# Patient Record
Sex: FEMALE | Race: WHITE | HISPANIC OR LATINO | URBAN - METROPOLITAN AREA
[De-identification: names, ages, dates, MRNs, and addresses within clinical notes are randomized per-mention and may not be internally consistent; named-entity substitution may affect disease eponyms.]

---

## 2018-10-19 ENCOUNTER — INPATIENT (INPATIENT)
Facility: HOSPITAL | Age: 78
LOS: 1 days | Discharge: HOME | DRG: 330 | End: 2018-10-21
Attending: SURGERY | Admitting: SURGERY
Payer: MEDICARE

## 2018-10-19 ENCOUNTER — RESULT REVIEW (OUTPATIENT)
Age: 78
End: 2018-10-19

## 2018-10-19 VITALS
DIASTOLIC BLOOD PRESSURE: 77 MMHG | SYSTOLIC BLOOD PRESSURE: 112 MMHG | HEART RATE: 80 BPM | OXYGEN SATURATION: 95 % | TEMPERATURE: 97 F | RESPIRATION RATE: 20 BRPM | WEIGHT: 139.99 LBS

## 2018-10-19 DIAGNOSIS — Z98.890 OTHER SPECIFIED POSTPROCEDURAL STATES: Chronic | ICD-10-CM

## 2018-10-19 LAB
ALBUMIN SERPL ELPH-MCNC: 4.6 G/DL — SIGNIFICANT CHANGE UP (ref 3.3–5)
ALP SERPL-CCNC: 86 U/L — SIGNIFICANT CHANGE UP (ref 40–120)
ALT FLD-CCNC: 19 U/L — SIGNIFICANT CHANGE UP (ref 10–45)
ANION GAP SERPL CALC-SCNC: 16 MMOL/L — SIGNIFICANT CHANGE UP (ref 5–17)
APTT BLD: 22.5 SEC — LOW (ref 27.5–37.4)
AST SERPL-CCNC: 18 U/L — SIGNIFICANT CHANGE UP (ref 10–40)
BASOPHILS NFR BLD AUTO: 0.3 % — SIGNIFICANT CHANGE UP (ref 0–2)
BILIRUB SERPL-MCNC: 1.1 MG/DL — SIGNIFICANT CHANGE UP (ref 0.2–1.2)
BLD GP AB SCN SERPL QL: NEGATIVE — SIGNIFICANT CHANGE UP
BUN SERPL-MCNC: 67 MG/DL — HIGH (ref 7–23)
CALCIUM SERPL-MCNC: 10.3 MG/DL — SIGNIFICANT CHANGE UP (ref 8.4–10.5)
CHLORIDE SERPL-SCNC: 74 MMOL/L — LOW (ref 96–108)
CO2 SERPL-SCNC: 39 MMOL/L — HIGH (ref 22–31)
CREAT SERPL-MCNC: 1.79 MG/DL — HIGH (ref 0.5–1.3)
EOSINOPHIL NFR BLD AUTO: 0.1 % — SIGNIFICANT CHANGE UP (ref 0–6)
GLUCOSE SERPL-MCNC: 177 MG/DL — HIGH (ref 70–99)
HCT VFR BLD CALC: 44.5 % — SIGNIFICANT CHANGE UP (ref 34.5–45)
HGB BLD-MCNC: 15.4 G/DL — SIGNIFICANT CHANGE UP (ref 11.5–15.5)
INR BLD: 0.96 — SIGNIFICANT CHANGE UP (ref 0.88–1.16)
LACTATE SERPL-SCNC: 1.8 MMOL/L — SIGNIFICANT CHANGE UP (ref 0.5–2)
LIDOCAIN IGE QN: 169 U/L — HIGH (ref 7–60)
LYMPHOCYTES # BLD AUTO: 12 % — LOW (ref 13–44)
MAGNESIUM SERPL-MCNC: 3.1 MG/DL — HIGH (ref 1.6–2.6)
MCHC RBC-ENTMCNC: 30.6 PG — SIGNIFICANT CHANGE UP (ref 27–34)
MCHC RBC-ENTMCNC: 34.6 G/DL — SIGNIFICANT CHANGE UP (ref 32–36)
MCV RBC AUTO: 88.5 FL — SIGNIFICANT CHANGE UP (ref 80–100)
MONOCYTES NFR BLD AUTO: 16.4 % — HIGH (ref 2–14)
NEUTROPHILS NFR BLD AUTO: 71.2 % — SIGNIFICANT CHANGE UP (ref 43–77)
PLATELET # BLD AUTO: 270 K/UL — SIGNIFICANT CHANGE UP (ref 150–400)
POTASSIUM SERPL-MCNC: 3.6 MMOL/L — SIGNIFICANT CHANGE UP (ref 3.5–5.3)
POTASSIUM SERPL-SCNC: 3.6 MMOL/L — SIGNIFICANT CHANGE UP (ref 3.5–5.3)
PROT SERPL-MCNC: 7.6 G/DL — SIGNIFICANT CHANGE UP (ref 6–8.3)
PROTHROM AB SERPL-ACNC: 10.7 SEC — SIGNIFICANT CHANGE UP (ref 9.8–12.7)
RBC # BLD: 5.03 M/UL — SIGNIFICANT CHANGE UP (ref 3.8–5.2)
RBC # FLD: 12.9 % — SIGNIFICANT CHANGE UP (ref 10.3–16.9)
RH IG SCN BLD-IMP: POSITIVE — SIGNIFICANT CHANGE UP
RH IG SCN BLD-IMP: POSITIVE — SIGNIFICANT CHANGE UP
SODIUM SERPL-SCNC: 129 MMOL/L — LOW (ref 135–145)
WBC # BLD: 7.9 K/UL — SIGNIFICANT CHANGE UP (ref 3.8–10.5)
WBC # FLD AUTO: 7.9 K/UL — SIGNIFICANT CHANGE UP (ref 3.8–10.5)

## 2018-10-19 PROCEDURE — 74176 CT ABD & PELVIS W/O CONTRAST: CPT | Mod: 26

## 2018-10-19 PROCEDURE — 99285 EMERGENCY DEPT VISIT HI MDM: CPT | Mod: 25

## 2018-10-19 PROCEDURE — 71045 X-RAY EXAM CHEST 1 VIEW: CPT | Mod: 26,76

## 2018-10-19 PROCEDURE — 93010 ELECTROCARDIOGRAM REPORT: CPT

## 2018-10-19 RX ORDER — ONDANSETRON 8 MG/1
4 TABLET, FILM COATED ORAL ONCE
Qty: 0 | Refills: 0 | Status: COMPLETED | OUTPATIENT
Start: 2018-10-19 | End: 2018-10-19

## 2018-10-19 RX ORDER — HYDROMORPHONE HYDROCHLORIDE 2 MG/ML
0.5 INJECTION INTRAMUSCULAR; INTRAVENOUS; SUBCUTANEOUS
Qty: 0 | Refills: 0 | Status: DISCONTINUED | OUTPATIENT
Start: 2018-10-19 | End: 2018-10-21

## 2018-10-19 RX ORDER — HEPARIN SODIUM 5000 [USP'U]/ML
5000 INJECTION INTRAVENOUS; SUBCUTANEOUS EVERY 8 HOURS
Qty: 0 | Refills: 0 | Status: DISCONTINUED | OUTPATIENT
Start: 2018-10-19 | End: 2018-10-21

## 2018-10-19 RX ORDER — IOHEXOL 300 MG/ML
30 INJECTION, SOLUTION INTRAVENOUS ONCE
Qty: 0 | Refills: 0 | Status: COMPLETED | OUTPATIENT
Start: 2018-10-19 | End: 2018-10-19

## 2018-10-19 RX ORDER — ONDANSETRON 8 MG/1
4 TABLET, FILM COATED ORAL EVERY 6 HOURS
Qty: 0 | Refills: 0 | Status: DISCONTINUED | OUTPATIENT
Start: 2018-10-19 | End: 2018-10-19

## 2018-10-19 RX ORDER — POTASSIUM CHLORIDE 20 MEQ
10 PACKET (EA) ORAL
Qty: 0 | Refills: 0 | Status: COMPLETED | OUTPATIENT
Start: 2018-10-19 | End: 2018-10-19

## 2018-10-19 RX ORDER — SODIUM CHLORIDE 9 MG/ML
1000 INJECTION, SOLUTION INTRAVENOUS
Qty: 0 | Refills: 0 | Status: DISCONTINUED | OUTPATIENT
Start: 2018-10-19 | End: 2018-10-19

## 2018-10-19 RX ORDER — SODIUM CHLORIDE 9 MG/ML
1000 INJECTION INTRAMUSCULAR; INTRAVENOUS; SUBCUTANEOUS ONCE
Qty: 0 | Refills: 0 | Status: COMPLETED | OUTPATIENT
Start: 2018-10-19 | End: 2018-10-19

## 2018-10-19 RX ORDER — SODIUM CHLORIDE 9 MG/ML
1000 INJECTION, SOLUTION INTRAVENOUS
Qty: 0 | Refills: 0 | Status: DISCONTINUED | OUTPATIENT
Start: 2018-10-19 | End: 2018-10-21

## 2018-10-19 RX ADMIN — ONDANSETRON 4 MILLIGRAM(S): 8 TABLET, FILM COATED ORAL at 02:53

## 2018-10-19 RX ADMIN — Medication 100 MILLIEQUIVALENT(S): at 08:02

## 2018-10-19 RX ADMIN — HYDROMORPHONE HYDROCHLORIDE 0.5 MILLIGRAM(S): 2 INJECTION INTRAMUSCULAR; INTRAVENOUS; SUBCUTANEOUS at 13:05

## 2018-10-19 RX ADMIN — ONDANSETRON 4 MILLIGRAM(S): 8 TABLET, FILM COATED ORAL at 06:07

## 2018-10-19 RX ADMIN — HYDROMORPHONE HYDROCHLORIDE 0.5 MILLIGRAM(S): 2 INJECTION INTRAMUSCULAR; INTRAVENOUS; SUBCUTANEOUS at 16:10

## 2018-10-19 RX ADMIN — HEPARIN SODIUM 5000 UNIT(S): 5000 INJECTION INTRAVENOUS; SUBCUTANEOUS at 22:06

## 2018-10-19 RX ADMIN — SODIUM CHLORIDE 1000 MILLILITER(S): 9 INJECTION INTRAMUSCULAR; INTRAVENOUS; SUBCUTANEOUS at 06:51

## 2018-10-19 RX ADMIN — SODIUM CHLORIDE 110 MILLILITER(S): 9 INJECTION, SOLUTION INTRAVENOUS at 12:41

## 2018-10-19 RX ADMIN — HYDROMORPHONE HYDROCHLORIDE 0.5 MILLIGRAM(S): 2 INJECTION INTRAMUSCULAR; INTRAVENOUS; SUBCUTANEOUS at 16:36

## 2018-10-19 RX ADMIN — HYDROMORPHONE HYDROCHLORIDE 0.5 MILLIGRAM(S): 2 INJECTION INTRAMUSCULAR; INTRAVENOUS; SUBCUTANEOUS at 12:40

## 2018-10-19 RX ADMIN — IOHEXOL 30 MILLILITER(S): 300 INJECTION, SOLUTION INTRAVENOUS at 02:53

## 2018-10-19 RX ADMIN — SODIUM CHLORIDE 1000 MILLILITER(S): 9 INJECTION INTRAMUSCULAR; INTRAVENOUS; SUBCUTANEOUS at 02:52

## 2018-10-19 NOTE — H&P ADULT - NSHPPHYSICALEXAM_GEN_ALL_CORE
Gen: Awake, Alert, oriented  Resp: non labored breathing  Abdomen:  Soft, Distended, Not tender  Femoral hernia with incarcerated bowel, erythema in overlying skin

## 2018-10-19 NOTE — H&P ADULT - NSHPREVIEWOFSYSTEMS_GEN_ALL_CORE
Gen: No headache, no blurry vision  Chest: No cough, No SOB  CVS: No chest pain, No palpitations  GI/: as mentioned above

## 2018-10-19 NOTE — H&P ADULT - NSHPLABSRESULTS_GEN_ALL_CORE
15.4   7.9   )-----------( 270      ( 19 Oct 2018 02:43 )             44.5   10-19    129<L>  |  74<L>  |  67<H>  ----------------------------<  177<H>  3.6   |  39<H>  |  1.79<H>    Ca    10.3      19 Oct 2018 02:43  Mg     3.1     10-19    TPro  7.6  /  Alb  4.6  /  TBili  1.1  /  DBili  x   /  AST  18  /  ALT  19  /  AlkPhos  86  10-19

## 2018-10-19 NOTE — PROGRESS NOTE ADULT - ASSESSMENT
A/P: 78y Female s/p above procedure  Diet: NPO  IVF: LR  NGT  mujica  Pain/nausea control  SQH/SCDs/OOBA/IS  Dispo pending pain control, PO tolerance, clinical improvement

## 2018-10-19 NOTE — H&P ADULT - ASSESSMENT
78y, present with 7 days history of Obstipation, Nausea and vomiting, Labs showing CASSANDRA 1.79, WBC 7.9, Admitted with Incarcerated groin hernia, Trial of bedside reduction failed for hernia reduction, , possible bowel resection and anastomosis.    Plan:  - NPO  - IV fluid LR 100ml/hr  - NG tube, 2200ml of Bile on insertion  - Added on to OR for surgery with   - Follow up CXR  - Post op care  - Resume Atenolol 25mg Po Once daily after surgery  - Seen and Examined with the Chief resident

## 2018-10-19 NOTE — H&P ADULT - HISTORY OF PRESENT ILLNESS
78 Year old, History of arrythmia (S/p ablation on Atenolol), Present with constipation of 1 week duration, last BM was Thursday 10/11 and was loose, 1 episode of pain on Saturday, generalized, crampy that improved on it's on and multiple episode of non bloody vomiting that didn't improve, 78 Year old, History of arrythmia (S/p ablation on Atenolol), Present with constipation of 1 week duration, last BM was Thursday 10/11 and was loose, No Stool or gas since, 1 episode of pain on Saturday, generalized, crampy that improved on it's on and multiple episode of non bloody vomiting. patient couldn't keep food down, she thought it's just constipation and tried suppositories but it didn't work. She denies any fever, She had recent weight loss of 30lbs, no hematuria or dysuria

## 2018-10-19 NOTE — BRIEF OPERATIVE NOTE - OPERATION/FINDINGS
Right groin cutdown to inguinal ligament performed. No inguinal hernia defect appreciated. Right femoral defect with small (5cm) segment of gangrenous small intestine incarcerated and inspected. Aforementioned segment resected with primary stapled anastomosis (linear DONNIE). Edmond repair performed.

## 2018-10-19 NOTE — PROGRESS NOTE ADULT - SUBJECTIVE AND OBJECTIVE BOX
POST-OPERATIVE NOTE    Procedure: right femoral hernia repair with small bowel resection    Diagnosis/Indication: incarcerated right femoral hernia    Surgeon: Dr. Smith    S: Pt has no complaints. Denies CP, SOB, HARRISON, calf tenderness. Pain controlled with medication. Denies nausea, vomiting.    O:  T(C): 36.2 (10-19-18 @ 13:00), Max: 36.4 (10-19-18 @ 11:25)  T(F): 97.2 (10-19-18 @ 13:00), Max: 97.5 (10-19-18 @ 11:25)  HR: 68 (10-19-18 @ 13:00) (60 - 68)  BP: 154/86 (10-19-18 @ 13:00) (153/81 - 163/89)  RR: 19 (10-19-18 @ 13:00) (12 - 29)  SpO2: 100% (10-19-18 @ 13:00) (97% - 100%)  Wt(kg): --                        15.4   7.9   )-----------( 270      ( 19 Oct 2018 02:43 )             44.5     10-19    129<L>  |  74<L>  |  67<H>  ----------------------------<  177<H>  3.6   |  39<H>  |  1.79<H>    Ca    10.3      19 Oct 2018 02:43  Mg     3.1     10-19    TPro  7.6  /  Alb  4.6  /  TBili  1.1  /  DBili  x   /  AST  18  /  ALT  19  /  AlkPhos  86  10-19      Gen: NAD, resting comfortably in bed  C/V: NSR  Pulm: Nonlabored breathing, no respiratory distress  Abd: soft, NT/ND. right groin incision clean, dry, and intact. mujica in place.   Extrem: WWP, no calf edema or tenderness, SCDs in place

## 2018-10-19 NOTE — BRIEF OPERATIVE NOTE - PROCEDURE
<<-----Click on this checkbox to enter Procedure Small bowel resection with anastomosis  10/19/2018    Active  BBASSIRITE  Femoral hernia repair  10/19/2018    Active  BBASSIRITE

## 2018-10-19 NOTE — ED PROVIDER NOTE - OBJECTIVE STATEMENT
78F hx afib (s/p ablation), c/o vomiting. pt states a week ago developed loose watery nonbloody diarrhea. states had diarrhea for 3 days.  states then developed abd pain and vomiting.  states persistent vomiting.  no fevers. no abd pain currently.  pt also states has not had a bowel movement in a week.  tried peptobismol, however made abd pain worse.  no sick contacts, no recent travel.

## 2018-10-19 NOTE — ED ADULT NURSE NOTE - NSIMPLEMENTINTERV_GEN_ALL_ED
Implemented All Universal Safety Interventions:  [place] to call system. Call bell, personal items and telephone within reach. Instruct patient to call for assistance. Room bathroom lighting operational. Non-slip footwear when patient is off stretcher. Physically safe environment: no spills, clutter or unnecessary equipment. Stretcher in lowest position, wheels locked, appropriate side rails in place.

## 2018-10-19 NOTE — ED ADULT TRIAGE NOTE - ARRIVAL INFO ADDITIONAL COMMENTS
pt reports diarrhea started 8 days ago after eating chicken, lasted for 3 days, since then, has not had BM in 7 days. also c/o nausea, vomiting bile, unable to tolerate PO for several days. feeling lightheaded. denies abd pain. denies abd surgeries

## 2018-10-19 NOTE — H&P ADULT - PMH
Atrial fibrillation, unspecified type  Unspecified Atrial arrhthymia, S/p ablation on Bblocker  Dyslipidemia    Hypertension

## 2018-10-20 LAB
ANION GAP SERPL CALC-SCNC: 10 MMOL/L — SIGNIFICANT CHANGE UP (ref 5–17)
BUN SERPL-MCNC: 31 MG/DL — HIGH (ref 7–23)
CALCIUM SERPL-MCNC: 9.1 MG/DL — SIGNIFICANT CHANGE UP (ref 8.4–10.5)
CHLORIDE SERPL-SCNC: 89 MMOL/L — LOW (ref 96–108)
CO2 SERPL-SCNC: 36 MMOL/L — HIGH (ref 22–31)
CREAT SERPL-MCNC: 0.99 MG/DL — SIGNIFICANT CHANGE UP (ref 0.5–1.3)
GLUCOSE SERPL-MCNC: 95 MG/DL — SIGNIFICANT CHANGE UP (ref 70–99)
HCT VFR BLD CALC: 41.7 % — SIGNIFICANT CHANGE UP (ref 34.5–45)
HGB BLD-MCNC: 13.5 G/DL — SIGNIFICANT CHANGE UP (ref 11.5–15.5)
MAGNESIUM SERPL-MCNC: 2.4 MG/DL — SIGNIFICANT CHANGE UP (ref 1.6–2.6)
MCHC RBC-ENTMCNC: 29.9 PG — SIGNIFICANT CHANGE UP (ref 27–34)
MCHC RBC-ENTMCNC: 32.4 G/DL — SIGNIFICANT CHANGE UP (ref 32–36)
MCV RBC AUTO: 92.3 FL — SIGNIFICANT CHANGE UP (ref 80–100)
PHOSPHATE SERPL-MCNC: 2.8 MG/DL — SIGNIFICANT CHANGE UP (ref 2.5–4.5)
PLATELET # BLD AUTO: 225 K/UL — SIGNIFICANT CHANGE UP (ref 150–400)
POTASSIUM SERPL-MCNC: 4.3 MMOL/L — SIGNIFICANT CHANGE UP (ref 3.5–5.3)
POTASSIUM SERPL-SCNC: 4.3 MMOL/L — SIGNIFICANT CHANGE UP (ref 3.5–5.3)
RBC # BLD: 4.52 M/UL — SIGNIFICANT CHANGE UP (ref 3.8–5.2)
RBC # FLD: 13.1 % — SIGNIFICANT CHANGE UP (ref 10.3–16.9)
SODIUM SERPL-SCNC: 135 MMOL/L — SIGNIFICANT CHANGE UP (ref 135–145)
WBC # BLD: 11.4 K/UL — HIGH (ref 3.8–10.5)
WBC # FLD AUTO: 11.4 K/UL — HIGH (ref 3.8–10.5)

## 2018-10-20 RX ADMIN — HEPARIN SODIUM 5000 UNIT(S): 5000 INJECTION INTRAVENOUS; SUBCUTANEOUS at 14:11

## 2018-10-20 RX ADMIN — SODIUM CHLORIDE 110 MILLILITER(S): 9 INJECTION, SOLUTION INTRAVENOUS at 03:33

## 2018-10-20 RX ADMIN — HEPARIN SODIUM 5000 UNIT(S): 5000 INJECTION INTRAVENOUS; SUBCUTANEOUS at 21:37

## 2018-10-20 RX ADMIN — HEPARIN SODIUM 5000 UNIT(S): 5000 INJECTION INTRAVENOUS; SUBCUTANEOUS at 05:23

## 2018-10-20 NOTE — PROGRESS NOTE ADULT - ASSESSMENT
78y Female s/p above procedure    Diet: NPO/sips  IVF: LR  d/c NGT  d/c WENDI mujica  Pain/nausea control  SQH/SCDs/OOBA/IS  Dispo pending pain control, PO tolerance, clinical improvement

## 2018-10-20 NOTE — PROGRESS NOTE ADULT - SUBJECTIVE AND OBJECTIVE BOX
S: Patient examined bedside. Denies complaints. States pain is well controlled. Denies passing flatus or BMs. NGT w/ minimal output.     O:     Vital Signs Last 24 Hrs  T(C): 36.9 (20 Oct 2018 12:58), Max: 37.1 (20 Oct 2018 08:09)  T(F): 98.5 (20 Oct 2018 12:58), Max: 98.7 (20 Oct 2018 08:09)  HR: 75 (20 Oct 2018 12:58) (63 - 81)  BP: 136/98 (20 Oct 2018 12:58) (136/98 - 154/77)  BP(mean): 106 (19 Oct 2018 15:00) (106 - 106)  RR: 19 (20 Oct 2018 12:58) (15 - 19)  SpO2: 96% (20 Oct 2018 12:58) (96% - 100%)  I&O's Summary    19 Oct 2018 07:01  -  20 Oct 2018 07:00  --------------------------------------------------------  IN: 1430 mL / OUT: 2240 mL / NET: -810 mL    20 Oct 2018 07:01  -  20 Oct 2018 14:15  --------------------------------------------------------  IN: 880 mL / OUT: 100 mL / NET: 780 mL      Gen: NAD, resting comfortably in bed  C/V: NSR  Pulm: Nonlabored breathing, no respiratory distress  Abd: soft, NT/ND. right groin incision clean, dry, and intact. mujica in place.   Extrem: WWP, no calf edema or tenderness, SCDs in place                          13.5   11.4  )-----------( 225      ( 20 Oct 2018 07:39 )             41.7   10-20    135  |  89<L>  |  31<H>  ----------------------------<  95  4.3   |  36<H>  |  0.99    Ca    9.1      20 Oct 2018 07:39  Phos  2.8     10-20  Mg     2.4     10-20    TPro  7.6  /  Alb  4.6  /  TBili  1.1  /  DBili  x   /  AST  18  /  ALT  19  /  AlkPhos  86  10-19  LIVER FUNCTIONS - ( 19 Oct 2018 02:43 )  Alb: 4.6 g/dL / Pro: 7.6 g/dL / ALK PHOS: 86 U/L / ALT: 19 U/L / AST: 18 U/L / GGT: x

## 2018-10-20 NOTE — PROGRESS NOTE ADULT - SUBJECTIVE AND OBJECTIVE BOX
POD#1 Doing well, no compl, OOB, abdomen is soft, ND, tender over incision line. Cont NPO, IVF, OOB.

## 2018-10-21 ENCOUNTER — TRANSCRIPTION ENCOUNTER (OUTPATIENT)
Age: 78
End: 2018-10-21

## 2018-10-21 VITALS
OXYGEN SATURATION: 97 % | RESPIRATION RATE: 17 BRPM | DIASTOLIC BLOOD PRESSURE: 74 MMHG | HEART RATE: 85 BPM | SYSTOLIC BLOOD PRESSURE: 120 MMHG | TEMPERATURE: 98 F

## 2018-10-21 LAB
ANION GAP SERPL CALC-SCNC: 14 MMOL/L — SIGNIFICANT CHANGE UP (ref 5–17)
BUN SERPL-MCNC: 20 MG/DL — SIGNIFICANT CHANGE UP (ref 7–23)
CALCIUM SERPL-MCNC: 8.5 MG/DL — SIGNIFICANT CHANGE UP (ref 8.4–10.5)
CHLORIDE SERPL-SCNC: 94 MMOL/L — LOW (ref 96–108)
CO2 SERPL-SCNC: 27 MMOL/L — SIGNIFICANT CHANGE UP (ref 22–31)
CREAT SERPL-MCNC: 0.67 MG/DL — SIGNIFICANT CHANGE UP (ref 0.5–1.3)
GLUCOSE SERPL-MCNC: 62 MG/DL — LOW (ref 70–99)
HCT VFR BLD CALC: 37.8 % — SIGNIFICANT CHANGE UP (ref 34.5–45)
HGB BLD-MCNC: 12.2 G/DL — SIGNIFICANT CHANGE UP (ref 11.5–15.5)
MAGNESIUM SERPL-MCNC: 1.9 MG/DL — SIGNIFICANT CHANGE UP (ref 1.6–2.6)
MCHC RBC-ENTMCNC: 30 PG — SIGNIFICANT CHANGE UP (ref 27–34)
MCHC RBC-ENTMCNC: 32.3 G/DL — SIGNIFICANT CHANGE UP (ref 32–36)
MCV RBC AUTO: 93.1 FL — SIGNIFICANT CHANGE UP (ref 80–100)
PHOSPHATE SERPL-MCNC: 1.4 MG/DL — LOW (ref 2.5–4.5)
PLATELET # BLD AUTO: 187 K/UL — SIGNIFICANT CHANGE UP (ref 150–400)
POTASSIUM SERPL-MCNC: 3.5 MMOL/L — SIGNIFICANT CHANGE UP (ref 3.5–5.3)
POTASSIUM SERPL-SCNC: 3.5 MMOL/L — SIGNIFICANT CHANGE UP (ref 3.5–5.3)
RBC # BLD: 4.06 M/UL — SIGNIFICANT CHANGE UP (ref 3.8–5.2)
RBC # FLD: 12.9 % — SIGNIFICANT CHANGE UP (ref 10.3–16.9)
SODIUM SERPL-SCNC: 135 MMOL/L — SIGNIFICANT CHANGE UP (ref 135–145)
WBC # BLD: 9.9 K/UL — SIGNIFICANT CHANGE UP (ref 3.8–10.5)
WBC # FLD AUTO: 9.9 K/UL — SIGNIFICANT CHANGE UP (ref 3.8–10.5)

## 2018-10-21 RX ORDER — ACETAMINOPHEN 500 MG
650 TABLET ORAL EVERY 6 HOURS
Qty: 0 | Refills: 0 | Status: DISCONTINUED | OUTPATIENT
Start: 2018-10-21 | End: 2018-10-21

## 2018-10-21 RX ORDER — OXYCODONE AND ACETAMINOPHEN 5; 325 MG/1; MG/1
1 TABLET ORAL EVERY 6 HOURS
Qty: 0 | Refills: 0 | Status: DISCONTINUED | OUTPATIENT
Start: 2018-10-21 | End: 2018-10-21

## 2018-10-21 RX ADMIN — SODIUM CHLORIDE 110 MILLILITER(S): 9 INJECTION, SOLUTION INTRAVENOUS at 05:03

## 2018-10-21 RX ADMIN — HEPARIN SODIUM 5000 UNIT(S): 5000 INJECTION INTRAVENOUS; SUBCUTANEOUS at 05:03

## 2018-10-21 NOTE — PROGRESS NOTE ADULT - SUBJECTIVE AND OBJECTIVE BOX
SUBJECTIVE: No acute events overnight. +flatus/BM. Denies n/v.       Vital Signs Last 24 Hrs  T(C): 37.2 (21 Oct 2018 08:34), Max: 37.4 (20 Oct 2018 20:50)  T(F): 99 (21 Oct 2018 08:34), Max: 99.4 (20 Oct 2018 20:50)  HR: 74 (21 Oct 2018 08:34) (74 - 92)  BP: 152/92 (21 Oct 2018 08:34) (136/88 - 152/92)  BP(mean): --  RR: 17 (21 Oct 2018 08:34) (16 - 19)  SpO2: 98% (21 Oct 2018 08:34) (96% - 99%)    General: NAD, resting comfortably in bed  Pulm: Nonlabored breathing, no respiratory distress  Abd: soft, NT/ND, incision cdi      LABS:                        12.2   9.9   )-----------( 187      ( 21 Oct 2018 07:26 )             37.8     10-21    135  |  94<L>  |  20  ----------------------------<  62<L>  3.5   |  27  |  0.67    Ca    8.5      21 Oct 2018 07:26  Phos  1.4     10-21  Mg     1.9     10-21

## 2018-10-21 NOTE — DISCHARGE NOTE ADULT - PLAN OF CARE
Recovery Follow up with Dr. Smith in 1-2 weeks. Call the office to schedule your appointment. You may shower; soap and water over incision sites. Do not scrub. Pat dry when done. No tub bathing or swimming until cleared. Keep incision sites out of the sun as scars will darken. Ambulate as tolerated, but no heavy lifting (>10lbs) or strenuous exercise. You may resume regular diet. You should be urinating at least 3-4x per day. Call the office if you experience increasing pain, abdominal pain, nausea, vomiting, or temperature >101.4F.

## 2018-10-21 NOTE — PROGRESS NOTE ADULT - ASSESSMENT
77yo F with PMH of arrythmia (s/p ablation on Atenolol) s/p femoral hernia Edmond repair with SBR (10/19)    - SQH  - CLD  - IVF  - Ice pack to groin

## 2018-10-21 NOTE — DISCHARGE NOTE ADULT - HOSPITAL COURSE
78 Year old, History of arrythmia (S/p ablation on Atenolol), Present with constipation of 1 week duration, last BM was Thursday 10/11 and was loose, No Stool or gas since, 1 episode of pain on Saturday, generalized, crampy that improved on it's on and multiple episode of non bloody vomiting. Patient couldn't keep food down, she thought it's just constipation and tried suppositories but it didn't work. She denies any fever, She had recent weight loss of 30lbs, no hematuria or dysuria.    Patient underwent open femoral hernia repair with small bowel resection. Patient tolerated procedure. Patient had ROBF and tolerated PO intake. Patient's clinical condition improved during hospital stay. Patient was hemodynamically stable, afebrile, and with a plan for followup upon discharge.

## 2018-10-21 NOTE — DISCHARGE NOTE ADULT - CARE PLAN
Principal Discharge DX:	Non-recurrent unilateral femoral hernia with gangrene  Goal:	Recovery  Assessment and plan of treatment:	Follow up with Dr. Smith in 1-2 weeks. Call the office to schedule your appointment. You may shower; soap and water over incision sites. Do not scrub. Pat dry when done. No tub bathing or swimming until cleared. Keep incision sites out of the sun as scars will darken. Ambulate as tolerated, but no heavy lifting (>10lbs) or strenuous exercise. You may resume regular diet. You should be urinating at least 3-4x per day. Call the office if you experience increasing pain, abdominal pain, nausea, vomiting, or temperature >101.4F.

## 2018-10-21 NOTE — PROGRESS NOTE ADULT - REASON FOR ADMISSION
Incarcerated femoral hernia

## 2018-10-21 NOTE — DISCHARGE NOTE ADULT - MEDICATION SUMMARY - MEDICATIONS TO TAKE
I will START or STAY ON the medications listed below when I get home from the hospital:    Percocet 5/325 oral tablet  -- 1 tab(s) by mouth every 6 hours, As Needed -for severe pain MDD:4   -- Caution federal law prohibits the transfer of this drug to any person other  than the person for whom it was prescribed.  May cause drowsiness.  Alcohol may intensify this effect.  Use care when operating dangerous machinery.  This prescription cannot be refilled.  This product contains acetaminophen.  Do not use  with any other product containing acetaminophen to prevent possible liver damage.  Using more of this medication than prescribed may cause serious breathing problems.    -- Indication: For Severe pain

## 2018-10-23 LAB — SURGICAL PATHOLOGY STUDY: SIGNIFICANT CHANGE UP

## 2018-10-25 DIAGNOSIS — N17.9 ACUTE KIDNEY FAILURE, UNSPECIFIED: ICD-10-CM

## 2018-10-25 DIAGNOSIS — K41.40 UNILATERAL FEMORAL HERNIA, WITH GANGRENE, NOT SPECIFIED AS RECURRENT: ICD-10-CM

## 2018-10-25 DIAGNOSIS — I48.91 UNSPECIFIED ATRIAL FIBRILLATION: ICD-10-CM

## 2018-10-25 DIAGNOSIS — K56.609 UNSPECIFIED INTESTINAL OBSTRUCTION, UNSPECIFIED AS TO PARTIAL VERSUS COMPLETE OBSTRUCTION: ICD-10-CM

## 2018-10-25 DIAGNOSIS — E78.5 HYPERLIPIDEMIA, UNSPECIFIED: ICD-10-CM

## 2018-10-25 DIAGNOSIS — I10 ESSENTIAL (PRIMARY) HYPERTENSION: ICD-10-CM

## 2018-10-26 ENCOUNTER — EMERGENCY (EMERGENCY)
Facility: HOSPITAL | Age: 78
LOS: 1 days | Discharge: HOME | End: 2018-10-26
Attending: EMERGENCY MEDICINE | Admitting: EMERGENCY MEDICINE
Payer: MEDICARE

## 2018-10-26 VITALS
OXYGEN SATURATION: 99 % | SYSTOLIC BLOOD PRESSURE: 140 MMHG | HEART RATE: 82 BPM | TEMPERATURE: 98 F | DIASTOLIC BLOOD PRESSURE: 89 MMHG | RESPIRATION RATE: 18 BRPM

## 2018-10-26 VITALS
HEART RATE: 91 BPM | OXYGEN SATURATION: 99 % | SYSTOLIC BLOOD PRESSURE: 142 MMHG | RESPIRATION RATE: 18 BRPM | DIASTOLIC BLOOD PRESSURE: 94 MMHG | TEMPERATURE: 98 F

## 2018-10-26 DIAGNOSIS — Z79.891 LONG TERM (CURRENT) USE OF OPIATE ANALGESIC: ICD-10-CM

## 2018-10-26 DIAGNOSIS — K91.840 POSTPROCEDURAL HEMORRHAGE OF A DIGESTIVE SYSTEM ORGAN OR STRUCTURE FOLLOWING A DIGESTIVE SYSTEM PROCEDURE: ICD-10-CM

## 2018-10-26 DIAGNOSIS — K56.609 UNSPECIFIED INTESTINAL OBSTRUCTION, UNSPECIFIED AS TO PARTIAL VERSUS COMPLETE OBSTRUCTION: Chronic | ICD-10-CM

## 2018-10-26 DIAGNOSIS — Z98.890 OTHER SPECIFIED POSTPROCEDURAL STATES: Chronic | ICD-10-CM

## 2018-10-26 PROBLEM — I48.91 UNSPECIFIED ATRIAL FIBRILLATION: Chronic | Status: ACTIVE | Noted: 2018-10-19

## 2018-10-26 LAB
ALBUMIN SERPL ELPH-MCNC: 3.5 G/DL — SIGNIFICANT CHANGE UP (ref 3.3–5)
ALP SERPL-CCNC: 97 U/L — SIGNIFICANT CHANGE UP (ref 40–120)
ALT FLD-CCNC: 58 U/L — HIGH (ref 10–45)
ANION GAP SERPL CALC-SCNC: 12 MMOL/L — SIGNIFICANT CHANGE UP (ref 5–17)
APPEARANCE UR: CLEAR — SIGNIFICANT CHANGE UP
APTT BLD: 25.6 SEC — LOW (ref 27.5–37.4)
AST SERPL-CCNC: 34 U/L — SIGNIFICANT CHANGE UP (ref 10–40)
BASOPHILS NFR BLD AUTO: 0.3 % — SIGNIFICANT CHANGE UP (ref 0–2)
BILIRUB SERPL-MCNC: 0.8 MG/DL — SIGNIFICANT CHANGE UP (ref 0.2–1.2)
BILIRUB UR-MCNC: NEGATIVE — SIGNIFICANT CHANGE UP
BUN SERPL-MCNC: 9 MG/DL — SIGNIFICANT CHANGE UP (ref 7–23)
CALCIUM SERPL-MCNC: 9 MG/DL — SIGNIFICANT CHANGE UP (ref 8.4–10.5)
CHLORIDE SERPL-SCNC: 99 MMOL/L — SIGNIFICANT CHANGE UP (ref 96–108)
CO2 SERPL-SCNC: 29 MMOL/L — SIGNIFICANT CHANGE UP (ref 22–31)
COLOR SPEC: YELLOW — SIGNIFICANT CHANGE UP
CREAT SERPL-MCNC: 0.73 MG/DL — SIGNIFICANT CHANGE UP (ref 0.5–1.3)
DIFF PNL FLD: ABNORMAL
EOSINOPHIL NFR BLD AUTO: 0.6 % — SIGNIFICANT CHANGE UP (ref 0–6)
GLUCOSE SERPL-MCNC: 122 MG/DL — HIGH (ref 70–99)
GLUCOSE UR QL: NEGATIVE — SIGNIFICANT CHANGE UP
HCT VFR BLD CALC: 38.3 % — SIGNIFICANT CHANGE UP (ref 34.5–45)
HGB BLD-MCNC: 12.6 G/DL — SIGNIFICANT CHANGE UP (ref 11.5–15.5)
INR BLD: 1 — SIGNIFICANT CHANGE UP (ref 0.88–1.16)
KETONES UR-MCNC: NEGATIVE — SIGNIFICANT CHANGE UP
LEUKOCYTE ESTERASE UR-ACNC: ABNORMAL
LYMPHOCYTES # BLD AUTO: 12.2 % — LOW (ref 13–44)
MCHC RBC-ENTMCNC: 30.4 PG — SIGNIFICANT CHANGE UP (ref 27–34)
MCHC RBC-ENTMCNC: 32.9 G/DL — SIGNIFICANT CHANGE UP (ref 32–36)
MCV RBC AUTO: 92.3 FL — SIGNIFICANT CHANGE UP (ref 80–100)
MONOCYTES NFR BLD AUTO: 8 % — SIGNIFICANT CHANGE UP (ref 2–14)
NEUTROPHILS NFR BLD AUTO: 78.9 % — HIGH (ref 43–77)
NITRITE UR-MCNC: NEGATIVE — SIGNIFICANT CHANGE UP
PH UR: 6.5 — SIGNIFICANT CHANGE UP (ref 5–8)
PLATELET # BLD AUTO: 345 K/UL — SIGNIFICANT CHANGE UP (ref 150–400)
POTASSIUM SERPL-MCNC: 4.1 MMOL/L — SIGNIFICANT CHANGE UP (ref 3.5–5.3)
POTASSIUM SERPL-SCNC: 4.1 MMOL/L — SIGNIFICANT CHANGE UP (ref 3.5–5.3)
PROT SERPL-MCNC: 6.2 G/DL — SIGNIFICANT CHANGE UP (ref 6–8.3)
PROT UR-MCNC: NEGATIVE MG/DL — SIGNIFICANT CHANGE UP
PROTHROM AB SERPL-ACNC: 11.1 SEC — SIGNIFICANT CHANGE UP (ref 9.8–12.7)
RBC # BLD: 4.15 M/UL — SIGNIFICANT CHANGE UP (ref 3.8–5.2)
RBC # FLD: 13.4 % — SIGNIFICANT CHANGE UP (ref 10.3–16.9)
SODIUM SERPL-SCNC: 140 MMOL/L — SIGNIFICANT CHANGE UP (ref 135–145)
SP GR SPEC: <=1.005 — SIGNIFICANT CHANGE UP (ref 1–1.03)
UROBILINOGEN FLD QL: 0.2 E.U./DL — SIGNIFICANT CHANGE UP
WBC # BLD: 13.4 K/UL — HIGH (ref 3.8–10.5)
WBC # FLD AUTO: 13.4 K/UL — HIGH (ref 3.8–10.5)

## 2018-10-26 PROCEDURE — 85025 COMPLETE CBC W/AUTO DIFF WBC: CPT

## 2018-10-26 PROCEDURE — 74177 CT ABD & PELVIS W/CONTRAST: CPT

## 2018-10-26 PROCEDURE — 74177 CT ABD & PELVIS W/CONTRAST: CPT | Mod: 26

## 2018-10-26 PROCEDURE — 99284 EMERGENCY DEPT VISIT MOD MDM: CPT | Mod: 25

## 2018-10-26 PROCEDURE — 80053 COMPREHEN METABOLIC PANEL: CPT

## 2018-10-26 PROCEDURE — 85730 THROMBOPLASTIN TIME PARTIAL: CPT

## 2018-10-26 PROCEDURE — 36415 COLL VENOUS BLD VENIPUNCTURE: CPT

## 2018-10-26 PROCEDURE — 81001 URINALYSIS AUTO W/SCOPE: CPT

## 2018-10-26 PROCEDURE — 85610 PROTHROMBIN TIME: CPT

## 2018-10-26 PROCEDURE — 96374 THER/PROPH/DIAG INJ IV PUSH: CPT | Mod: XU

## 2018-10-26 RX ORDER — ONDANSETRON 8 MG/1
4 TABLET, FILM COATED ORAL ONCE
Qty: 0 | Refills: 0 | Status: COMPLETED | OUTPATIENT
Start: 2018-10-26 | End: 2018-10-26

## 2018-10-26 RX ORDER — IOHEXOL 300 MG/ML
30 INJECTION, SOLUTION INTRAVENOUS ONCE
Qty: 0 | Refills: 0 | Status: COMPLETED | OUTPATIENT
Start: 2018-10-26 | End: 2018-10-26

## 2018-10-26 RX ADMIN — Medication 1 TABLET(S): at 06:17

## 2018-10-26 RX ADMIN — IOHEXOL 30 MILLILITER(S): 300 INJECTION, SOLUTION INTRAVENOUS at 02:07

## 2018-10-26 RX ADMIN — ONDANSETRON 4 MILLIGRAM(S): 8 TABLET, FILM COATED ORAL at 02:06

## 2018-10-26 NOTE — ED ADULT NURSE REASSESSMENT NOTE - NS ED NURSE REASSESS COMMENT FT1
pt. provided with warm blanket for comfort, spouse remains at bedside, utd on poc with understanding verbalized

## 2018-10-26 NOTE — ED PROVIDER NOTE - MEDICAL DECISION MAKING DETAILS
?hematoma, ?seroma, ?postop infectious process, will check labs, CT (will d/w surg regarding best imaging modality), analgesia

## 2018-10-26 NOTE — ED ADULT NURSE REASSESSMENT NOTE - NS ED NURSE REASSESS COMMENT FT1
pt. ingesting contrast, ernestina. well, awaiting CT at approx. 0320, pt. aware, with understanding verbalized, spouse remains at bedside

## 2018-10-26 NOTE — ED PROVIDER NOTE - PHYSICAL EXAMINATION
CON: ao x 3, HENMT: clear oropharynx, soft neck, HEAD: atraumatic, CV: rrr, equal pulses b/l, RESP: cta b/l, GI: +BS, soft, nontender, no rebound, no guarding, SKIN: right inguinal area firmness w/o fluctuance or induration, surrounding mild ecchymosis, no vesicles, no lymphatic streaking, no bualle, MSK: no deformities, NEURO: no gross motor or sensory deficit

## 2018-10-26 NOTE — ED PROVIDER NOTE - OBJECTIVE STATEMENT
78 yof pw right groin bleeding from surg site, sp Right groin cutdown to inguinal ligament performed. No inguinal hernia defect appreciated. Right femoral defect with small (5cm) segment of gangrenous small intestine incarcerated and inspected.  states bleeding began the day after returning home.  no vomiting.

## 2018-10-26 NOTE — ED ADULT TRIAGE NOTE - ARRIVAL INFO ADDITIONAL COMMENTS
pt had surgery for a bowel obstruction last friday and on tuesday began bleeding from site whenever she moved.

## 2018-10-26 NOTE — ED ADULT NURSE NOTE - PMH
Atrial fibrillation, unspecified type  Unspecified Atrial arrhthymia, S/p ablation on Bblocker  Dyslipidemia    Hypertension    Small bowel obstruction

## 2018-10-26 NOTE — ED ADULT NURSE NOTE - OBJECTIVE STATEMENT
s/p abdominal sx due to SBO last Friday last week, Bleeding on the operative site on and off since Tuesday,

## 2018-10-26 NOTE — ED ADULT NURSE NOTE - NSIMPLEMENTINTERV_GEN_ALL_ED
Implemented All Universal Safety Interventions:  [place] to call system. Call bell, personal items and telephone within reach. Instruct patient to call for assistance. Room bathroom lighting operational. Non-slip footwear when patient is off stretcher. Physically safe environment: no spills, clutter or unnecessary equipment. Stretcher in lowest position, wheels locked, appropriate side rails in place.

## 2018-10-26 NOTE — CONSULT NOTE ADULT - ASSESSMENT
78 Year old Female, S/p Right femoral hernia repair, small bowel resection and reanastomosis on 10/19/2018, Present with Right inguinal incision seroma and surrounding cellulitis, Bedside drainage has been done with some purulent/serosang fluid coming out of the wound, wound culture taken    Plan:  - No acute surgical intervention  - Oral Augmentin for 10 days  - Follow up with  in 1 week  - Packing to be changed tomorrow, Patient and  were given wound care instruction and when to return to the ED  - Dispo per ED

## 2018-11-11 PROCEDURE — 85610 PROTHROMBIN TIME: CPT

## 2018-11-11 PROCEDURE — 86850 RBC ANTIBODY SCREEN: CPT

## 2018-11-11 PROCEDURE — 93005 ELECTROCARDIOGRAM TRACING: CPT

## 2018-11-11 PROCEDURE — 99285 EMERGENCY DEPT VISIT HI MDM: CPT | Mod: 25

## 2018-11-11 PROCEDURE — 84100 ASSAY OF PHOSPHORUS: CPT

## 2018-11-11 PROCEDURE — 80053 COMPREHEN METABOLIC PANEL: CPT

## 2018-11-11 PROCEDURE — 85730 THROMBOPLASTIN TIME PARTIAL: CPT

## 2018-11-11 PROCEDURE — 85025 COMPLETE CBC W/AUTO DIFF WBC: CPT

## 2018-11-11 PROCEDURE — 74176 CT ABD & PELVIS W/O CONTRAST: CPT

## 2018-11-11 PROCEDURE — 83605 ASSAY OF LACTIC ACID: CPT

## 2018-11-11 PROCEDURE — 96374 THER/PROPH/DIAG INJ IV PUSH: CPT

## 2018-11-11 PROCEDURE — 88302 TISSUE EXAM BY PATHOLOGIST: CPT

## 2018-11-11 PROCEDURE — 71045 X-RAY EXAM CHEST 1 VIEW: CPT

## 2018-11-11 PROCEDURE — 85027 COMPLETE CBC AUTOMATED: CPT

## 2018-11-11 PROCEDURE — 83735 ASSAY OF MAGNESIUM: CPT

## 2018-11-11 PROCEDURE — 88307 TISSUE EXAM BY PATHOLOGIST: CPT

## 2018-11-11 PROCEDURE — 86900 BLOOD TYPING SEROLOGIC ABO: CPT

## 2018-11-11 PROCEDURE — 80048 BASIC METABOLIC PNL TOTAL CA: CPT

## 2018-11-11 PROCEDURE — 36415 COLL VENOUS BLD VENIPUNCTURE: CPT

## 2018-11-11 PROCEDURE — 86901 BLOOD TYPING SEROLOGIC RH(D): CPT

## 2018-11-11 PROCEDURE — 83690 ASSAY OF LIPASE: CPT

## 2019-06-12 NOTE — CONSULT NOTE ADULT - SUBJECTIVE AND OBJECTIVE BOX
L arm IV infiltrated. 78 Year old Female, S/p Right femoral hernia repair, small bowel resection and reanastomosis on 10/19/2018, patient was discharged 2 days after and has been doing well till 3 days ago when she started having drainage from her incision site. She also had swelling, which was present after surgery. The drainage looks bloody and of significant amount. She denies any fever. has been tolerating PO diet and passing stool and flatus. she denies any pain    Objective:  Vital Signs Last 24 Hrs  T(C): 36.7 (26 Oct 2018 01:31), Max: 36.7 (26 Oct 2018 01:31)  T(F): 98.1 (26 Oct 2018 01:31), Max: 98.1 (26 Oct 2018 01:31)  HR: 91 (26 Oct 2018 01:31) (91 - 91)  BP: 142/94 (26 Oct 2018 01:31) (142/94 - 142/94)  BP(mean): --  RR: 18 (26 Oct 2018 01:31) (18 - 18)  SpO2: 99% (26 Oct 2018 01:31) (99% - 99%)    Gen: Awake, Alert and oriented  Resp: non labored breathing  Abdomen:  Soft, ND,NT  RLQ: inguinal incision with surrounding erythema extending around the wound   Fullness and tenderness 78 Year old Female, S/p Right femoral hernia repair, small bowel resection and reanastomosis on 10/19/2018, patient was discharged 2 days after and has been doing well till 3 days ago when she started having drainage from her incision site. She also had swelling, which was present after surgery. The drainage looks bloody and of significant amount. She denies any fever. has been tolerating PO diet and passing stool and flatus. she denies any pain    Objective:  Vital Signs Last 24 Hrs  T(C): 36.7 (26 Oct 2018 01:31), Max: 36.7 (26 Oct 2018 01:31)  T(F): 98.1 (26 Oct 2018 01:31), Max: 98.1 (26 Oct 2018 01:31)  HR: 91 (26 Oct 2018 01:31) (91 - 91)  BP: 142/94 (26 Oct 2018 01:31) (142/94 - 142/94)  BP(mean): --  RR: 18 (26 Oct 2018 01:31) (18 - 18)  SpO2: 99% (26 Oct 2018 01:31) (99% - 99%)    Gen: Awake, Alert and oriented  Resp: non labored breathing  Abdomen:  Soft, ND,NT  RLQ: inguinal incision with surrounding erythema extending around the wound   Fullness and tenderness  loose wound edges with serosanguinous fluid coming out

## 2021-02-04 NOTE — PATIENT PROFILE ADULT - NSPROSPIRITUALVALUESFT_GEN_A_NUR
Jessica Durbin was seen and treated in our emergency department on 2/4/2021. She may return to work on 02/05/2021. If you have any questions or concerns, please don't hesitate to call.  
 
 
Attending Provider: Shana Parks MD 

na

## 2022-01-01 ENCOUNTER — RESULT REVIEW (OUTPATIENT)
Age: 82
End: 2022-01-01

## 2022-01-01 ENCOUNTER — TRANSCRIPTION ENCOUNTER (OUTPATIENT)
Age: 82
End: 2022-01-01

## 2022-01-01 ENCOUNTER — APPOINTMENT (OUTPATIENT)
Dept: INFUSION THERAPY | Facility: HOSPITAL | Age: 82
End: 2022-01-01

## 2022-01-01 ENCOUNTER — APPOINTMENT (OUTPATIENT)
Dept: HEMATOLOGY ONCOLOGY | Facility: CLINIC | Age: 82
End: 2022-01-01

## 2022-01-01 ENCOUNTER — APPOINTMENT (OUTPATIENT)
Dept: PULMONOLOGY | Facility: CLINIC | Age: 82
End: 2022-01-01
Payer: MEDICARE

## 2022-01-01 ENCOUNTER — OUTPATIENT (OUTPATIENT)
Dept: OUTPATIENT SERVICES | Facility: HOSPITAL | Age: 82
LOS: 1 days | Discharge: HOME | End: 2022-01-01

## 2022-01-01 ENCOUNTER — APPOINTMENT (OUTPATIENT)
Dept: INTERNAL MEDICINE | Facility: CLINIC | Age: 82
End: 2022-01-01

## 2022-01-01 ENCOUNTER — APPOINTMENT (OUTPATIENT)
Dept: NUCLEAR MEDICINE | Facility: IMAGING CENTER | Age: 82
End: 2022-01-01

## 2022-01-01 ENCOUNTER — APPOINTMENT (OUTPATIENT)
Dept: NEUROSURGERY | Facility: HOSPITAL | Age: 82
End: 2022-01-01

## 2022-01-01 ENCOUNTER — NON-APPOINTMENT (OUTPATIENT)
Age: 82
End: 2022-01-01

## 2022-01-01 ENCOUNTER — APPOINTMENT (OUTPATIENT)
Dept: UROGYNECOLOGY | Facility: CLINIC | Age: 82
End: 2022-01-01

## 2022-01-01 ENCOUNTER — INPATIENT (INPATIENT)
Facility: HOSPITAL | Age: 82
LOS: 10 days | Discharge: NURSING, REHAB OR LONG-TERM CARE | DRG: 871 | End: 2022-11-03
Attending: INTERNAL MEDICINE | Admitting: HOSPITALIST
Payer: MEDICARE

## 2022-01-01 ENCOUNTER — INPATIENT (INPATIENT)
Facility: HOSPITAL | Age: 82
LOS: 31 days | Discharge: NURSING, REHAB OR LONG-TERM CARE | DRG: 823 | End: 2022-09-23
Attending: STUDENT IN AN ORGANIZED HEALTH CARE EDUCATION/TRAINING PROGRAM | Admitting: STUDENT IN AN ORGANIZED HEALTH CARE EDUCATION/TRAINING PROGRAM
Payer: MEDICARE

## 2022-01-01 ENCOUNTER — APPOINTMENT (OUTPATIENT)
Dept: UROGYNECOLOGY | Facility: CLINIC | Age: 82
End: 2022-01-01
Payer: MEDICARE

## 2022-01-01 ENCOUNTER — OUTPATIENT (OUTPATIENT)
Dept: OUTPATIENT SERVICES | Facility: HOSPITAL | Age: 82
LOS: 1 days | End: 2022-01-01
Payer: MEDICARE

## 2022-01-01 VITALS
TEMPERATURE: 98 F | OXYGEN SATURATION: 98 % | DIASTOLIC BLOOD PRESSURE: 90 MMHG | HEART RATE: 68 BPM | RESPIRATION RATE: 18 BRPM | SYSTOLIC BLOOD PRESSURE: 146 MMHG

## 2022-01-01 VITALS
HEART RATE: 109 BPM | OXYGEN SATURATION: 92 % | DIASTOLIC BLOOD PRESSURE: 83 MMHG | SYSTOLIC BLOOD PRESSURE: 114 MMHG | WEIGHT: 104.28 LBS | BODY MASS INDEX: 16.88 KG/M2 | TEMPERATURE: 96.8 F | RESPIRATION RATE: 84 BRPM

## 2022-01-01 VITALS
RESPIRATION RATE: 18 BRPM | HEART RATE: 108 BPM | DIASTOLIC BLOOD PRESSURE: 72 MMHG | HEIGHT: 65.91 IN | SYSTOLIC BLOOD PRESSURE: 97 MMHG | TEMPERATURE: 97.6 F

## 2022-01-01 VITALS
HEART RATE: 80 BPM | RESPIRATION RATE: 16 BRPM | OXYGEN SATURATION: 97 % | BODY MASS INDEX: 18.57 KG/M2 | SYSTOLIC BLOOD PRESSURE: 127 MMHG | TEMPERATURE: 97.9 F | DIASTOLIC BLOOD PRESSURE: 85 MMHG | WEIGHT: 114.2 LBS | HEIGHT: 65.91 IN

## 2022-01-01 VITALS — BODY MASS INDEX: 17.88 KG/M2 | WEIGHT: 110.45 LBS

## 2022-01-01 VITALS
BODY MASS INDEX: 17.48 KG/M2 | TEMPERATURE: 97.3 F | RESPIRATION RATE: 18 BRPM | HEART RATE: 111 BPM | OXYGEN SATURATION: 98 % | SYSTOLIC BLOOD PRESSURE: 100 MMHG | DIASTOLIC BLOOD PRESSURE: 70 MMHG | WEIGHT: 107.98 LBS

## 2022-01-01 VITALS
TEMPERATURE: 98 F | SYSTOLIC BLOOD PRESSURE: 133 MMHG | OXYGEN SATURATION: 97 % | HEIGHT: 68 IN | RESPIRATION RATE: 32 BRPM | HEART RATE: 110 BPM | DIASTOLIC BLOOD PRESSURE: 99 MMHG

## 2022-01-01 VITALS
TEMPERATURE: 98 F | SYSTOLIC BLOOD PRESSURE: 140 MMHG | HEART RATE: 69 BPM | WEIGHT: 110.01 LBS | DIASTOLIC BLOOD PRESSURE: 91 MMHG | RESPIRATION RATE: 18 BRPM | OXYGEN SATURATION: 96 % | HEIGHT: 68 IN

## 2022-01-01 VITALS — DIASTOLIC BLOOD PRESSURE: 64 MMHG | HEART RATE: 98 BPM | SYSTOLIC BLOOD PRESSURE: 97 MMHG

## 2022-01-01 VITALS — OXYGEN SATURATION: 98 %

## 2022-01-01 DIAGNOSIS — J18.9 PNEUMONIA, UNSPECIFIED ORGANISM: ICD-10-CM

## 2022-01-01 DIAGNOSIS — R11.2 NAUSEA WITH VOMITING, UNSPECIFIED: ICD-10-CM

## 2022-01-01 DIAGNOSIS — Z87.19 PERSONAL HISTORY OF OTHER DISEASES OF THE DIGESTIVE SYSTEM: ICD-10-CM

## 2022-01-01 DIAGNOSIS — N81.4 UTEROVAGINAL PROLAPSE, UNSPECIFIED: ICD-10-CM

## 2022-01-01 DIAGNOSIS — Z51.11 ENCOUNTER FOR ANTINEOPLASTIC CHEMOTHERAPY: ICD-10-CM

## 2022-01-01 DIAGNOSIS — Z85.830 PERSONAL HISTORY OF MALIGNANT NEOPLASM OF BONE: ICD-10-CM

## 2022-01-01 DIAGNOSIS — Z86.39 PERSONAL HISTORY OF OTHER ENDOCRINE, NUTRITIONAL AND METABOLIC DISEASE: ICD-10-CM

## 2022-01-01 DIAGNOSIS — J90 PLEURAL EFFUSION, NOT ELSEWHERE CLASSIFIED: ICD-10-CM

## 2022-01-01 DIAGNOSIS — K56.609 UNSPECIFIED INTESTINAL OBSTRUCTION, UNSPECIFIED AS TO PARTIAL VERSUS COMPLETE OBSTRUCTION: Chronic | ICD-10-CM

## 2022-01-01 DIAGNOSIS — Z87.42 PERSONAL HISTORY OF OTHER DISEASES OF THE FEMALE GENITAL TRACT: ICD-10-CM

## 2022-01-01 DIAGNOSIS — C82.90 FOLLICULAR LYMPHOMA, UNSPECIFIED, UNSPECIFIED SITE: ICD-10-CM

## 2022-01-01 DIAGNOSIS — R33.9 RETENTION OF URINE, UNSPECIFIED: ICD-10-CM

## 2022-01-01 DIAGNOSIS — Z98.890 OTHER SPECIFIED POSTPROCEDURAL STATES: Chronic | ICD-10-CM

## 2022-01-01 DIAGNOSIS — Z78.9 OTHER SPECIFIED HEALTH STATUS: ICD-10-CM

## 2022-01-01 DIAGNOSIS — Z80.9 FAMILY HISTORY OF MALIGNANT NEOPLASM, UNSPECIFIED: ICD-10-CM

## 2022-01-01 DIAGNOSIS — N81.11 CYSTOCELE, MIDLINE: ICD-10-CM

## 2022-01-01 DIAGNOSIS — Z90.49 ACQUIRED ABSENCE OF OTHER SPECIFIED PARTS OF DIGESTIVE TRACT: Chronic | ICD-10-CM

## 2022-01-01 DIAGNOSIS — G96.198 OTHER DISORDERS OF MENINGES, NOT ELSEWHERE CLASSIFIED: ICD-10-CM

## 2022-01-01 DIAGNOSIS — N39.0 URINARY TRACT INFECTION, SITE NOT SPECIFIED: ICD-10-CM

## 2022-01-01 DIAGNOSIS — Z29.9 ENCOUNTER FOR PROPHYLACTIC MEASURES, UNSPECIFIED: ICD-10-CM

## 2022-01-01 DIAGNOSIS — Z86.79 PERSONAL HISTORY OF OTHER DISEASES OF THE CIRCULATORY SYSTEM: ICD-10-CM

## 2022-01-01 DIAGNOSIS — I50.32 CHRONIC DIASTOLIC (CONGESTIVE) HEART FAILURE: ICD-10-CM

## 2022-01-01 DIAGNOSIS — R06.02 SHORTNESS OF BREATH: ICD-10-CM

## 2022-01-01 DIAGNOSIS — Z98.2 PRESENCE OF CEREBROSPINAL FLUID DRAINAGE DEVICE: ICD-10-CM

## 2022-01-01 DIAGNOSIS — N81.3 COMPLETE UTEROVAGINAL PROLAPSE: ICD-10-CM

## 2022-01-01 DIAGNOSIS — Z87.09 PERSONAL HISTORY OF OTHER DISEASES OF THE RESPIRATORY SYSTEM: ICD-10-CM

## 2022-01-01 DIAGNOSIS — C85.90 NON-HODGKIN LYMPHOMA, UNSPECIFIED, UNSPECIFIED SITE: ICD-10-CM

## 2022-01-01 DIAGNOSIS — Z80.6 FAMILY HISTORY OF LEUKEMIA: Chronic | ICD-10-CM

## 2022-01-01 DIAGNOSIS — I48.20 CHRONIC ATRIAL FIBRILLATION, UNSPECIFIED: ICD-10-CM

## 2022-01-01 DIAGNOSIS — Z85.79 PERSONAL HISTORY OF OTHER MALIGNANT NEOPLASMS OF LYMPHOID, HEMATOPOIETIC AND RELATED TISSUES: ICD-10-CM

## 2022-01-01 DIAGNOSIS — R53.1 WEAKNESS: ICD-10-CM

## 2022-01-01 DIAGNOSIS — C79.9 SECONDARY MALIGNANT NEOPLASM OF UNSPECIFIED SITE: ICD-10-CM

## 2022-01-01 DIAGNOSIS — N81.6 RECTOCELE: ICD-10-CM

## 2022-01-01 DIAGNOSIS — Z51.89 ENCOUNTER FOR OTHER SPECIFIED AFTERCARE: ICD-10-CM

## 2022-01-01 DIAGNOSIS — M25.532 PAIN IN LEFT WRIST: ICD-10-CM

## 2022-01-01 DIAGNOSIS — I50.33 ACUTE ON CHRONIC DIASTOLIC (CONGESTIVE) HEART FAILURE: ICD-10-CM

## 2022-01-01 LAB
-  AMIKACIN: SIGNIFICANT CHANGE UP
-  AMIKACIN: SIGNIFICANT CHANGE UP
-  AMPICILLIN/SULBACTAM: SIGNIFICANT CHANGE UP
-  AMPICILLIN: SIGNIFICANT CHANGE UP
-  AZTREONAM: SIGNIFICANT CHANGE UP
-  AZTREONAM: SIGNIFICANT CHANGE UP
-  CEFAZOLIN: SIGNIFICANT CHANGE UP
-  CEFEPIME: SIGNIFICANT CHANGE UP
-  CEFEPIME: SIGNIFICANT CHANGE UP
-  CEFOXITIN: SIGNIFICANT CHANGE UP
-  CEFTAZIDIME: SIGNIFICANT CHANGE UP
-  CEFTRIAXONE: SIGNIFICANT CHANGE UP
-  CIPROFLOXACIN: SIGNIFICANT CHANGE UP
-  CIPROFLOXACIN: SIGNIFICANT CHANGE UP
-  ERTAPENEM: SIGNIFICANT CHANGE UP
-  GENTAMICIN: SIGNIFICANT CHANGE UP
-  GENTAMICIN: SIGNIFICANT CHANGE UP
-  IMIPENEM: SIGNIFICANT CHANGE UP
-  IMIPENEM: SIGNIFICANT CHANGE UP
-  K. PNEUMONIAE GROUP: SIGNIFICANT CHANGE UP
-  LEVOFLOXACIN: SIGNIFICANT CHANGE UP
-  LEVOFLOXACIN: SIGNIFICANT CHANGE UP
-  MEROPENEM: SIGNIFICANT CHANGE UP
-  MEROPENEM: SIGNIFICANT CHANGE UP
-  PIPERACILLIN/TAZOBACTAM: SIGNIFICANT CHANGE UP
-  PIPERACILLIN/TAZOBACTAM: SIGNIFICANT CHANGE UP
-  TOBRAMYCIN: SIGNIFICANT CHANGE UP
-  TOBRAMYCIN: SIGNIFICANT CHANGE UP
-  TRIMETHOPRIM/SULFAMETHOXAZOLE: SIGNIFICANT CHANGE UP
ALBUMIN FLD-MCNC: 1.4 G/DL — SIGNIFICANT CHANGE UP
ALBUMIN FLD-MCNC: 2.5 G/DL — SIGNIFICANT CHANGE UP
ALBUMIN MFR SERPL ELPH: 64.4 %
ALBUMIN SERPL ELPH-MCNC: 2.3 G/DL — LOW (ref 3.3–5)
ALBUMIN SERPL ELPH-MCNC: 2.5 G/DL — LOW (ref 3.3–5)
ALBUMIN SERPL ELPH-MCNC: 2.6 G/DL — LOW (ref 3.3–5)
ALBUMIN SERPL ELPH-MCNC: 2.6 G/DL — LOW (ref 3.3–5)
ALBUMIN SERPL ELPH-MCNC: 2.7 G/DL — LOW (ref 3.3–5)
ALBUMIN SERPL ELPH-MCNC: 2.8 G/DL — LOW (ref 3.3–5)
ALBUMIN SERPL ELPH-MCNC: 3.1 G/DL — LOW (ref 3.3–5)
ALBUMIN SERPL ELPH-MCNC: 3.2 G/DL
ALBUMIN SERPL ELPH-MCNC: 3.3 G/DL — SIGNIFICANT CHANGE UP (ref 3.3–5)
ALBUMIN SERPL ELPH-MCNC: 3.4 G/DL — SIGNIFICANT CHANGE UP (ref 3.3–5)
ALBUMIN SERPL ELPH-MCNC: 3.5 G/DL
ALBUMIN SERPL ELPH-MCNC: 3.5 G/DL — SIGNIFICANT CHANGE UP (ref 3.3–5)
ALBUMIN SERPL ELPH-MCNC: 3.6 G/DL — SIGNIFICANT CHANGE UP (ref 3.3–5)
ALBUMIN SERPL ELPH-MCNC: 3.7 G/DL — SIGNIFICANT CHANGE UP (ref 3.3–5)
ALBUMIN SERPL ELPH-MCNC: 3.7 G/DL — SIGNIFICANT CHANGE UP (ref 3.3–5)
ALBUMIN SERPL ELPH-MCNC: 3.8 G/DL
ALBUMIN SERPL ELPH-MCNC: 4 G/DL — SIGNIFICANT CHANGE UP (ref 3.3–5)
ALBUMIN SERPL ELPH-MCNC: 4.1 G/DL — SIGNIFICANT CHANGE UP (ref 3.3–5)
ALBUMIN SERPL ELPH-MCNC: 4.1 G/DL — SIGNIFICANT CHANGE UP (ref 3.3–5)
ALBUMIN SERPL-MCNC: 3.7 G/DL
ALBUMIN/GLOB SERPL: 1.8 RATIO
ALDOLASE SERPL-CCNC: 4.6 U/L — SIGNIFICANT CHANGE UP (ref 3.3–10.3)
ALP BLD-CCNC: 143 U/L
ALP BLD-CCNC: 145 U/L
ALP BLD-CCNC: 173 U/L
ALP SERPL-CCNC: 101 U/L — SIGNIFICANT CHANGE UP (ref 40–120)
ALP SERPL-CCNC: 101 U/L — SIGNIFICANT CHANGE UP (ref 40–120)
ALP SERPL-CCNC: 103 U/L — SIGNIFICANT CHANGE UP (ref 40–120)
ALP SERPL-CCNC: 104 U/L — SIGNIFICANT CHANGE UP (ref 40–120)
ALP SERPL-CCNC: 108 U/L — SIGNIFICANT CHANGE UP (ref 40–120)
ALP SERPL-CCNC: 109 U/L — SIGNIFICANT CHANGE UP (ref 40–120)
ALP SERPL-CCNC: 111 U/L — SIGNIFICANT CHANGE UP (ref 40–120)
ALP SERPL-CCNC: 112 U/L — SIGNIFICANT CHANGE UP (ref 40–120)
ALP SERPL-CCNC: 114 U/L — SIGNIFICANT CHANGE UP (ref 40–120)
ALP SERPL-CCNC: 120 U/L — SIGNIFICANT CHANGE UP (ref 40–120)
ALP SERPL-CCNC: 128 U/L — HIGH (ref 40–120)
ALP SERPL-CCNC: 130 U/L — HIGH (ref 40–120)
ALP SERPL-CCNC: 143 U/L — HIGH (ref 40–120)
ALP SERPL-CCNC: 144 U/L — HIGH (ref 40–120)
ALP SERPL-CCNC: 147 U/L — HIGH (ref 40–120)
ALP SERPL-CCNC: 152 U/L — HIGH (ref 40–120)
ALP SERPL-CCNC: 159 U/L — HIGH (ref 40–120)
ALP SERPL-CCNC: 160 U/L — HIGH (ref 40–120)
ALP SERPL-CCNC: 161 U/L — HIGH (ref 40–120)
ALP SERPL-CCNC: 175 U/L — HIGH (ref 40–120)
ALP SERPL-CCNC: 199 U/L — HIGH (ref 40–120)
ALP SERPL-CCNC: 207 U/L — HIGH (ref 40–120)
ALP SERPL-CCNC: 94 U/L — SIGNIFICANT CHANGE UP (ref 40–120)
ALPHA1 GLOB MFR SERPL ELPH: 4.6 %
ALPHA1 GLOB SERPL ELPH-MCNC: 0.3 G/DL
ALPHA2 GLOB MFR SERPL ELPH: 10.7 %
ALPHA2 GLOB SERPL ELPH-MCNC: 0.6 G/DL
ALT FLD-CCNC: 10 U/L — SIGNIFICANT CHANGE UP (ref 10–45)
ALT FLD-CCNC: 11 U/L — SIGNIFICANT CHANGE UP (ref 10–45)
ALT FLD-CCNC: 15 U/L — SIGNIFICANT CHANGE UP (ref 10–45)
ALT FLD-CCNC: 16 U/L — SIGNIFICANT CHANGE UP (ref 10–45)
ALT FLD-CCNC: 17 U/L — SIGNIFICANT CHANGE UP (ref 10–45)
ALT FLD-CCNC: 17 U/L — SIGNIFICANT CHANGE UP (ref 10–45)
ALT FLD-CCNC: 18 U/L — SIGNIFICANT CHANGE UP (ref 10–45)
ALT FLD-CCNC: 19 U/L — SIGNIFICANT CHANGE UP (ref 10–45)
ALT FLD-CCNC: 19 U/L — SIGNIFICANT CHANGE UP (ref 10–45)
ALT FLD-CCNC: 21 U/L — SIGNIFICANT CHANGE UP (ref 10–45)
ALT FLD-CCNC: 23 U/L — SIGNIFICANT CHANGE UP (ref 10–45)
ALT FLD-CCNC: 24 U/L — SIGNIFICANT CHANGE UP (ref 10–45)
ALT FLD-CCNC: 30 U/L — SIGNIFICANT CHANGE UP (ref 10–45)
ALT FLD-CCNC: 6 U/L — LOW (ref 10–45)
ALT FLD-CCNC: 7 U/L — LOW (ref 10–45)
ALT FLD-CCNC: 7 U/L — LOW (ref 10–45)
ALT FLD-CCNC: <5 U/L — LOW (ref 10–45)
ALT SERPL-CCNC: 20 U/L
ALT SERPL-CCNC: <5 U/L
ALT SERPL-CCNC: <5 U/L
ANION GAP SERPL CALC-SCNC: 10 MMOL/L — SIGNIFICANT CHANGE UP (ref 5–17)
ANION GAP SERPL CALC-SCNC: 11 MMOL/L
ANION GAP SERPL CALC-SCNC: 11 MMOL/L — SIGNIFICANT CHANGE UP (ref 5–17)
ANION GAP SERPL CALC-SCNC: 12 MMOL/L
ANION GAP SERPL CALC-SCNC: 12 MMOL/L — SIGNIFICANT CHANGE UP (ref 5–17)
ANION GAP SERPL CALC-SCNC: 13 MMOL/L — SIGNIFICANT CHANGE UP (ref 5–17)
ANION GAP SERPL CALC-SCNC: 13 MMOL/L — SIGNIFICANT CHANGE UP (ref 5–17)
ANION GAP SERPL CALC-SCNC: 14 MMOL/L — SIGNIFICANT CHANGE UP (ref 5–17)
ANION GAP SERPL CALC-SCNC: 14 MMOL/L — SIGNIFICANT CHANGE UP (ref 5–17)
ANION GAP SERPL CALC-SCNC: 15 MMOL/L — SIGNIFICANT CHANGE UP (ref 5–17)
ANION GAP SERPL CALC-SCNC: 15 MMOL/L — SIGNIFICANT CHANGE UP (ref 5–17)
ANION GAP SERPL CALC-SCNC: 7 MMOL/L — SIGNIFICANT CHANGE UP (ref 5–17)
ANION GAP SERPL CALC-SCNC: 7 MMOL/L — SIGNIFICANT CHANGE UP (ref 5–17)
ANION GAP SERPL CALC-SCNC: 8 MMOL/L — SIGNIFICANT CHANGE UP (ref 5–17)
ANION GAP SERPL CALC-SCNC: 9 MMOL/L
ANION GAP SERPL CALC-SCNC: 9 MMOL/L — SIGNIFICANT CHANGE UP (ref 5–17)
ANISOCYTOSIS BLD QL: SLIGHT — SIGNIFICANT CHANGE UP
APPEARANCE CSF: CLEAR — SIGNIFICANT CHANGE UP
APPEARANCE SPUN FLD: COLORLESS — SIGNIFICANT CHANGE UP
APPEARANCE UR: ABNORMAL
APPEARANCE UR: CLEAR — SIGNIFICANT CHANGE UP
APTT BLD: 21.8 SEC — LOW (ref 27.5–35.5)
APTT BLD: 22.3 SEC — LOW (ref 27.5–35.5)
APTT BLD: 24.2 SEC
APTT BLD: 24.5 SEC — LOW (ref 27.5–35.5)
APTT BLD: 24.8 SEC — LOW (ref 27.5–35.5)
APTT BLD: 26.8 SEC — LOW (ref 27.5–35.5)
APTT BLD: 27.2 SEC — LOW (ref 27.5–35.5)
APTT BLD: 27.4 SEC — LOW (ref 27.5–35.5)
APTT BLD: 29.1 SEC — SIGNIFICANT CHANGE UP (ref 27.5–35.5)
APTT BLD: 39.1 SEC — HIGH (ref 27.5–35.5)
APTT BLD: 50.8 SEC — HIGH (ref 27.5–35.5)
APTT BLD: 54.8 SEC — HIGH (ref 27.5–35.5)
APTT BLD: 55.9 SEC — HIGH (ref 27.5–35.5)
APTT BLD: 56.2 SEC — HIGH (ref 27.5–35.5)
APTT BLD: 57.3 SEC — HIGH (ref 27.5–35.5)
APTT BLD: 57.8 SEC — HIGH (ref 27.5–35.5)
APTT BLD: 59.5 SEC — HIGH (ref 27.5–35.5)
APTT BLD: 59.9 SEC — HIGH (ref 27.5–35.5)
APTT BLD: 60.8 SEC — HIGH (ref 27.5–35.5)
APTT BLD: 61.3 SEC — HIGH (ref 27.5–35.5)
APTT BLD: 62.3 SEC — HIGH (ref 27.5–35.5)
APTT BLD: 64.1 SEC — HIGH (ref 27.5–35.5)
APTT BLD: 64.6 SEC — HIGH (ref 27.5–35.5)
APTT BLD: 65.4 SEC — HIGH (ref 27.5–35.5)
APTT BLD: 70.6 SEC — HIGH (ref 27.5–35.5)
APTT BLD: 84 SEC — HIGH (ref 27.5–35.5)
AST SERPL-CCNC: 11 U/L — SIGNIFICANT CHANGE UP (ref 10–40)
AST SERPL-CCNC: 11 U/L — SIGNIFICANT CHANGE UP (ref 10–40)
AST SERPL-CCNC: 12 U/L — SIGNIFICANT CHANGE UP (ref 10–40)
AST SERPL-CCNC: 13 U/L — SIGNIFICANT CHANGE UP (ref 10–40)
AST SERPL-CCNC: 14 U/L
AST SERPL-CCNC: 14 U/L
AST SERPL-CCNC: 14 U/L — SIGNIFICANT CHANGE UP (ref 10–40)
AST SERPL-CCNC: 15 U/L
AST SERPL-CCNC: 15 U/L — SIGNIFICANT CHANGE UP (ref 10–40)
AST SERPL-CCNC: 16 U/L — SIGNIFICANT CHANGE UP (ref 10–40)
AST SERPL-CCNC: 16 U/L — SIGNIFICANT CHANGE UP (ref 10–40)
AST SERPL-CCNC: 17 U/L — SIGNIFICANT CHANGE UP (ref 10–40)
AST SERPL-CCNC: 17 U/L — SIGNIFICANT CHANGE UP (ref 10–40)
AST SERPL-CCNC: 19 U/L — SIGNIFICANT CHANGE UP (ref 10–40)
AST SERPL-CCNC: 20 U/L — SIGNIFICANT CHANGE UP (ref 10–40)
AST SERPL-CCNC: 20 U/L — SIGNIFICANT CHANGE UP (ref 10–40)
AST SERPL-CCNC: 22 U/L — SIGNIFICANT CHANGE UP (ref 10–40)
AST SERPL-CCNC: 26 U/L — SIGNIFICANT CHANGE UP (ref 10–40)
AST SERPL-CCNC: 35 U/L — SIGNIFICANT CHANGE UP (ref 10–40)
AST SERPL-CCNC: 7 U/L — LOW (ref 10–40)
AST SERPL-CCNC: 9 U/L — LOW (ref 10–40)
AST SERPL-CCNC: 9 U/L — LOW (ref 10–40)
B PERT IGG+IGM PNL SER: ABNORMAL
B-GLOBULIN MFR SERPL ELPH: 10.4 %
B-GLOBULIN SERPL ELPH-MCNC: 0.6 G/DL
B2 MICROGLOB SERPL-MCNC: 1.6 MG/L
B2 MICROGLOB SERPL-MCNC: 2.9 MG/L — HIGH (ref 0.8–2.2)
B2 MICROGLOB SERPL-MCNC: 3.3 MG/L — HIGH (ref 0.8–2.2)
B2 MICROGLOB SERPL-MCNC: 3.6 MG/L
B2 MICROGLOB SERPL-MCNC: 3.6 MG/L — HIGH (ref 0.8–2.2)
B2 MICROGLOB SERPL-MCNC: 4.3 MG/L
BACTERIA # UR AUTO: NEGATIVE — SIGNIFICANT CHANGE UP
BACTERIA # UR AUTO: NEGATIVE — SIGNIFICANT CHANGE UP
BASE EXCESS BLDV CALC-SCNC: 2.9 MMOL/L — SIGNIFICANT CHANGE UP (ref -2–3)
BASOPHILS # BLD AUTO: 0 K/UL — SIGNIFICANT CHANGE UP (ref 0–0.2)
BASOPHILS # BLD AUTO: 0.01 K/UL — SIGNIFICANT CHANGE UP (ref 0–0.2)
BASOPHILS # BLD AUTO: 0.03 K/UL — SIGNIFICANT CHANGE UP (ref 0–0.2)
BASOPHILS # BLD AUTO: 0.03 K/UL — SIGNIFICANT CHANGE UP (ref 0–0.2)
BASOPHILS # BLD AUTO: 0.04 K/UL — SIGNIFICANT CHANGE UP (ref 0–0.2)
BASOPHILS # BLD AUTO: 0.04 K/UL — SIGNIFICANT CHANGE UP (ref 0–0.2)
BASOPHILS # BLD AUTO: 0.05 K/UL — SIGNIFICANT CHANGE UP (ref 0–0.2)
BASOPHILS # BLD AUTO: 0.06 K/UL — SIGNIFICANT CHANGE UP (ref 0–0.2)
BASOPHILS # BLD AUTO: 0.06 K/UL — SIGNIFICANT CHANGE UP (ref 0–0.2)
BASOPHILS # BLD AUTO: 0.08 K/UL — SIGNIFICANT CHANGE UP (ref 0–0.2)
BASOPHILS # BLD AUTO: 0.08 K/UL — SIGNIFICANT CHANGE UP (ref 0–0.2)
BASOPHILS # BLD AUTO: 0.1 K/UL — SIGNIFICANT CHANGE UP (ref 0–0.2)
BASOPHILS # BLD AUTO: 0.11 K/UL — SIGNIFICANT CHANGE UP (ref 0–0.2)
BASOPHILS # BLD AUTO: 0.17 K/UL — SIGNIFICANT CHANGE UP (ref 0–0.2)
BASOPHILS NFR BLD AUTO: 0 % — SIGNIFICANT CHANGE UP (ref 0–2)
BASOPHILS NFR BLD AUTO: 0.1 % — SIGNIFICANT CHANGE UP (ref 0–2)
BASOPHILS NFR BLD AUTO: 0.1 % — SIGNIFICANT CHANGE UP (ref 0–2)
BASOPHILS NFR BLD AUTO: 0.2 % — SIGNIFICANT CHANGE UP (ref 0–2)
BASOPHILS NFR BLD AUTO: 0.4 % — SIGNIFICANT CHANGE UP (ref 0–2)
BASOPHILS NFR BLD AUTO: 0.5 % — SIGNIFICANT CHANGE UP (ref 0–2)
BASOPHILS NFR BLD AUTO: 0.7 % — SIGNIFICANT CHANGE UP (ref 0–2)
BASOPHILS NFR BLD AUTO: 0.8 % — SIGNIFICANT CHANGE UP (ref 0–2)
BASOPHILS NFR BLD AUTO: 0.9 % — SIGNIFICANT CHANGE UP (ref 0–2)
BASOPHILS NFR BLD AUTO: 1 % — SIGNIFICANT CHANGE UP (ref 0–2)
BASOPHILS NFR BLD AUTO: 1.2 % — SIGNIFICANT CHANGE UP (ref 0–2)
BASOPHILS NFR BLD AUTO: 1.2 % — SIGNIFICANT CHANGE UP (ref 0–2)
BCR/ABL BY RT - PCR QUANTITATIVE: SIGNIFICANT CHANGE UP
BILIRUB SERPL-MCNC: 0.2 MG/DL — SIGNIFICANT CHANGE UP (ref 0.2–1.2)
BILIRUB SERPL-MCNC: 0.3 MG/DL
BILIRUB SERPL-MCNC: 0.3 MG/DL — SIGNIFICANT CHANGE UP (ref 0.2–1.2)
BILIRUB SERPL-MCNC: 0.4 MG/DL
BILIRUB SERPL-MCNC: 0.4 MG/DL
BILIRUB SERPL-MCNC: 0.4 MG/DL — SIGNIFICANT CHANGE UP (ref 0.2–1.2)
BILIRUB SERPL-MCNC: 0.5 MG/DL — SIGNIFICANT CHANGE UP (ref 0.2–1.2)
BILIRUB SERPL-MCNC: 0.6 MG/DL — SIGNIFICANT CHANGE UP (ref 0.2–1.2)
BILIRUB SERPL-MCNC: 0.7 MG/DL — SIGNIFICANT CHANGE UP (ref 0.2–1.2)
BILIRUB SERPL-MCNC: 0.8 MG/DL — SIGNIFICANT CHANGE UP (ref 0.2–1.2)
BILIRUB UR-MCNC: NEGATIVE — SIGNIFICANT CHANGE UP
BILIRUB UR-MCNC: NEGATIVE — SIGNIFICANT CHANGE UP
BLD GP AB SCN SERPL QL: NEGATIVE — SIGNIFICANT CHANGE UP
BUN SERPL-MCNC: 14 MG/DL — SIGNIFICANT CHANGE UP (ref 7–23)
BUN SERPL-MCNC: 17 MG/DL — SIGNIFICANT CHANGE UP (ref 7–23)
BUN SERPL-MCNC: 18 MG/DL — SIGNIFICANT CHANGE UP (ref 7–23)
BUN SERPL-MCNC: 19 MG/DL
BUN SERPL-MCNC: 19 MG/DL
BUN SERPL-MCNC: 19 MG/DL — SIGNIFICANT CHANGE UP (ref 7–23)
BUN SERPL-MCNC: 20 MG/DL — SIGNIFICANT CHANGE UP (ref 7–23)
BUN SERPL-MCNC: 21 MG/DL — SIGNIFICANT CHANGE UP (ref 7–23)
BUN SERPL-MCNC: 22 MG/DL — SIGNIFICANT CHANGE UP (ref 7–23)
BUN SERPL-MCNC: 22 MG/DL — SIGNIFICANT CHANGE UP (ref 7–23)
BUN SERPL-MCNC: 24 MG/DL — HIGH (ref 7–23)
BUN SERPL-MCNC: 25 MG/DL
BUN SERPL-MCNC: 25 MG/DL — HIGH (ref 7–23)
BUN SERPL-MCNC: 25 MG/DL — HIGH (ref 7–23)
BUN SERPL-MCNC: 26 MG/DL — HIGH (ref 7–23)
BUN SERPL-MCNC: 26 MG/DL — HIGH (ref 7–23)
BUN SERPL-MCNC: 28 MG/DL — HIGH (ref 7–23)
BUN SERPL-MCNC: 29 MG/DL — HIGH (ref 7–23)
BUN SERPL-MCNC: 30 MG/DL — HIGH (ref 7–23)
BUN SERPL-MCNC: 31 MG/DL — HIGH (ref 7–23)
BUN SERPL-MCNC: 32 MG/DL — HIGH (ref 7–23)
BUN SERPL-MCNC: 33 MG/DL — HIGH (ref 7–23)
BUN SERPL-MCNC: 34 MG/DL — HIGH (ref 7–23)
BUN SERPL-MCNC: 35 MG/DL — HIGH (ref 7–23)
BUN SERPL-MCNC: 35 MG/DL — HIGH (ref 7–23)
BUN SERPL-MCNC: 37 MG/DL — HIGH (ref 7–23)
BUN SERPL-MCNC: 40 MG/DL — HIGH (ref 7–23)
CA-I BLD-SCNC: 1.1 MMOL/L — LOW (ref 1.15–1.33)
CA-I SERPL-SCNC: 1.21 MMOL/L — SIGNIFICANT CHANGE UP (ref 1.15–1.33)
CALCIUM SERPL-MCNC: 8.1 MG/DL — LOW (ref 8.4–10.5)
CALCIUM SERPL-MCNC: 8.2 MG/DL — LOW (ref 8.4–10.5)
CALCIUM SERPL-MCNC: 8.3 MG/DL — LOW (ref 8.4–10.5)
CALCIUM SERPL-MCNC: 8.5 MG/DL — SIGNIFICANT CHANGE UP (ref 8.4–10.5)
CALCIUM SERPL-MCNC: 8.6 MG/DL — SIGNIFICANT CHANGE UP (ref 8.4–10.5)
CALCIUM SERPL-MCNC: 8.7 MG/DL — SIGNIFICANT CHANGE UP (ref 8.4–10.5)
CALCIUM SERPL-MCNC: 8.8 MG/DL
CALCIUM SERPL-MCNC: 8.8 MG/DL — SIGNIFICANT CHANGE UP (ref 8.4–10.5)
CALCIUM SERPL-MCNC: 8.9 MG/DL — SIGNIFICANT CHANGE UP (ref 8.4–10.5)
CALCIUM SERPL-MCNC: 9 MG/DL — SIGNIFICANT CHANGE UP (ref 8.4–10.5)
CALCIUM SERPL-MCNC: 9.1 MG/DL
CALCIUM SERPL-MCNC: 9.1 MG/DL
CALCIUM SERPL-MCNC: 9.1 MG/DL — SIGNIFICANT CHANGE UP (ref 8.4–10.5)
CALCIUM SERPL-MCNC: 9.2 MG/DL — SIGNIFICANT CHANGE UP (ref 8.4–10.5)
CALCIUM SERPL-MCNC: 9.3 MG/DL — SIGNIFICANT CHANGE UP (ref 8.4–10.5)
CALCIUM SERPL-MCNC: 9.4 MG/DL — SIGNIFICANT CHANGE UP (ref 8.4–10.5)
CALCIUM SERPL-MCNC: 9.5 MG/DL — SIGNIFICANT CHANGE UP (ref 8.4–10.5)
CALCIUM SERPL-MCNC: 9.5 MG/DL — SIGNIFICANT CHANGE UP (ref 8.4–10.5)
CALCIUM SERPL-MCNC: 9.9 MG/DL — SIGNIFICANT CHANGE UP (ref 8.4–10.5)
CHLORIDE BLDV-SCNC: 109 MMOL/L — HIGH (ref 96–108)
CHLORIDE SERPL-SCNC: 100 MMOL/L
CHLORIDE SERPL-SCNC: 100 MMOL/L — SIGNIFICANT CHANGE UP (ref 96–108)
CHLORIDE SERPL-SCNC: 101 MMOL/L — SIGNIFICANT CHANGE UP (ref 96–108)
CHLORIDE SERPL-SCNC: 102 MMOL/L
CHLORIDE SERPL-SCNC: 102 MMOL/L — SIGNIFICANT CHANGE UP (ref 96–108)
CHLORIDE SERPL-SCNC: 103 MMOL/L — SIGNIFICANT CHANGE UP (ref 96–108)
CHLORIDE SERPL-SCNC: 104 MMOL/L — SIGNIFICANT CHANGE UP (ref 96–108)
CHLORIDE SERPL-SCNC: 105 MMOL/L
CHLORIDE SERPL-SCNC: 105 MMOL/L — SIGNIFICANT CHANGE UP (ref 96–108)
CHLORIDE SERPL-SCNC: 106 MMOL/L — SIGNIFICANT CHANGE UP (ref 96–108)
CHLORIDE SERPL-SCNC: 107 MMOL/L — SIGNIFICANT CHANGE UP (ref 96–108)
CHLORIDE SERPL-SCNC: 108 MMOL/L — SIGNIFICANT CHANGE UP (ref 96–108)
CHLORIDE SERPL-SCNC: 108 MMOL/L — SIGNIFICANT CHANGE UP (ref 96–108)
CHLORIDE SERPL-SCNC: 110 MMOL/L — HIGH (ref 96–108)
CHLORIDE SERPL-SCNC: 98 MMOL/L — SIGNIFICANT CHANGE UP (ref 96–108)
CHLORIDE SERPL-SCNC: 99 MMOL/L — SIGNIFICANT CHANGE UP (ref 96–108)
CHOLEST FLD-MCNC: 63 MG/DL — SIGNIFICANT CHANGE UP
CHROM ANALY INTERPHASE BLD FISH-IMP: SIGNIFICANT CHANGE UP
CHROM ANALY OVERALL INTERP SPEC-IMP: SIGNIFICANT CHANGE UP
CK SERPL-CCNC: 69 U/L — SIGNIFICANT CHANGE UP (ref 25–170)
CO2 BLDV-SCNC: 31 MMOL/L — HIGH (ref 22–26)
CO2 SERPL-SCNC: 22 MMOL/L — SIGNIFICANT CHANGE UP (ref 22–31)
CO2 SERPL-SCNC: 23 MMOL/L — SIGNIFICANT CHANGE UP (ref 22–31)
CO2 SERPL-SCNC: 23 MMOL/L — SIGNIFICANT CHANGE UP (ref 22–31)
CO2 SERPL-SCNC: 24 MMOL/L — SIGNIFICANT CHANGE UP (ref 22–31)
CO2 SERPL-SCNC: 25 MMOL/L
CO2 SERPL-SCNC: 25 MMOL/L — SIGNIFICANT CHANGE UP (ref 22–31)
CO2 SERPL-SCNC: 26 MMOL/L
CO2 SERPL-SCNC: 26 MMOL/L — SIGNIFICANT CHANGE UP (ref 22–31)
CO2 SERPL-SCNC: 27 MMOL/L — SIGNIFICANT CHANGE UP (ref 22–31)
CO2 SERPL-SCNC: 28 MMOL/L
CO2 SERPL-SCNC: 28 MMOL/L — SIGNIFICANT CHANGE UP (ref 22–31)
CO2 SERPL-SCNC: 30 MMOL/L — SIGNIFICANT CHANGE UP (ref 22–31)
CO2 SERPL-SCNC: 31 MMOL/L — SIGNIFICANT CHANGE UP (ref 22–31)
COLOR CSF: SIGNIFICANT CHANGE UP
COLOR FLD: YELLOW — SIGNIFICANT CHANGE UP
COLOR SPEC: YELLOW — SIGNIFICANT CHANGE UP
COLOR SPEC: YELLOW — SIGNIFICANT CHANGE UP
COPPER SERPL-MCNC: 143 UG/DL — SIGNIFICANT CHANGE UP (ref 80–158)
CREAT SERPL-MCNC: 0.4 MG/DL — LOW (ref 0.5–1.3)
CREAT SERPL-MCNC: 0.44 MG/DL — LOW (ref 0.5–1.3)
CREAT SERPL-MCNC: 0.44 MG/DL — LOW (ref 0.5–1.3)
CREAT SERPL-MCNC: 0.45 MG/DL — LOW (ref 0.5–1.3)
CREAT SERPL-MCNC: 0.48 MG/DL — LOW (ref 0.5–1.3)
CREAT SERPL-MCNC: 0.5 MG/DL
CREAT SERPL-MCNC: 0.5 MG/DL — SIGNIFICANT CHANGE UP (ref 0.5–1.3)
CREAT SERPL-MCNC: 0.52 MG/DL
CREAT SERPL-MCNC: 0.52 MG/DL — SIGNIFICANT CHANGE UP (ref 0.5–1.3)
CREAT SERPL-MCNC: 0.53 MG/DL — SIGNIFICANT CHANGE UP (ref 0.5–1.3)
CREAT SERPL-MCNC: 0.54 MG/DL — SIGNIFICANT CHANGE UP (ref 0.5–1.3)
CREAT SERPL-MCNC: 0.54 MG/DL — SIGNIFICANT CHANGE UP (ref 0.5–1.3)
CREAT SERPL-MCNC: 0.55 MG/DL — SIGNIFICANT CHANGE UP (ref 0.5–1.3)
CREAT SERPL-MCNC: 0.56 MG/DL — SIGNIFICANT CHANGE UP (ref 0.5–1.3)
CREAT SERPL-MCNC: 0.57 MG/DL — SIGNIFICANT CHANGE UP (ref 0.5–1.3)
CREAT SERPL-MCNC: 0.58 MG/DL — SIGNIFICANT CHANGE UP (ref 0.5–1.3)
CREAT SERPL-MCNC: 0.58 MG/DL — SIGNIFICANT CHANGE UP (ref 0.5–1.3)
CREAT SERPL-MCNC: 0.59 MG/DL — SIGNIFICANT CHANGE UP (ref 0.5–1.3)
CREAT SERPL-MCNC: 0.6 MG/DL — SIGNIFICANT CHANGE UP (ref 0.5–1.3)
CREAT SERPL-MCNC: 0.63 MG/DL — SIGNIFICANT CHANGE UP (ref 0.5–1.3)
CREAT SERPL-MCNC: 0.64 MG/DL — SIGNIFICANT CHANGE UP (ref 0.5–1.3)
CREAT SERPL-MCNC: 0.65 MG/DL — SIGNIFICANT CHANGE UP (ref 0.5–1.3)
CREAT SERPL-MCNC: 0.65 MG/DL — SIGNIFICANT CHANGE UP (ref 0.5–1.3)
CREAT SERPL-MCNC: 0.66 MG/DL — SIGNIFICANT CHANGE UP (ref 0.5–1.3)
CREAT SERPL-MCNC: 0.66 MG/DL — SIGNIFICANT CHANGE UP (ref 0.5–1.3)
CREAT SERPL-MCNC: 0.67 MG/DL — SIGNIFICANT CHANGE UP (ref 0.5–1.3)
CREAT SERPL-MCNC: 0.67 MG/DL — SIGNIFICANT CHANGE UP (ref 0.5–1.3)
CREAT SERPL-MCNC: 0.7 MG/DL — SIGNIFICANT CHANGE UP (ref 0.5–1.3)
CREAT SERPL-MCNC: 0.76 MG/DL — SIGNIFICANT CHANGE UP (ref 0.5–1.3)
CREAT SERPL-MCNC: 0.78 MG/DL — SIGNIFICANT CHANGE UP (ref 0.5–1.3)
CREAT SERPL-MCNC: 0.79 MG/DL
CREAT SERPL-MCNC: 0.82 MG/DL — SIGNIFICANT CHANGE UP (ref 0.5–1.3)
CREAT SERPL-MCNC: 0.83 MG/DL — SIGNIFICANT CHANGE UP (ref 0.5–1.3)
CREAT SERPL-MCNC: 0.84 MG/DL — SIGNIFICANT CHANGE UP (ref 0.5–1.3)
CREAT SERPL-MCNC: 0.99 MG/DL — SIGNIFICANT CHANGE UP (ref 0.5–1.3)
CULTURE RESULTS: NO GROWTH — SIGNIFICANT CHANGE UP
CULTURE RESULTS: SIGNIFICANT CHANGE UP
DACRYOCYTES BLD QL SMEAR: SLIGHT — SIGNIFICANT CHANGE UP
DEPRECATED KAPPA LC FREE/LAMBDA SER: 1.1 RATIO
DIFF PNL FLD: ABNORMAL
DIFF PNL FLD: NEGATIVE — SIGNIFICANT CHANGE UP
EGFR: 57 ML/MIN/1.73M2 — LOW
EGFR: 70 ML/MIN/1.73M2 — SIGNIFICANT CHANGE UP
EGFR: 71 ML/MIN/1.73M2 — SIGNIFICANT CHANGE UP
EGFR: 72 ML/MIN/1.73M2 — SIGNIFICANT CHANGE UP
EGFR: 75 ML/MIN/1.73M2
EGFR: 76 ML/MIN/1.73M2 — SIGNIFICANT CHANGE UP
EGFR: 79 ML/MIN/1.73M2 — SIGNIFICANT CHANGE UP
EGFR: 87 ML/MIN/1.73M2 — SIGNIFICANT CHANGE UP
EGFR: 88 ML/MIN/1.73M2 — SIGNIFICANT CHANGE UP
EGFR: 89 ML/MIN/1.73M2 — SIGNIFICANT CHANGE UP
EGFR: 89 ML/MIN/1.73M2 — SIGNIFICANT CHANGE UP
EGFR: 90 ML/MIN/1.73M2 — SIGNIFICANT CHANGE UP
EGFR: 91 ML/MIN/1.73M2 — SIGNIFICANT CHANGE UP
EGFR: 92 ML/MIN/1.73M2 — SIGNIFICANT CHANGE UP
EGFR: 93 ML/MIN/1.73M2
EGFR: 93 ML/MIN/1.73M2 — SIGNIFICANT CHANGE UP
EGFR: 93 ML/MIN/1.73M2 — SIGNIFICANT CHANGE UP
EGFR: 94 ML/MIN/1.73M2
EGFR: 94 ML/MIN/1.73M2 — SIGNIFICANT CHANGE UP
EGFR: 95 ML/MIN/1.73M2 — SIGNIFICANT CHANGE UP
EGFR: 97 ML/MIN/1.73M2 — SIGNIFICANT CHANGE UP
EGFR: 99 ML/MIN/1.73M2 — SIGNIFICANT CHANGE UP
ELLIPTOCYTES BLD QL SMEAR: SLIGHT — SIGNIFICANT CHANGE UP
EOSINOPHIL # BLD AUTO: 0 K/UL — SIGNIFICANT CHANGE UP (ref 0–0.5)
EOSINOPHIL # BLD AUTO: 0.02 K/UL — SIGNIFICANT CHANGE UP (ref 0–0.5)
EOSINOPHIL # BLD AUTO: 0.05 K/UL — SIGNIFICANT CHANGE UP (ref 0–0.5)
EOSINOPHIL # BLD AUTO: 0.06 K/UL — SIGNIFICANT CHANGE UP (ref 0–0.5)
EOSINOPHIL # BLD AUTO: 0.16 K/UL — SIGNIFICANT CHANGE UP (ref 0–0.5)
EOSINOPHIL # BLD AUTO: 0.18 K/UL — SIGNIFICANT CHANGE UP (ref 0–0.5)
EOSINOPHIL # BLD AUTO: 0.19 K/UL — SIGNIFICANT CHANGE UP (ref 0–0.5)
EOSINOPHIL # BLD AUTO: 0.21 K/UL — SIGNIFICANT CHANGE UP (ref 0–0.5)
EOSINOPHIL # BLD AUTO: 0.23 K/UL — SIGNIFICANT CHANGE UP (ref 0–0.5)
EOSINOPHIL # BLD AUTO: 0.24 K/UL — SIGNIFICANT CHANGE UP (ref 0–0.5)
EOSINOPHIL # BLD AUTO: 0.24 K/UL — SIGNIFICANT CHANGE UP (ref 0–0.5)
EOSINOPHIL # BLD AUTO: 0.26 K/UL — SIGNIFICANT CHANGE UP (ref 0–0.5)
EOSINOPHIL # BLD AUTO: 0.26 K/UL — SIGNIFICANT CHANGE UP (ref 0–0.5)
EOSINOPHIL # BLD AUTO: 0.3 K/UL — SIGNIFICANT CHANGE UP (ref 0–0.5)
EOSINOPHIL # BLD AUTO: 0.34 K/UL — SIGNIFICANT CHANGE UP (ref 0–0.5)
EOSINOPHIL NFR BLD AUTO: 0 % — SIGNIFICANT CHANGE UP (ref 0–6)
EOSINOPHIL NFR BLD AUTO: 0.2 % — SIGNIFICANT CHANGE UP (ref 0–6)
EOSINOPHIL NFR BLD AUTO: 0.3 % — SIGNIFICANT CHANGE UP (ref 0–6)
EOSINOPHIL NFR BLD AUTO: 0.7 % — SIGNIFICANT CHANGE UP (ref 0–6)
EOSINOPHIL NFR BLD AUTO: 1.2 % — SIGNIFICANT CHANGE UP (ref 0–6)
EOSINOPHIL NFR BLD AUTO: 1.7 % — SIGNIFICANT CHANGE UP (ref 0–6)
EOSINOPHIL NFR BLD AUTO: 2.2 % — SIGNIFICANT CHANGE UP (ref 0–6)
EOSINOPHIL NFR BLD AUTO: 2.3 % — SIGNIFICANT CHANGE UP (ref 0–6)
EOSINOPHIL NFR BLD AUTO: 2.3 % — SIGNIFICANT CHANGE UP (ref 0–6)
EOSINOPHIL NFR BLD AUTO: 2.6 % — SIGNIFICANT CHANGE UP (ref 0–6)
EOSINOPHIL NFR BLD AUTO: 3.6 % — SIGNIFICANT CHANGE UP (ref 0–6)
EOSINOPHIL NFR BLD AUTO: 4 % — SIGNIFICANT CHANGE UP (ref 0–6)
EOSINOPHIL NFR BLD AUTO: 4.5 % — SIGNIFICANT CHANGE UP (ref 0–6)
EOSINOPHIL NFR BLD AUTO: 4.5 % — SIGNIFICANT CHANGE UP (ref 0–6)
EOSINOPHIL NFR BLD AUTO: 4.9 % — SIGNIFICANT CHANGE UP (ref 0–6)
EPI CELLS # UR: 0 /HPF — SIGNIFICANT CHANGE UP
EPI CELLS # UR: 2 — SIGNIFICANT CHANGE UP
FERRITIN SERPL-MCNC: 109 NG/ML — SIGNIFICANT CHANGE UP (ref 15–150)
FLUID INTAKE SUBSTANCE CLASS: SIGNIFICANT CHANGE UP
FOLATE SERPL-MCNC: 19.5 NG/ML — SIGNIFICANT CHANGE UP
FOLATE+VIT B12 SERBLD-IMP: 3 % — SIGNIFICANT CHANGE UP
FOLATE+VIT B12 SERBLD-IMP: 4 % — SIGNIFICANT CHANGE UP
GAMMA GLOB FLD ELPH-MCNC: 0.6 G/DL
GAMMA GLOB MFR SERPL ELPH: 9.9 %
GAS PNL BLDV: 141 MMOL/L — SIGNIFICANT CHANGE UP (ref 136–145)
GAS PNL BLDV: SIGNIFICANT CHANGE UP
GLUCOSE BLDV-MCNC: 122 MG/DL — HIGH (ref 70–99)
GLUCOSE CSF-MCNC: 100 MG/DL — HIGH (ref 40–70)
GLUCOSE FLD-MCNC: 124 MG/DL — SIGNIFICANT CHANGE UP
GLUCOSE FLD-MCNC: 135 MG/DL — SIGNIFICANT CHANGE UP
GLUCOSE FLD-MCNC: 155 MG/DL — SIGNIFICANT CHANGE UP
GLUCOSE SERPL-MCNC: 102 MG/DL — HIGH (ref 70–99)
GLUCOSE SERPL-MCNC: 107 MG/DL
GLUCOSE SERPL-MCNC: 109 MG/DL — HIGH (ref 70–99)
GLUCOSE SERPL-MCNC: 111 MG/DL — HIGH (ref 70–99)
GLUCOSE SERPL-MCNC: 118 MG/DL
GLUCOSE SERPL-MCNC: 118 MG/DL — HIGH (ref 70–99)
GLUCOSE SERPL-MCNC: 124 MG/DL — HIGH (ref 70–99)
GLUCOSE SERPL-MCNC: 129 MG/DL — HIGH (ref 70–99)
GLUCOSE SERPL-MCNC: 130 MG/DL — HIGH (ref 70–99)
GLUCOSE SERPL-MCNC: 131 MG/DL — HIGH (ref 70–99)
GLUCOSE SERPL-MCNC: 131 MG/DL — HIGH (ref 70–99)
GLUCOSE SERPL-MCNC: 133 MG/DL — HIGH (ref 70–99)
GLUCOSE SERPL-MCNC: 134 MG/DL — HIGH (ref 70–99)
GLUCOSE SERPL-MCNC: 137 MG/DL — HIGH (ref 70–99)
GLUCOSE SERPL-MCNC: 141 MG/DL — HIGH (ref 70–99)
GLUCOSE SERPL-MCNC: 144 MG/DL — HIGH (ref 70–99)
GLUCOSE SERPL-MCNC: 153 MG/DL — HIGH (ref 70–99)
GLUCOSE SERPL-MCNC: 158 MG/DL — HIGH (ref 70–99)
GLUCOSE SERPL-MCNC: 169 MG/DL — HIGH (ref 70–99)
GLUCOSE SERPL-MCNC: 173 MG/DL — HIGH (ref 70–99)
GLUCOSE SERPL-MCNC: 193 MG/DL
GLUCOSE SERPL-MCNC: 277 MG/DL — HIGH (ref 70–99)
GLUCOSE SERPL-MCNC: 287 MG/DL — HIGH (ref 70–99)
GLUCOSE SERPL-MCNC: 80 MG/DL — SIGNIFICANT CHANGE UP (ref 70–99)
GLUCOSE SERPL-MCNC: 80 MG/DL — SIGNIFICANT CHANGE UP (ref 70–99)
GLUCOSE SERPL-MCNC: 81 MG/DL — SIGNIFICANT CHANGE UP (ref 70–99)
GLUCOSE SERPL-MCNC: 81 MG/DL — SIGNIFICANT CHANGE UP (ref 70–99)
GLUCOSE SERPL-MCNC: 83 MG/DL — SIGNIFICANT CHANGE UP (ref 70–99)
GLUCOSE SERPL-MCNC: 83 MG/DL — SIGNIFICANT CHANGE UP (ref 70–99)
GLUCOSE SERPL-MCNC: 84 MG/DL — SIGNIFICANT CHANGE UP (ref 70–99)
GLUCOSE SERPL-MCNC: 84 MG/DL — SIGNIFICANT CHANGE UP (ref 70–99)
GLUCOSE SERPL-MCNC: 86 MG/DL — SIGNIFICANT CHANGE UP (ref 70–99)
GLUCOSE SERPL-MCNC: 87 MG/DL — SIGNIFICANT CHANGE UP (ref 70–99)
GLUCOSE SERPL-MCNC: 88 MG/DL — SIGNIFICANT CHANGE UP (ref 70–99)
GLUCOSE SERPL-MCNC: 88 MG/DL — SIGNIFICANT CHANGE UP (ref 70–99)
GLUCOSE SERPL-MCNC: 89 MG/DL — SIGNIFICANT CHANGE UP (ref 70–99)
GLUCOSE SERPL-MCNC: 90 MG/DL — SIGNIFICANT CHANGE UP (ref 70–99)
GLUCOSE SERPL-MCNC: 92 MG/DL — SIGNIFICANT CHANGE UP (ref 70–99)
GLUCOSE SERPL-MCNC: 93 MG/DL — SIGNIFICANT CHANGE UP (ref 70–99)
GLUCOSE SERPL-MCNC: 96 MG/DL — SIGNIFICANT CHANGE UP (ref 70–99)
GLUCOSE UR QL: ABNORMAL
GLUCOSE UR QL: NEGATIVE — SIGNIFICANT CHANGE UP
GRAM STN FLD: SIGNIFICANT CHANGE UP
HAV IGM SER-ACNC: SIGNIFICANT CHANGE UP
HBV CORE AB SER-ACNC: SIGNIFICANT CHANGE UP
HBV CORE IGM SER-ACNC: SIGNIFICANT CHANGE UP
HBV SURFACE AG SER-ACNC: SIGNIFICANT CHANGE UP
HCO3 BLDV-SCNC: 29 MMOL/L — SIGNIFICANT CHANGE UP (ref 22–29)
HCT VFR BLD CALC: 27.6 % — LOW (ref 34.5–45)
HCT VFR BLD CALC: 28.2 % — LOW (ref 34.5–45)
HCT VFR BLD CALC: 28.6 % — LOW (ref 34.5–45)
HCT VFR BLD CALC: 29.1 % — LOW (ref 34.5–45)
HCT VFR BLD CALC: 29.3 % — LOW (ref 34.5–45)
HCT VFR BLD CALC: 29.4 % — LOW (ref 34.5–45)
HCT VFR BLD CALC: 29.4 % — LOW (ref 34.5–45)
HCT VFR BLD CALC: 29.7 % — LOW (ref 34.5–45)
HCT VFR BLD CALC: 29.8 % — LOW (ref 34.5–45)
HCT VFR BLD CALC: 30.5 % — LOW (ref 34.5–45)
HCT VFR BLD CALC: 30.8 % — LOW (ref 34.5–45)
HCT VFR BLD CALC: 31.9 % — LOW (ref 34.5–45)
HCT VFR BLD CALC: 33 % — LOW (ref 34.5–45)
HCT VFR BLD CALC: 33.4 % — LOW (ref 34.5–45)
HCT VFR BLD CALC: 33.6 % — LOW (ref 34.5–45)
HCT VFR BLD CALC: 33.6 % — LOW (ref 34.5–45)
HCT VFR BLD CALC: 33.7 % — LOW (ref 34.5–45)
HCT VFR BLD CALC: 33.8 % — LOW (ref 34.5–45)
HCT VFR BLD CALC: 34.5 % — SIGNIFICANT CHANGE UP (ref 34.5–45)
HCT VFR BLD CALC: 34.7 % — SIGNIFICANT CHANGE UP (ref 34.5–45)
HCT VFR BLD CALC: 35.1 % — SIGNIFICANT CHANGE UP (ref 34.5–45)
HCT VFR BLD CALC: 35.2 % — SIGNIFICANT CHANGE UP (ref 34.5–45)
HCT VFR BLD CALC: 35.5 % — SIGNIFICANT CHANGE UP (ref 34.5–45)
HCT VFR BLD CALC: 35.7 % — SIGNIFICANT CHANGE UP (ref 34.5–45)
HCT VFR BLD CALC: 35.9 % — SIGNIFICANT CHANGE UP (ref 34.5–45)
HCT VFR BLD CALC: 36 % — SIGNIFICANT CHANGE UP (ref 34.5–45)
HCT VFR BLD CALC: 36.2 % — SIGNIFICANT CHANGE UP (ref 34.5–45)
HCT VFR BLD CALC: 36.4 % — SIGNIFICANT CHANGE UP (ref 34.5–45)
HCT VFR BLD CALC: 36.4 % — SIGNIFICANT CHANGE UP (ref 34.5–45)
HCT VFR BLD CALC: 36.6 % — SIGNIFICANT CHANGE UP (ref 34.5–45)
HCT VFR BLD CALC: 36.6 % — SIGNIFICANT CHANGE UP (ref 34.5–45)
HCT VFR BLD CALC: 36.7 % — SIGNIFICANT CHANGE UP (ref 34.5–45)
HCT VFR BLD CALC: 37.5 % — SIGNIFICANT CHANGE UP (ref 34.5–45)
HCT VFR BLD CALC: 37.8 % — SIGNIFICANT CHANGE UP (ref 34.5–45)
HCT VFR BLD CALC: 38.3 % — SIGNIFICANT CHANGE UP (ref 34.5–45)
HCT VFR BLD CALC: 38.7 % — SIGNIFICANT CHANGE UP (ref 34.5–45)
HCT VFR BLD CALC: 38.8 % — SIGNIFICANT CHANGE UP (ref 34.5–45)
HCT VFR BLD CALC: 38.8 % — SIGNIFICANT CHANGE UP (ref 34.5–45)
HCT VFR BLD CALC: 39.6 % — SIGNIFICANT CHANGE UP (ref 34.5–45)
HCT VFR BLDA CALC: 39 % — SIGNIFICANT CHANGE UP (ref 34.5–46.5)
HCV AB S/CO SERPL IA: 0.09 S/CO — SIGNIFICANT CHANGE UP (ref 0–0.99)
HCV AB SERPL-IMP: SIGNIFICANT CHANGE UP
HEMATOPATHOLOGY REPORT: SIGNIFICANT CHANGE UP
HGB BLD CALC-MCNC: 12.9 G/DL — SIGNIFICANT CHANGE UP (ref 11.7–16.1)
HGB BLD-MCNC: 10.4 G/DL — LOW (ref 11.5–15.5)
HGB BLD-MCNC: 10.4 G/DL — LOW (ref 11.5–15.5)
HGB BLD-MCNC: 10.5 G/DL — LOW (ref 11.5–15.5)
HGB BLD-MCNC: 10.9 G/DL — LOW (ref 11.5–15.5)
HGB BLD-MCNC: 10.9 G/DL — LOW (ref 11.5–15.5)
HGB BLD-MCNC: 11.2 G/DL — LOW (ref 11.5–15.5)
HGB BLD-MCNC: 11.3 G/DL — LOW (ref 11.5–15.5)
HGB BLD-MCNC: 11.5 G/DL — SIGNIFICANT CHANGE UP (ref 11.5–15.5)
HGB BLD-MCNC: 11.5 G/DL — SIGNIFICANT CHANGE UP (ref 11.5–15.5)
HGB BLD-MCNC: 11.6 G/DL — SIGNIFICANT CHANGE UP (ref 11.5–15.5)
HGB BLD-MCNC: 11.7 G/DL — SIGNIFICANT CHANGE UP (ref 11.5–15.5)
HGB BLD-MCNC: 11.8 G/DL — SIGNIFICANT CHANGE UP (ref 11.5–15.5)
HGB BLD-MCNC: 11.8 G/DL — SIGNIFICANT CHANGE UP (ref 11.5–15.5)
HGB BLD-MCNC: 11.9 G/DL — SIGNIFICANT CHANGE UP (ref 11.5–15.5)
HGB BLD-MCNC: 12 G/DL — SIGNIFICANT CHANGE UP (ref 11.5–15.5)
HGB BLD-MCNC: 12.2 G/DL — SIGNIFICANT CHANGE UP (ref 11.5–15.5)
HGB BLD-MCNC: 12.3 G/DL — SIGNIFICANT CHANGE UP (ref 11.5–15.5)
HGB BLD-MCNC: 12.4 G/DL — SIGNIFICANT CHANGE UP (ref 11.5–15.5)
HGB BLD-MCNC: 12.4 G/DL — SIGNIFICANT CHANGE UP (ref 11.5–15.5)
HGB BLD-MCNC: 12.5 G/DL — SIGNIFICANT CHANGE UP (ref 11.5–15.5)
HGB BLD-MCNC: 12.5 G/DL — SIGNIFICANT CHANGE UP (ref 11.5–15.5)
HGB BLD-MCNC: 12.6 G/DL — SIGNIFICANT CHANGE UP (ref 11.5–15.5)
HGB BLD-MCNC: 13 G/DL — SIGNIFICANT CHANGE UP (ref 11.5–15.5)
HGB BLD-MCNC: 13 G/DL — SIGNIFICANT CHANGE UP (ref 11.5–15.5)
HGB BLD-MCNC: 8.8 G/DL — LOW (ref 11.5–15.5)
HGB BLD-MCNC: 9.2 G/DL — LOW (ref 11.5–15.5)
HGB BLD-MCNC: 9.4 G/DL — LOW (ref 11.5–15.5)
HGB BLD-MCNC: 9.5 G/DL — LOW (ref 11.5–15.5)
HGB BLD-MCNC: 9.6 G/DL — LOW (ref 11.5–15.5)
HGB BLD-MCNC: 9.8 G/DL — LOW (ref 11.5–15.5)
HGB BLD-MCNC: 9.8 G/DL — LOW (ref 11.5–15.5)
HIV 1+2 AB+HIV1 P24 AG SERPL QL IA: SIGNIFICANT CHANGE UP
HLX FLT3 FINAL REPORT: SIGNIFICANT CHANGE UP
HYALINE CASTS # UR AUTO: 0 /LPF — SIGNIFICANT CHANGE UP (ref 0–7)
HYPOCHROMIA BLD QL: SLIGHT — SIGNIFICANT CHANGE UP
IGA SER QL IEP: 72 MG/DL
IGG SER QL IEP: 552 MG/DL
IGM SER QL IEP: 191 MG/DL
IMM GRANULOCYTES NFR BLD AUTO: 0.2 % — SIGNIFICANT CHANGE UP (ref 0–1.5)
IMM GRANULOCYTES NFR BLD AUTO: 0.3 % — SIGNIFICANT CHANGE UP (ref 0–1.5)
IMM GRANULOCYTES NFR BLD AUTO: 0.4 % — SIGNIFICANT CHANGE UP (ref 0–0.9)
IMM GRANULOCYTES NFR BLD AUTO: 0.4 % — SIGNIFICANT CHANGE UP (ref 0–0.9)
IMM GRANULOCYTES NFR BLD AUTO: 0.4 % — SIGNIFICANT CHANGE UP (ref 0–1.5)
IMM GRANULOCYTES NFR BLD AUTO: 0.5 % — SIGNIFICANT CHANGE UP (ref 0–0.9)
IMM GRANULOCYTES NFR BLD AUTO: 0.7 % — SIGNIFICANT CHANGE UP (ref 0–0.9)
IMM GRANULOCYTES NFR BLD AUTO: 0.9 % — SIGNIFICANT CHANGE UP (ref 0–1.5)
IMM GRANULOCYTES NFR BLD AUTO: 2.1 % — HIGH (ref 0–0.9)
IMM GRANULOCYTES NFR BLD AUTO: 2.6 % — HIGH (ref 0–0.9)
IMM GRANULOCYTES NFR BLD AUTO: 4.2 % — HIGH (ref 0–0.9)
IMM GRANULOCYTES NFR BLD AUTO: 4.4 % — HIGH (ref 0–0.9)
IMM GRANULOCYTES NFR BLD AUTO: 5.2 % — HIGH (ref 0–0.9)
INR BLD: 0.9 RATIO — SIGNIFICANT CHANGE UP (ref 0.88–1.16)
INR BLD: 0.92 RATIO — SIGNIFICANT CHANGE UP (ref 0.88–1.16)
INR BLD: 0.94 RATIO — SIGNIFICANT CHANGE UP (ref 0.88–1.16)
INR BLD: 0.94 RATIO — SIGNIFICANT CHANGE UP (ref 0.88–1.16)
INR BLD: 0.95 RATIO — SIGNIFICANT CHANGE UP (ref 0.88–1.16)
INR BLD: 1 RATIO — SIGNIFICANT CHANGE UP (ref 0.88–1.16)
INR BLD: 1.03 RATIO — SIGNIFICANT CHANGE UP (ref 0.88–1.16)
INR BLD: 1.07 RATIO — SIGNIFICANT CHANGE UP (ref 0.88–1.16)
INR BLD: 1.12 RATIO — SIGNIFICANT CHANGE UP (ref 0.88–1.16)
INR BLD: 1.12 RATIO — SIGNIFICANT CHANGE UP (ref 0.88–1.16)
INR PPP: 0.89 RATIO
INTERPRETATION SERPL IEP-IMP: NORMAL
IRON SATN MFR SERPL: 14 % — SIGNIFICANT CHANGE UP (ref 14–50)
IRON SATN MFR SERPL: 34 UG/DL — SIGNIFICANT CHANGE UP (ref 30–160)
KAPPA LC CSF-MCNC: 0.9 MG/DL
KAPPA LC SERPL-MCNC: 0.99 MG/DL
KETONES UR-MCNC: NEGATIVE — SIGNIFICANT CHANGE UP
KETONES UR-MCNC: NEGATIVE — SIGNIFICANT CHANGE UP
LACTATE BLDV-MCNC: 1.2 MMOL/L — SIGNIFICANT CHANGE UP (ref 0.5–2)
LACTATE CSF-MCNC: 2.1 MMOL/L — SIGNIFICANT CHANGE UP (ref 1.1–2.4)
LDH CSF L TO P-CCNC: 15 U/L — SIGNIFICANT CHANGE UP
LDH FLD-CCNC: 15 U/L — SIGNIFICANT CHANGE UP
LDH SERPL L TO P-CCNC: 109 U/L — SIGNIFICANT CHANGE UP (ref 50–242)
LDH SERPL L TO P-CCNC: 111 U/L — SIGNIFICANT CHANGE UP (ref 50–242)
LDH SERPL L TO P-CCNC: 113 U/L — SIGNIFICANT CHANGE UP (ref 50–242)
LDH SERPL L TO P-CCNC: 116 U/L — SIGNIFICANT CHANGE UP (ref 50–242)
LDH SERPL L TO P-CCNC: 119 U/L — SIGNIFICANT CHANGE UP (ref 50–242)
LDH SERPL L TO P-CCNC: 122 U/L — SIGNIFICANT CHANGE UP (ref 50–242)
LDH SERPL L TO P-CCNC: 132 U/L — SIGNIFICANT CHANGE UP (ref 50–242)
LDH SERPL L TO P-CCNC: 152 U/L — SIGNIFICANT CHANGE UP (ref 50–242)
LDH SERPL L TO P-CCNC: 158 U/L — SIGNIFICANT CHANGE UP (ref 50–242)
LDH SERPL L TO P-CCNC: 161 U/L — SIGNIFICANT CHANGE UP (ref 50–242)
LDH SERPL L TO P-CCNC: 168 U/L — SIGNIFICANT CHANGE UP (ref 50–242)
LDH SERPL L TO P-CCNC: 195 U/L — SIGNIFICANT CHANGE UP (ref 50–242)
LDH SERPL L TO P-CCNC: 208 U/L — SIGNIFICANT CHANGE UP (ref 50–242)
LDH SERPL L TO P-CCNC: 210 U/L — SIGNIFICANT CHANGE UP
LDH SERPL L TO P-CCNC: 258 U/L — HIGH (ref 50–242)
LDH SERPL L TO P-CCNC: 286 U/L — HIGH (ref 50–242)
LDH SERPL L TO P-CCNC: 376 U/L — HIGH (ref 50–242)
LDH SERPL L TO P-CCNC: 59 U/L — SIGNIFICANT CHANGE UP
LDH SERPL L TO P-CCNC: 76 U/L — SIGNIFICANT CHANGE UP
LDH SERPL L TO P-CCNC: 93 U/L — SIGNIFICANT CHANGE UP (ref 50–242)
LDH SERPL-CCNC: 119 U/L
LDH SERPL-CCNC: 131 U/L
LDH SERPL-CCNC: 137 U/L
LEUKOCYTE ESTERASE UR-ACNC: ABNORMAL
LEUKOCYTE ESTERASE UR-ACNC: ABNORMAL
LIDOCAIN IGE QN: 30 U/L — SIGNIFICANT CHANGE UP (ref 7–60)
LYMPHOCYTES # BLD AUTO: 0 % — LOW (ref 13–44)
LYMPHOCYTES # BLD AUTO: 0 K/UL — LOW (ref 1–3.3)
LYMPHOCYTES # BLD AUTO: 0.18 K/UL — LOW (ref 1–3.3)
LYMPHOCYTES # BLD AUTO: 0.24 K/UL — LOW (ref 1–3.3)
LYMPHOCYTES # BLD AUTO: 0.26 K/UL — LOW (ref 1–3.3)
LYMPHOCYTES # BLD AUTO: 0.38 K/UL — LOW (ref 1–3.3)
LYMPHOCYTES # BLD AUTO: 0.39 K/UL — LOW (ref 1–3.3)
LYMPHOCYTES # BLD AUTO: 0.44 K/UL — LOW (ref 1–3.3)
LYMPHOCYTES # BLD AUTO: 0.59 K/UL — LOW (ref 1–3.3)
LYMPHOCYTES # BLD AUTO: 0.64 K/UL — LOW (ref 1–3.3)
LYMPHOCYTES # BLD AUTO: 0.67 K/UL — LOW (ref 1–3.3)
LYMPHOCYTES # BLD AUTO: 0.72 K/UL — LOW (ref 1–3.3)
LYMPHOCYTES # BLD AUTO: 0.76 K/UL — LOW (ref 1–3.3)
LYMPHOCYTES # BLD AUTO: 0.79 K/UL — LOW (ref 1–3.3)
LYMPHOCYTES # BLD AUTO: 0.8 % — LOW (ref 13–44)
LYMPHOCYTES # BLD AUTO: 0.82 K/UL — LOW (ref 1–3.3)
LYMPHOCYTES # BLD AUTO: 0.84 K/UL — LOW (ref 1–3.3)
LYMPHOCYTES # BLD AUTO: 0.95 K/UL — LOW (ref 1–3.3)
LYMPHOCYTES # BLD AUTO: 1 K/UL — SIGNIFICANT CHANGE UP (ref 1–3.3)
LYMPHOCYTES # BLD AUTO: 1.03 K/UL — SIGNIFICANT CHANGE UP (ref 1–3.3)
LYMPHOCYTES # BLD AUTO: 1.1 % — LOW (ref 13–44)
LYMPHOCYTES # BLD AUTO: 1.1 K/UL — SIGNIFICANT CHANGE UP (ref 1–3.3)
LYMPHOCYTES # BLD AUTO: 1.16 K/UL — SIGNIFICANT CHANGE UP (ref 1–3.3)
LYMPHOCYTES # BLD AUTO: 1.7 % — LOW (ref 13–44)
LYMPHOCYTES # BLD AUTO: 11.1 % — LOW (ref 13–44)
LYMPHOCYTES # BLD AUTO: 11.7 % — LOW (ref 13–44)
LYMPHOCYTES # BLD AUTO: 13.9 % — SIGNIFICANT CHANGE UP (ref 13–44)
LYMPHOCYTES # BLD AUTO: 14.2 % — SIGNIFICANT CHANGE UP (ref 13–44)
LYMPHOCYTES # BLD AUTO: 14.6 % — SIGNIFICANT CHANGE UP (ref 13–44)
LYMPHOCYTES # BLD AUTO: 15.9 % — SIGNIFICANT CHANGE UP (ref 13–44)
LYMPHOCYTES # BLD AUTO: 17.5 % — SIGNIFICANT CHANGE UP (ref 13–44)
LYMPHOCYTES # BLD AUTO: 18.7 % — SIGNIFICANT CHANGE UP (ref 13–44)
LYMPHOCYTES # BLD AUTO: 2 % — LOW (ref 13–44)
LYMPHOCYTES # BLD AUTO: 2.9 % — LOW (ref 13–44)
LYMPHOCYTES # BLD AUTO: 4.4 % — LOW (ref 13–44)
LYMPHOCYTES # BLD AUTO: 4.8 % — LOW (ref 13–44)
LYMPHOCYTES # BLD AUTO: 5.2 % — LOW (ref 13–44)
LYMPHOCYTES # BLD AUTO: 5.4 % — LOW (ref 13–44)
LYMPHOCYTES # BLD AUTO: 6 % — LOW (ref 13–44)
LYMPHOCYTES # BLD AUTO: 7.1 % — LOW (ref 13–44)
LYMPHOCYTES # CSF: 30 % — LOW (ref 40–80)
LYMPHOCYTES # FLD: 34 % — SIGNIFICANT CHANGE UP
LYMPHOCYTES # FLD: 56 % — SIGNIFICANT CHANGE UP
LYMPHOCYTES # FLD: 92 % — SIGNIFICANT CHANGE UP
M PROTEIN SPEC IFE-MCNC: NORMAL
MACROCYTES BLD QL: SLIGHT — SIGNIFICANT CHANGE UP
MAGNESIUM SERPL-MCNC: 1.6 MG/DL — SIGNIFICANT CHANGE UP (ref 1.6–2.6)
MAGNESIUM SERPL-MCNC: 1.7 MG/DL — SIGNIFICANT CHANGE UP (ref 1.6–2.6)
MAGNESIUM SERPL-MCNC: 1.8 MG/DL — SIGNIFICANT CHANGE UP (ref 1.6–2.6)
MAGNESIUM SERPL-MCNC: 1.8 MG/DL — SIGNIFICANT CHANGE UP (ref 1.6–2.6)
MAGNESIUM SERPL-MCNC: 1.9 MG/DL
MAGNESIUM SERPL-MCNC: 1.9 MG/DL — SIGNIFICANT CHANGE UP (ref 1.6–2.6)
MAGNESIUM SERPL-MCNC: 2 MG/DL — SIGNIFICANT CHANGE UP (ref 1.6–2.6)
MAGNESIUM SERPL-MCNC: 2.1 MG/DL
MAGNESIUM SERPL-MCNC: 2.1 MG/DL — SIGNIFICANT CHANGE UP (ref 1.6–2.6)
MAGNESIUM SERPL-MCNC: 2.1 MG/DL — SIGNIFICANT CHANGE UP (ref 1.6–2.6)
MAGNESIUM SERPL-MCNC: 2.2 MG/DL — SIGNIFICANT CHANGE UP (ref 1.6–2.6)
MAGNESIUM SERPL-MCNC: 2.3 MG/DL — SIGNIFICANT CHANGE UP (ref 1.6–2.6)
MANUAL SMEAR VERIFICATION: SIGNIFICANT CHANGE UP
MANUAL SMEAR VERIFICATION: SIGNIFICANT CHANGE UP
MCHC RBC-ENTMCNC: 30.2 PG — SIGNIFICANT CHANGE UP (ref 27–34)
MCHC RBC-ENTMCNC: 30.4 PG — SIGNIFICANT CHANGE UP (ref 27–34)
MCHC RBC-ENTMCNC: 30.9 G/DL — LOW (ref 32–36)
MCHC RBC-ENTMCNC: 31 GM/DL — LOW (ref 32–36)
MCHC RBC-ENTMCNC: 31.2 PG — SIGNIFICANT CHANGE UP (ref 27–34)
MCHC RBC-ENTMCNC: 31.2 PG — SIGNIFICANT CHANGE UP (ref 27–34)
MCHC RBC-ENTMCNC: 31.3 PG — SIGNIFICANT CHANGE UP (ref 27–34)
MCHC RBC-ENTMCNC: 31.4 PG — SIGNIFICANT CHANGE UP (ref 27–34)
MCHC RBC-ENTMCNC: 31.5 PG — SIGNIFICANT CHANGE UP (ref 27–34)
MCHC RBC-ENTMCNC: 31.6 PG — SIGNIFICANT CHANGE UP (ref 27–34)
MCHC RBC-ENTMCNC: 31.7 PG — SIGNIFICANT CHANGE UP (ref 27–34)
MCHC RBC-ENTMCNC: 31.8 G/DL — LOW (ref 32–36)
MCHC RBC-ENTMCNC: 31.8 GM/DL — LOW (ref 32–36)
MCHC RBC-ENTMCNC: 31.8 PG — SIGNIFICANT CHANGE UP (ref 27–34)
MCHC RBC-ENTMCNC: 31.9 GM/DL — LOW (ref 32–36)
MCHC RBC-ENTMCNC: 31.9 PG — SIGNIFICANT CHANGE UP (ref 27–34)
MCHC RBC-ENTMCNC: 31.9 PG — SIGNIFICANT CHANGE UP (ref 27–34)
MCHC RBC-ENTMCNC: 32 G/DL — SIGNIFICANT CHANGE UP (ref 32–36)
MCHC RBC-ENTMCNC: 32 PG — SIGNIFICANT CHANGE UP (ref 27–34)
MCHC RBC-ENTMCNC: 32.1 GM/DL — SIGNIFICANT CHANGE UP (ref 32–36)
MCHC RBC-ENTMCNC: 32.1 PG — SIGNIFICANT CHANGE UP (ref 27–34)
MCHC RBC-ENTMCNC: 32.2 GM/DL — SIGNIFICANT CHANGE UP (ref 32–36)
MCHC RBC-ENTMCNC: 32.2 PG — SIGNIFICANT CHANGE UP (ref 27–34)
MCHC RBC-ENTMCNC: 32.3 GM/DL — SIGNIFICANT CHANGE UP (ref 32–36)
MCHC RBC-ENTMCNC: 32.3 GM/DL — SIGNIFICANT CHANGE UP (ref 32–36)
MCHC RBC-ENTMCNC: 32.3 PG — SIGNIFICANT CHANGE UP (ref 27–34)
MCHC RBC-ENTMCNC: 32.4 GM/DL — SIGNIFICANT CHANGE UP (ref 32–36)
MCHC RBC-ENTMCNC: 32.4 PG — SIGNIFICANT CHANGE UP (ref 27–34)
MCHC RBC-ENTMCNC: 32.5 G/DL — SIGNIFICANT CHANGE UP (ref 32–36)
MCHC RBC-ENTMCNC: 32.5 GM/DL — SIGNIFICANT CHANGE UP (ref 32–36)
MCHC RBC-ENTMCNC: 32.5 GM/DL — SIGNIFICANT CHANGE UP (ref 32–36)
MCHC RBC-ENTMCNC: 32.5 PG — SIGNIFICANT CHANGE UP (ref 27–34)
MCHC RBC-ENTMCNC: 32.5 PG — SIGNIFICANT CHANGE UP (ref 27–34)
MCHC RBC-ENTMCNC: 32.6 G/DL — SIGNIFICANT CHANGE UP (ref 32–36)
MCHC RBC-ENTMCNC: 32.6 GM/DL — SIGNIFICANT CHANGE UP (ref 32–36)
MCHC RBC-ENTMCNC: 32.6 GM/DL — SIGNIFICANT CHANGE UP (ref 32–36)
MCHC RBC-ENTMCNC: 32.7 GM/DL — SIGNIFICANT CHANGE UP (ref 32–36)
MCHC RBC-ENTMCNC: 32.8 GM/DL — SIGNIFICANT CHANGE UP (ref 32–36)
MCHC RBC-ENTMCNC: 32.9 GM/DL — SIGNIFICANT CHANGE UP (ref 32–36)
MCHC RBC-ENTMCNC: 33 GM/DL — SIGNIFICANT CHANGE UP (ref 32–36)
MCHC RBC-ENTMCNC: 33 GM/DL — SIGNIFICANT CHANGE UP (ref 32–36)
MCHC RBC-ENTMCNC: 33.1 GM/DL — SIGNIFICANT CHANGE UP (ref 32–36)
MCHC RBC-ENTMCNC: 33.3 GM/DL — SIGNIFICANT CHANGE UP (ref 32–36)
MCHC RBC-ENTMCNC: 33.4 GM/DL — SIGNIFICANT CHANGE UP (ref 32–36)
MCHC RBC-ENTMCNC: 33.5 GM/DL — SIGNIFICANT CHANGE UP (ref 32–36)
MCHC RBC-ENTMCNC: 33.6 G/DL — SIGNIFICANT CHANGE UP (ref 32–36)
MCHC RBC-ENTMCNC: 33.6 GM/DL — SIGNIFICANT CHANGE UP (ref 32–36)
MCHC RBC-ENTMCNC: 33.6 GM/DL — SIGNIFICANT CHANGE UP (ref 32–36)
MCHC RBC-ENTMCNC: 33.9 GM/DL — SIGNIFICANT CHANGE UP (ref 32–36)
MCHC RBC-ENTMCNC: 33.9 GM/DL — SIGNIFICANT CHANGE UP (ref 32–36)
MCV RBC AUTO: 101 FL — HIGH (ref 80–100)
MCV RBC AUTO: 101.4 FL — HIGH (ref 80–100)
MCV RBC AUTO: 103.4 FL — HIGH (ref 80–100)
MCV RBC AUTO: 92.7 FL — SIGNIFICANT CHANGE UP (ref 80–100)
MCV RBC AUTO: 94.3 FL — SIGNIFICANT CHANGE UP (ref 80–100)
MCV RBC AUTO: 94.9 FL — SIGNIFICANT CHANGE UP (ref 80–100)
MCV RBC AUTO: 95 FL — SIGNIFICANT CHANGE UP (ref 80–100)
MCV RBC AUTO: 95.3 FL — SIGNIFICANT CHANGE UP (ref 80–100)
MCV RBC AUTO: 95.5 FL — SIGNIFICANT CHANGE UP (ref 80–100)
MCV RBC AUTO: 95.7 FL — SIGNIFICANT CHANGE UP (ref 80–100)
MCV RBC AUTO: 95.7 FL — SIGNIFICANT CHANGE UP (ref 80–100)
MCV RBC AUTO: 95.8 FL — SIGNIFICANT CHANGE UP (ref 80–100)
MCV RBC AUTO: 95.8 FL — SIGNIFICANT CHANGE UP (ref 80–100)
MCV RBC AUTO: 95.9 FL — SIGNIFICANT CHANGE UP (ref 80–100)
MCV RBC AUTO: 96.2 FL — SIGNIFICANT CHANGE UP (ref 80–100)
MCV RBC AUTO: 96.3 FL — SIGNIFICANT CHANGE UP (ref 80–100)
MCV RBC AUTO: 96.3 FL — SIGNIFICANT CHANGE UP (ref 80–100)
MCV RBC AUTO: 96.5 FL — SIGNIFICANT CHANGE UP (ref 80–100)
MCV RBC AUTO: 96.6 FL — SIGNIFICANT CHANGE UP (ref 80–100)
MCV RBC AUTO: 96.6 FL — SIGNIFICANT CHANGE UP (ref 80–100)
MCV RBC AUTO: 96.7 FL — SIGNIFICANT CHANGE UP (ref 80–100)
MCV RBC AUTO: 96.8 FL — SIGNIFICANT CHANGE UP (ref 80–100)
MCV RBC AUTO: 97 FL — SIGNIFICANT CHANGE UP (ref 80–100)
MCV RBC AUTO: 97.2 FL — SIGNIFICANT CHANGE UP (ref 80–100)
MCV RBC AUTO: 97.3 FL — SIGNIFICANT CHANGE UP (ref 80–100)
MCV RBC AUTO: 97.6 FL — SIGNIFICANT CHANGE UP (ref 80–100)
MCV RBC AUTO: 97.7 FL — SIGNIFICANT CHANGE UP (ref 80–100)
MCV RBC AUTO: 97.8 FL — SIGNIFICANT CHANGE UP (ref 80–100)
MCV RBC AUTO: 98 FL — SIGNIFICANT CHANGE UP (ref 80–100)
MCV RBC AUTO: 98.2 FL — SIGNIFICANT CHANGE UP (ref 80–100)
MCV RBC AUTO: 98.3 FL — SIGNIFICANT CHANGE UP (ref 80–100)
MCV RBC AUTO: 98.6 FL — SIGNIFICANT CHANGE UP (ref 80–100)
MCV RBC AUTO: 98.7 FL — SIGNIFICANT CHANGE UP (ref 80–100)
MCV RBC AUTO: 98.7 FL — SIGNIFICANT CHANGE UP (ref 80–100)
MCV RBC AUTO: 98.8 FL — SIGNIFICANT CHANGE UP (ref 80–100)
MCV RBC AUTO: 99 FL — SIGNIFICANT CHANGE UP (ref 80–100)
MCV RBC AUTO: 99 FL — SIGNIFICANT CHANGE UP (ref 80–100)
MCV RBC AUTO: 99.3 FL — SIGNIFICANT CHANGE UP (ref 80–100)
MCV RBC AUTO: 99.7 FL — SIGNIFICANT CHANGE UP (ref 80–100)
MESOTHL CELL # FLD: 1 % — SIGNIFICANT CHANGE UP
METAMYELOCYTES # FLD: 0.8 % — HIGH (ref 0–0)
METHOD TYPE: SIGNIFICANT CHANGE UP
MONOCYTES # BLD AUTO: 0.17 K/UL — SIGNIFICANT CHANGE UP (ref 0–0.9)
MONOCYTES # BLD AUTO: 0.35 K/UL — SIGNIFICANT CHANGE UP (ref 0–0.9)
MONOCYTES # BLD AUTO: 0.61 K/UL — SIGNIFICANT CHANGE UP (ref 0–0.9)
MONOCYTES # BLD AUTO: 0.64 K/UL — SIGNIFICANT CHANGE UP (ref 0–0.9)
MONOCYTES # BLD AUTO: 0.76 K/UL — SIGNIFICANT CHANGE UP (ref 0–0.9)
MONOCYTES # BLD AUTO: 0.77 K/UL — SIGNIFICANT CHANGE UP (ref 0–0.9)
MONOCYTES # BLD AUTO: 0.78 K/UL — SIGNIFICANT CHANGE UP (ref 0–0.9)
MONOCYTES # BLD AUTO: 0.81 K/UL — SIGNIFICANT CHANGE UP (ref 0–0.9)
MONOCYTES # BLD AUTO: 0.82 K/UL — SIGNIFICANT CHANGE UP (ref 0–0.9)
MONOCYTES # BLD AUTO: 0.92 K/UL — HIGH (ref 0–0.9)
MONOCYTES # BLD AUTO: 0.92 K/UL — HIGH (ref 0–0.9)
MONOCYTES # BLD AUTO: 0.96 K/UL — HIGH (ref 0–0.9)
MONOCYTES # BLD AUTO: 0.99 K/UL — HIGH (ref 0–0.9)
MONOCYTES # BLD AUTO: 0.99 K/UL — HIGH (ref 0–0.9)
MONOCYTES # BLD AUTO: 1.02 K/UL — HIGH (ref 0–0.9)
MONOCYTES # BLD AUTO: 1.07 K/UL — HIGH (ref 0–0.9)
MONOCYTES # BLD AUTO: 1.09 K/UL — HIGH (ref 0–0.9)
MONOCYTES # BLD AUTO: 1.17 K/UL — HIGH (ref 0–0.9)
MONOCYTES # BLD AUTO: 1.29 K/UL — HIGH (ref 0–0.9)
MONOCYTES # BLD AUTO: 1.33 K/UL — HIGH (ref 0–0.9)
MONOCYTES NFR BLD AUTO: 0.9 % — LOW (ref 2–14)
MONOCYTES NFR BLD AUTO: 11.3 % — SIGNIFICANT CHANGE UP (ref 2–14)
MONOCYTES NFR BLD AUTO: 13.1 % — SIGNIFICANT CHANGE UP (ref 2–14)
MONOCYTES NFR BLD AUTO: 13.3 % — SIGNIFICANT CHANGE UP (ref 2–14)
MONOCYTES NFR BLD AUTO: 13.5 % — SIGNIFICANT CHANGE UP (ref 2–14)
MONOCYTES NFR BLD AUTO: 14.7 % — HIGH (ref 2–14)
MONOCYTES NFR BLD AUTO: 14.8 % — HIGH (ref 2–14)
MONOCYTES NFR BLD AUTO: 15.1 % — HIGH (ref 2–14)
MONOCYTES NFR BLD AUTO: 15.5 % — HIGH (ref 2–14)
MONOCYTES NFR BLD AUTO: 2.3 % — SIGNIFICANT CHANGE UP (ref 2–14)
MONOCYTES NFR BLD AUTO: 2.6 % — SIGNIFICANT CHANGE UP (ref 2–14)
MONOCYTES NFR BLD AUTO: 2.7 % — SIGNIFICANT CHANGE UP (ref 2–14)
MONOCYTES NFR BLD AUTO: 21 % — HIGH (ref 2–14)
MONOCYTES NFR BLD AUTO: 3 % — SIGNIFICANT CHANGE UP (ref 2–14)
MONOCYTES NFR BLD AUTO: 6.3 % — SIGNIFICANT CHANGE UP (ref 2–14)
MONOCYTES NFR BLD AUTO: 7 % — SIGNIFICANT CHANGE UP (ref 2–14)
MONOCYTES NFR BLD AUTO: 7.4 % — SIGNIFICANT CHANGE UP (ref 2–14)
MONOCYTES NFR BLD AUTO: 7.4 % — SIGNIFICANT CHANGE UP (ref 2–14)
MONOCYTES NFR BLD AUTO: 8.4 % — SIGNIFICANT CHANGE UP (ref 2–14)
MONOCYTES NFR BLD AUTO: 8.5 % — SIGNIFICANT CHANGE UP (ref 2–14)
MONOS+MACROS # FLD: 18 % — SIGNIFICANT CHANGE UP
MONOS+MACROS # FLD: 3 % — SIGNIFICANT CHANGE UP
MONOS+MACROS # FLD: 5 % — SIGNIFICANT CHANGE UP
MRSA PCR RESULT.: SIGNIFICANT CHANGE UP
MYELOCYTES NFR BLD: 0.8 % — HIGH (ref 0–0)
MYOGLOBIN SERPL-MCNC: 43 NG/ML — SIGNIFICANT CHANGE UP (ref 25–58)
NEUTROPHILS # BLD AUTO: 11.05 K/UL — HIGH (ref 1.8–7.4)
NEUTROPHILS # BLD AUTO: 11.65 K/UL — HIGH (ref 1.8–7.4)
NEUTROPHILS # BLD AUTO: 11.82 K/UL — HIGH (ref 1.8–7.4)
NEUTROPHILS # BLD AUTO: 13.1 K/UL — HIGH (ref 1.8–7.4)
NEUTROPHILS # BLD AUTO: 28.53 K/UL — HIGH (ref 1.8–7.4)
NEUTROPHILS # BLD AUTO: 3.08 K/UL — SIGNIFICANT CHANGE UP (ref 1.8–7.4)
NEUTROPHILS # BLD AUTO: 3.65 K/UL — SIGNIFICANT CHANGE UP (ref 1.8–7.4)
NEUTROPHILS # BLD AUTO: 30.5 K/UL — HIGH (ref 1.8–7.4)
NEUTROPHILS # BLD AUTO: 31.9 K/UL — HIGH (ref 1.8–7.4)
NEUTROPHILS # BLD AUTO: 38.58 K/UL — HIGH (ref 1.8–7.4)
NEUTROPHILS # BLD AUTO: 4.11 K/UL — SIGNIFICANT CHANGE UP (ref 1.8–7.4)
NEUTROPHILS # BLD AUTO: 4.3 K/UL — SIGNIFICANT CHANGE UP (ref 1.8–7.4)
NEUTROPHILS # BLD AUTO: 4.61 K/UL — SIGNIFICANT CHANGE UP (ref 1.8–7.4)
NEUTROPHILS # BLD AUTO: 4.62 K/UL — SIGNIFICANT CHANGE UP (ref 1.8–7.4)
NEUTROPHILS # BLD AUTO: 4.85 K/UL — SIGNIFICANT CHANGE UP (ref 1.8–7.4)
NEUTROPHILS # BLD AUTO: 5.33 K/UL — SIGNIFICANT CHANGE UP (ref 1.8–7.4)
NEUTROPHILS # BLD AUTO: 5.78 K/UL — SIGNIFICANT CHANGE UP (ref 1.8–7.4)
NEUTROPHILS # BLD AUTO: 5.78 K/UL — SIGNIFICANT CHANGE UP (ref 1.8–7.4)
NEUTROPHILS # BLD AUTO: 6.72 K/UL — SIGNIFICANT CHANGE UP (ref 1.8–7.4)
NEUTROPHILS # BLD AUTO: 8.63 K/UL — HIGH (ref 1.8–7.4)
NEUTROPHILS # CSF: 70 % — HIGH (ref 0–6)
NEUTROPHILS NFR BLD AUTO: 60.5 % — SIGNIFICANT CHANGE UP (ref 43–77)
NEUTROPHILS NFR BLD AUTO: 62.1 % — SIGNIFICANT CHANGE UP (ref 43–77)
NEUTROPHILS NFR BLD AUTO: 63.7 % — SIGNIFICANT CHANGE UP (ref 43–77)
NEUTROPHILS NFR BLD AUTO: 67.8 % — SIGNIFICANT CHANGE UP (ref 43–77)
NEUTROPHILS NFR BLD AUTO: 68.4 % — SIGNIFICANT CHANGE UP (ref 43–77)
NEUTROPHILS NFR BLD AUTO: 68.8 % — SIGNIFICANT CHANGE UP (ref 43–77)
NEUTROPHILS NFR BLD AUTO: 70.8 % — SIGNIFICANT CHANGE UP (ref 43–77)
NEUTROPHILS NFR BLD AUTO: 72.5 % — SIGNIFICANT CHANGE UP (ref 43–77)
NEUTROPHILS NFR BLD AUTO: 73 % — SIGNIFICANT CHANGE UP (ref 43–77)
NEUTROPHILS NFR BLD AUTO: 79.6 % — HIGH (ref 43–77)
NEUTROPHILS NFR BLD AUTO: 80.7 % — HIGH (ref 43–77)
NEUTROPHILS NFR BLD AUTO: 81.8 % — HIGH (ref 43–77)
NEUTROPHILS NFR BLD AUTO: 83.1 % — HIGH (ref 43–77)
NEUTROPHILS NFR BLD AUTO: 85.6 % — HIGH (ref 43–77)
NEUTROPHILS NFR BLD AUTO: 87.4 % — HIGH (ref 43–77)
NEUTROPHILS NFR BLD AUTO: 88.9 % — HIGH (ref 43–77)
NEUTROPHILS NFR BLD AUTO: 91.5 % — HIGH (ref 43–77)
NEUTROPHILS NFR BLD AUTO: 91.8 % — HIGH (ref 43–77)
NEUTROPHILS NFR BLD AUTO: 95 % — HIGH (ref 43–77)
NEUTROPHILS NFR BLD AUTO: 97.4 % — HIGH (ref 43–77)
NEUTROPHILS-BODY FLUID: 3 % — SIGNIFICANT CHANGE UP
NEUTROPHILS-BODY FLUID: 36 % — SIGNIFICANT CHANGE UP
NEUTROPHILS-BODY FLUID: 45 % — SIGNIFICANT CHANGE UP
NEUTS BAND # BLD: 1.7 % — SIGNIFICANT CHANGE UP (ref 0–8)
NEUTS BAND # BLD: 3.4 % — SIGNIFICANT CHANGE UP (ref 0–8)
NIGHT BLUE STAIN TISS: SIGNIFICANT CHANGE UP
NIGHT BLUE STAIN TISS: SIGNIFICANT CHANGE UP
NITRITE UR-MCNC: NEGATIVE — SIGNIFICANT CHANGE UP
NITRITE UR-MCNC: POSITIVE
NON-GYNECOLOGICAL CYTOLOGY STUDY: SIGNIFICANT CHANGE UP
NRBC # BLD: 0 /100 WBCS — SIGNIFICANT CHANGE UP (ref 0–0)
NRBC # BLD: 0 /100 — SIGNIFICANT CHANGE UP (ref 0–0)
NRBC # BLD: SIGNIFICANT CHANGE UP /100 WBCS (ref 0–0)
NRBC NFR CSF: 8 /UL — HIGH (ref 0–5)
NT-PROBNP SERPL-SCNC: 716 PG/ML — HIGH (ref 0–300)
ORGANISM # SPEC MICROSCOPIC CNT: SIGNIFICANT CHANGE UP
OVALOCYTES BLD QL SMEAR: SLIGHT — SIGNIFICANT CHANGE UP
PARANEOPLASTIC AB PNL SER: SIGNIFICANT CHANGE UP
PCO2 BLDV: 52 MMHG — HIGH (ref 39–42)
PH BLDV: 7.36 — SIGNIFICANT CHANGE UP (ref 7.32–7.43)
PH FLD: 7.62 — SIGNIFICANT CHANGE UP
PH FLD: 7.65 — SIGNIFICANT CHANGE UP
PH FLD: 7.67 — SIGNIFICANT CHANGE UP
PH UR: 6 — SIGNIFICANT CHANGE UP (ref 5–8)
PH UR: 6 — SIGNIFICANT CHANGE UP (ref 5–8)
PHOSPHATE SERPL-MCNC: 2.1 MG/DL — LOW (ref 2.5–4.5)
PHOSPHATE SERPL-MCNC: 2.4 MG/DL — LOW (ref 2.5–4.5)
PHOSPHATE SERPL-MCNC: 2.5 MG/DL — SIGNIFICANT CHANGE UP (ref 2.5–4.5)
PHOSPHATE SERPL-MCNC: 2.6 MG/DL
PHOSPHATE SERPL-MCNC: 2.7 MG/DL — SIGNIFICANT CHANGE UP (ref 2.5–4.5)
PHOSPHATE SERPL-MCNC: 2.8 MG/DL — SIGNIFICANT CHANGE UP (ref 2.5–4.5)
PHOSPHATE SERPL-MCNC: 2.9 MG/DL — SIGNIFICANT CHANGE UP (ref 2.5–4.5)
PHOSPHATE SERPL-MCNC: 2.9 MG/DL — SIGNIFICANT CHANGE UP (ref 2.5–4.5)
PHOSPHATE SERPL-MCNC: 3 MG/DL — SIGNIFICANT CHANGE UP (ref 2.5–4.5)
PHOSPHATE SERPL-MCNC: 3 MG/DL — SIGNIFICANT CHANGE UP (ref 2.5–4.5)
PHOSPHATE SERPL-MCNC: 3.1 MG/DL — SIGNIFICANT CHANGE UP (ref 2.5–4.5)
PHOSPHATE SERPL-MCNC: 3.1 MG/DL — SIGNIFICANT CHANGE UP (ref 2.5–4.5)
PHOSPHATE SERPL-MCNC: 3.2 MG/DL — SIGNIFICANT CHANGE UP (ref 2.5–4.5)
PHOSPHATE SERPL-MCNC: 3.3 MG/DL — SIGNIFICANT CHANGE UP (ref 2.5–4.5)
PHOSPHATE SERPL-MCNC: 3.3 MG/DL — SIGNIFICANT CHANGE UP (ref 2.5–4.5)
PHOSPHATE SERPL-MCNC: 3.4 MG/DL — SIGNIFICANT CHANGE UP (ref 2.5–4.5)
PHOSPHATE SERPL-MCNC: 3.4 MG/DL — SIGNIFICANT CHANGE UP (ref 2.5–4.5)
PHOSPHATE SERPL-MCNC: 3.5 MG/DL — SIGNIFICANT CHANGE UP (ref 2.5–4.5)
PHOSPHATE SERPL-MCNC: 3.6 MG/DL — SIGNIFICANT CHANGE UP (ref 2.5–4.5)
PHOSPHATE SERPL-MCNC: 3.8 MG/DL
PHOSPHATE SERPL-MCNC: 3.9 MG/DL — SIGNIFICANT CHANGE UP (ref 2.5–4.5)
PHOSPHATE SERPL-MCNC: 4.1 MG/DL — SIGNIFICANT CHANGE UP (ref 2.5–4.5)
PLAT MORPH BLD: NORMAL — SIGNIFICANT CHANGE UP
PLATELET # BLD AUTO: 105 K/UL — LOW (ref 150–400)
PLATELET # BLD AUTO: 132 K/UL — LOW (ref 150–400)
PLATELET # BLD AUTO: 134 K/UL — LOW (ref 150–400)
PLATELET # BLD AUTO: 134 K/UL — LOW (ref 150–400)
PLATELET # BLD AUTO: 135 K/UL — LOW (ref 150–400)
PLATELET # BLD AUTO: 135 K/UL — LOW (ref 150–400)
PLATELET # BLD AUTO: 145 K/UL — LOW (ref 150–400)
PLATELET # BLD AUTO: 148 K/UL — LOW (ref 150–400)
PLATELET # BLD AUTO: 163 K/UL — SIGNIFICANT CHANGE UP (ref 150–400)
PLATELET # BLD AUTO: 166 K/UL — SIGNIFICANT CHANGE UP (ref 150–400)
PLATELET # BLD AUTO: 184 K/UL — SIGNIFICANT CHANGE UP (ref 150–400)
PLATELET # BLD AUTO: 188 K/UL — SIGNIFICANT CHANGE UP (ref 150–400)
PLATELET # BLD AUTO: 197 K/UL — SIGNIFICANT CHANGE UP (ref 150–400)
PLATELET # BLD AUTO: 203 K/UL — SIGNIFICANT CHANGE UP (ref 150–400)
PLATELET # BLD AUTO: 207 K/UL — SIGNIFICANT CHANGE UP (ref 150–400)
PLATELET # BLD AUTO: 212 K/UL — SIGNIFICANT CHANGE UP (ref 150–400)
PLATELET # BLD AUTO: 212 K/UL — SIGNIFICANT CHANGE UP (ref 150–400)
PLATELET # BLD AUTO: 215 K/UL — SIGNIFICANT CHANGE UP (ref 150–400)
PLATELET # BLD AUTO: 223 K/UL — SIGNIFICANT CHANGE UP (ref 150–400)
PLATELET # BLD AUTO: 229 K/UL — SIGNIFICANT CHANGE UP (ref 150–400)
PLATELET # BLD AUTO: 229 K/UL — SIGNIFICANT CHANGE UP (ref 150–400)
PLATELET # BLD AUTO: 233 K/UL — SIGNIFICANT CHANGE UP (ref 150–400)
PLATELET # BLD AUTO: 236 K/UL — SIGNIFICANT CHANGE UP (ref 150–400)
PLATELET # BLD AUTO: 241 K/UL — SIGNIFICANT CHANGE UP (ref 150–400)
PLATELET # BLD AUTO: 244 K/UL — SIGNIFICANT CHANGE UP (ref 150–400)
PLATELET # BLD AUTO: 249 K/UL — SIGNIFICANT CHANGE UP (ref 150–400)
PLATELET # BLD AUTO: 260 K/UL — SIGNIFICANT CHANGE UP (ref 150–400)
PLATELET # BLD AUTO: 265 K/UL — SIGNIFICANT CHANGE UP (ref 150–400)
PLATELET # BLD AUTO: 273 K/UL — SIGNIFICANT CHANGE UP (ref 150–400)
PLATELET # BLD AUTO: 283 K/UL — SIGNIFICANT CHANGE UP (ref 150–400)
PLATELET # BLD AUTO: 283 K/UL — SIGNIFICANT CHANGE UP (ref 150–400)
PLATELET # BLD AUTO: 285 K/UL — SIGNIFICANT CHANGE UP (ref 150–400)
PLATELET # BLD AUTO: 287 K/UL — SIGNIFICANT CHANGE UP (ref 150–400)
PLATELET # BLD AUTO: 299 K/UL — SIGNIFICANT CHANGE UP (ref 150–400)
PLATELET # BLD AUTO: 301 K/UL — SIGNIFICANT CHANGE UP (ref 150–400)
PLATELET # BLD AUTO: 316 K/UL — SIGNIFICANT CHANGE UP (ref 150–400)
PLATELET # BLD AUTO: 328 K/UL — SIGNIFICANT CHANGE UP (ref 150–400)
PLATELET # BLD AUTO: 332 K/UL — SIGNIFICANT CHANGE UP (ref 150–400)
PLATELET # BLD AUTO: 333 K/UL — SIGNIFICANT CHANGE UP (ref 150–400)
PLATELET # BLD AUTO: 338 K/UL — SIGNIFICANT CHANGE UP (ref 150–400)
PLATELET # BLD AUTO: 346 K/UL — SIGNIFICANT CHANGE UP (ref 150–400)
PLATELET # BLD AUTO: 367 K/UL — SIGNIFICANT CHANGE UP (ref 150–400)
PLATELET # BLD AUTO: 369 K/UL — SIGNIFICANT CHANGE UP (ref 150–400)
PO2 BLDV: 25 MMHG — SIGNIFICANT CHANGE UP (ref 25–45)
POIKILOCYTOSIS BLD QL AUTO: SLIGHT — SIGNIFICANT CHANGE UP
POIKILOCYTOSIS BLD QL AUTO: SLIGHT — SIGNIFICANT CHANGE UP
POLYCHROMASIA BLD QL SMEAR: SLIGHT — SIGNIFICANT CHANGE UP
POTASSIUM BLDV-SCNC: 3.7 MMOL/L — SIGNIFICANT CHANGE UP (ref 3.5–5.1)
POTASSIUM SERPL-MCNC: 3.4 MMOL/L — LOW (ref 3.5–5.3)
POTASSIUM SERPL-MCNC: 3.5 MMOL/L — SIGNIFICANT CHANGE UP (ref 3.5–5.3)
POTASSIUM SERPL-MCNC: 3.6 MMOL/L — SIGNIFICANT CHANGE UP (ref 3.5–5.3)
POTASSIUM SERPL-MCNC: 3.6 MMOL/L — SIGNIFICANT CHANGE UP (ref 3.5–5.3)
POTASSIUM SERPL-MCNC: 3.7 MMOL/L — SIGNIFICANT CHANGE UP (ref 3.5–5.3)
POTASSIUM SERPL-MCNC: 3.7 MMOL/L — SIGNIFICANT CHANGE UP (ref 3.5–5.3)
POTASSIUM SERPL-MCNC: 3.8 MMOL/L — SIGNIFICANT CHANGE UP (ref 3.5–5.3)
POTASSIUM SERPL-MCNC: 3.8 MMOL/L — SIGNIFICANT CHANGE UP (ref 3.5–5.3)
POTASSIUM SERPL-MCNC: 3.9 MMOL/L — SIGNIFICANT CHANGE UP (ref 3.5–5.3)
POTASSIUM SERPL-MCNC: 3.9 MMOL/L — SIGNIFICANT CHANGE UP (ref 3.5–5.3)
POTASSIUM SERPL-MCNC: 4 MMOL/L — SIGNIFICANT CHANGE UP (ref 3.5–5.3)
POTASSIUM SERPL-MCNC: 4.1 MMOL/L — SIGNIFICANT CHANGE UP (ref 3.5–5.3)
POTASSIUM SERPL-MCNC: 4.2 MMOL/L — SIGNIFICANT CHANGE UP (ref 3.5–5.3)
POTASSIUM SERPL-MCNC: 4.3 MMOL/L — SIGNIFICANT CHANGE UP (ref 3.5–5.3)
POTASSIUM SERPL-MCNC: 4.4 MMOL/L — SIGNIFICANT CHANGE UP (ref 3.5–5.3)
POTASSIUM SERPL-MCNC: 4.5 MMOL/L — SIGNIFICANT CHANGE UP (ref 3.5–5.3)
POTASSIUM SERPL-MCNC: 4.5 MMOL/L — SIGNIFICANT CHANGE UP (ref 3.5–5.3)
POTASSIUM SERPL-MCNC: 4.6 MMOL/L — SIGNIFICANT CHANGE UP (ref 3.5–5.3)
POTASSIUM SERPL-MCNC: 4.6 MMOL/L — SIGNIFICANT CHANGE UP (ref 3.5–5.3)
POTASSIUM SERPL-MCNC: 4.8 MMOL/L — SIGNIFICANT CHANGE UP (ref 3.5–5.3)
POTASSIUM SERPL-MCNC: 5.2 MMOL/L — SIGNIFICANT CHANGE UP (ref 3.5–5.3)
POTASSIUM SERPL-SCNC: 3.4 MMOL/L — LOW (ref 3.5–5.3)
POTASSIUM SERPL-SCNC: 3.5 MMOL/L — SIGNIFICANT CHANGE UP (ref 3.5–5.3)
POTASSIUM SERPL-SCNC: 3.6 MMOL/L — SIGNIFICANT CHANGE UP (ref 3.5–5.3)
POTASSIUM SERPL-SCNC: 3.6 MMOL/L — SIGNIFICANT CHANGE UP (ref 3.5–5.3)
POTASSIUM SERPL-SCNC: 3.7 MMOL/L — SIGNIFICANT CHANGE UP (ref 3.5–5.3)
POTASSIUM SERPL-SCNC: 3.7 MMOL/L — SIGNIFICANT CHANGE UP (ref 3.5–5.3)
POTASSIUM SERPL-SCNC: 3.8 MMOL/L — SIGNIFICANT CHANGE UP (ref 3.5–5.3)
POTASSIUM SERPL-SCNC: 3.8 MMOL/L — SIGNIFICANT CHANGE UP (ref 3.5–5.3)
POTASSIUM SERPL-SCNC: 3.9 MMOL/L — SIGNIFICANT CHANGE UP (ref 3.5–5.3)
POTASSIUM SERPL-SCNC: 3.9 MMOL/L — SIGNIFICANT CHANGE UP (ref 3.5–5.3)
POTASSIUM SERPL-SCNC: 4 MMOL/L — SIGNIFICANT CHANGE UP (ref 3.5–5.3)
POTASSIUM SERPL-SCNC: 4.1 MMOL/L — SIGNIFICANT CHANGE UP (ref 3.5–5.3)
POTASSIUM SERPL-SCNC: 4.2 MMOL/L — SIGNIFICANT CHANGE UP (ref 3.5–5.3)
POTASSIUM SERPL-SCNC: 4.3 MMOL/L — SIGNIFICANT CHANGE UP (ref 3.5–5.3)
POTASSIUM SERPL-SCNC: 4.4 MMOL/L — SIGNIFICANT CHANGE UP (ref 3.5–5.3)
POTASSIUM SERPL-SCNC: 4.5 MMOL/L — SIGNIFICANT CHANGE UP (ref 3.5–5.3)
POTASSIUM SERPL-SCNC: 4.5 MMOL/L — SIGNIFICANT CHANGE UP (ref 3.5–5.3)
POTASSIUM SERPL-SCNC: 4.6 MMOL/L
POTASSIUM SERPL-SCNC: 4.6 MMOL/L — SIGNIFICANT CHANGE UP (ref 3.5–5.3)
POTASSIUM SERPL-SCNC: 4.6 MMOL/L — SIGNIFICANT CHANGE UP (ref 3.5–5.3)
POTASSIUM SERPL-SCNC: 4.7 MMOL/L
POTASSIUM SERPL-SCNC: 4.8 MMOL/L
POTASSIUM SERPL-SCNC: 4.8 MMOL/L — SIGNIFICANT CHANGE UP (ref 3.5–5.3)
POTASSIUM SERPL-SCNC: 5.2 MMOL/L — SIGNIFICANT CHANGE UP (ref 3.5–5.3)
PROT CSF-MCNC: 10 MG/DL — LOW (ref 15–45)
PROT FLD-MCNC: 2.2 G/DL — SIGNIFICANT CHANGE UP
PROT FLD-MCNC: 2.4 G/DL — SIGNIFICANT CHANGE UP
PROT FLD-MCNC: 3.3 G/DL — SIGNIFICANT CHANGE UP
PROT SERPL-MCNC: 4.2 G/DL — LOW (ref 6–8.3)
PROT SERPL-MCNC: 4.7 G/DL — LOW (ref 6–8.3)
PROT SERPL-MCNC: 4.8 G/DL
PROT SERPL-MCNC: 4.8 G/DL — LOW (ref 6–8.3)
PROT SERPL-MCNC: 4.9 G/DL — LOW (ref 6–8.3)
PROT SERPL-MCNC: 5.2 G/DL
PROT SERPL-MCNC: 5.3 G/DL — LOW (ref 6–8.3)
PROT SERPL-MCNC: 5.3 G/DL — LOW (ref 6–8.3)
PROT SERPL-MCNC: 5.4 G/DL — LOW (ref 6–8.3)
PROT SERPL-MCNC: 5.6 G/DL — LOW (ref 6–8.3)
PROT SERPL-MCNC: 5.7 G/DL — LOW (ref 6–8.3)
PROT SERPL-MCNC: 5.8 G/DL
PROT SERPL-MCNC: 5.8 G/DL — LOW (ref 6–8.3)
PROT SERPL-MCNC: 5.9 G/DL — LOW (ref 6–8.3)
PROT SERPL-MCNC: 6 G/DL — SIGNIFICANT CHANGE UP (ref 6–8.3)
PROT SERPL-MCNC: 6.3 G/DL — SIGNIFICANT CHANGE UP (ref 6–8.3)
PROT UR-MCNC: ABNORMAL
PROT UR-MCNC: SIGNIFICANT CHANGE UP
PROTHROM AB SERPL-ACNC: 10.4 SEC — LOW (ref 10.5–13.4)
PROTHROM AB SERPL-ACNC: 10.6 SEC — SIGNIFICANT CHANGE UP (ref 10.5–13.4)
PROTHROM AB SERPL-ACNC: 10.9 SEC — SIGNIFICANT CHANGE UP (ref 10.5–13.4)
PROTHROM AB SERPL-ACNC: 11.6 SEC — SIGNIFICANT CHANGE UP (ref 10.5–13.4)
PROTHROM AB SERPL-ACNC: 11.8 SEC — SIGNIFICANT CHANGE UP (ref 10.5–13.4)
PROTHROM AB SERPL-ACNC: 12.3 SEC — SIGNIFICANT CHANGE UP (ref 10.5–13.4)
PROTHROM AB SERPL-ACNC: 13 SEC — SIGNIFICANT CHANGE UP (ref 10.5–13.4)
PROTHROM AB SERPL-ACNC: 13 SEC — SIGNIFICANT CHANGE UP (ref 10.5–13.4)
PT BLD: 10.5 SEC
RAPID RVP RESULT: SIGNIFICANT CHANGE UP
RAPID RVP RESULT: SIGNIFICANT CHANGE UP
RBC # BLD: 2.78 M/UL — LOW (ref 3.8–5.2)
RBC # BLD: 2.86 M/UL — LOW (ref 3.8–5.2)
RBC # BLD: 2.91 M/UL — LOW (ref 3.8–5.2)
RBC # BLD: 2.93 M/UL — LOW (ref 3.8–5.2)
RBC # BLD: 2.97 M/UL — LOW (ref 3.8–5.2)
RBC # BLD: 2.99 M/UL — LOW (ref 3.8–5.2)
RBC # BLD: 3.02 M/UL — LOW (ref 3.8–5.2)
RBC # BLD: 3.05 M/UL — LOW (ref 3.8–5.2)
RBC # BLD: 3.06 M/UL — LOW (ref 3.8–5.2)
RBC # BLD: 3.26 M/UL — LOW (ref 3.8–5.2)
RBC # BLD: 3.36 M/UL — LOW (ref 3.8–5.2)
RBC # BLD: 3.4 M/UL — LOW (ref 3.8–5.2)
RBC # BLD: 3.44 M/UL — LOW (ref 3.8–5.2)
RBC # BLD: 3.45 M/UL — LOW (ref 3.8–5.2)
RBC # BLD: 3.48 M/UL — LOW (ref 3.8–5.2)
RBC # BLD: 3.49 M/UL — LOW (ref 3.8–5.2)
RBC # BLD: 3.57 M/UL — LOW (ref 3.8–5.2)
RBC # BLD: 3.62 M/UL — LOW (ref 3.8–5.2)
RBC # BLD: 3.63 M/UL — LOW (ref 3.8–5.2)
RBC # BLD: 3.64 M/UL — LOW (ref 3.8–5.2)
RBC # BLD: 3.69 M/UL — LOW (ref 3.8–5.2)
RBC # BLD: 3.7 M/UL — LOW (ref 3.8–5.2)
RBC # BLD: 3.71 M/UL — LOW (ref 3.8–5.2)
RBC # BLD: 3.73 M/UL — LOW (ref 3.8–5.2)
RBC # BLD: 3.73 M/UL — LOW (ref 3.8–5.2)
RBC # BLD: 3.74 M/UL — LOW (ref 3.8–5.2)
RBC # BLD: 3.74 M/UL — LOW (ref 3.8–5.2)
RBC # BLD: 3.76 M/UL — LOW (ref 3.8–5.2)
RBC # BLD: 3.78 M/UL — LOW (ref 3.8–5.2)
RBC # BLD: 3.79 M/UL — LOW (ref 3.8–5.2)
RBC # BLD: 3.8 M/UL — SIGNIFICANT CHANGE UP (ref 3.8–5.2)
RBC # BLD: 3.81 M/UL — SIGNIFICANT CHANGE UP (ref 3.8–5.2)
RBC # BLD: 3.81 M/UL — SIGNIFICANT CHANGE UP (ref 3.8–5.2)
RBC # BLD: 3.82 M/UL — SIGNIFICANT CHANGE UP (ref 3.8–5.2)
RBC # BLD: 3.88 M/UL — SIGNIFICANT CHANGE UP (ref 3.8–5.2)
RBC # BLD: 3.94 M/UL — SIGNIFICANT CHANGE UP (ref 3.8–5.2)
RBC # BLD: 3.96 M/UL — SIGNIFICANT CHANGE UP (ref 3.8–5.2)
RBC # BLD: 3.97 M/UL — SIGNIFICANT CHANGE UP (ref 3.8–5.2)
RBC # BLD: 4.01 M/UL — SIGNIFICANT CHANGE UP (ref 3.8–5.2)
RBC # BLD: 4.01 M/UL — SIGNIFICANT CHANGE UP (ref 3.8–5.2)
RBC # BLD: 4.09 M/UL — SIGNIFICANT CHANGE UP (ref 3.8–5.2)
RBC # CSF: 217 /UL — HIGH (ref 0–0)
RBC # FLD: 13.7 % — SIGNIFICANT CHANGE UP (ref 10.3–14.5)
RBC # FLD: 13.8 % — SIGNIFICANT CHANGE UP (ref 10.3–14.5)
RBC # FLD: 13.9 % — SIGNIFICANT CHANGE UP (ref 10.3–14.5)
RBC # FLD: 14 % — SIGNIFICANT CHANGE UP (ref 10.3–14.5)
RBC # FLD: 14 % — SIGNIFICANT CHANGE UP (ref 10.3–14.5)
RBC # FLD: 14.1 % — SIGNIFICANT CHANGE UP (ref 10.3–14.5)
RBC # FLD: 14.2 % — SIGNIFICANT CHANGE UP (ref 10.3–14.5)
RBC # FLD: 14.7 % — HIGH (ref 10.3–14.5)
RBC # FLD: 15 % — HIGH (ref 10.3–14.5)
RBC # FLD: 15.1 % — HIGH (ref 10.3–14.5)
RBC # FLD: 15.3 % — HIGH (ref 10.3–14.5)
RBC # FLD: 15.3 % — HIGH (ref 10.3–14.5)
RBC # FLD: 15.4 % — HIGH (ref 10.3–14.5)
RBC # FLD: 15.5 % — HIGH (ref 10.3–14.5)
RBC # FLD: 15.7 % — HIGH (ref 10.3–14.5)
RBC # FLD: 16 % — HIGH (ref 10.3–14.5)
RBC # FLD: 16.2 % — HIGH (ref 10.3–14.5)
RBC # FLD: 16.4 % — HIGH (ref 10.3–14.5)
RBC # FLD: 17 % — HIGH (ref 10.3–14.5)
RBC # FLD: 17 % — HIGH (ref 10.3–14.5)
RBC BLD AUTO: ABNORMAL
RBC BLD AUTO: ABNORMAL
RBC BLD AUTO: SIGNIFICANT CHANGE UP
RBC BLD AUTO: SIGNIFICANT CHANGE UP
RBC CASTS # UR COMP ASSIST: 1 /HPF — SIGNIFICANT CHANGE UP (ref 0–4)
RBC CASTS # UR COMP ASSIST: 7 /HPF — HIGH (ref 0–4)
RCV VOL RI: 1000 /UL — HIGH (ref 0–0)
RCV VOL RI: 1000 /UL — HIGH (ref 0–0)
RCV VOL RI: < 2000 /UL — SIGNIFICANT CHANGE UP (ref 0–0)
RH IG SCN BLD-IMP: POSITIVE — SIGNIFICANT CHANGE UP
S AUREUS DNA NOSE QL NAA+PROBE: SIGNIFICANT CHANGE UP
SAO2 % BLDV: 29.4 % — LOW (ref 67–88)
SARS-COV-2 RNA SPEC QL NAA+PROBE: SIGNIFICANT CHANGE UP
SODIUM SERPL-SCNC: 136 MMOL/L — SIGNIFICANT CHANGE UP (ref 135–145)
SODIUM SERPL-SCNC: 136 MMOL/L — SIGNIFICANT CHANGE UP (ref 135–145)
SODIUM SERPL-SCNC: 137 MMOL/L — SIGNIFICANT CHANGE UP (ref 135–145)
SODIUM SERPL-SCNC: 138 MMOL/L
SODIUM SERPL-SCNC: 138 MMOL/L — SIGNIFICANT CHANGE UP (ref 135–145)
SODIUM SERPL-SCNC: 139 MMOL/L
SODIUM SERPL-SCNC: 139 MMOL/L — SIGNIFICANT CHANGE UP (ref 135–145)
SODIUM SERPL-SCNC: 140 MMOL/L — SIGNIFICANT CHANGE UP (ref 135–145)
SODIUM SERPL-SCNC: 141 MMOL/L
SODIUM SERPL-SCNC: 141 MMOL/L — SIGNIFICANT CHANGE UP (ref 135–145)
SODIUM SERPL-SCNC: 142 MMOL/L — SIGNIFICANT CHANGE UP (ref 135–145)
SODIUM SERPL-SCNC: 143 MMOL/L — SIGNIFICANT CHANGE UP (ref 135–145)
SODIUM SERPL-SCNC: 145 MMOL/L — SIGNIFICANT CHANGE UP (ref 135–145)
SP GR SPEC: 1.02 — SIGNIFICANT CHANGE UP (ref 1.01–1.02)
SP GR SPEC: 1.03 — HIGH (ref 1.01–1.02)
SPECIMEN SOURCE FLD: SIGNIFICANT CHANGE UP
SPECIMEN SOURCE: SIGNIFICANT CHANGE UP
T3 SERPL-MCNC: 77 NG/DL — LOW (ref 80–200)
T4 AB SER-ACNC: 6.4 UG/DL — SIGNIFICANT CHANGE UP (ref 4.6–12)
TIBC SERPL-MCNC: 238 UG/DL — SIGNIFICANT CHANGE UP (ref 220–430)
TM INTERPRETATION: SIGNIFICANT CHANGE UP
TOTAL NUCLEATED CELL COUNT, BODY FLUID: 118 /UL — SIGNIFICANT CHANGE UP
TOTAL NUCLEATED CELL COUNT, BODY FLUID: 1312 /UL — SIGNIFICANT CHANGE UP
TOTAL NUCLEATED CELL COUNT, BODY FLUID: 523 /UL — SIGNIFICANT CHANGE UP
TRIGL FLD-MCNC: 128 MG/DL — SIGNIFICANT CHANGE UP
TRIGL FLD-MCNC: 441 MG/DL — SIGNIFICANT CHANGE UP
TROPONIN T, HIGH SENSITIVITY RESULT: 15 NG/L — SIGNIFICANT CHANGE UP (ref 0–51)
TROPONIN T, HIGH SENSITIVITY RESULT: 31 NG/L — SIGNIFICANT CHANGE UP (ref 0–51)
TROPONIN T, HIGH SENSITIVITY RESULT: 41 NG/L — SIGNIFICANT CHANGE UP (ref 0–51)
TSH SERPL-MCNC: 5.04 UIU/ML — HIGH (ref 0.27–4.2)
TUBE TYPE: SIGNIFICANT CHANGE UP
UIBC SERPL-MCNC: 204 UG/DL — SIGNIFICANT CHANGE UP (ref 110–370)
URATE SERPL-MCNC: 1.8 MG/DL — LOW (ref 2.5–7)
URATE SERPL-MCNC: 1.9 MG/DL — LOW (ref 2.5–7)
URATE SERPL-MCNC: 1.9 MG/DL — LOW (ref 2.5–7)
URATE SERPL-MCNC: 2 MG/DL
URATE SERPL-MCNC: 2.1 MG/DL
URATE SERPL-MCNC: 2.2 MG/DL — LOW (ref 2.5–7)
URATE SERPL-MCNC: 2.3 MG/DL — LOW (ref 2.5–7)
URATE SERPL-MCNC: 2.3 MG/DL — LOW (ref 2.5–7)
URATE SERPL-MCNC: 2.4 MG/DL
URATE SERPL-MCNC: 2.4 MG/DL — LOW (ref 2.5–7)
URATE SERPL-MCNC: 2.5 MG/DL — SIGNIFICANT CHANGE UP (ref 2.5–7)
URATE SERPL-MCNC: 2.6 MG/DL — SIGNIFICANT CHANGE UP (ref 2.5–7)
URATE SERPL-MCNC: 2.8 MG/DL — SIGNIFICANT CHANGE UP (ref 2.5–7)
URATE SERPL-MCNC: 3 MG/DL — SIGNIFICANT CHANGE UP (ref 2.5–7)
URATE SERPL-MCNC: 3 MG/DL — SIGNIFICANT CHANGE UP (ref 2.5–7)
URATE SERPL-MCNC: 3.7 MG/DL — SIGNIFICANT CHANGE UP (ref 2.5–7)
URATE SERPL-MCNC: 4.1 MG/DL — SIGNIFICANT CHANGE UP (ref 2.5–7)
URATE SERPL-MCNC: 4.8 MG/DL — SIGNIFICANT CHANGE UP (ref 2.5–7)
URATE SERPL-MCNC: 5 MG/DL — SIGNIFICANT CHANGE UP (ref 2.5–7)
UROBILINOGEN FLD QL: NEGATIVE — SIGNIFICANT CHANGE UP
UROBILINOGEN FLD QL: NEGATIVE — SIGNIFICANT CHANGE UP
VIT B1 SERPL-MCNC: 137.4 NMOL/L — SIGNIFICANT CHANGE UP (ref 66.5–200)
VIT B12 SERPL-MCNC: 600 PG/ML — SIGNIFICANT CHANGE UP (ref 232–1245)
WBC # BLD: 10.09 K/UL — SIGNIFICANT CHANGE UP (ref 3.8–10.5)
WBC # BLD: 10.31 K/UL — SIGNIFICANT CHANGE UP (ref 3.8–10.5)
WBC # BLD: 13.33 K/UL — HIGH (ref 3.8–10.5)
WBC # BLD: 13.88 K/UL — HIGH (ref 3.8–10.5)
WBC # BLD: 14.64 K/UL — HIGH (ref 3.8–10.5)
WBC # BLD: 15.14 K/UL — HIGH (ref 3.8–10.5)
WBC # BLD: 16.73 K/UL — HIGH (ref 3.8–10.5)
WBC # BLD: 16.78 K/UL — HIGH (ref 3.8–10.5)
WBC # BLD: 18.43 K/UL — HIGH (ref 3.8–10.5)
WBC # BLD: 18.59 K/UL — HIGH (ref 3.8–10.5)
WBC # BLD: 21.63 K/UL — HIGH (ref 3.8–10.5)
WBC # BLD: 22.87 K/UL — HIGH (ref 3.8–10.5)
WBC # BLD: 23.57 K/UL — HIGH (ref 3.8–10.5)
WBC # BLD: 29.81 K/UL — HIGH (ref 3.8–10.5)
WBC # BLD: 32.12 K/UL — HIGH (ref 3.8–10.5)
WBC # BLD: 32.37 K/UL — HIGH (ref 3.8–10.5)
WBC # BLD: 34.88 K/UL — HIGH (ref 3.8–10.5)
WBC # BLD: 38.93 K/UL — HIGH (ref 3.8–10.5)
WBC # BLD: 4.67 K/UL — SIGNIFICANT CHANGE UP (ref 3.8–10.5)
WBC # BLD: 4.94 K/UL — SIGNIFICANT CHANGE UP (ref 3.8–10.5)
WBC # BLD: 5.04 K/UL — SIGNIFICANT CHANGE UP (ref 3.8–10.5)
WBC # BLD: 5.09 K/UL — SIGNIFICANT CHANGE UP (ref 3.8–10.5)
WBC # BLD: 5.39 K/UL — SIGNIFICANT CHANGE UP (ref 3.8–10.5)
WBC # BLD: 5.49 K/UL — SIGNIFICANT CHANGE UP (ref 3.8–10.5)
WBC # BLD: 5.73 K/UL — SIGNIFICANT CHANGE UP (ref 3.8–10.5)
WBC # BLD: 5.73 K/UL — SIGNIFICANT CHANGE UP (ref 3.8–10.5)
WBC # BLD: 53.25 K/UL — CRITICAL HIGH (ref 3.8–10.5)
WBC # BLD: 6.06 K/UL — SIGNIFICANT CHANGE UP (ref 3.8–10.5)
WBC # BLD: 6.29 K/UL — SIGNIFICANT CHANGE UP (ref 3.8–10.5)
WBC # BLD: 6.31 K/UL — SIGNIFICANT CHANGE UP (ref 3.8–10.5)
WBC # BLD: 6.75 K/UL — SIGNIFICANT CHANGE UP (ref 3.8–10.5)
WBC # BLD: 6.92 K/UL — SIGNIFICANT CHANGE UP (ref 3.8–10.5)
WBC # BLD: 62.01 K/UL — CRITICAL HIGH (ref 3.8–10.5)
WBC # BLD: 62.5 K/UL — CRITICAL HIGH (ref 3.8–10.5)
WBC # BLD: 68.21 K/UL — CRITICAL HIGH (ref 3.8–10.5)
WBC # BLD: 7.04 K/UL — SIGNIFICANT CHANGE UP (ref 3.8–10.5)
WBC # BLD: 7.36 K/UL — SIGNIFICANT CHANGE UP (ref 3.8–10.5)
WBC # BLD: 8.16 K/UL — SIGNIFICANT CHANGE UP (ref 3.8–10.5)
WBC # BLD: 8.21 K/UL — SIGNIFICANT CHANGE UP (ref 3.8–10.5)
WBC # BLD: 8.38 K/UL — SIGNIFICANT CHANGE UP (ref 3.8–10.5)
WBC # BLD: 8.43 K/UL — SIGNIFICANT CHANGE UP (ref 3.8–10.5)
WBC # BLD: 9.15 K/UL — SIGNIFICANT CHANGE UP (ref 3.8–10.5)
WBC # BLD: 9.94 K/UL — SIGNIFICANT CHANGE UP (ref 3.8–10.5)
WBC # FLD AUTO: 10.09 K/UL — SIGNIFICANT CHANGE UP (ref 3.8–10.5)
WBC # FLD AUTO: 10.31 K/UL — SIGNIFICANT CHANGE UP (ref 3.8–10.5)
WBC # FLD AUTO: 13.33 K/UL — HIGH (ref 3.8–10.5)
WBC # FLD AUTO: 13.88 K/UL — HIGH (ref 3.8–10.5)
WBC # FLD AUTO: 14.64 K/UL — HIGH (ref 3.8–10.5)
WBC # FLD AUTO: 15.14 K/UL — HIGH (ref 3.8–10.5)
WBC # FLD AUTO: 16.73 K/UL — HIGH (ref 3.8–10.5)
WBC # FLD AUTO: 16.78 K/UL — HIGH (ref 3.8–10.5)
WBC # FLD AUTO: 18.43 K/UL — HIGH (ref 3.8–10.5)
WBC # FLD AUTO: 18.59 K/UL — HIGH (ref 3.8–10.5)
WBC # FLD AUTO: 21.63 K/UL — HIGH (ref 3.8–10.5)
WBC # FLD AUTO: 22.87 K/UL — HIGH (ref 3.8–10.5)
WBC # FLD AUTO: 23.57 K/UL — HIGH (ref 3.8–10.5)
WBC # FLD AUTO: 29.81 K/UL — HIGH (ref 3.8–10.5)
WBC # FLD AUTO: 32.12 K/UL — HIGH (ref 3.8–10.5)
WBC # FLD AUTO: 32.37 K/UL — HIGH (ref 3.8–10.5)
WBC # FLD AUTO: 34.88 K/UL — HIGH (ref 3.8–10.5)
WBC # FLD AUTO: 38.93 K/UL — HIGH (ref 3.8–10.5)
WBC # FLD AUTO: 4.67 K/UL — SIGNIFICANT CHANGE UP (ref 3.8–10.5)
WBC # FLD AUTO: 4.94 K/UL — SIGNIFICANT CHANGE UP (ref 3.8–10.5)
WBC # FLD AUTO: 5.04 K/UL — SIGNIFICANT CHANGE UP (ref 3.8–10.5)
WBC # FLD AUTO: 5.09 K/UL — SIGNIFICANT CHANGE UP (ref 3.8–10.5)
WBC # FLD AUTO: 5.39 K/UL — SIGNIFICANT CHANGE UP (ref 3.8–10.5)
WBC # FLD AUTO: 5.49 K/UL — SIGNIFICANT CHANGE UP (ref 3.8–10.5)
WBC # FLD AUTO: 5.73 K/UL — SIGNIFICANT CHANGE UP (ref 3.8–10.5)
WBC # FLD AUTO: 5.73 K/UL — SIGNIFICANT CHANGE UP (ref 3.8–10.5)
WBC # FLD AUTO: 53.25 K/UL — CRITICAL HIGH (ref 3.8–10.5)
WBC # FLD AUTO: 6.06 K/UL — SIGNIFICANT CHANGE UP (ref 3.8–10.5)
WBC # FLD AUTO: 6.29 K/UL — SIGNIFICANT CHANGE UP (ref 3.8–10.5)
WBC # FLD AUTO: 6.31 K/UL — SIGNIFICANT CHANGE UP (ref 3.8–10.5)
WBC # FLD AUTO: 6.75 K/UL — SIGNIFICANT CHANGE UP (ref 3.8–10.5)
WBC # FLD AUTO: 6.92 K/UL — SIGNIFICANT CHANGE UP (ref 3.8–10.5)
WBC # FLD AUTO: 62.01 K/UL — CRITICAL HIGH (ref 3.8–10.5)
WBC # FLD AUTO: 62.5 K/UL — CRITICAL HIGH (ref 3.8–10.5)
WBC # FLD AUTO: 68.21 K/UL — CRITICAL HIGH (ref 3.8–10.5)
WBC # FLD AUTO: 7.04 K/UL — SIGNIFICANT CHANGE UP (ref 3.8–10.5)
WBC # FLD AUTO: 7.36 K/UL — SIGNIFICANT CHANGE UP (ref 3.8–10.5)
WBC # FLD AUTO: 8.16 K/UL — SIGNIFICANT CHANGE UP (ref 3.8–10.5)
WBC # FLD AUTO: 8.21 K/UL — SIGNIFICANT CHANGE UP (ref 3.8–10.5)
WBC # FLD AUTO: 8.38 K/UL — SIGNIFICANT CHANGE UP (ref 3.8–10.5)
WBC # FLD AUTO: 8.43 K/UL — SIGNIFICANT CHANGE UP (ref 3.8–10.5)
WBC # FLD AUTO: 9.15 K/UL — SIGNIFICANT CHANGE UP (ref 3.8–10.5)
WBC # FLD AUTO: 9.94 K/UL — SIGNIFICANT CHANGE UP (ref 3.8–10.5)
WBC UR QL: 11 /HPF — HIGH (ref 0–5)
WBC UR QL: 211 /HPF — HIGH (ref 0–5)

## 2022-01-01 PROCEDURE — 70553 MRI BRAIN STEM W/O & W/DYE: CPT | Mod: 26

## 2022-01-01 PROCEDURE — 84550 ASSAY OF BLOOD/URIC ACID: CPT

## 2022-01-01 PROCEDURE — 99233 SBSQ HOSP IP/OBS HIGH 50: CPT

## 2022-01-01 PROCEDURE — 87102 FUNGUS ISOLATION CULTURE: CPT

## 2022-01-01 PROCEDURE — 85097 BONE MARROW INTERPRETATION: CPT

## 2022-01-01 PROCEDURE — 88275 CYTOGENETICS 100-300: CPT

## 2022-01-01 PROCEDURE — 88342 IMHCHEM/IMCYTCHM 1ST ANTB: CPT | Mod: 26,59

## 2022-01-01 PROCEDURE — 99215 OFFICE O/P EST HI 40 MIN: CPT

## 2022-01-01 PROCEDURE — 99232 SBSQ HOSP IP/OBS MODERATE 35: CPT | Mod: GC

## 2022-01-01 PROCEDURE — 84484 ASSAY OF TROPONIN QUANT: CPT

## 2022-01-01 PROCEDURE — 99285 EMERGENCY DEPT VISIT HI MDM: CPT | Mod: 25

## 2022-01-01 PROCEDURE — 99214 OFFICE O/P EST MOD 30 MIN: CPT

## 2022-01-01 PROCEDURE — 99233 SBSQ HOSP IP/OBS HIGH 50: CPT | Mod: GC

## 2022-01-01 PROCEDURE — 93306 TTE W/DOPPLER COMPLETE: CPT | Mod: 26

## 2022-01-01 PROCEDURE — 99222 1ST HOSP IP/OBS MODERATE 55: CPT

## 2022-01-01 PROCEDURE — 88112 CYTOPATH CELL ENHANCE TECH: CPT | Mod: 26

## 2022-01-01 PROCEDURE — 85730 THROMBOPLASTIN TIME PARTIAL: CPT

## 2022-01-01 PROCEDURE — 86704 HEP B CORE ANTIBODY TOTAL: CPT

## 2022-01-01 PROCEDURE — 78815 PET IMAGE W/CT SKULL-THIGH: CPT | Mod: 26,PS,MH

## 2022-01-01 PROCEDURE — U0005: CPT

## 2022-01-01 PROCEDURE — 82085 ASSAY OF ALDOLASE: CPT

## 2022-01-01 PROCEDURE — C1751: CPT

## 2022-01-01 PROCEDURE — 99232 SBSQ HOSP IP/OBS MODERATE 35: CPT

## 2022-01-01 PROCEDURE — 87205 SMEAR GRAM STAIN: CPT

## 2022-01-01 PROCEDURE — 82947 ASSAY GLUCOSE BLOOD QUANT: CPT

## 2022-01-01 PROCEDURE — 70450 CT HEAD/BRAIN W/O DYE: CPT | Mod: 26

## 2022-01-01 PROCEDURE — 84443 ASSAY THYROID STIM HORMONE: CPT

## 2022-01-01 PROCEDURE — 99233 SBSQ HOSP IP/OBS HIGH 50: CPT | Mod: GC,25

## 2022-01-01 PROCEDURE — 82803 BLOOD GASES ANY COMBINATION: CPT

## 2022-01-01 PROCEDURE — 87070 CULTURE OTHR SPECIMN AEROBIC: CPT

## 2022-01-01 PROCEDURE — 87075 CULTR BACTERIA EXCEPT BLOOD: CPT

## 2022-01-01 PROCEDURE — 84100 ASSAY OF PHOSPHORUS: CPT

## 2022-01-01 PROCEDURE — 96375 TX/PRO/DX INJ NEW DRUG ADDON: CPT

## 2022-01-01 PROCEDURE — 70450 CT HEAD/BRAIN W/O DYE: CPT

## 2022-01-01 PROCEDURE — 83550 IRON BINDING TEST: CPT

## 2022-01-01 PROCEDURE — 36415 COLL VENOUS BLD VENIPUNCTURE: CPT

## 2022-01-01 PROCEDURE — 88360 TUMOR IMMUNOHISTOCHEM/MANUAL: CPT | Mod: 26

## 2022-01-01 PROCEDURE — 71045 X-RAY EXAM CHEST 1 VIEW: CPT | Mod: 26

## 2022-01-01 PROCEDURE — 97116 GAIT TRAINING THERAPY: CPT

## 2022-01-01 PROCEDURE — 72157 MRI CHEST SPINE W/O & W/DYE: CPT

## 2022-01-01 PROCEDURE — 74177 CT ABD & PELVIS W/CONTRAST: CPT | Mod: 26,MA

## 2022-01-01 PROCEDURE — 96374 THER/PROPH/DIAG INJ IV PUSH: CPT

## 2022-01-01 PROCEDURE — 76604 US EXAM CHEST: CPT | Mod: 26

## 2022-01-01 PROCEDURE — 88341 IMHCHEM/IMCYTCHM EA ADD ANTB: CPT | Mod: 26,59

## 2022-01-01 PROCEDURE — 86901 BLOOD TYPING SEROLOGIC RH(D): CPT

## 2022-01-01 PROCEDURE — 99285 EMERGENCY DEPT VISIT HI MDM: CPT | Mod: GC,CS

## 2022-01-01 PROCEDURE — 97535 SELF CARE MNGMENT TRAINING: CPT

## 2022-01-01 PROCEDURE — 84157 ASSAY OF PROTEIN OTHER: CPT

## 2022-01-01 PROCEDURE — 88313 SPECIAL STAINS GROUP 2: CPT

## 2022-01-01 PROCEDURE — 87186 SC STD MICRODIL/AGAR DIL: CPT

## 2022-01-01 PROCEDURE — 88189 FLOWCYTOMETRY/READ 16 & >: CPT

## 2022-01-01 PROCEDURE — 92526 ORAL FUNCTION THERAPY: CPT

## 2022-01-01 PROCEDURE — 85025 COMPLETE CBC W/AUTO DIFF WBC: CPT

## 2022-01-01 PROCEDURE — 88185 FLOWCYTOMETRY/TC ADD-ON: CPT

## 2022-01-01 PROCEDURE — 84436 ASSAY OF TOTAL THYROXINE: CPT

## 2022-01-01 PROCEDURE — 85027 COMPLETE CBC AUTOMATED: CPT

## 2022-01-01 PROCEDURE — 88305 TISSUE EXAM BY PATHOLOGIST: CPT | Mod: 26

## 2022-01-01 PROCEDURE — 92610 EVALUATE SWALLOWING FUNCTION: CPT

## 2022-01-01 PROCEDURE — 87040 BLOOD CULTURE FOR BACTERIA: CPT

## 2022-01-01 PROCEDURE — 85018 HEMOGLOBIN: CPT

## 2022-01-01 PROCEDURE — 82435 ASSAY OF BLOOD CHLORIDE: CPT

## 2022-01-01 PROCEDURE — 84478 ASSAY OF TRIGLYCERIDES: CPT

## 2022-01-01 PROCEDURE — 71045 X-RAY EXAM CHEST 1 VIEW: CPT

## 2022-01-01 PROCEDURE — G0452: CPT | Mod: 26

## 2022-01-01 PROCEDURE — 97161 PT EVAL LOW COMPLEX 20 MIN: CPT

## 2022-01-01 PROCEDURE — 81206 BCR/ABL1 GENE MAJOR BP: CPT

## 2022-01-01 PROCEDURE — 88108 CYTOPATH CONCENTRATE TECH: CPT

## 2022-01-01 PROCEDURE — 36569 INSJ PICC 5 YR+ W/O IMAGING: CPT

## 2022-01-01 PROCEDURE — 99222 1ST HOSP IP/OBS MODERATE 55: CPT | Mod: GC

## 2022-01-01 PROCEDURE — 99239 HOSP IP/OBS DSCHRG MGMT >30: CPT

## 2022-01-01 PROCEDURE — 82945 GLUCOSE OTHER FLUID: CPT

## 2022-01-01 PROCEDURE — 61781 SCAN PROC CRANIAL INTRA: CPT

## 2022-01-01 PROCEDURE — 38505 NEEDLE BIOPSY LYMPH NODES: CPT

## 2022-01-01 PROCEDURE — 74177 CT ABD & PELVIS W/CONTRAST: CPT | Mod: MA

## 2022-01-01 PROCEDURE — 70450 CT HEAD/BRAIN W/O DYE: CPT | Mod: 26,MA

## 2022-01-01 PROCEDURE — 86850 RBC ANTIBODY SCREEN: CPT

## 2022-01-01 PROCEDURE — C1889: CPT

## 2022-01-01 PROCEDURE — U0003: CPT

## 2022-01-01 PROCEDURE — 88108 CYTOPATH CONCENTRATE TECH: CPT | Mod: 26,59

## 2022-01-01 PROCEDURE — 87116 MYCOBACTERIA CULTURE: CPT

## 2022-01-01 PROCEDURE — 99231 SBSQ HOSP IP/OBS SF/LOW 25: CPT

## 2022-01-01 PROCEDURE — 82042 OTHER SOURCE ALBUMIN QUAN EA: CPT

## 2022-01-01 PROCEDURE — 87086 URINE CULTURE/COLONY COUNT: CPT

## 2022-01-01 PROCEDURE — 83615 LACTATE (LD) (LDH) ENZYME: CPT

## 2022-01-01 PROCEDURE — 32555 ASPIRATE PLEURA W/ IMAGING: CPT | Mod: GC

## 2022-01-01 PROCEDURE — 97530 THERAPEUTIC ACTIVITIES: CPT

## 2022-01-01 PROCEDURE — 99285 EMERGENCY DEPT VISIT HI MDM: CPT

## 2022-01-01 PROCEDURE — 72141 MRI NECK SPINE W/O DYE: CPT

## 2022-01-01 PROCEDURE — 99204 OFFICE O/P NEW MOD 45 MIN: CPT | Mod: 25

## 2022-01-01 PROCEDURE — 99223 1ST HOSP IP/OBS HIGH 75: CPT | Mod: GC,25

## 2022-01-01 PROCEDURE — 85014 HEMATOCRIT: CPT

## 2022-01-01 PROCEDURE — 71275 CT ANGIOGRAPHY CHEST: CPT | Mod: MA

## 2022-01-01 PROCEDURE — 85610 PROTHROMBIN TIME: CPT

## 2022-01-01 PROCEDURE — 81245 FLT3 GENE: CPT

## 2022-01-01 PROCEDURE — 80053 COMPREHEN METABOLIC PANEL: CPT

## 2022-01-01 PROCEDURE — 83880 ASSAY OF NATRIURETIC PEPTIDE: CPT

## 2022-01-01 PROCEDURE — 38221 DX BONE MARROW BIOPSIES: CPT | Mod: GC

## 2022-01-01 PROCEDURE — 87150 DNA/RNA AMPLIFIED PROBE: CPT

## 2022-01-01 PROCEDURE — 71275 CT ANGIOGRAPHY CHEST: CPT | Mod: 26,MA

## 2022-01-01 PROCEDURE — 99255 IP/OBS CONSLTJ NEW/EST HI 80: CPT | Mod: GC

## 2022-01-01 PROCEDURE — 88342 IMHCHEM/IMCYTCHM 1ST ANTB: CPT

## 2022-01-01 PROCEDURE — 72156 MRI NECK SPINE W/O & W/DYE: CPT | Mod: 26

## 2022-01-01 PROCEDURE — 97166 OT EVAL MOD COMPLEX 45 MIN: CPT

## 2022-01-01 PROCEDURE — A9552: CPT

## 2022-01-01 PROCEDURE — 71260 CT THORAX DX C+: CPT

## 2022-01-01 PROCEDURE — 76942 ECHO GUIDE FOR BIOPSY: CPT

## 2022-01-01 PROCEDURE — 99232 SBSQ HOSP IP/OBS MODERATE 35: CPT | Mod: GC,25

## 2022-01-01 PROCEDURE — 70551 MRI BRAIN STEM W/O DYE: CPT

## 2022-01-01 PROCEDURE — 82550 ASSAY OF CK (CPK): CPT

## 2022-01-01 PROCEDURE — 87077 CULTURE AEROBIC IDENTIFY: CPT

## 2022-01-01 PROCEDURE — 86900 BLOOD TYPING SEROLOGIC ABO: CPT

## 2022-01-01 PROCEDURE — 0225U NFCT DS DNA&RNA 21 SARSCOV2: CPT

## 2022-01-01 PROCEDURE — 87206 SMEAR FLUORESCENT/ACID STAI: CPT

## 2022-01-01 PROCEDURE — 93970 EXTREMITY STUDY: CPT

## 2022-01-01 PROCEDURE — 99231 SBSQ HOSP IP/OBS SF/LOW 25: CPT | Mod: GC

## 2022-01-01 PROCEDURE — 84480 ASSAY TRIIODOTHYRONINE (T3): CPT

## 2022-01-01 PROCEDURE — 99203 OFFICE O/P NEW LOW 30 MIN: CPT | Mod: 25

## 2022-01-01 PROCEDURE — 78815 PET IMAGE W/CT SKULL-THIGH: CPT

## 2022-01-01 PROCEDURE — 93010 ELECTROCARDIOGRAM REPORT: CPT

## 2022-01-01 PROCEDURE — 87015 SPECIMEN INFECT AGNT CONCNTJ: CPT

## 2022-01-01 PROCEDURE — 82525 ASSAY OF COPPER: CPT

## 2022-01-01 PROCEDURE — 85045 AUTOMATED RETICULOCYTE COUNT: CPT

## 2022-01-01 PROCEDURE — 84132 ASSAY OF SERUM POTASSIUM: CPT

## 2022-01-01 PROCEDURE — 93306 TTE W/DOPPLER COMPLETE: CPT

## 2022-01-01 PROCEDURE — 70553 MRI BRAIN STEM W/O & W/DYE: CPT

## 2022-01-01 PROCEDURE — 88313 SPECIAL STAINS GROUP 2: CPT | Mod: 26

## 2022-01-01 PROCEDURE — 88264 CHROMOSOME ANALYSIS 20-25: CPT

## 2022-01-01 PROCEDURE — 83540 ASSAY OF IRON: CPT

## 2022-01-01 PROCEDURE — 84311 SPECTROPHOTOMETRY: CPT

## 2022-01-01 PROCEDURE — 51701 INSERT BLADDER CATHETER: CPT

## 2022-01-01 PROCEDURE — 93005 ELECTROCARDIOGRAM TRACING: CPT

## 2022-01-01 PROCEDURE — 99223 1ST HOSP IP/OBS HIGH 75: CPT | Mod: GC

## 2022-01-01 PROCEDURE — 97110 THERAPEUTIC EXERCISES: CPT

## 2022-01-01 PROCEDURE — 88112 CYTOPATH CELL ENHANCE TECH: CPT

## 2022-01-01 PROCEDURE — 88307 TISSUE EXAM BY PATHOLOGIST: CPT

## 2022-01-01 PROCEDURE — 70450 CT HEAD/BRAIN W/O DYE: CPT | Mod: MA

## 2022-01-01 PROCEDURE — 83735 ASSAY OF MAGNESIUM: CPT

## 2022-01-01 PROCEDURE — 84295 ASSAY OF SERUM SODIUM: CPT

## 2022-01-01 PROCEDURE — 88360 TUMOR IMMUNOHISTOCHEM/MANUAL: CPT

## 2022-01-01 PROCEDURE — 87389 HIV-1 AG W/HIV-1&-2 AB AG IA: CPT

## 2022-01-01 PROCEDURE — 88280 CHROMOSOME KARYOTYPE STUDY: CPT

## 2022-01-01 PROCEDURE — A9585: CPT

## 2022-01-01 PROCEDURE — C9399: CPT

## 2022-01-01 PROCEDURE — 61215 INS SUBQ RSVR PMP/NFS SYS: CPT | Mod: 59

## 2022-01-01 PROCEDURE — 89051 BODY FLUID CELL COUNT: CPT

## 2022-01-01 PROCEDURE — 83874 ASSAY OF MYOGLOBIN: CPT

## 2022-01-01 PROCEDURE — 71260 CT THORAX DX C+: CPT | Mod: 26

## 2022-01-01 PROCEDURE — 72158 MRI LUMBAR SPINE W/O & W/DYE: CPT

## 2022-01-01 PROCEDURE — 87641 MR-STAPH DNA AMP PROBE: CPT

## 2022-01-01 PROCEDURE — 88307 TISSUE EXAM BY PATHOLOGIST: CPT | Mod: 26

## 2022-01-01 PROCEDURE — 88305 TISSUE EXAM BY PATHOLOGIST: CPT

## 2022-01-01 PROCEDURE — 72156 MRI NECK SPINE W/O & W/DYE: CPT

## 2022-01-01 PROCEDURE — 88341 IMHCHEM/IMCYTCHM EA ADD ANTB: CPT

## 2022-01-01 PROCEDURE — 88237 TISSUE CULTURE BONE MARROW: CPT

## 2022-01-01 PROCEDURE — 93970 EXTREMITY STUDY: CPT | Mod: 26

## 2022-01-01 PROCEDURE — 88271 CYTOGENETICS DNA PROBE: CPT

## 2022-01-01 PROCEDURE — 88108 CYTOPATH CONCENTRATE TECH: CPT | Mod: 26

## 2022-01-01 PROCEDURE — A4562: CPT

## 2022-01-01 PROCEDURE — 72158 MRI LUMBAR SPINE W/O & W/DYE: CPT | Mod: 26

## 2022-01-01 PROCEDURE — 73110 X-RAY EXAM OF WRIST: CPT

## 2022-01-01 PROCEDURE — 70551 MRI BRAIN STEM W/O DYE: CPT | Mod: 26

## 2022-01-01 PROCEDURE — 32554 ASPIRATE PLEURA W/O IMAGING: CPT

## 2022-01-01 PROCEDURE — 83690 ASSAY OF LIPASE: CPT

## 2022-01-01 PROCEDURE — 99223 1ST HOSP IP/OBS HIGH 75: CPT

## 2022-01-01 PROCEDURE — 83986 ASSAY PH BODY FLUID NOS: CPT

## 2022-01-01 PROCEDURE — 76604 US EXAM CHEST: CPT | Mod: 26,GC

## 2022-01-01 PROCEDURE — 61210 BURR HOLE IMPLT VENTR CATH: CPT

## 2022-01-01 PROCEDURE — 80048 BASIC METABOLIC PNL TOTAL CA: CPT

## 2022-01-01 PROCEDURE — 84145 PROCALCITONIN (PCT): CPT

## 2022-01-01 PROCEDURE — 84425 ASSAY OF VITAMIN B-1: CPT

## 2022-01-01 PROCEDURE — 88173 CYTOPATH EVAL FNA REPORT: CPT

## 2022-01-01 PROCEDURE — 82330 ASSAY OF CALCIUM: CPT

## 2022-01-01 PROCEDURE — 83605 ASSAY OF LACTIC ACID: CPT

## 2022-01-01 PROCEDURE — 82728 ASSAY OF FERRITIN: CPT

## 2022-01-01 PROCEDURE — 87640 STAPH A DNA AMP PROBE: CPT

## 2022-01-01 PROCEDURE — 99221 1ST HOSP IP/OBS SF/LOW 40: CPT

## 2022-01-01 PROCEDURE — 88173 CYTOPATH EVAL FNA REPORT: CPT | Mod: 26

## 2022-01-01 PROCEDURE — 73110 X-RAY EXAM OF WRIST: CPT | Mod: 26,LT

## 2022-01-01 PROCEDURE — 81001 URINALYSIS AUTO W/SCOPE: CPT

## 2022-01-01 PROCEDURE — 82746 ASSAY OF FOLIC ACID SERUM: CPT

## 2022-01-01 PROCEDURE — 82607 VITAMIN B-12: CPT

## 2022-01-01 PROCEDURE — 76942 ECHO GUIDE FOR BIOPSY: CPT | Mod: 26

## 2022-01-01 PROCEDURE — 86255 FLUORESCENT ANTIBODY SCREEN: CPT

## 2022-01-01 PROCEDURE — 80074 ACUTE HEPATITIS PANEL: CPT

## 2022-01-01 PROCEDURE — 72141 MRI NECK SPINE W/O DYE: CPT | Mod: 26

## 2022-01-01 PROCEDURE — 32557 INSERT CATH PLEURA W/ IMAGE: CPT | Mod: GC

## 2022-01-01 PROCEDURE — 81207 BCR/ABL1 GENE MINOR BP: CPT

## 2022-01-01 PROCEDURE — 72157 MRI CHEST SPINE W/O & W/DYE: CPT | Mod: 26

## 2022-01-01 PROCEDURE — 49180 BIOPSY ABDOMINAL MASS: CPT

## 2022-01-01 DEVICE — SURGIFOAM PAD 8CM X 12.5CM X 10MM (100): Type: IMPLANTABLE DEVICE | Site: RIGHT | Status: FUNCTIONAL

## 2022-01-01 DEVICE — IMPLANTABLE DEVICE: Type: IMPLANTABLE DEVICE | Site: RIGHT | Status: FUNCTIONAL

## 2022-01-01 RX ORDER — CEFEPIME 1 G/1
2000 INJECTION, POWDER, FOR SOLUTION INTRAMUSCULAR; INTRAVENOUS ONCE
Refills: 0 | Status: COMPLETED | OUTPATIENT
Start: 2022-01-01 | End: 2022-01-01

## 2022-01-01 RX ORDER — LATANOPROST/PF 0.005 %
0.01 DROPS OPHTHALMIC (EYE)
Refills: 0 | Status: ACTIVE | COMMUNITY

## 2022-01-01 RX ORDER — ATROPINE SULFATE 0.1 MG/ML
1 SYRINGE (ML) INJECTION ONCE
Refills: 0 | Status: DISCONTINUED | OUTPATIENT
Start: 2022-01-01 | End: 2022-01-01

## 2022-01-01 RX ORDER — APIXABAN 2.5 MG/1
2.5 TABLET, FILM COATED ORAL EVERY 12 HOURS
Refills: 0 | Status: DISCONTINUED | OUTPATIENT
Start: 2022-01-01 | End: 2022-01-01

## 2022-01-01 RX ORDER — METOPROLOL TARTRATE 50 MG
12.5 TABLET ORAL EVERY 12 HOURS
Refills: 0 | Status: DISCONTINUED | OUTPATIENT
Start: 2022-01-01 | End: 2022-01-01

## 2022-01-01 RX ORDER — ENOXAPARIN SODIUM 100 MG/ML
40 INJECTION SUBCUTANEOUS EVERY 24 HOURS
Refills: 0 | Status: DISCONTINUED | OUTPATIENT
Start: 2022-01-01 | End: 2022-01-01

## 2022-01-01 RX ORDER — ACETAMINOPHEN 500 MG
650 TABLET ORAL ONCE
Refills: 0 | Status: COMPLETED | OUTPATIENT
Start: 2022-01-01 | End: 2022-01-01

## 2022-01-01 RX ORDER — CEFEPIME 1 G/1
INJECTION, POWDER, FOR SOLUTION INTRAMUSCULAR; INTRAVENOUS
Refills: 0 | Status: DISCONTINUED | OUTPATIENT
Start: 2022-01-01 | End: 2022-01-01

## 2022-01-01 RX ORDER — APIXABAN 2.5 MG/1
1 TABLET, FILM COATED ORAL
Qty: 30 | Refills: 0
Start: 2022-01-01

## 2022-01-01 RX ORDER — FILGRASTIM 480MCG/1.6
300 VIAL (ML) INJECTION DAILY
Refills: 0 | Status: DISCONTINUED | OUTPATIENT
Start: 2022-01-01 | End: 2022-01-01

## 2022-01-01 RX ORDER — MEPERIDINE HYDROCHLORIDE 50 MG/ML
12.5 INJECTION INTRAMUSCULAR; INTRAVENOUS; SUBCUTANEOUS ONCE
Refills: 0 | Status: DISCONTINUED | OUTPATIENT
Start: 2022-01-01 | End: 2022-01-01

## 2022-01-01 RX ORDER — MAGNESIUM SULFATE 500 MG/ML
2 VIAL (ML) INJECTION ONCE
Refills: 0 | Status: COMPLETED | OUTPATIENT
Start: 2022-01-01 | End: 2022-01-01

## 2022-01-01 RX ORDER — CEFEPIME 1 G/1
2000 INJECTION, POWDER, FOR SOLUTION INTRAMUSCULAR; INTRAVENOUS EVERY 12 HOURS
Refills: 0 | Status: DISCONTINUED | OUTPATIENT
Start: 2022-01-01 | End: 2022-01-01

## 2022-01-01 RX ORDER — SODIUM CHLORIDE 9 MG/ML
1000 INJECTION INTRAMUSCULAR; INTRAVENOUS; SUBCUTANEOUS
Refills: 0 | Status: DISCONTINUED | OUTPATIENT
Start: 2022-01-01 | End: 2022-01-01

## 2022-01-01 RX ORDER — CARBIDOPA AND LEVODOPA 25; 100 MG/1; MG/1
1 TABLET ORAL THREE TIMES A DAY
Refills: 0 | Status: DISCONTINUED | OUTPATIENT
Start: 2022-01-01 | End: 2022-01-01

## 2022-01-01 RX ORDER — PANTOPRAZOLE SODIUM 20 MG/1
40 TABLET, DELAYED RELEASE ORAL
Refills: 0 | Status: DISCONTINUED | OUTPATIENT
Start: 2022-01-01 | End: 2022-01-01

## 2022-01-01 RX ORDER — LATANOPROST 0.05 MG/ML
1 SOLUTION/ DROPS OPHTHALMIC; TOPICAL AT BEDTIME
Refills: 0 | Status: DISCONTINUED | OUTPATIENT
Start: 2022-01-01 | End: 2022-01-01

## 2022-01-01 RX ORDER — METOPROLOL TARTRATE 50 MG
12.5 TABLET ORAL ONCE
Refills: 0 | Status: COMPLETED | OUTPATIENT
Start: 2022-01-01 | End: 2022-01-01

## 2022-01-01 RX ORDER — CARBIDOPA AND LEVODOPA 25; 100 MG/1; MG/1
25-100 TABLET ORAL 3 TIMES DAILY
Refills: 0 | Status: ACTIVE | COMMUNITY

## 2022-01-01 RX ORDER — HYDROMORPHONE HYDROCHLORIDE 2 MG/ML
0.25 INJECTION INTRAMUSCULAR; INTRAVENOUS; SUBCUTANEOUS ONCE
Refills: 0 | Status: DISCONTINUED | OUTPATIENT
Start: 2022-01-01 | End: 2022-01-01

## 2022-01-01 RX ORDER — ACETAMINOPHEN 500 MG
1000 TABLET ORAL ONCE
Refills: 0 | Status: COMPLETED | OUTPATIENT
Start: 2022-01-01 | End: 2022-01-01

## 2022-01-01 RX ORDER — ZINC SULFATE TAB 220 MG (50 MG ZINC EQUIVALENT) 220 (50 ZN) MG
220 TAB ORAL DAILY
Refills: 0 | Status: DISCONTINUED | OUTPATIENT
Start: 2022-01-01 | End: 2022-01-01

## 2022-01-01 RX ORDER — LANOLIN ALCOHOL/MO/W.PET/CERES
3 CREAM (GRAM) TOPICAL AT BEDTIME
Refills: 0 | Status: DISCONTINUED | OUTPATIENT
Start: 2022-01-01 | End: 2022-01-01

## 2022-01-01 RX ORDER — CEFEPIME 1 G/1
2000 INJECTION, POWDER, FOR SOLUTION INTRAMUSCULAR; INTRAVENOUS EVERY 8 HOURS
Refills: 0 | Status: DISCONTINUED | OUTPATIENT
Start: 2022-01-01 | End: 2022-01-01

## 2022-01-01 RX ORDER — SODIUM CHLORIDE 9 MG/ML
500 INJECTION, SOLUTION INTRAVENOUS ONCE
Refills: 0 | Status: COMPLETED | OUTPATIENT
Start: 2022-01-01 | End: 2022-01-01

## 2022-01-01 RX ORDER — LIDOCAINE 4 G/100G
2 CREAM TOPICAL ONCE
Refills: 0 | Status: COMPLETED | OUTPATIENT
Start: 2022-01-01 | End: 2022-01-01

## 2022-01-01 RX ORDER — CHLORHEXIDINE GLUCONATE 213 G/1000ML
1 SOLUTION TOPICAL
Refills: 0 | Status: DISCONTINUED | OUTPATIENT
Start: 2022-01-01 | End: 2022-01-01

## 2022-01-01 RX ORDER — DIPHENHYDRAMINE HCL 50 MG
1 CAPSULE ORAL
Qty: 0 | Refills: 0 | DISCHARGE
Start: 2022-01-01

## 2022-01-01 RX ORDER — DEXAMETHASONE 0.5 MG/5ML
4 ELIXIR ORAL EVERY 6 HOURS
Refills: 0 | Status: DISCONTINUED | OUTPATIENT
Start: 2022-01-01 | End: 2022-01-01

## 2022-01-01 RX ORDER — SODIUM CHLORIDE 9 MG/ML
500 INJECTION INTRAMUSCULAR; INTRAVENOUS; SUBCUTANEOUS ONCE
Refills: 0 | Status: DISCONTINUED | OUTPATIENT
Start: 2022-01-01 | End: 2022-01-01

## 2022-01-01 RX ORDER — ASCORBIC ACID 60 MG
1 TABLET,CHEWABLE ORAL
Qty: 0 | Refills: 0 | DISCHARGE
Start: 2022-01-01

## 2022-01-01 RX ORDER — HYDROMORPHONE HYDROCHLORIDE 2 MG/ML
0.5 INJECTION INTRAMUSCULAR; INTRAVENOUS; SUBCUTANEOUS
Refills: 0 | Status: DISCONTINUED | OUTPATIENT
Start: 2022-01-01 | End: 2022-01-01

## 2022-01-01 RX ORDER — FOSAPREPITANT DIMEGLUMINE 150 MG/5ML
150 INJECTION, POWDER, LYOPHILIZED, FOR SOLUTION INTRAVENOUS ONCE
Refills: 0 | Status: COMPLETED | OUTPATIENT
Start: 2022-01-01 | End: 2022-01-01

## 2022-01-01 RX ORDER — VINCRISTINE SULFATE 1 MG/ML
1 VIAL (ML) INTRAVENOUS ONCE
Refills: 0 | Status: COMPLETED | OUTPATIENT
Start: 2022-01-01 | End: 2022-01-01

## 2022-01-01 RX ORDER — CARBIDOPA AND LEVODOPA 25; 100 MG/1; MG/1
0.5 TABLET ORAL
Refills: 0 | Status: DISCONTINUED | OUTPATIENT
Start: 2022-01-01 | End: 2022-01-01

## 2022-01-01 RX ORDER — DIPHENHYDRAMINE HCL 50 MG
50 CAPSULE ORAL ONCE
Refills: 0 | Status: DISCONTINUED | OUTPATIENT
Start: 2022-01-01 | End: 2022-01-01

## 2022-01-01 RX ORDER — METOPROLOL TARTRATE 50 MG
12.5 TABLET ORAL DAILY
Refills: 0 | Status: DISCONTINUED | OUTPATIENT
Start: 2022-01-01 | End: 2022-01-01

## 2022-01-01 RX ORDER — CARBIDOPA AND LEVODOPA 25; 100 MG/1; MG/1
1 TABLET ORAL
Refills: 0 | Status: DISCONTINUED | OUTPATIENT
Start: 2022-01-01 | End: 2022-01-01

## 2022-01-01 RX ORDER — VANCOMYCIN HCL 1 G
1000 VIAL (EA) INTRAVENOUS ONCE
Refills: 0 | Status: COMPLETED | OUTPATIENT
Start: 2022-01-01 | End: 2022-01-01

## 2022-01-01 RX ORDER — SENNA PLUS 8.6 MG/1
2 TABLET ORAL AT BEDTIME
Refills: 0 | Status: DISCONTINUED | OUTPATIENT
Start: 2022-01-01 | End: 2022-01-01

## 2022-01-01 RX ORDER — VANCOMYCIN HCL 1 G
750 VIAL (EA) INTRAVENOUS ONCE
Refills: 0 | Status: COMPLETED | OUTPATIENT
Start: 2022-01-01 | End: 2022-01-01

## 2022-01-01 RX ORDER — POTASSIUM CHLORIDE 20 MEQ
20 PACKET (EA) ORAL ONCE
Refills: 0 | Status: COMPLETED | OUTPATIENT
Start: 2022-01-01 | End: 2022-01-01

## 2022-01-01 RX ORDER — DOXORUBICIN HYDROCHLORIDE 2 MG/ML
41 INJECTION, SOLUTION INTRAVENOUS ONCE
Refills: 0 | Status: COMPLETED | OUTPATIENT
Start: 2022-01-01 | End: 2022-01-01

## 2022-01-01 RX ORDER — PANTOPRAZOLE SODIUM 20 MG/1
1 TABLET, DELAYED RELEASE ORAL
Qty: 0 | Refills: 0 | DISCHARGE
Start: 2022-01-01

## 2022-01-01 RX ORDER — PIPERACILLIN AND TAZOBACTAM 4; .5 G/20ML; G/20ML
3.38 INJECTION, POWDER, LYOPHILIZED, FOR SOLUTION INTRAVENOUS EVERY 8 HOURS
Refills: 0 | Status: DISCONTINUED | OUTPATIENT
Start: 2022-01-01 | End: 2022-01-01

## 2022-01-01 RX ORDER — LANOLIN ALCOHOL/MO/W.PET/CERES
1 CREAM (GRAM) TOPICAL
Qty: 0 | Refills: 0 | DISCHARGE
Start: 2022-01-01

## 2022-01-01 RX ORDER — CEFTRIAXONE 500 MG/1
1000 INJECTION, POWDER, FOR SOLUTION INTRAMUSCULAR; INTRAVENOUS ONCE
Refills: 0 | Status: COMPLETED | OUTPATIENT
Start: 2022-01-01 | End: 2022-01-01

## 2022-01-01 RX ORDER — CEFTRIAXONE 500 MG/1
1000 INJECTION, POWDER, FOR SOLUTION INTRAMUSCULAR; INTRAVENOUS EVERY 24 HOURS
Refills: 0 | Status: DISCONTINUED | OUTPATIENT
Start: 2022-01-01 | End: 2022-01-01

## 2022-01-01 RX ORDER — SENNA PLUS 8.6 MG/1
2 TABLET ORAL
Qty: 0 | Refills: 0 | DISCHARGE
Start: 2022-01-01

## 2022-01-01 RX ORDER — ONDANSETRON 8 MG/1
8 TABLET, FILM COATED ORAL ONCE
Refills: 0 | Status: DISCONTINUED | OUTPATIENT
Start: 2022-01-01 | End: 2022-01-01

## 2022-01-01 RX ORDER — ALLOPURINOL 300 MG
300 TABLET ORAL AT BEDTIME
Refills: 0 | Status: DISCONTINUED | OUTPATIENT
Start: 2022-01-01 | End: 2022-01-01

## 2022-01-01 RX ORDER — CHLORHEXIDINE GLUCONATE 4 %
5 LIQUID (ML) TOPICAL
Refills: 0 | Status: ACTIVE | COMMUNITY

## 2022-01-01 RX ORDER — HEPARIN SODIUM 5000 [USP'U]/ML
1000 INJECTION INTRAVENOUS; SUBCUTANEOUS
Qty: 25000 | Refills: 0 | Status: DISCONTINUED | OUTPATIENT
Start: 2022-01-01 | End: 2022-01-01

## 2022-01-01 RX ORDER — PANTOPRAZOLE 40 MG/1
40 TABLET, DELAYED RELEASE ORAL
Refills: 0 | Status: ACTIVE | COMMUNITY

## 2022-01-01 RX ORDER — APIXABAN 2.5 MG/1
1 TABLET, FILM COATED ORAL
Qty: 0 | Refills: 0 | DISCHARGE
Start: 2022-01-01

## 2022-01-01 RX ORDER — RITUXIMAB 10 MG/ML
611 INJECTION, SOLUTION INTRAVENOUS ONCE
Refills: 0 | Status: COMPLETED | OUTPATIENT
Start: 2022-01-01 | End: 2022-01-01

## 2022-01-01 RX ORDER — ACETAMINOPHEN 500 MG
650 TABLET ORAL EVERY 6 HOURS
Refills: 0 | Status: DISCONTINUED | OUTPATIENT
Start: 2022-01-01 | End: 2022-01-01

## 2022-01-01 RX ORDER — KETOROLAC TROMETHAMINE 30 MG/ML
15 SYRINGE (ML) INJECTION ONCE
Refills: 0 | Status: DISCONTINUED | OUTPATIENT
Start: 2022-01-01 | End: 2022-01-01

## 2022-01-01 RX ORDER — IBUPROFEN 200 MG
400 TABLET ORAL ONCE
Refills: 0 | Status: DISCONTINUED | OUTPATIENT
Start: 2022-01-01 | End: 2022-01-01

## 2022-01-01 RX ORDER — DIPHENHYDRAMINE HCL 50 MG
50 CAPSULE ORAL ONCE
Refills: 0 | Status: COMPLETED | OUTPATIENT
Start: 2022-01-01 | End: 2022-01-01

## 2022-01-01 RX ORDER — HYDROMORPHONE HYDROCHLORIDE 2 MG/ML
0.25 INJECTION INTRAMUSCULAR; INTRAVENOUS; SUBCUTANEOUS EVERY 4 HOURS
Refills: 0 | Status: DISCONTINUED | OUTPATIENT
Start: 2022-01-01 | End: 2022-01-01

## 2022-01-01 RX ORDER — IBUPROFEN 200 MG
400 TABLET ORAL EVERY 8 HOURS
Refills: 0 | Status: DISCONTINUED | OUTPATIENT
Start: 2022-01-01 | End: 2022-01-01

## 2022-01-01 RX ORDER — HEPARIN SODIUM 5000 [USP'U]/ML
950 INJECTION INTRAVENOUS; SUBCUTANEOUS
Qty: 25000 | Refills: 0 | Status: DISCONTINUED | OUTPATIENT
Start: 2022-01-01 | End: 2022-01-01

## 2022-01-01 RX ORDER — METOPROLOL TARTRATE 50 MG
75 TABLET ORAL DAILY
Refills: 0 | Status: DISCONTINUED | OUTPATIENT
Start: 2022-01-01 | End: 2022-01-01

## 2022-01-01 RX ORDER — DEXAMETHASONE 0.5 MG/5ML
4 ELIXIR ORAL EVERY 8 HOURS
Refills: 0 | Status: DISCONTINUED | OUTPATIENT
Start: 2022-01-01 | End: 2022-01-01

## 2022-01-01 RX ORDER — ALLOPURINOL 300 MG/1
300 TABLET ORAL DAILY
Refills: 0 | Status: ACTIVE | COMMUNITY

## 2022-01-01 RX ORDER — PIPERACILLIN AND TAZOBACTAM 4; .5 G/20ML; G/20ML
3.38 INJECTION, POWDER, LYOPHILIZED, FOR SOLUTION INTRAVENOUS ONCE
Refills: 0 | Status: COMPLETED | OUTPATIENT
Start: 2022-01-01 | End: 2022-01-01

## 2022-01-01 RX ORDER — POTASSIUM PHOSPHATE, MONOBASIC POTASSIUM PHOSPHATE, DIBASIC 236; 224 MG/ML; MG/ML
15 INJECTION, SOLUTION INTRAVENOUS ONCE
Refills: 0 | Status: COMPLETED | OUTPATIENT
Start: 2022-01-01 | End: 2022-01-01

## 2022-01-01 RX ORDER — FUROSEMIDE 40 MG
20 TABLET ORAL DAILY
Refills: 0 | Status: DISCONTINUED | OUTPATIENT
Start: 2022-01-01 | End: 2022-01-01

## 2022-01-01 RX ORDER — HYDROCORTISONE 20 MG
100 TABLET ORAL ONCE
Refills: 0 | Status: COMPLETED | OUTPATIENT
Start: 2022-01-01 | End: 2022-01-01

## 2022-01-01 RX ORDER — HEPARIN SODIUM 5000 [USP'U]/ML
900 INJECTION INTRAVENOUS; SUBCUTANEOUS
Qty: 25000 | Refills: 0 | Status: DISCONTINUED | OUTPATIENT
Start: 2022-01-01 | End: 2022-01-01

## 2022-01-01 RX ORDER — ALLOPURINOL 300 MG
1 TABLET ORAL
Qty: 0 | Refills: 0 | DISCHARGE
Start: 2022-01-01

## 2022-01-01 RX ORDER — FUROSEMIDE 40 MG
1 TABLET ORAL
Qty: 0 | Refills: 0 | DISCHARGE
Start: 2022-01-01

## 2022-01-01 RX ORDER — INFLUENZA VIRUS VACCINE 15; 15; 15; 15 UG/.5ML; UG/.5ML; UG/.5ML; UG/.5ML
0.7 SUSPENSION INTRAMUSCULAR ONCE
Refills: 0 | Status: DISCONTINUED | OUTPATIENT
Start: 2022-01-01 | End: 2022-01-01

## 2022-01-01 RX ORDER — SENNOSIDES 8.6 MG TABLETS 8.6 MG/1
8.6 TABLET ORAL
Refills: 0 | Status: ACTIVE | COMMUNITY

## 2022-01-01 RX ORDER — VANCOMYCIN HCL 1 G
VIAL (EA) INTRAVENOUS
Refills: 0 | Status: DISCONTINUED | OUTPATIENT
Start: 2022-01-01 | End: 2022-01-01

## 2022-01-01 RX ORDER — METOPROLOL TARTRATE 50 MG
12.5 TABLET ORAL EVERY 8 HOURS
Refills: 0 | Status: COMPLETED | OUTPATIENT
Start: 2022-01-01 | End: 2022-01-01

## 2022-01-01 RX ORDER — VANCOMYCIN HCL 1 G
750 VIAL (EA) INTRAVENOUS EVERY 12 HOURS
Refills: 0 | Status: DISCONTINUED | OUTPATIENT
Start: 2022-01-01 | End: 2022-01-01

## 2022-01-01 RX ORDER — FUROSEMIDE 40 MG
40 TABLET ORAL ONCE
Refills: 0 | Status: COMPLETED | OUTPATIENT
Start: 2022-01-01 | End: 2022-01-01

## 2022-01-01 RX ORDER — HYDROCORTISONE 20 MG
100 TABLET ORAL ONCE
Refills: 0 | Status: DISCONTINUED | OUTPATIENT
Start: 2022-01-01 | End: 2022-01-01

## 2022-01-01 RX ORDER — CHLORHEXIDINE GLUCONATE 213 G/1000ML
1 SOLUTION TOPICAL DAILY
Refills: 0 | Status: DISCONTINUED | OUTPATIENT
Start: 2022-01-01 | End: 2022-01-01

## 2022-01-01 RX ORDER — DEXAMETHASONE 0.5 MG/5ML
10 ELIXIR ORAL ONCE
Refills: 0 | Status: DISCONTINUED | OUTPATIENT
Start: 2022-01-01 | End: 2022-01-01

## 2022-01-01 RX ORDER — CARBIDOPA AND LEVODOPA 25; 100 MG/1; MG/1
1 TABLET ORAL
Qty: 0 | Refills: 0 | DISCHARGE
Start: 2022-01-01

## 2022-01-01 RX ORDER — HEPARIN SODIUM 5000 [USP'U]/ML
800 INJECTION INTRAVENOUS; SUBCUTANEOUS
Qty: 25000 | Refills: 0 | Status: DISCONTINUED | OUTPATIENT
Start: 2022-01-01 | End: 2022-01-01

## 2022-01-01 RX ORDER — HEPARIN SODIUM 5000 [USP'U]/ML
9 INJECTION INTRAVENOUS; SUBCUTANEOUS
Qty: 25000 | Refills: 0 | Status: DISCONTINUED | OUTPATIENT
Start: 2022-01-01 | End: 2022-01-01

## 2022-01-01 RX ORDER — ASCORBIC ACID 60 MG
500 TABLET,CHEWABLE ORAL DAILY
Refills: 0 | Status: DISCONTINUED | OUTPATIENT
Start: 2022-01-01 | End: 2022-01-01

## 2022-01-01 RX ORDER — DEXAMETHASONE 0.5 MG/5ML
1 ELIXIR ORAL
Qty: 0 | Refills: 0 | DISCHARGE
Start: 2022-01-01

## 2022-01-01 RX ORDER — CEFAZOLIN SODIUM 1 G
1000 VIAL (EA) INJECTION EVERY 8 HOURS
Refills: 0 | Status: DISCONTINUED | OUTPATIENT
Start: 2022-01-01 | End: 2022-01-01

## 2022-01-01 RX ORDER — CYCLOPHOSPHAMIDE 100 MG
608 VIAL (EA) INTRAVENOUS ONCE
Refills: 0 | Status: COMPLETED | OUTPATIENT
Start: 2022-01-01 | End: 2022-01-01

## 2022-01-01 RX ORDER — METOPROLOL TARTRATE 50 MG
3 TABLET ORAL
Qty: 0 | Refills: 0 | DISCHARGE
Start: 2022-01-01

## 2022-01-01 RX ORDER — PREDNISONE 10 MG/1
10 TABLET ORAL
Qty: 24 | Refills: 2 | Status: ACTIVE | COMMUNITY
Start: 2022-01-01 | End: 1900-01-01

## 2022-01-01 RX ORDER — LATANOPROST 0.05 MG/ML
1 SOLUTION/ DROPS OPHTHALMIC; TOPICAL
Qty: 0 | Refills: 0 | DISCHARGE
Start: 2022-01-01

## 2022-01-01 RX ORDER — DEXAMETHASONE 0.5 MG/5ML
10 ELIXIR ORAL ONCE
Refills: 0 | Status: COMPLETED | OUTPATIENT
Start: 2022-01-01 | End: 2022-01-01

## 2022-01-01 RX ADMIN — Medication 650 MILLIGRAM(S): at 23:58

## 2022-01-01 RX ADMIN — Medication 300 MILLIGRAM(S): at 22:13

## 2022-01-01 RX ADMIN — CARBIDOPA AND LEVODOPA 1 TABLET(S): 25; 100 TABLET ORAL at 06:34

## 2022-01-01 RX ADMIN — Medication 100 MILLIGRAM(S): at 05:08

## 2022-01-01 RX ADMIN — CARBIDOPA AND LEVODOPA 1 TABLET(S): 25; 100 TABLET ORAL at 09:15

## 2022-01-01 RX ADMIN — Medication 100 MILLIGRAM(S): at 14:08

## 2022-01-01 RX ADMIN — Medication 650 MILLIGRAM(S): at 13:40

## 2022-01-01 RX ADMIN — CARBIDOPA AND LEVODOPA 1 TABLET(S): 25; 100 TABLET ORAL at 21:32

## 2022-01-01 RX ADMIN — Medication 650 MILLIGRAM(S): at 14:24

## 2022-01-01 RX ADMIN — Medication 4 MILLIGRAM(S): at 05:12

## 2022-01-01 RX ADMIN — Medication 650 MILLIGRAM(S): at 21:19

## 2022-01-01 RX ADMIN — LATANOPROST 1 DROP(S): 0.05 SOLUTION/ DROPS OPHTHALMIC; TOPICAL at 22:02

## 2022-01-01 RX ADMIN — CARBIDOPA AND LEVODOPA 1 TABLET(S): 25; 100 TABLET ORAL at 15:09

## 2022-01-01 RX ADMIN — CARBIDOPA AND LEVODOPA 1 TABLET(S): 25; 100 TABLET ORAL at 21:11

## 2022-01-01 RX ADMIN — Medication 100 MILLIGRAM(S): at 05:30

## 2022-01-01 RX ADMIN — CARBIDOPA AND LEVODOPA 1 TABLET(S): 25; 100 TABLET ORAL at 21:28

## 2022-01-01 RX ADMIN — CARBIDOPA AND LEVODOPA 1 TABLET(S): 25; 100 TABLET ORAL at 23:32

## 2022-01-01 RX ADMIN — ENOXAPARIN SODIUM 40 MILLIGRAM(S): 100 INJECTION SUBCUTANEOUS at 21:37

## 2022-01-01 RX ADMIN — CHLORHEXIDINE GLUCONATE 1 APPLICATION(S): 213 SOLUTION TOPICAL at 10:00

## 2022-01-01 RX ADMIN — HYDROMORPHONE HYDROCHLORIDE 0.25 MILLIGRAM(S): 2 INJECTION INTRAMUSCULAR; INTRAVENOUS; SUBCUTANEOUS at 22:52

## 2022-01-01 RX ADMIN — Medication 4 MILLIGRAM(S): at 15:14

## 2022-01-01 RX ADMIN — Medication 100 MILLIGRAM(S): at 22:53

## 2022-01-01 RX ADMIN — Medication 400 MILLIGRAM(S): at 23:57

## 2022-01-01 RX ADMIN — CARBIDOPA AND LEVODOPA 1 TABLET(S): 25; 100 TABLET ORAL at 05:56

## 2022-01-01 RX ADMIN — LIDOCAINE 2 PATCH: 4 CREAM TOPICAL at 18:30

## 2022-01-01 RX ADMIN — Medication 300 MICROGRAM(S): at 15:35

## 2022-01-01 RX ADMIN — Medication 1 TABLET(S): at 12:15

## 2022-01-01 RX ADMIN — Medication 100 MILLIGRAM(S): at 13:11

## 2022-01-01 RX ADMIN — CARBIDOPA AND LEVODOPA 1 TABLET(S): 25; 100 TABLET ORAL at 14:46

## 2022-01-01 RX ADMIN — Medication 250 MILLIGRAM(S): at 03:46

## 2022-01-01 RX ADMIN — CARBIDOPA AND LEVODOPA 0.5 TABLET(S): 25; 100 TABLET ORAL at 10:46

## 2022-01-01 RX ADMIN — Medication 300 MILLIGRAM(S): at 22:23

## 2022-01-01 RX ADMIN — LATANOPROST 1 DROP(S): 0.05 SOLUTION/ DROPS OPHTHALMIC; TOPICAL at 22:29

## 2022-01-01 RX ADMIN — LATANOPROST 1 DROP(S): 0.05 SOLUTION/ DROPS OPHTHALMIC; TOPICAL at 21:42

## 2022-01-01 RX ADMIN — CARBIDOPA AND LEVODOPA 1 TABLET(S): 25; 100 TABLET ORAL at 05:59

## 2022-01-01 RX ADMIN — HEPARIN SODIUM 10 UNIT(S)/HR: 5000 INJECTION INTRAVENOUS; SUBCUTANEOUS at 18:08

## 2022-01-01 RX ADMIN — Medication 4 MILLIGRAM(S): at 22:18

## 2022-01-01 RX ADMIN — CARBIDOPA AND LEVODOPA 1 TABLET(S): 25; 100 TABLET ORAL at 20:50

## 2022-01-01 RX ADMIN — Medication 3 MILLIGRAM(S): at 21:50

## 2022-01-01 RX ADMIN — CARBIDOPA AND LEVODOPA 0.5 TABLET(S): 25; 100 TABLET ORAL at 15:35

## 2022-01-01 RX ADMIN — LATANOPROST 1 DROP(S): 0.05 SOLUTION/ DROPS OPHTHALMIC; TOPICAL at 22:06

## 2022-01-01 RX ADMIN — CHLORHEXIDINE GLUCONATE 1 APPLICATION(S): 213 SOLUTION TOPICAL at 05:58

## 2022-01-01 RX ADMIN — CHLORHEXIDINE GLUCONATE 1 APPLICATION(S): 213 SOLUTION TOPICAL at 11:30

## 2022-01-01 RX ADMIN — CARBIDOPA AND LEVODOPA 1 TABLET(S): 25; 100 TABLET ORAL at 21:10

## 2022-01-01 RX ADMIN — Medication 1 TABLET(S): at 12:41

## 2022-01-01 RX ADMIN — Medication 650 MILLIGRAM(S): at 12:34

## 2022-01-01 RX ADMIN — LATANOPROST 1 DROP(S): 0.05 SOLUTION/ DROPS OPHTHALMIC; TOPICAL at 22:24

## 2022-01-01 RX ADMIN — HEPARIN SODIUM 11 UNIT(S)/HR: 5000 INJECTION INTRAVENOUS; SUBCUTANEOUS at 18:50

## 2022-01-01 RX ADMIN — Medication 4 MILLIGRAM(S): at 03:09

## 2022-01-01 RX ADMIN — CARBIDOPA AND LEVODOPA 1 TABLET(S): 25; 100 TABLET ORAL at 15:31

## 2022-01-01 RX ADMIN — CARBIDOPA AND LEVODOPA 1 TABLET(S): 25; 100 TABLET ORAL at 20:02

## 2022-01-01 RX ADMIN — HYDROMORPHONE HYDROCHLORIDE 0.25 MILLIGRAM(S): 2 INJECTION INTRAMUSCULAR; INTRAVENOUS; SUBCUTANEOUS at 00:32

## 2022-01-01 RX ADMIN — CARBIDOPA AND LEVODOPA 1 TABLET(S): 25; 100 TABLET ORAL at 15:14

## 2022-01-01 RX ADMIN — Medication 4 MILLIGRAM(S): at 06:08

## 2022-01-01 RX ADMIN — CARBIDOPA AND LEVODOPA 1 TABLET(S): 25; 100 TABLET ORAL at 10:49

## 2022-01-01 RX ADMIN — Medication 250 MILLIGRAM(S): at 05:04

## 2022-01-01 RX ADMIN — CEFEPIME 100 MILLIGRAM(S): 1 INJECTION, POWDER, FOR SOLUTION INTRAMUSCULAR; INTRAVENOUS at 09:44

## 2022-01-01 RX ADMIN — Medication 3 MILLIGRAM(S): at 21:17

## 2022-01-01 RX ADMIN — CARBIDOPA AND LEVODOPA 1 TABLET(S): 25; 100 TABLET ORAL at 10:47

## 2022-01-01 RX ADMIN — CHLORHEXIDINE GLUCONATE 1 APPLICATION(S): 213 SOLUTION TOPICAL at 05:39

## 2022-01-01 RX ADMIN — Medication 4 MILLIGRAM(S): at 19:57

## 2022-01-01 RX ADMIN — CHLORHEXIDINE GLUCONATE 1 APPLICATION(S): 213 SOLUTION TOPICAL at 11:32

## 2022-01-01 RX ADMIN — HEPARIN SODIUM 10 UNIT(S)/HR: 5000 INJECTION INTRAVENOUS; SUBCUTANEOUS at 13:54

## 2022-01-01 RX ADMIN — CHLORHEXIDINE GLUCONATE 1 APPLICATION(S): 213 SOLUTION TOPICAL at 05:33

## 2022-01-01 RX ADMIN — PANTOPRAZOLE SODIUM 40 MILLIGRAM(S): 20 TABLET, DELAYED RELEASE ORAL at 05:13

## 2022-01-01 RX ADMIN — Medication 650 MILLIGRAM(S): at 00:45

## 2022-01-01 RX ADMIN — Medication 300 MICROGRAM(S): at 16:53

## 2022-01-01 RX ADMIN — Medication 650 MILLIGRAM(S): at 03:20

## 2022-01-01 RX ADMIN — Medication 650 MILLIGRAM(S): at 03:41

## 2022-01-01 RX ADMIN — LIDOCAINE 2 PATCH: 4 CREAM TOPICAL at 08:00

## 2022-01-01 RX ADMIN — Medication 650 MILLIGRAM(S): at 22:30

## 2022-01-01 RX ADMIN — CARBIDOPA AND LEVODOPA 1 TABLET(S): 25; 100 TABLET ORAL at 21:27

## 2022-01-01 RX ADMIN — CEFEPIME 100 MILLIGRAM(S): 1 INJECTION, POWDER, FOR SOLUTION INTRAMUSCULAR; INTRAVENOUS at 05:36

## 2022-01-01 RX ADMIN — Medication 3 MILLIGRAM(S): at 00:57

## 2022-01-01 RX ADMIN — CARBIDOPA AND LEVODOPA 0.5 TABLET(S): 25; 100 TABLET ORAL at 10:20

## 2022-01-01 RX ADMIN — Medication 100 MILLIGRAM(S): at 23:36

## 2022-01-01 RX ADMIN — CARBIDOPA AND LEVODOPA 1 TABLET(S): 25; 100 TABLET ORAL at 21:18

## 2022-01-01 RX ADMIN — Medication 300 MILLIGRAM(S): at 21:17

## 2022-01-01 RX ADMIN — Medication 4 MILLIGRAM(S): at 14:46

## 2022-01-01 RX ADMIN — Medication 4 MILLIGRAM(S): at 06:20

## 2022-01-01 RX ADMIN — LATANOPROST 1 DROP(S): 0.05 SOLUTION/ DROPS OPHTHALMIC; TOPICAL at 21:41

## 2022-01-01 RX ADMIN — Medication 4 MILLIGRAM(S): at 02:27

## 2022-01-01 RX ADMIN — SENNA PLUS 2 TABLET(S): 8.6 TABLET ORAL at 21:38

## 2022-01-01 RX ADMIN — CEFTRIAXONE 100 MILLIGRAM(S): 500 INJECTION, POWDER, FOR SOLUTION INTRAMUSCULAR; INTRAVENOUS at 02:48

## 2022-01-01 RX ADMIN — CARBIDOPA AND LEVODOPA 1 TABLET(S): 25; 100 TABLET ORAL at 21:58

## 2022-01-01 RX ADMIN — Medication 4 MILLIGRAM(S): at 06:07

## 2022-01-01 RX ADMIN — Medication 12.5 MILLIGRAM(S): at 05:59

## 2022-01-01 RX ADMIN — Medication 400 MILLIGRAM(S): at 06:26

## 2022-01-01 RX ADMIN — PIPERACILLIN AND TAZOBACTAM 200 GRAM(S): 4; .5 INJECTION, POWDER, LYOPHILIZED, FOR SOLUTION INTRAVENOUS at 03:16

## 2022-01-01 RX ADMIN — CARBIDOPA AND LEVODOPA 1 TABLET(S): 25; 100 TABLET ORAL at 22:30

## 2022-01-01 RX ADMIN — Medication 3 MILLIGRAM(S): at 21:40

## 2022-01-01 RX ADMIN — Medication 4 MILLIGRAM(S): at 17:07

## 2022-01-01 RX ADMIN — Medication 650 MILLIGRAM(S): at 13:35

## 2022-01-01 RX ADMIN — CARBIDOPA AND LEVODOPA 1 TABLET(S): 25; 100 TABLET ORAL at 16:53

## 2022-01-01 RX ADMIN — CARBIDOPA AND LEVODOPA 1 TABLET(S): 25; 100 TABLET ORAL at 16:58

## 2022-01-01 RX ADMIN — CARBIDOPA AND LEVODOPA 1 TABLET(S): 25; 100 TABLET ORAL at 09:12

## 2022-01-01 RX ADMIN — CARBIDOPA AND LEVODOPA 1 TABLET(S): 25; 100 TABLET ORAL at 15:33

## 2022-01-01 RX ADMIN — Medication 500 MILLIGRAM(S): at 11:18

## 2022-01-01 RX ADMIN — Medication 3 MILLIGRAM(S): at 21:15

## 2022-01-01 RX ADMIN — PANTOPRAZOLE SODIUM 40 MILLIGRAM(S): 20 TABLET, DELAYED RELEASE ORAL at 05:53

## 2022-01-01 RX ADMIN — Medication 12.5 MILLIGRAM(S): at 21:37

## 2022-01-01 RX ADMIN — CARBIDOPA AND LEVODOPA 1 TABLET(S): 25; 100 TABLET ORAL at 16:18

## 2022-01-01 RX ADMIN — Medication 100 MILLIGRAM(S): at 12:34

## 2022-01-01 RX ADMIN — ENOXAPARIN SODIUM 40 MILLIGRAM(S): 100 INJECTION SUBCUTANEOUS at 15:54

## 2022-01-01 RX ADMIN — CEFEPIME 100 MILLIGRAM(S): 1 INJECTION, POWDER, FOR SOLUTION INTRAMUSCULAR; INTRAVENOUS at 05:56

## 2022-01-01 RX ADMIN — SENNA PLUS 2 TABLET(S): 8.6 TABLET ORAL at 23:31

## 2022-01-01 RX ADMIN — Medication 100 MILLIGRAM(S): at 22:13

## 2022-01-01 RX ADMIN — Medication 3 MILLIGRAM(S): at 21:38

## 2022-01-01 RX ADMIN — PANTOPRAZOLE SODIUM 40 MILLIGRAM(S): 20 TABLET, DELAYED RELEASE ORAL at 05:36

## 2022-01-01 RX ADMIN — CARBIDOPA AND LEVODOPA 1 TABLET(S): 25; 100 TABLET ORAL at 10:13

## 2022-01-01 RX ADMIN — Medication 4 MILLIGRAM(S): at 23:37

## 2022-01-01 RX ADMIN — Medication 4 MILLIGRAM(S): at 05:38

## 2022-01-01 RX ADMIN — Medication 100 MILLIGRAM(S): at 21:32

## 2022-01-01 RX ADMIN — Medication 650 MILLIGRAM(S): at 23:55

## 2022-01-01 RX ADMIN — Medication 300 MILLIGRAM(S): at 21:15

## 2022-01-01 RX ADMIN — Medication 650 MILLIGRAM(S): at 03:08

## 2022-01-01 RX ADMIN — Medication 102 MILLIGRAM(S): at 16:07

## 2022-01-01 RX ADMIN — Medication 4 MILLIGRAM(S): at 21:10

## 2022-01-01 RX ADMIN — CARBIDOPA AND LEVODOPA 1 TABLET(S): 25; 100 TABLET ORAL at 19:58

## 2022-01-01 RX ADMIN — Medication 20 MILLIEQUIVALENT(S): at 11:25

## 2022-01-01 RX ADMIN — HYDROMORPHONE HYDROCHLORIDE 0.25 MILLIGRAM(S): 2 INJECTION INTRAMUSCULAR; INTRAVENOUS; SUBCUTANEOUS at 21:37

## 2022-01-01 RX ADMIN — Medication 4 MILLIGRAM(S): at 21:42

## 2022-01-01 RX ADMIN — CARBIDOPA AND LEVODOPA 1 TABLET(S): 25; 100 TABLET ORAL at 14:29

## 2022-01-01 RX ADMIN — Medication 650 MILLIGRAM(S): at 22:00

## 2022-01-01 RX ADMIN — Medication 300 MILLIGRAM(S): at 22:34

## 2022-01-01 RX ADMIN — ENOXAPARIN SODIUM 40 MILLIGRAM(S): 100 INJECTION SUBCUTANEOUS at 21:58

## 2022-01-01 RX ADMIN — CARBIDOPA AND LEVODOPA 1 TABLET(S): 25; 100 TABLET ORAL at 21:37

## 2022-01-01 RX ADMIN — CEFEPIME 100 MILLIGRAM(S): 1 INJECTION, POWDER, FOR SOLUTION INTRAMUSCULAR; INTRAVENOUS at 17:26

## 2022-01-01 RX ADMIN — Medication 4 MILLIGRAM(S): at 17:27

## 2022-01-01 RX ADMIN — Medication 500 MILLIGRAM(S): at 12:40

## 2022-01-01 RX ADMIN — PANTOPRAZOLE SODIUM 40 MILLIGRAM(S): 20 TABLET, DELAYED RELEASE ORAL at 10:24

## 2022-01-01 RX ADMIN — CARBIDOPA AND LEVODOPA 1 TABLET(S): 25; 100 TABLET ORAL at 10:33

## 2022-01-01 RX ADMIN — Medication 650 MILLIGRAM(S): at 21:51

## 2022-01-01 RX ADMIN — HYDROMORPHONE HYDROCHLORIDE 0.25 MILLIGRAM(S): 2 INJECTION INTRAMUSCULAR; INTRAVENOUS; SUBCUTANEOUS at 14:37

## 2022-01-01 RX ADMIN — CARBIDOPA AND LEVODOPA 1 TABLET(S): 25; 100 TABLET ORAL at 13:31

## 2022-01-01 RX ADMIN — SODIUM CHLORIDE 1000 MILLILITER(S): 9 INJECTION, SOLUTION INTRAVENOUS at 00:20

## 2022-01-01 RX ADMIN — HYDROMORPHONE HYDROCHLORIDE 0.25 MILLIGRAM(S): 2 INJECTION INTRAMUSCULAR; INTRAVENOUS; SUBCUTANEOUS at 14:00

## 2022-01-01 RX ADMIN — Medication 12.5 MILLIGRAM(S): at 17:27

## 2022-01-01 RX ADMIN — Medication 3 MILLIGRAM(S): at 23:31

## 2022-01-01 RX ADMIN — CARBIDOPA AND LEVODOPA 1 TABLET(S): 25; 100 TABLET ORAL at 11:02

## 2022-01-01 RX ADMIN — HEPARIN SODIUM 10 UNIT(S)/HR: 5000 INJECTION INTRAVENOUS; SUBCUTANEOUS at 09:39

## 2022-01-01 RX ADMIN — Medication 4 MILLIGRAM(S): at 10:56

## 2022-01-01 RX ADMIN — Medication 3 MILLIGRAM(S): at 21:27

## 2022-01-01 RX ADMIN — CEFTRIAXONE 100 MILLIGRAM(S): 500 INJECTION, POWDER, FOR SOLUTION INTRAMUSCULAR; INTRAVENOUS at 03:15

## 2022-01-01 RX ADMIN — Medication 3 MILLIGRAM(S): at 00:15

## 2022-01-01 RX ADMIN — CARBIDOPA AND LEVODOPA 1 TABLET(S): 25; 100 TABLET ORAL at 09:40

## 2022-01-01 RX ADMIN — Medication 650 MILLIGRAM(S): at 22:12

## 2022-01-01 RX ADMIN — LATANOPROST 1 DROP(S): 0.05 SOLUTION/ DROPS OPHTHALMIC; TOPICAL at 21:28

## 2022-01-01 RX ADMIN — Medication 50 MILLIGRAM(S): at 12:34

## 2022-01-01 RX ADMIN — Medication 25 GRAM(S): at 09:00

## 2022-01-01 RX ADMIN — Medication 4 MILLIGRAM(S): at 14:15

## 2022-01-01 RX ADMIN — LATANOPROST 1 DROP(S): 0.05 SOLUTION/ DROPS OPHTHALMIC; TOPICAL at 21:16

## 2022-01-01 RX ADMIN — Medication 12.5 MILLIGRAM(S): at 06:33

## 2022-01-01 RX ADMIN — Medication 4 MILLIGRAM(S): at 00:33

## 2022-01-01 RX ADMIN — PANTOPRAZOLE SODIUM 40 MILLIGRAM(S): 20 TABLET, DELAYED RELEASE ORAL at 06:07

## 2022-01-01 RX ADMIN — CARBIDOPA AND LEVODOPA 1 TABLET(S): 25; 100 TABLET ORAL at 22:22

## 2022-01-01 RX ADMIN — CEFEPIME 100 MILLIGRAM(S): 1 INJECTION, POWDER, FOR SOLUTION INTRAMUSCULAR; INTRAVENOUS at 05:32

## 2022-01-01 RX ADMIN — CARBIDOPA AND LEVODOPA 1 TABLET(S): 25; 100 TABLET ORAL at 14:09

## 2022-01-01 RX ADMIN — Medication 3 MILLIGRAM(S): at 23:40

## 2022-01-01 RX ADMIN — Medication 400 MILLIGRAM(S): at 05:53

## 2022-01-01 RX ADMIN — CARBIDOPA AND LEVODOPA 1 TABLET(S): 25; 100 TABLET ORAL at 20:55

## 2022-01-01 RX ADMIN — ZINC SULFATE TAB 220 MG (50 MG ZINC EQUIVALENT) 220 MILLIGRAM(S): 220 (50 ZN) TAB at 12:38

## 2022-01-01 RX ADMIN — CARBIDOPA AND LEVODOPA 1 TABLET(S): 25; 100 TABLET ORAL at 09:50

## 2022-01-01 RX ADMIN — Medication 20 MILLIGRAM(S): at 05:24

## 2022-01-01 RX ADMIN — PANTOPRAZOLE SODIUM 40 MILLIGRAM(S): 20 TABLET, DELAYED RELEASE ORAL at 05:41

## 2022-01-01 RX ADMIN — Medication 12.5 MILLIGRAM(S): at 22:23

## 2022-01-01 RX ADMIN — Medication 100 MILLIGRAM(S): at 05:34

## 2022-01-01 RX ADMIN — LATANOPROST 1 DROP(S): 0.05 SOLUTION/ DROPS OPHTHALMIC; TOPICAL at 21:32

## 2022-01-01 RX ADMIN — Medication 300 MILLIGRAM(S): at 22:30

## 2022-01-01 RX ADMIN — PANTOPRAZOLE SODIUM 40 MILLIGRAM(S): 20 TABLET, DELAYED RELEASE ORAL at 05:21

## 2022-01-01 RX ADMIN — Medication 300 MILLIGRAM(S): at 21:32

## 2022-01-01 RX ADMIN — Medication 500 MILLIGRAM(S): at 11:24

## 2022-01-01 RX ADMIN — CARBIDOPA AND LEVODOPA 1 TABLET(S): 25; 100 TABLET ORAL at 14:15

## 2022-01-01 RX ADMIN — Medication 4 MILLIGRAM(S): at 22:00

## 2022-01-01 RX ADMIN — CARBIDOPA AND LEVODOPA 1 TABLET(S): 25; 100 TABLET ORAL at 15:03

## 2022-01-01 RX ADMIN — Medication 4 MILLIGRAM(S): at 10:34

## 2022-01-01 RX ADMIN — HEPARIN SODIUM 9.7 UNIT(S)/HR: 5000 INJECTION INTRAVENOUS; SUBCUTANEOUS at 10:16

## 2022-01-01 RX ADMIN — Medication 300 MILLIGRAM(S): at 21:31

## 2022-01-01 RX ADMIN — Medication 10 MILLIGRAM(S): at 19:05

## 2022-01-01 RX ADMIN — HEPARIN SODIUM 9 UNIT(S)/HR: 5000 INJECTION INTRAVENOUS; SUBCUTANEOUS at 08:28

## 2022-01-01 RX ADMIN — CARBIDOPA AND LEVODOPA 1 TABLET(S): 25; 100 TABLET ORAL at 20:05

## 2022-01-01 RX ADMIN — Medication 12.5 MILLIGRAM(S): at 10:24

## 2022-01-01 RX ADMIN — PANTOPRAZOLE SODIUM 40 MILLIGRAM(S): 20 TABLET, DELAYED RELEASE ORAL at 06:20

## 2022-01-01 RX ADMIN — Medication 3 MILLIGRAM(S): at 23:26

## 2022-01-01 RX ADMIN — CARBIDOPA AND LEVODOPA 1 TABLET(S): 25; 100 TABLET ORAL at 15:00

## 2022-01-01 RX ADMIN — Medication 400 MILLIGRAM(S): at 22:22

## 2022-01-01 RX ADMIN — PANTOPRAZOLE SODIUM 40 MILLIGRAM(S): 20 TABLET, DELAYED RELEASE ORAL at 06:24

## 2022-01-01 RX ADMIN — DOXORUBICIN HYDROCHLORIDE 123 MILLIGRAM(S): 2 INJECTION, SOLUTION INTRAVENOUS at 16:22

## 2022-01-01 RX ADMIN — LATANOPROST 1 DROP(S): 0.05 SOLUTION/ DROPS OPHTHALMIC; TOPICAL at 21:52

## 2022-01-01 RX ADMIN — PANTOPRAZOLE SODIUM 40 MILLIGRAM(S): 20 TABLET, DELAYED RELEASE ORAL at 06:10

## 2022-01-01 RX ADMIN — SENNA PLUS 2 TABLET(S): 8.6 TABLET ORAL at 22:49

## 2022-01-01 RX ADMIN — HEPARIN SODIUM 9 UNIT(S)/HR: 5000 INJECTION INTRAVENOUS; SUBCUTANEOUS at 00:00

## 2022-01-01 RX ADMIN — CHLORHEXIDINE GLUCONATE 1 APPLICATION(S): 213 SOLUTION TOPICAL at 09:00

## 2022-01-01 RX ADMIN — ENOXAPARIN SODIUM 40 MILLIGRAM(S): 100 INJECTION SUBCUTANEOUS at 15:09

## 2022-01-01 RX ADMIN — Medication 3 MILLIGRAM(S): at 22:29

## 2022-01-01 RX ADMIN — CARBIDOPA AND LEVODOPA 1 TABLET(S): 25; 100 TABLET ORAL at 14:03

## 2022-01-01 RX ADMIN — CEFEPIME 100 MILLIGRAM(S): 1 INJECTION, POWDER, FOR SOLUTION INTRAMUSCULAR; INTRAVENOUS at 13:39

## 2022-01-01 RX ADMIN — CHLORHEXIDINE GLUCONATE 1 APPLICATION(S): 213 SOLUTION TOPICAL at 12:39

## 2022-01-01 RX ADMIN — HYDROMORPHONE HYDROCHLORIDE 0.25 MILLIGRAM(S): 2 INJECTION INTRAMUSCULAR; INTRAVENOUS; SUBCUTANEOUS at 06:42

## 2022-01-01 RX ADMIN — Medication 500 MILLIGRAM(S): at 12:16

## 2022-01-01 RX ADMIN — CARBIDOPA AND LEVODOPA 1 TABLET(S): 25; 100 TABLET ORAL at 11:34

## 2022-01-01 RX ADMIN — CARBIDOPA AND LEVODOPA 1 TABLET(S): 25; 100 TABLET ORAL at 21:15

## 2022-01-01 RX ADMIN — Medication 75 MILLIGRAM(S): at 06:10

## 2022-01-01 RX ADMIN — Medication 650 MILLIGRAM(S): at 21:26

## 2022-01-01 RX ADMIN — Medication 608 MILLIGRAM(S): at 14:43

## 2022-01-01 RX ADMIN — Medication 650 MILLIGRAM(S): at 12:27

## 2022-01-01 RX ADMIN — CHLORHEXIDINE GLUCONATE 1 APPLICATION(S): 213 SOLUTION TOPICAL at 05:53

## 2022-01-01 RX ADMIN — PANTOPRAZOLE SODIUM 40 MILLIGRAM(S): 20 TABLET, DELAYED RELEASE ORAL at 06:08

## 2022-01-01 RX ADMIN — Medication 3 MILLIGRAM(S): at 21:32

## 2022-01-01 RX ADMIN — Medication 100 MILLIGRAM(S): at 22:06

## 2022-01-01 RX ADMIN — Medication 12.5 MILLIGRAM(S): at 21:49

## 2022-01-01 RX ADMIN — Medication 20 MILLIGRAM(S): at 06:06

## 2022-01-01 RX ADMIN — Medication 12.5 MILLIGRAM(S): at 22:12

## 2022-01-01 RX ADMIN — Medication 650 MILLIGRAM(S): at 22:45

## 2022-01-01 RX ADMIN — PANTOPRAZOLE SODIUM 40 MILLIGRAM(S): 20 TABLET, DELAYED RELEASE ORAL at 13:40

## 2022-01-01 RX ADMIN — LATANOPROST 1 DROP(S): 0.05 SOLUTION/ DROPS OPHTHALMIC; TOPICAL at 22:35

## 2022-01-01 RX ADMIN — SODIUM CHLORIDE 40 MILLILITER(S): 9 INJECTION INTRAMUSCULAR; INTRAVENOUS; SUBCUTANEOUS at 10:34

## 2022-01-01 RX ADMIN — Medication 4 MILLIGRAM(S): at 01:58

## 2022-01-01 RX ADMIN — Medication 4 MILLIGRAM(S): at 13:09

## 2022-01-01 RX ADMIN — CHLORHEXIDINE GLUCONATE 1 APPLICATION(S): 213 SOLUTION TOPICAL at 11:20

## 2022-01-01 RX ADMIN — CARBIDOPA AND LEVODOPA 1 TABLET(S): 25; 100 TABLET ORAL at 05:51

## 2022-01-01 RX ADMIN — CARBIDOPA AND LEVODOPA 1 TABLET(S): 25; 100 TABLET ORAL at 21:14

## 2022-01-01 RX ADMIN — Medication 4 MILLIGRAM(S): at 14:05

## 2022-01-01 RX ADMIN — Medication 400 MILLIGRAM(S): at 22:31

## 2022-01-01 RX ADMIN — ZINC SULFATE TAB 220 MG (50 MG ZINC EQUIVALENT) 220 MILLIGRAM(S): 220 (50 ZN) TAB at 12:15

## 2022-01-01 RX ADMIN — SENNA PLUS 2 TABLET(S): 8.6 TABLET ORAL at 22:23

## 2022-01-01 RX ADMIN — LATANOPROST 1 DROP(S): 0.05 SOLUTION/ DROPS OPHTHALMIC; TOPICAL at 21:18

## 2022-01-01 RX ADMIN — CARBIDOPA AND LEVODOPA 1 TABLET(S): 25; 100 TABLET ORAL at 21:17

## 2022-01-01 RX ADMIN — CARBIDOPA AND LEVODOPA 1 TABLET(S): 25; 100 TABLET ORAL at 09:34

## 2022-01-01 RX ADMIN — CARBIDOPA AND LEVODOPA 1 TABLET(S): 25; 100 TABLET ORAL at 16:22

## 2022-01-01 RX ADMIN — LATANOPROST 1 DROP(S): 0.05 SOLUTION/ DROPS OPHTHALMIC; TOPICAL at 22:12

## 2022-01-01 RX ADMIN — APIXABAN 2.5 MILLIGRAM(S): 2.5 TABLET, FILM COATED ORAL at 05:23

## 2022-01-01 RX ADMIN — Medication 650 MILLIGRAM(S): at 20:00

## 2022-01-01 RX ADMIN — Medication 20 MILLIGRAM(S): at 05:59

## 2022-01-01 RX ADMIN — Medication 12.5 MILLIGRAM(S): at 11:46

## 2022-01-01 RX ADMIN — SENNA PLUS 2 TABLET(S): 8.6 TABLET ORAL at 21:32

## 2022-01-01 RX ADMIN — CHLORHEXIDINE GLUCONATE 1 APPLICATION(S): 213 SOLUTION TOPICAL at 11:24

## 2022-01-01 RX ADMIN — CARBIDOPA AND LEVODOPA 0.5 TABLET(S): 25; 100 TABLET ORAL at 21:27

## 2022-01-01 RX ADMIN — Medication 4 MILLIGRAM(S): at 00:14

## 2022-01-01 RX ADMIN — Medication 4 MILLIGRAM(S): at 12:07

## 2022-01-01 RX ADMIN — CARBIDOPA AND LEVODOPA 1 TABLET(S): 25; 100 TABLET ORAL at 21:31

## 2022-01-01 RX ADMIN — Medication 250 MILLIGRAM(S): at 18:13

## 2022-01-01 RX ADMIN — CARBIDOPA AND LEVODOPA 0.5 TABLET(S): 25; 100 TABLET ORAL at 21:48

## 2022-01-01 RX ADMIN — CEFEPIME 100 MILLIGRAM(S): 1 INJECTION, POWDER, FOR SOLUTION INTRAMUSCULAR; INTRAVENOUS at 05:04

## 2022-01-01 RX ADMIN — Medication 12.5 MILLIGRAM(S): at 17:26

## 2022-01-01 RX ADMIN — PANTOPRAZOLE SODIUM 40 MILLIGRAM(S): 20 TABLET, DELAYED RELEASE ORAL at 05:33

## 2022-01-01 RX ADMIN — PIPERACILLIN AND TAZOBACTAM 25 GRAM(S): 4; .5 INJECTION, POWDER, LYOPHILIZED, FOR SOLUTION INTRAVENOUS at 22:06

## 2022-01-01 RX ADMIN — Medication 100 MILLIGRAM(S): at 05:38

## 2022-01-01 RX ADMIN — Medication 4 MILLIGRAM(S): at 16:58

## 2022-01-01 RX ADMIN — Medication 400 MILLIGRAM(S): at 23:58

## 2022-01-01 RX ADMIN — PANTOPRAZOLE SODIUM 40 MILLIGRAM(S): 20 TABLET, DELAYED RELEASE ORAL at 05:50

## 2022-01-01 RX ADMIN — Medication 3 MILLIGRAM(S): at 22:30

## 2022-01-01 RX ADMIN — Medication 20 MILLIGRAM(S): at 05:33

## 2022-01-01 RX ADMIN — PANTOPRAZOLE SODIUM 40 MILLIGRAM(S): 20 TABLET, DELAYED RELEASE ORAL at 05:04

## 2022-01-01 RX ADMIN — CHLORHEXIDINE GLUCONATE 1 APPLICATION(S): 213 SOLUTION TOPICAL at 12:32

## 2022-01-01 RX ADMIN — PANTOPRAZOLE SODIUM 40 MILLIGRAM(S): 20 TABLET, DELAYED RELEASE ORAL at 05:49

## 2022-01-01 RX ADMIN — HEPARIN SODIUM 8 UNIT(S)/HR: 5000 INJECTION INTRAVENOUS; SUBCUTANEOUS at 19:37

## 2022-01-01 RX ADMIN — PANTOPRAZOLE SODIUM 40 MILLIGRAM(S): 20 TABLET, DELAYED RELEASE ORAL at 06:26

## 2022-01-01 RX ADMIN — SODIUM CHLORIDE 40 MILLILITER(S): 9 INJECTION INTRAMUSCULAR; INTRAVENOUS; SUBCUTANEOUS at 04:03

## 2022-01-01 RX ADMIN — Medication 100 MILLIGRAM(S): at 13:53

## 2022-01-01 RX ADMIN — Medication 650 MILLIGRAM(S): at 23:59

## 2022-01-01 RX ADMIN — SENNA PLUS 2 TABLET(S): 8.6 TABLET ORAL at 22:34

## 2022-01-01 RX ADMIN — CARBIDOPA AND LEVODOPA 0.5 TABLET(S): 25; 100 TABLET ORAL at 15:04

## 2022-01-01 RX ADMIN — Medication 400 MILLIGRAM(S): at 20:02

## 2022-01-01 RX ADMIN — ENOXAPARIN SODIUM 40 MILLIGRAM(S): 100 INJECTION SUBCUTANEOUS at 16:22

## 2022-01-01 RX ADMIN — Medication 100 MILLIGRAM(S): at 14:10

## 2022-01-01 RX ADMIN — Medication 4 MILLIGRAM(S): at 08:29

## 2022-01-01 RX ADMIN — Medication 3 MILLIGRAM(S): at 22:24

## 2022-01-01 RX ADMIN — Medication 650 MILLIGRAM(S): at 22:57

## 2022-01-01 RX ADMIN — Medication 650 MILLIGRAM(S): at 05:51

## 2022-01-01 RX ADMIN — CEFEPIME 100 MILLIGRAM(S): 1 INJECTION, POWDER, FOR SOLUTION INTRAMUSCULAR; INTRAVENOUS at 18:30

## 2022-01-01 RX ADMIN — Medication 63.75 MILLIMOLE(S): at 10:57

## 2022-01-01 RX ADMIN — Medication 1000 MILLIGRAM(S): at 22:00

## 2022-01-01 RX ADMIN — CARBIDOPA AND LEVODOPA 1 TABLET(S): 25; 100 TABLET ORAL at 15:54

## 2022-01-01 RX ADMIN — Medication 12.5 MILLIGRAM(S): at 05:33

## 2022-01-01 RX ADMIN — CARBIDOPA AND LEVODOPA 1 TABLET(S): 25; 100 TABLET ORAL at 22:36

## 2022-01-01 RX ADMIN — CARBIDOPA AND LEVODOPA 1 TABLET(S): 25; 100 TABLET ORAL at 14:05

## 2022-01-01 RX ADMIN — CARBIDOPA AND LEVODOPA 1 TABLET(S): 25; 100 TABLET ORAL at 14:33

## 2022-01-01 RX ADMIN — CEFEPIME 100 MILLIGRAM(S): 1 INJECTION, POWDER, FOR SOLUTION INTRAMUSCULAR; INTRAVENOUS at 18:13

## 2022-01-01 RX ADMIN — CHLORHEXIDINE GLUCONATE 1 APPLICATION(S): 213 SOLUTION TOPICAL at 05:41

## 2022-01-01 RX ADMIN — Medication 25 GRAM(S): at 09:41

## 2022-01-01 RX ADMIN — CARBIDOPA AND LEVODOPA 1 TABLET(S): 25; 100 TABLET ORAL at 09:08

## 2022-01-01 RX ADMIN — CARBIDOPA AND LEVODOPA 1 TABLET(S): 25; 100 TABLET ORAL at 16:00

## 2022-01-01 RX ADMIN — ZINC SULFATE TAB 220 MG (50 MG ZINC EQUIVALENT) 220 MILLIGRAM(S): 220 (50 ZN) TAB at 11:18

## 2022-01-01 RX ADMIN — CARBIDOPA AND LEVODOPA 0.5 TABLET(S): 25; 100 TABLET ORAL at 20:35

## 2022-01-01 RX ADMIN — CARBIDOPA AND LEVODOPA 1 TABLET(S): 25; 100 TABLET ORAL at 06:10

## 2022-01-01 RX ADMIN — SENNA PLUS 2 TABLET(S): 8.6 TABLET ORAL at 22:29

## 2022-01-01 RX ADMIN — HYDROMORPHONE HYDROCHLORIDE 0.25 MILLIGRAM(S): 2 INJECTION INTRAMUSCULAR; INTRAVENOUS; SUBCUTANEOUS at 14:30

## 2022-01-01 RX ADMIN — CARBIDOPA AND LEVODOPA 1 TABLET(S): 25; 100 TABLET ORAL at 14:42

## 2022-01-01 RX ADMIN — HYDROMORPHONE HYDROCHLORIDE 0.25 MILLIGRAM(S): 2 INJECTION INTRAMUSCULAR; INTRAVENOUS; SUBCUTANEOUS at 18:55

## 2022-01-01 RX ADMIN — Medication 4 MILLIGRAM(S): at 05:30

## 2022-01-01 RX ADMIN — CARBIDOPA AND LEVODOPA 1 TABLET(S): 25; 100 TABLET ORAL at 16:07

## 2022-01-01 RX ADMIN — CEFEPIME 100 MILLIGRAM(S): 1 INJECTION, POWDER, FOR SOLUTION INTRAMUSCULAR; INTRAVENOUS at 17:17

## 2022-01-01 RX ADMIN — Medication 4 MILLIGRAM(S): at 05:33

## 2022-01-01 RX ADMIN — Medication 4 MILLIGRAM(S): at 19:59

## 2022-01-01 RX ADMIN — Medication 12.5 MILLIGRAM(S): at 13:13

## 2022-01-01 RX ADMIN — PANTOPRAZOLE SODIUM 40 MILLIGRAM(S): 20 TABLET, DELAYED RELEASE ORAL at 06:34

## 2022-01-01 RX ADMIN — HEPARIN SODIUM 10 UNIT(S)/HR: 5000 INJECTION INTRAVENOUS; SUBCUTANEOUS at 03:09

## 2022-01-01 RX ADMIN — Medication 15 MILLIGRAM(S): at 22:24

## 2022-01-01 RX ADMIN — CARBIDOPA AND LEVODOPA 1 TABLET(S): 25; 100 TABLET ORAL at 13:40

## 2022-01-01 RX ADMIN — LATANOPROST 1 DROP(S): 0.05 SOLUTION/ DROPS OPHTHALMIC; TOPICAL at 23:30

## 2022-01-01 RX ADMIN — Medication 300 MILLIGRAM(S): at 22:06

## 2022-01-01 RX ADMIN — Medication 100 MILLIGRAM(S): at 21:16

## 2022-01-01 RX ADMIN — Medication 12.5 MILLIGRAM(S): at 15:14

## 2022-01-01 RX ADMIN — Medication 1 DROP(S): at 21:19

## 2022-01-01 RX ADMIN — HYDROMORPHONE HYDROCHLORIDE 0.25 MILLIGRAM(S): 2 INJECTION INTRAMUSCULAR; INTRAVENOUS; SUBCUTANEOUS at 23:38

## 2022-01-01 RX ADMIN — HEPARIN SODIUM 9 UNIT(S)/HR: 5000 INJECTION INTRAVENOUS; SUBCUTANEOUS at 10:55

## 2022-01-01 RX ADMIN — Medication 3 MILLIGRAM(S): at 22:35

## 2022-01-01 RX ADMIN — Medication 650 MILLIGRAM(S): at 13:10

## 2022-01-01 RX ADMIN — Medication 3 MILLIGRAM(S): at 23:01

## 2022-01-01 RX ADMIN — CARBIDOPA AND LEVODOPA 1 TABLET(S): 25; 100 TABLET ORAL at 09:44

## 2022-01-01 RX ADMIN — Medication 4 MILLIGRAM(S): at 21:41

## 2022-01-01 RX ADMIN — Medication 400 MILLIGRAM(S): at 13:02

## 2022-01-01 RX ADMIN — POTASSIUM PHOSPHATE, MONOBASIC POTASSIUM PHOSPHATE, DIBASIC 62.5 MILLIMOLE(S): 236; 224 INJECTION, SOLUTION INTRAVENOUS at 16:53

## 2022-01-01 RX ADMIN — LATANOPROST 1 DROP(S): 0.05 SOLUTION/ DROPS OPHTHALMIC; TOPICAL at 22:30

## 2022-01-01 RX ADMIN — Medication 650 MILLIGRAM(S): at 05:56

## 2022-01-01 RX ADMIN — Medication 650 MILLIGRAM(S): at 07:53

## 2022-01-01 RX ADMIN — Medication 4 MILLIGRAM(S): at 11:02

## 2022-01-01 RX ADMIN — Medication 650 MILLIGRAM(S): at 15:01

## 2022-01-01 RX ADMIN — RITUXIMAB 611 MILLIGRAM(S): 10 INJECTION, SOLUTION INTRAVENOUS at 13:58

## 2022-01-01 RX ADMIN — HEPARIN SODIUM 9.5 UNIT(S)/HR: 5000 INJECTION INTRAVENOUS; SUBCUTANEOUS at 07:12

## 2022-01-01 RX ADMIN — Medication 4 MILLIGRAM(S): at 17:31

## 2022-01-01 RX ADMIN — ZINC SULFATE TAB 220 MG (50 MG ZINC EQUIVALENT) 220 MILLIGRAM(S): 220 (50 ZN) TAB at 12:00

## 2022-01-01 RX ADMIN — CARBIDOPA AND LEVODOPA 1 TABLET(S): 25; 100 TABLET ORAL at 16:39

## 2022-01-01 RX ADMIN — LATANOPROST 1 DROP(S): 0.05 SOLUTION/ DROPS OPHTHALMIC; TOPICAL at 22:16

## 2022-01-01 RX ADMIN — Medication 3 MILLIGRAM(S): at 23:09

## 2022-01-01 RX ADMIN — CHLORHEXIDINE GLUCONATE 1 APPLICATION(S): 213 SOLUTION TOPICAL at 12:18

## 2022-01-01 RX ADMIN — CARBIDOPA AND LEVODOPA 1 TABLET(S): 25; 100 TABLET ORAL at 10:25

## 2022-01-01 RX ADMIN — CARBIDOPA AND LEVODOPA 1 TABLET(S): 25; 100 TABLET ORAL at 09:22

## 2022-01-01 RX ADMIN — CARBIDOPA AND LEVODOPA 1 TABLET(S): 25; 100 TABLET ORAL at 13:14

## 2022-01-01 RX ADMIN — Medication 100 MILLIGRAM(S): at 14:11

## 2022-01-01 RX ADMIN — APIXABAN 2.5 MILLIGRAM(S): 2.5 TABLET, FILM COATED ORAL at 17:49

## 2022-01-01 RX ADMIN — Medication 650 MILLIGRAM(S): at 21:45

## 2022-01-01 RX ADMIN — Medication 250 MILLIGRAM(S): at 18:54

## 2022-01-01 RX ADMIN — Medication 3 MILLIGRAM(S): at 23:39

## 2022-01-01 RX ADMIN — Medication 4 MILLIGRAM(S): at 13:52

## 2022-01-01 RX ADMIN — ZINC SULFATE TAB 220 MG (50 MG ZINC EQUIVALENT) 220 MILLIGRAM(S): 220 (50 ZN) TAB at 12:16

## 2022-01-01 RX ADMIN — Medication 4 MILLIGRAM(S): at 20:05

## 2022-01-01 RX ADMIN — Medication 300 MILLIGRAM(S): at 21:16

## 2022-01-01 RX ADMIN — Medication 4 MILLIGRAM(S): at 23:10

## 2022-01-01 RX ADMIN — Medication 400 MILLIGRAM(S): at 21:27

## 2022-01-01 RX ADMIN — PANTOPRAZOLE SODIUM 40 MILLIGRAM(S): 20 TABLET, DELAYED RELEASE ORAL at 06:37

## 2022-01-01 RX ADMIN — CEFEPIME 100 MILLIGRAM(S): 1 INJECTION, POWDER, FOR SOLUTION INTRAMUSCULAR; INTRAVENOUS at 22:28

## 2022-01-01 RX ADMIN — CARBIDOPA AND LEVODOPA 1 TABLET(S): 25; 100 TABLET ORAL at 05:35

## 2022-01-01 RX ADMIN — CARBIDOPA AND LEVODOPA 1 TABLET(S): 25; 100 TABLET ORAL at 21:41

## 2022-01-01 RX ADMIN — CARBIDOPA AND LEVODOPA 1 TABLET(S): 25; 100 TABLET ORAL at 21:50

## 2022-01-01 RX ADMIN — LATANOPROST 1 DROP(S): 0.05 SOLUTION/ DROPS OPHTHALMIC; TOPICAL at 21:10

## 2022-01-01 RX ADMIN — PANTOPRAZOLE SODIUM 40 MILLIGRAM(S): 20 TABLET, DELAYED RELEASE ORAL at 05:57

## 2022-01-01 RX ADMIN — Medication 100 MILLIGRAM(S): at 06:20

## 2022-01-01 RX ADMIN — Medication 400 MILLIGRAM(S): at 13:32

## 2022-01-01 RX ADMIN — Medication 300 MILLIGRAM(S): at 21:41

## 2022-01-01 RX ADMIN — CARBIDOPA AND LEVODOPA 1 TABLET(S): 25; 100 TABLET ORAL at 22:12

## 2022-01-01 RX ADMIN — LATANOPROST 1 DROP(S): 0.05 SOLUTION/ DROPS OPHTHALMIC; TOPICAL at 21:38

## 2022-01-01 RX ADMIN — Medication 12.5 MILLIGRAM(S): at 05:11

## 2022-01-01 RX ADMIN — Medication 3 MILLIGRAM(S): at 21:18

## 2022-01-01 RX ADMIN — Medication 650 MILLIGRAM(S): at 06:05

## 2022-01-01 RX ADMIN — LATANOPROST 1 DROP(S): 0.05 SOLUTION/ DROPS OPHTHALMIC; TOPICAL at 22:00

## 2022-01-01 RX ADMIN — Medication 300 MILLIGRAM(S): at 23:31

## 2022-01-01 RX ADMIN — CARBIDOPA AND LEVODOPA 1 TABLET(S): 25; 100 TABLET ORAL at 05:23

## 2022-01-01 RX ADMIN — Medication 20 MILLIGRAM(S): at 06:09

## 2022-01-01 RX ADMIN — Medication 15 MILLIGRAM(S): at 21:38

## 2022-01-01 RX ADMIN — LATANOPROST 1 DROP(S): 0.05 SOLUTION/ DROPS OPHTHALMIC; TOPICAL at 21:50

## 2022-01-01 RX ADMIN — ENOXAPARIN SODIUM 40 MILLIGRAM(S): 100 INJECTION SUBCUTANEOUS at 15:33

## 2022-01-01 RX ADMIN — Medication 4 MILLIGRAM(S): at 06:40

## 2022-01-01 RX ADMIN — LATANOPROST 1 DROP(S): 0.05 SOLUTION/ DROPS OPHTHALMIC; TOPICAL at 21:31

## 2022-01-01 RX ADMIN — Medication 650 MILLIGRAM(S): at 12:19

## 2022-01-01 RX ADMIN — Medication 4 MILLIGRAM(S): at 20:57

## 2022-01-01 RX ADMIN — Medication 100 MILLIGRAM(S): at 21:42

## 2022-01-01 RX ADMIN — Medication 75 MILLIGRAM(S): at 06:05

## 2022-01-01 RX ADMIN — PANTOPRAZOLE SODIUM 40 MILLIGRAM(S): 20 TABLET, DELAYED RELEASE ORAL at 05:29

## 2022-01-01 RX ADMIN — Medication 300 MILLIGRAM(S): at 21:19

## 2022-01-01 RX ADMIN — CARBIDOPA AND LEVODOPA 1 TABLET(S): 25; 100 TABLET ORAL at 14:57

## 2022-01-01 RX ADMIN — CARBIDOPA AND LEVODOPA 0.5 TABLET(S): 25; 100 TABLET ORAL at 11:04

## 2022-01-01 RX ADMIN — Medication 12.5 MILLIGRAM(S): at 14:58

## 2022-01-01 RX ADMIN — Medication 300 MILLIGRAM(S): at 21:33

## 2022-01-01 RX ADMIN — CARBIDOPA AND LEVODOPA 1 TABLET(S): 25; 100 TABLET ORAL at 06:06

## 2022-01-01 RX ADMIN — CARBIDOPA AND LEVODOPA 1 TABLET(S): 25; 100 TABLET ORAL at 22:01

## 2022-01-01 RX ADMIN — CARBIDOPA AND LEVODOPA 1 TABLET(S): 25; 100 TABLET ORAL at 21:19

## 2022-01-01 RX ADMIN — LATANOPROST 1 DROP(S): 0.05 SOLUTION/ DROPS OPHTHALMIC; TOPICAL at 22:55

## 2022-01-01 RX ADMIN — LATANOPROST 1 DROP(S): 0.05 SOLUTION/ DROPS OPHTHALMIC; TOPICAL at 22:31

## 2022-01-01 RX ADMIN — Medication 400 MILLIGRAM(S): at 22:12

## 2022-01-01 RX ADMIN — Medication 650 MILLIGRAM(S): at 23:25

## 2022-01-01 RX ADMIN — Medication 3 MILLIGRAM(S): at 21:37

## 2022-01-01 RX ADMIN — PANTOPRAZOLE SODIUM 40 MILLIGRAM(S): 20 TABLET, DELAYED RELEASE ORAL at 05:30

## 2022-01-01 RX ADMIN — ENOXAPARIN SODIUM 40 MILLIGRAM(S): 100 INJECTION SUBCUTANEOUS at 15:00

## 2022-01-01 RX ADMIN — CARBIDOPA AND LEVODOPA 1 TABLET(S): 25; 100 TABLET ORAL at 13:52

## 2022-01-01 RX ADMIN — CARBIDOPA AND LEVODOPA 1 TABLET(S): 25; 100 TABLET ORAL at 21:38

## 2022-01-01 RX ADMIN — Medication 4 MILLIGRAM(S): at 05:57

## 2022-01-01 RX ADMIN — Medication 4 MILLIGRAM(S): at 23:36

## 2022-01-01 RX ADMIN — Medication 20 MILLIGRAM(S): at 10:25

## 2022-01-01 RX ADMIN — SENNA PLUS 2 TABLET(S): 8.6 TABLET ORAL at 21:52

## 2022-01-01 RX ADMIN — Medication 4 MILLIGRAM(S): at 05:08

## 2022-01-01 RX ADMIN — CARBIDOPA AND LEVODOPA 1 TABLET(S): 25; 100 TABLET ORAL at 15:34

## 2022-01-01 RX ADMIN — CARBIDOPA AND LEVODOPA 1 TABLET(S): 25; 100 TABLET ORAL at 20:14

## 2022-01-01 RX ADMIN — PIPERACILLIN AND TAZOBACTAM 200 GRAM(S): 4; .5 INJECTION, POWDER, LYOPHILIZED, FOR SOLUTION INTRAVENOUS at 18:41

## 2022-01-01 RX ADMIN — CARBIDOPA AND LEVODOPA 1 TABLET(S): 25; 100 TABLET ORAL at 10:26

## 2022-01-01 RX ADMIN — Medication 20 MILLIGRAM(S): at 06:34

## 2022-01-01 RX ADMIN — CARBIDOPA AND LEVODOPA 1 TABLET(S): 25; 100 TABLET ORAL at 13:43

## 2022-01-01 RX ADMIN — SENNA PLUS 2 TABLET(S): 8.6 TABLET ORAL at 21:14

## 2022-01-01 RX ADMIN — Medication 4 MILLIGRAM(S): at 02:51

## 2022-01-01 RX ADMIN — Medication 1000 MILLIGRAM(S): at 22:54

## 2022-01-01 RX ADMIN — CARBIDOPA AND LEVODOPA 1 TABLET(S): 25; 100 TABLET ORAL at 05:04

## 2022-01-01 RX ADMIN — FOSAPREPITANT DIMEGLUMINE 300 MILLIGRAM(S): 150 INJECTION, POWDER, LYOPHILIZED, FOR SOLUTION INTRAVENOUS at 14:02

## 2022-01-01 RX ADMIN — Medication 4 MILLIGRAM(S): at 00:19

## 2022-01-01 RX ADMIN — HEPARIN SODIUM 9.5 UNIT(S)/HR: 5000 INJECTION INTRAVENOUS; SUBCUTANEOUS at 18:22

## 2022-01-01 RX ADMIN — PANTOPRAZOLE SODIUM 40 MILLIGRAM(S): 20 TABLET, DELAYED RELEASE ORAL at 05:08

## 2022-01-01 RX ADMIN — CARBIDOPA AND LEVODOPA 0.5 TABLET(S): 25; 100 TABLET ORAL at 15:07

## 2022-01-01 RX ADMIN — Medication 1 TABLET(S): at 11:59

## 2022-01-01 RX ADMIN — Medication 100 MILLIGRAM(S): at 05:49

## 2022-01-01 RX ADMIN — PANTOPRAZOLE SODIUM 40 MILLIGRAM(S): 20 TABLET, DELAYED RELEASE ORAL at 06:06

## 2022-01-01 RX ADMIN — CARBIDOPA AND LEVODOPA 1 TABLET(S): 25; 100 TABLET ORAL at 21:42

## 2022-01-01 RX ADMIN — Medication 4 MILLIGRAM(S): at 09:13

## 2022-01-01 RX ADMIN — ZINC SULFATE TAB 220 MG (50 MG ZINC EQUIVALENT) 220 MILLIGRAM(S): 220 (50 ZN) TAB at 11:25

## 2022-01-01 RX ADMIN — CEFEPIME 100 MILLIGRAM(S): 1 INJECTION, POWDER, FOR SOLUTION INTRAMUSCULAR; INTRAVENOUS at 06:33

## 2022-01-01 RX ADMIN — ZINC SULFATE TAB 220 MG (50 MG ZINC EQUIVALENT) 220 MILLIGRAM(S): 220 (50 ZN) TAB at 12:42

## 2022-01-01 RX ADMIN — Medication 1 TABLET(S): at 11:18

## 2022-01-01 RX ADMIN — Medication 63.75 MILLIMOLE(S): at 15:34

## 2022-01-01 RX ADMIN — Medication 1 TABLET(S): at 11:25

## 2022-01-01 RX ADMIN — Medication 300 MILLIGRAM(S): at 21:51

## 2022-01-01 RX ADMIN — HYDROMORPHONE HYDROCHLORIDE 0.25 MILLIGRAM(S): 2 INJECTION INTRAMUSCULAR; INTRAVENOUS; SUBCUTANEOUS at 18:40

## 2022-01-01 RX ADMIN — CARBIDOPA AND LEVODOPA 1 TABLET(S): 25; 100 TABLET ORAL at 11:24

## 2022-01-01 RX ADMIN — Medication 300 MILLIGRAM(S): at 21:50

## 2022-01-01 RX ADMIN — CARBIDOPA AND LEVODOPA 1 TABLET(S): 25; 100 TABLET ORAL at 22:16

## 2022-01-01 RX ADMIN — Medication 4 MILLIGRAM(S): at 11:22

## 2022-01-01 RX ADMIN — Medication 100 MILLIGRAM(S): at 21:51

## 2022-01-01 RX ADMIN — Medication 250 MILLIGRAM(S): at 05:56

## 2022-01-01 RX ADMIN — Medication 12.5 MILLIGRAM(S): at 05:35

## 2022-01-01 RX ADMIN — Medication 4 MILLIGRAM(S): at 11:54

## 2022-01-01 RX ADMIN — PANTOPRAZOLE SODIUM 40 MILLIGRAM(S): 20 TABLET, DELAYED RELEASE ORAL at 05:56

## 2022-01-01 RX ADMIN — SENNA PLUS 2 TABLET(S): 8.6 TABLET ORAL at 21:17

## 2022-01-01 RX ADMIN — Medication 20 MILLIGRAM(S): at 05:11

## 2022-01-01 RX ADMIN — Medication 1 TABLET(S): at 12:16

## 2022-01-01 RX ADMIN — CARBIDOPA AND LEVODOPA 1 TABLET(S): 25; 100 TABLET ORAL at 09:52

## 2022-01-01 RX ADMIN — Medication 650 MILLIGRAM(S): at 00:41

## 2022-01-01 RX ADMIN — Medication 1 TABLET(S): at 12:38

## 2022-01-01 RX ADMIN — HEPARIN SODIUM 9.7 UNIT(S)/HR: 5000 INJECTION INTRAVENOUS; SUBCUTANEOUS at 01:40

## 2022-01-01 RX ADMIN — Medication 3 MILLIGRAM(S): at 22:12

## 2022-01-01 RX ADMIN — CARBIDOPA AND LEVODOPA 1 TABLET(S): 25; 100 TABLET ORAL at 15:46

## 2022-01-01 RX ADMIN — Medication 300 MILLIGRAM(S): at 21:37

## 2022-01-01 RX ADMIN — Medication 650 MILLIGRAM(S): at 22:11

## 2022-01-01 RX ADMIN — Medication 4 MILLIGRAM(S): at 21:28

## 2022-01-01 RX ADMIN — HYDROMORPHONE HYDROCHLORIDE 0.25 MILLIGRAM(S): 2 INJECTION INTRAMUSCULAR; INTRAVENOUS; SUBCUTANEOUS at 14:27

## 2022-01-01 RX ADMIN — CEFEPIME 100 MILLIGRAM(S): 1 INJECTION, POWDER, FOR SOLUTION INTRAMUSCULAR; INTRAVENOUS at 05:59

## 2022-01-01 RX ADMIN — Medication 650 MILLIGRAM(S): at 07:00

## 2022-01-01 RX ADMIN — Medication 1 MILLIGRAM(S): at 16:08

## 2022-01-01 RX ADMIN — Medication 650 MILLIGRAM(S): at 01:08

## 2022-01-01 RX ADMIN — PIPERACILLIN AND TAZOBACTAM 25 GRAM(S): 4; .5 INJECTION, POWDER, LYOPHILIZED, FOR SOLUTION INTRAVENOUS at 08:56

## 2022-01-01 RX ADMIN — CARBIDOPA AND LEVODOPA 1 TABLET(S): 25; 100 TABLET ORAL at 11:50

## 2022-01-01 RX ADMIN — CARBIDOPA AND LEVODOPA 1 TABLET(S): 25; 100 TABLET ORAL at 11:54

## 2022-01-01 RX ADMIN — HEPARIN SODIUM 8 UNIT(S)/HR: 5000 INJECTION INTRAVENOUS; SUBCUTANEOUS at 15:58

## 2022-01-01 RX ADMIN — Medication 40 MILLIGRAM(S): at 13:40

## 2022-01-01 RX ADMIN — Medication 3 MILLIGRAM(S): at 22:19

## 2022-01-01 RX ADMIN — CEFEPIME 100 MILLIGRAM(S): 1 INJECTION, POWDER, FOR SOLUTION INTRAMUSCULAR; INTRAVENOUS at 17:28

## 2022-01-01 RX ADMIN — CARBIDOPA AND LEVODOPA 1 TABLET(S): 25; 100 TABLET ORAL at 22:23

## 2022-01-01 RX ADMIN — CARBIDOPA AND LEVODOPA 1 TABLET(S): 25; 100 TABLET ORAL at 10:57

## 2022-01-01 RX ADMIN — HYDROMORPHONE HYDROCHLORIDE 0.25 MILLIGRAM(S): 2 INJECTION INTRAMUSCULAR; INTRAVENOUS; SUBCUTANEOUS at 05:14

## 2022-01-01 RX ADMIN — Medication 3 MILLIGRAM(S): at 21:49

## 2022-01-01 RX ADMIN — CARBIDOPA AND LEVODOPA 1 TABLET(S): 25; 100 TABLET ORAL at 13:25

## 2022-01-01 RX ADMIN — CARBIDOPA AND LEVODOPA 1 TABLET(S): 25; 100 TABLET ORAL at 17:07

## 2022-01-01 RX ADMIN — CARBIDOPA AND LEVODOPA 1 TABLET(S): 25; 100 TABLET ORAL at 05:11

## 2022-01-01 RX ADMIN — Medication 4 MILLIGRAM(S): at 12:10

## 2022-01-01 RX ADMIN — CHLORHEXIDINE GLUCONATE 1 APPLICATION(S): 213 SOLUTION TOPICAL at 06:20

## 2022-01-01 RX ADMIN — CARBIDOPA AND LEVODOPA 1 TABLET(S): 25; 100 TABLET ORAL at 10:27

## 2022-01-01 RX ADMIN — Medication 300 MILLIGRAM(S): at 22:49

## 2022-01-01 RX ADMIN — Medication 12.5 MILLIGRAM(S): at 18:40

## 2022-01-01 RX ADMIN — Medication 500 MILLIGRAM(S): at 11:59

## 2022-01-01 RX ADMIN — Medication 4 MILLIGRAM(S): at 15:34

## 2022-01-01 RX ADMIN — Medication 3 MILLIGRAM(S): at 21:19

## 2022-01-01 RX ADMIN — Medication 1 DROP(S): at 22:13

## 2022-01-01 RX ADMIN — CHLORHEXIDINE GLUCONATE 1 APPLICATION(S): 213 SOLUTION TOPICAL at 06:27

## 2022-01-01 RX ADMIN — Medication 100 MILLIGRAM(S): at 05:41

## 2022-01-01 RX ADMIN — PANTOPRAZOLE SODIUM 40 MILLIGRAM(S): 20 TABLET, DELAYED RELEASE ORAL at 05:59

## 2022-01-01 RX ADMIN — LIDOCAINE 2 PATCH: 4 CREAM TOPICAL at 05:25

## 2022-01-01 RX ADMIN — Medication 650 MILLIGRAM(S): at 08:00

## 2022-01-01 RX ADMIN — HYDROMORPHONE HYDROCHLORIDE 0.25 MILLIGRAM(S): 2 INJECTION INTRAMUSCULAR; INTRAVENOUS; SUBCUTANEOUS at 23:58

## 2022-01-01 RX ADMIN — CARBIDOPA AND LEVODOPA 1 TABLET(S): 25; 100 TABLET ORAL at 20:58

## 2022-01-01 RX ADMIN — HYDROMORPHONE HYDROCHLORIDE 0.25 MILLIGRAM(S): 2 INJECTION INTRAMUSCULAR; INTRAVENOUS; SUBCUTANEOUS at 20:52

## 2022-01-01 RX ADMIN — CARBIDOPA AND LEVODOPA 1 TABLET(S): 25; 100 TABLET ORAL at 10:14

## 2022-01-01 RX ADMIN — Medication 650 MILLIGRAM(S): at 21:28

## 2022-01-01 RX ADMIN — Medication 4 MILLIGRAM(S): at 05:34

## 2022-01-01 RX ADMIN — Medication 20 MILLIGRAM(S): at 05:51

## 2022-01-01 RX ADMIN — Medication 650 MILLIGRAM(S): at 06:35

## 2022-01-01 RX ADMIN — Medication 4 MILLIGRAM(S): at 23:22

## 2022-01-01 RX ADMIN — CARBIDOPA AND LEVODOPA 1 TABLET(S): 25; 100 TABLET ORAL at 10:03

## 2022-01-01 RX ADMIN — APIXABAN 2.5 MILLIGRAM(S): 2.5 TABLET, FILM COATED ORAL at 16:57

## 2022-01-01 RX ADMIN — Medication 500 MILLIGRAM(S): at 12:37

## 2022-01-01 RX ADMIN — Medication 75 MILLIGRAM(S): at 05:23

## 2022-01-01 RX ADMIN — Medication 650 MILLIGRAM(S): at 14:40

## 2022-01-01 RX ADMIN — Medication 650 MILLIGRAM(S): at 11:29

## 2022-01-01 RX ADMIN — Medication 4 MILLIGRAM(S): at 10:13

## 2022-01-01 RX ADMIN — CARBIDOPA AND LEVODOPA 1 TABLET(S): 25; 100 TABLET ORAL at 05:33

## 2022-01-01 RX ADMIN — Medication 4 MILLIGRAM(S): at 14:08

## 2022-01-01 RX ADMIN — CHLORHEXIDINE GLUCONATE 1 APPLICATION(S): 213 SOLUTION TOPICAL at 05:49

## 2022-04-18 NOTE — ED ADULT NURSE NOTE - INTEGUMENTARY WDL
Color consistent with ethnicity/race, warm, dry intact, resilient. alert and oriented x 3/responds to pain/responds to verbal commands

## 2022-08-22 PROBLEM — K56.609 UNSPECIFIED INTESTINAL OBSTRUCTION, UNSPECIFIED AS TO PARTIAL VERSUS COMPLETE OBSTRUCTION: Chronic | Status: ACTIVE | Noted: 2018-10-26

## 2022-08-22 NOTE — ED PROVIDER NOTE - ATTENDING CONTRIBUTION TO CARE
------------ATTENDING NOTE------------   pt w/ daughter c/o weeks of progressive decreasing functionality, inability to perform ADL/ambulation, decreasing PO w/ unintentional weight loss, poor sleep and increased urinary frequency, no recent fevers/illness, not taking medications or following w/ healthcare, ED Sign Out pending labs/imaging and planned admission for continued [place], tx, optimize medical mgmt, PT/OT, outpt needs assessments.  - Kenan Simpson MD   ---------------------------------------------

## 2022-08-22 NOTE — ED PROVIDER NOTE - CARE PLAN
1 Principal Discharge DX:	Decreased activities of daily living (ADL)  Secondary Diagnosis:	Unintentional weight loss  Secondary Diagnosis:	Mild dehydration   Principal Discharge DX:	Decreased activities of daily living (ADL)  Secondary Diagnosis:	Unintentional weight loss  Secondary Diagnosis:	Mild dehydration  Secondary Diagnosis:	Urinary tract infection

## 2022-08-22 NOTE — ED PROVIDER NOTE - PROGRESS NOTE DETAILS
Vince PGY2  UA positive. Ordered ceftriaxone.   Admitted for further management of failure to thrive.

## 2022-08-22 NOTE — ED PROVIDER NOTE - CLINICAL SUMMARY MEDICAL DECISION MAKING FREE TEXT BOX
Patient is a 82 year-old-female with history of arrhythmia s/p ablation and incarcerated R femoral hernia s/p small bowel resection presents with worsening abdominal discomfort and 2-week history of generalized weakness. Labs, UA/UC, RVP, CTH and CTAP, ECG, CXR. Fluids. TBA.

## 2022-08-22 NOTE — ED PROVIDER NOTE - NSICDXPASTSURGICALHX_GEN_ALL_CORE_FT
PAST SURGICAL HISTORY:  H/O cardiac radiofrequency ablation     Small bowel obstruction abdominal sx

## 2022-08-22 NOTE — ED PROVIDER NOTE - ATTENDING WITH...
EDWARD to reschedule July 30th appointment since Dr Dali Olguin will not be in the office 
Rescheduled appointment doctor requested   
Resident

## 2022-08-22 NOTE — ED PROVIDER NOTE - OBJECTIVE STATEMENT
Patient is a 82 year-old-female with history of arrhythmia s/p ablation and incarcerated R femoral hernia s/p small bowel resection presents with worsening abdominal discomfort. Describing it as a worsening pulling sensation at the site of mesh. + loss of appetite. Also noted that in the last 2 weeks, has been having difficulty ambulating and doing ADLs which are now. +generalized weakness. Denies fever, vomiting, dysuria, hematuria, urinary frequency. Patient is a 82 year-old-female with history of arrhythmia s/p ablation and incarcerated R femoral hernia s/p small bowel resection presents with worsening abdominal discomfort. Describing it as a worsening pulling sensation at the site of mesh. + loss of appetite. Also noted that in the last 2 weeks, has been having difficulty ambulating and doing ADLs which are now. +generalized weakness. Denies fever, vomiting, dysuria, hematuria, urinary frequency.  No PMD. Has appt with Dr. Kimball.

## 2022-08-22 NOTE — ED ADULT NURSE NOTE - OBJECTIVE STATEMENT
83 y/o female presenting to ED ambulatory, A&ox3, complaining of generalized weakness. pt reports weakness progressing. 83 y/o female presenting to ED ambulatory, A&ox3, complaining of generalized weakness. pt reports weakness progressing and worsening abdominal discomfort. pt describes pain as worsening pulling sensation at the site of mesh from prolapsed uterus. reports decreased appeitite and weakness where she feels like she cant walk long distances with difficulty completing adls. pt Denies fever, vomiting, dysuria, hematuria, urinary frequency. pt well appear, breathing spontaneous and unlabored. abd soft flat nondistended. skin warm and dry. extremities equal in strength and sensation. Safety and comfort measures provided, bed locked and in lowest position, side rails up for safety. Call bell within reach. Awaiting md parry.

## 2022-08-22 NOTE — ED PROVIDER NOTE - PHYSICAL EXAMINATION
General: cachetic   HEENT: atraumatic, normocephalic; pupils are equal, round and react to light, extraocular movements intact bilaterally without deficits, no conjunctival pallor, mucous membranes moist  Neck: no jugular venous distension, full range of motion  Chest/Lung: clear to auscultation bilaterally, no wheezes/rhonchi/rales  Heart: regular rate and rhythm, no murmur/gallops/rubs  Abdomen: normal bowel sounds, soft, non-distended, mildly tender in the lower abdomen  Extremities: no lower extremity edema, +2 radial pulses bilaterally, +2 dorsalis pedis pulses bilaterally  Musculoskeletal: full range of motion of all 4 extremities  Nervous System: alert and oriented, no motor deficits or sensory deficits; CNII-XII grossly intact; no focal neurologic deficits  Skin: no rashes/lacerations noted

## 2022-08-23 NOTE — H&P ADULT - NSHPREVIEWOFSYSTEMS_GEN_ALL_CORE
Review of Systems:   CONSTITUTIONAL: No fever, +weight loss  EYES: No eye pain, visual disturbances, or discharge  ENMT:  No difficulty hearing, tinnitus, vertigo; No sinus or throat pain  RESPIRATORY: No SOB. No cough, wheezing, chills or hemoptysis  CARDIOVASCULAR: No chest pain, palpitations, dizziness, or leg swelling  GASTROINTESTINAL: + abdominal. No epigastric pain. No nausea, vomiting, or hematemesis; No diarrhea or constipation. No melena or hematochezia.  GENITOURINARY: No dysuria, frequency, hematuria, or incontinence  NEUROLOGICAL: No headaches. + loss of strength. No numbness, or tremors  SKIN: No itching, burning, rashes, or lesions   MUSCULOSKELETAL: No joint pain or swelling; No muscle, back pain  PSYCHIATRIC: No depression, anxiety, mood swings, or difficulty sleeping  HEME/LYMPH: No easy bruising, or bleeding gums

## 2022-08-23 NOTE — H&P ADULT - PROBLEM SELECTOR PLAN 2
Several months of generalized weakness and fatigue. Reports difficulties with ADLs and unsteadiness with ambulation.   -F/u TSH   -PT/OT eval pending

## 2022-08-23 NOTE — H&P ADULT - NSHPLABSRESULTS_GEN_ALL_CORE
LABS:                         12.5   7.04  )-----------( 229      ( 22 Aug 2022 23:34 )             38.3     08-    145  |  110<H>  |  29<H>  ----------------------------<  130<H>  3.9   |  25  |  0.70    Ca    9.3      22 Aug 2022 23:34  Phos  2.9       Mg     2.3         TPro  6.0  /  Alb  4.1  /  TBili  0.4  /  DBili  x   /  AST  17  /  ALT  19  /  AlkPhos  120        Urinalysis Basic - ( 23 Aug 2022 00:44 )    Color: Yellow / Appearance: Slightly Turbid / S.028 / pH: x  Gluc: x / Ketone: Negative  / Bili: Negative / Urobili: Negative   Blood: x / Protein: 30 mg/dL / Nitrite: Positive   Leuk Esterase: Large / RBC: 1 /hpf /  /HPF   Sq Epi: x / Non Sq Epi: 0 /hpf / Bacteria: Negative          Records reviewed from prior hospitalization.  Labs reviewed. BMP and CBC grossly unremarkable.   CXR personally reviewed. Moderate left pleural effusion.   CTH: No acute intracranial processes.   CTAP: Moderate left pleural effusion. Bladder, uterine, and rectal prolapse through the pelvic floor. No bowel obstruction. Amorphous soft tissue in the retroperitoneal space adjacent to the vena cava and aorta which is similar to but more pronounced than prior exam likely representing retroperitoneal lymphadenopathy.

## 2022-08-23 NOTE — H&P ADULT - PROBLEM SELECTOR PLAN 1
Vague lower abdominal pain. Denied dysuria, difficulties with urination. UA: , +leuk esterase, +nitrate. Started on ceftriaxone for UTI by ED team.   -C/w ceftriaxone

## 2022-08-23 NOTE — H&P ADULT - PROBLEM SELECTOR PLAN 3
Known hx of uterine prolapse. CTAP significant for bladder, uterine, and rectal prolapse through the pelvic floor. Patient reports she has outpatient f/u with gyn next month for evaluation.

## 2022-08-23 NOTE — CONSULT NOTE ADULT - ASSESSMENT
83 y/o F w/ hx of arrhythmia s/p ablation and incarcerated R femoral hernia s/p small bowel resection presents with several months of generalized weakness and 2 weeks of lower abdominal discomfort being evaluated for a UTI, retroperitoneal lymphadenopathy and small asymptomatic pleural effusion found on US imaging    #small-moderate asymptomatic pleural effusion  -Patient satting well on room air in no acute distress;   -etiology: idiopathic vs occult malignancy vs cardiogenic   -Diagnostic thoracentesis 8/24; patient hesitant for doing it in the ED right away; no hx suggestive of exudative effusion (no fevers/chills/n/v/d/ha/SOB,WBC elevations); simple in nature (no loculations, sediments, striations) on US imaging  -f/u echo, BNP   81 y/o F w/ hx of arrhythmia s/p ablation and incarcerated R femoral hernia s/p small bowel resection presents with several months of generalized weakness and 2 weeks of lower abdominal discomfort being evaluated for a UTI, retroperitoneal lymphadenopathy and small asymptomatic pleural effusion found on US imaging    #small-moderate asymptomatic pleural effusion  -Patient satting well on room air in no acute distress;   -etiology: idiopathic vs occult malignancy vs cardiogenic   -Diagnostic thoracentesis likely 8/24 if patient amenable; patient hesitant for doing it in the ED right away; no hx suggestive of exudative effusion (no fevers/chills/n/v/d/ha/SOB,WBC elevations); simple in nature (no loculations, sediments, striations) on US imaging.   -Hold AM AC if/when patient amenable for the procedure after talking to family (daughter/). Does not necessarily have to happen inpatient.   -f/u echo, BNP

## 2022-08-23 NOTE — H&P ADULT - PROBLEM SELECTOR PLAN 4
CT showing moderate left pleural effusion. Unclear etiology.  Reported some dyspnea while doing house chores and weight loss. She is on RA with satts in the 90s. Discussed with pulmonary team regarding ultrasound of the effusion for further evaluation.  -BNP elevated to 716  -F/u TTE  -Appreciate pulmonary input. On US, effusion appears to be small and simple w/ loculations, striations. Can consider diagnostic thoracentesis. Will discuss with patient and family, if they would want to pursue this.

## 2022-08-23 NOTE — H&P ADULT - NSHPPHYSICALEXAM_GEN_ALL_CORE
Vital Signs Last 24 Hrs  T(C): 36.6 (23 Aug 2022 07:48), Max: 37.2 (22 Aug 2022 17:25)  T(F): 97.8 (23 Aug 2022 07:48), Max: 98.9 (22 Aug 2022 17:25)  HR: 66 (23 Aug 2022 07:48) (62 - 69)  BP: 116/73 (23 Aug 2022 07:48) (103/67 - 140/91)  BP(mean): --  RR: 16 (23 Aug 2022 07:48) (16 - 18)  SpO2: 97% (23 Aug 2022 07:48) (96% - 98%)    Parameters below as of 23 Aug 2022 07:48  Patient On (Oxygen Delivery Method): room air    CONSTITUTIONAL: Well-groomed, in no apparent distress  EYES: No conjunctival or scleral injection, non-icteric; PERRLA and symmetric  ENMT: No external nasal lesions; no pharyngeal injection or exudates, oral mucosa with moist membranes  NECK: Supple; Trachea midline  RESPIRATORY: Breathing comfortably; lungs CTA without wheeze/rhonchi/rales  CARDIOVASCULAR: +S1S2, RRR; pedal pulses full and symmetric; no lower extremity edema  GASTROINTESTINAL: ND/NT; +BS throughout, no rebound/guarding  MUSCULOSKELETAL: No digital clubbing or cyanosis; no paraspinal tenderness; examination of the  (head/neck, spine/ribs/pelvis, RUE, LUE, RLE, LLE) without misalignment, normal strength and tone of extremities  SKIN: No rashes or ulcers noted; no subcutaneous nodules or induration palpable  NEUROLOGIC: CN II-XII intact; sensation intact in LEs b/l to light touch  PSYCHIATRIC: A+O x 3; mood and affect appropriate; appropriate insight and judgment

## 2022-08-23 NOTE — CONSULT NOTE ADULT - SUBJECTIVE AND OBJECTIVE BOX
CHIEF COMPLAINT:Patient is a 82y old  Female who presents with a chief complaint of CC: generalized weakness, abdominal discomfort (23 Aug 2022 12:00)      HPI:  83 y/o F w/ hx of arrhythmia s/p ablation and incarcerated R femoral hernia s/p small bowel resection presents with several months of generalized weakness and 2 weeks of lower abdominal discomfort. She says for the past several months she has been experiencing generalized weakness which has been worsening over the last 2 weeks. Prior to this she has been able to manage chores on her own like doing laundry but now she feels she is too weak and needs help with ADLs. She ambulates with help of cane but during this timeframe, she has been feeling weak and unsteady on her feet. She also reports lower abdominal discomfort. She denies fevers, chills, CP, SOB, n/v, diarrhea, dysuria. Denies any difficulties with urination or bowel movements.   In the ED, afebrile, HR 69, 140/91, on RA with satts in the 90s. BMP and CBC grossly unremarkable. CTAP: Moderate left pleural effusion. Bladder, uterine, and rectal prolapse through the pelvic floor. No bowel obstruction. Amorphous soft tissue in the retroperitoneal space likely representing retroperitoneal lymphadenopathy. Denies CA history in the past with a family hx of leukemia in parents, denies smoking and any occupational exposure to toxic pulmonary agents.         PAST MEDICAL & SURGICAL HISTORY:  Dyslipidemia      Hypertension      Atrial fibrillation, unspecified type  Unspecified Atrial arrhthymia, S/p ablation on Bblocker      Small bowel obstruction      H/O cardiac radiofrequency ablation      Small bowel obstruction  abdominal sx          FAMILY HISTORY:  No pertinent family history in first degree relatives        SOCIAL HISTORY:  Smoking: [ ] Never Smoked [ ] Former Smoker (__ packs x ___ years) [ ] Current Smoker  (__ packs x ___ years)  Substance Use: [ ] Never Used [ ] Used ____  EtOH Use:  Marital Status: [ ] Single [ ]  [ ]  [ ]   Sexual History:   Occupation:  Recent Travel:  Country of Birth:  Advance Directives:    Allergies    No Known Allergies    Intolerances        HOME MEDICATIONS:  Home Medications:      REVIEW OF SYSTEMS:  Constitutional: [ ] negative [ ] fevers [ ] chills [ ] weight loss [ ] weight gain  HEENT: [ ] negative [ ] dry eyes [ ] eye irritation [ ] postnasal drip [ ] nasal congestion  CV: [ ] negative  [ ] chest pain [ ] orthopnea [ ] palpitations [ ] murmur  Resp: [ ] negative [ ] cough [ ] shortness of breath [ ] dyspnea [ ] wheezing [ ] sputum [ ] hemoptysis  GI: [ ] negative [ ] nausea [ ] vomiting [ ] diarrhea [ ] constipation [ ] abd pain [ ] dysphagia   : [ ] negative [ ] dysuria [ ] nocturia [ ] hematuria [ ] increased urinary frequency  Musculoskeletal: [ ] negative [ ] back pain [ ] myalgias [ ] arthralgias [ ] fracture  Skin: [ ] negative [ ] rash [ ] itch  Neurological: [ ] negative [ ] headache [ ] dizziness [ ] syncope [ ] weakness [ ] numbness  Psychiatric: [ ] negative [ ] anxiety [ ] depression  Endocrine: [ ] negative [ ] diabetes [ ] thyroid problem  Hematologic/Lymphatic: [ ] negative [ ] anemia [ ] bleeding problem  Allergic/Immunologic: [ ] negative [ ] itchy eyes [ ] nasal discharge [ ] hives [ ] angioedema  [ ] All other systems negative  [ ] Unable to assess ROS because ________    OBJECTIVE:  ICU Vital Signs Last 24 Hrs  T(C): 36.6 (23 Aug 2022 07:48), Max: 37.2 (22 Aug 2022 17:25)  T(F): 97.8 (23 Aug 2022 07:48), Max: 98.9 (22 Aug 2022 17:25)  HR: 66 (23 Aug 2022 07:48) (62 - 69)  BP: 116/73 (23 Aug 2022 07:48) (103/67 - 140/91)  BP(mean): --  ABP: --  ABP(mean): --  RR: 16 (23 Aug 2022 07:48) (16 - 18)  SpO2: 97% (23 Aug 2022 07:48) (96% - 98%)    O2 Parameters below as of 23 Aug 2022 07:48  Patient On (Oxygen Delivery Method): room air              CAPILLARY BLOOD GLUCOSE          PHYSICAL EXAM:  GENERAL: NAD, well-groomed, well-developed  HEAD:  Atraumatic, Normocephalic  EYES: EOMI, PERRLA, conjunctiva and sclera clear  ENMT: No tonsillar erythema, exudates, or enlargement; Moist mucous membranes, Good dentition, No lesions  NECK: Supple, No JVD, Normal thyroid  CHEST/LUNG: Dec sounds in L lung; no crackles, wheezing b/l  HEART: Regular rate and rhythm; No murmurs, rubs, or gallops  ABDOMEN: Soft, Nontender, Nondistended; Bowel sounds present  VASCULAR:  2+ Peripheral Pulses, No clubbing, cyanosis, or edema  LYMPH: No lymphadenopathy noted  SKIN: No rashes or lesions  NERVOUS SYSTEM:  Alert & Oriented X3, Good concentration; Motor Strength 5/5 B/L upper and lower extremities; DTRs 2+ intact and symmetric    HOSPITAL MEDICATIONS:  Standing Meds:      PRN Meds:  melatonin 3 milliGRAM(s) Oral at bedtime PRN      LABS:    The Labs were reviewed by me   The Radiology was reviewed by me    EKG tracing reviewed by me        143  |  107  |  28<H>  ----------------------------<  173<H>  4.6   |  25  |  0.99      145  |  110<H>  |  29<H>  ----------------------------<  130<H>  3.9   |  25  |  0.70    Ca    9.3      23 Aug 2022 14:40  Ca    9.3      22 Aug 2022 23:34  Phos  2.9       Mg     2.3         TPro  5.9<L>  /  Alb  4.0  /  TBili  0.4  /  DBili  x   /  AST  19  /  ALT  16  /  AlkPhos  112    TPro  6.0  /  Alb  4.1  /  TBili  0.4  /  DBili  x   /  AST  17  /  ALT  19  /  AlkPhos  120      Magnesium, Serum: 2.3 mg/dL (22 @ 23:34)    Phosphorus Level, Serum: 2.9 mg/dL (22 @ 23:34)                    Urinalysis Basic - ( 23 Aug 2022 00:44 )    Color: Yellow / Appearance: Slightly Turbid / S.028 / pH: x  Gluc: x / Ketone: Negative  / Bili: Negative / Urobili: Negative   Blood: x / Protein: 30 mg/dL / Nitrite: Positive   Leuk Esterase: Large / RBC: 1 /hpf /  /HPF   Sq Epi: x / Non Sq Epi: 0 /hpf / Bacteria: Negative                              12.5   7.04  )-----------( 229      ( 22 Aug 2022 23:34 )             38.3     CAPILLARY BLOOD GLUCOSE            MICROBIOLOGY:       RADIOLOGY:  [ ] Reviewed and interpreted by me    PULMONARY FUNCTION TESTS:    EKG:

## 2022-08-23 NOTE — ED ADULT NURSE REASSESSMENT NOTE - NS ED NURSE REASSESS COMMENT FT1
Patient straight cathed using sterile technique. Second RN tyasia at bedside to confirm sterility. Patient tolerated procedure well. Output of approximately 600cc's of yellow urine. Sterile specimens collected and sent to lab. Pt tolerated well, successful after one attempt.
pt assisted to bathroom in wheelchair
Report received from KAITLYNN DUARTE  Pt AAOx4, NAD, resp nonlabored, skin warm/dry, resting comfortably in bed Pt c/o . Pt denies headache, dizziness, chest pain, palpitations, SOB, abd pain, n/v/d, urinary symptoms, fevers, chills, weakness at this time. Pt awaiting for bed  . Safety maintained

## 2022-08-23 NOTE — H&P ADULT - ASSESSMENT
81 y/o F w/ hx of arrhythmia s/p ablation and incarcerated R femoral hernia s/p small bowel resection presents with several months of generalized weakness and 2 weeks of lower abdominal discomfort. Being treated for UTI, PT/OT eval.

## 2022-08-23 NOTE — CONSULT NOTE ADULT - ATTENDING COMMENTS
81 y/o F w/no significant history presenting with weeks of weakness found to have L sided pleural effusion. Pleural effusion is small-moderate sized and simple appearing on bedside US. No fevers, chills, weight loss, or loss of appetite. Unclear etiology of effusion. Diagnostic thoracentesis offered as there is no clear etiology. Patient to discuss with her family.    - If patient remains hospitalized and is amenable will perform diagnostic thoracentesis, however patient does not need to remain inpatient for the procedure if she is otherwise ready for discharge. 83 y/o F w/no significant history presenting with weeks of weakness found to have L sided pleural effusion. Pleural effusion is small-moderate sized and simple appearing on bedside US. No fevers, chills, weight loss, or loss of appetite. Unclear etiology of effusion. Diagnostic thoracentesis offered as there is no clear etiology. Patient to discuss with her family.    - If patient remains hospitalized and is amenable will perform diagnostic thoracentesis, however patient does not need to remain inpatient for the procedure if she is otherwise ready for discharge.  - Please check TTE, BNP

## 2022-08-23 NOTE — H&P ADULT - HISTORY OF PRESENT ILLNESS
83 y/o F w/ hx of arrhythmia s/p ablation and incarcerated R femoral hernia s/p small bowel resection presents with several months of generalized weakness and 2 weeks of lower abdominal discomfort.   She says for the past several months she has been experiencing generalized weakness which has been worsening over the last 2 weeks. Prior to this she has been able to manage chores on her own like doing laundry but now she feels she is too weak and needs help with ADLs. She ambulates with help of cane but during this timeframe, she has been feeling weak and unsteady on her feet. She also reports lower abdominal discomfort. She denies fevers, chills, CP, SOB, n/v, diarrhea, dysuria. Denies any difficulties with urination or bowel movements.   In the ED, afebrile, HR 69, 140/91, on RA with satts in the 90s. BMP and CBC grossly unremarkable. UA: , +leuk esterase, +nitrate. CXR: moderate left pleural effusion. CTH: No acute intracranial processes. CTAP: Moderate left pleural effusion. Bladder, uterine, and rectal prolapse through the pelvic floor. No bowel obstruction. Amorphous soft tissue in the retroperitoneal space adjacent to the vena cava and aorta which is similar to but more pronounced than prior exam   likely representing retroperitoneal lymphadenopathy.

## 2022-08-23 NOTE — H&P ADULT - NSICDXPASTMEDICALHX_GEN_ALL_CORE_FT
PAST MEDICAL HISTORY:  Atrial fibrillation, unspecified type Unspecified Atrial arrhthymia, S/p ablation on Bblocker    Dyslipidemia     Hypertension     Small bowel obstruction      Patient declined information

## 2022-08-24 NOTE — PROGRESS NOTE ADULT - PROBLEM SELECTOR PLAN 1
Vague lower abdominal pain. Denied dysuria, difficulties with urination. UA: , +leuk esterase, +nitrate. Started on ceftriaxone for UTI by ED team.   -UCx +pseudomonas -> switch to zosyn for now. anticipate dc on levaquin for few days pending sensitivities

## 2022-08-24 NOTE — OCCUPATIONAL THERAPY INITIAL EVALUATION ADULT - LIVES WITH, PROFILE
pt lives with  in apt with 5 flts of stairs to get to room +HR. pt was independent with all ADLs and functional tasks with use of standard cane up until a few weeks ago. pt occasionally uses rolling walker. -drives/spouse

## 2022-08-24 NOTE — OCCUPATIONAL THERAPY INITIAL EVALUATION ADULT - DIAGNOSIS, OT EVAL
pt presents with decreased strength, endurance, postural control, and balance limiting their ability to engage in ADLs and functional tasks.

## 2022-08-24 NOTE — OCCUPATIONAL THERAPY INITIAL EVALUATION ADULT - PERTINENT HX OF CURRENT PROBLEM, REHAB EVAL
83 y/o F w/ hx of arrhythmia s/p ablation and incarcerated R femoral hernia presents with several months of generalized weakness and 2 weeks of lower abdominal discomfort. She says for the past several months she has been experiencing generalized weakness which has been worsening over the last 2 weeks. Prior to this she has been able to manage chores on her own like doing laundry but now she feels she is too weak and needs help with ADLs. She ambulates with help of cane but during this timeframe, she has been feeling weak and unsteady on her feet. She also reports lower abdominal discomfort. She denies fevers, chills, CP, SOB, n/v, diarrhea, dysuria. Denies any difficulties with urination or bowel movements. CXR: moderate left pleural effusion. CTH: No acute intracranial processes. CTAP: Moderate left pleural effusion. Bladder, uterine, and rectal prolapse through the pelvic floor. No bowel obstruction. Amorphous soft tissue in the retroperitoneal space adjacent to the vena cava and aorta which is similar to but more pronounced than prior exam likely representing retroperitoneal lymphadenopathy.

## 2022-08-24 NOTE — PROGRESS NOTE ADULT - NSPROGADDITIONALINFOA_GEN_ALL_CORE
d/w acp    Guillermo Gan MD  [facility] Division of Hospital Medicine  Available via MS Teams  Pager: [phone]

## 2022-08-24 NOTE — OCCUPATIONAL THERAPY INITIAL EVALUATION ADULT - ADL RETRAINING, OT EVAL
GOAL: pt will be independent with all UB/LB dressing tasks within 4 weeks. GOAL: Pt will be independent in all toileting tasks within 4 weeks GOAL: Pt will be independent with all bathing tasks within 4 weeks.

## 2022-08-24 NOTE — PROGRESS NOTE ADULT - ATTENDING COMMENTS
81 y/o F w/no significant history presenting with weeks of weakness found to have L sided pleural effusion. Pleural effusion is small-moderate sized and simple appearing on bedside US. No fevers, chills, weight loss, or loss of appetite. Unclear etiology of effusion. Diagnostic thoracentesis offered as there is no clear etiology. Patient to discuss with her family.    - If patient remains hospitalized and is amenable will perform diagnostic thoracentesis, however patient does not need to remain inpatient for the procedure if she is otherwise ready for discharge.

## 2022-08-24 NOTE — OCCUPATIONAL THERAPY INITIAL EVALUATION ADULT - TRANSFER TRAINING, PT EVAL
GOAL: Pt will be independent with all functional transfers with AE in 4 weeks to increase participation in functional mobility tasks.

## 2022-08-24 NOTE — PROGRESS NOTE ADULT - SUBJECTIVE AND OBJECTIVE BOX
[facility] Division of Hospital Medicine  Guillermo Gan MD  Available via MS Teams  Pager: [phone]    SUBJECTIVE / OVERNIGHT EVENTS:  -denies chest pain, nausea, vomiting, headaches, has some mild lower abdominal pain, unable to fully describe. Spoke to daughter at bedside.     ADDITIONAL REVIEW OF SYSTEMS:    MEDICATIONS  (STANDING):  piperacillin/tazobactam IVPB. 3.375 Gram(s) IV Intermittent once  piperacillin/tazobactam IVPB.. 3.375 Gram(s) IV Intermittent every 8 hours    MEDICATIONS  (PRN):  melatonin 3 milliGRAM(s) Oral at bedtime PRN Insomnia      I&O's Summary      PHYSICAL EXAM:  Vital Signs Last 24 Hrs  T(C): 36.7 (24 Aug 2022 05:30), Max: 36.8 (23 Aug 2022 21:58)  T(F): 98.1 (24 Aug 2022 05:30), Max: 98.3 (23 Aug 2022 21:58)  HR: 85 (24 Aug 2022 12:29) (64 - 85)  BP: 120/82 (24 Aug 2022 12:29) (112/78 - 126/84)  BP(mean): --  RR: 18 (24 Aug 2022 05:30) (18 - 18)  SpO2: 98% (24 Aug 2022 12:29) (94% - 98%)    Parameters below as of 24 Aug 2022 12:29  Patient On (Oxygen Delivery Method): room air      CONSTITUTIONAL: NAD, well-developed, well-groomed  EYES: PERRLA; conjunctiva and sclera clear  ENMT: Moist oral mucosa, no pharyngeal injection or exudates; normal dentition  NECK: Supple, no palpable masses; no thyromegaly  RESPIRATORY: Normal respiratory effort; lungs are clear to auscultation bilaterally  CARDIOVASCULAR: Regular rate and rhythm, normal S1 and S2, no murmur/rub/gallop; No lower extremity edema; Peripheral pulses are 2+ bilaterally  ABDOMEN: Nontender to palpation, normoactive bowel sounds, no rebound/guarding; No hepatosplenomegaly  MUSCULOSKELETAL:  Normal gait; no clubbing or cyanosis of digits; no joint swelling or tenderness to palpation  PSYCH: A+O to person, place, and time; affect appropriate  NEUROLOGY: CN 2-12 are intact and symmetric; no gross sensory deficits   SKIN: No rashes; no palpable lesions    LABS:                        12.6   8.16  )-----------( 212      ( 24 Aug 2022 08:05 )             38.8     08-24    141  |  107  |  29<H>  ----------------------------<  80  4.1   |  25  |  0.78    Ca    8.9      24 Aug 2022 08:05  Phos  2.9     08-22  Mg     2.3     08-22    TPro  5.9<L>  /  Alb  4.0  /  TBili  0.4  /  DBili  x   /  AST  19  /  ALT  16  /  AlkPhos  112  08-23    PT/INR - ( 23 Aug 2022 14:40 )   PT: 11.6 sec;   INR: 1.00 ratio         PTT - ( 23 Aug 2022 14:40 )  PTT:26.8 sec      Urinalysis Basic - ( 23 Aug 2022 00:44 )    Color: Yellow / Appearance: Slightly Turbid / S.028 / pH: x  Gluc: x / Ketone: Negative  / Bili: Negative / Urobili: Negative   Blood: x / Protein: 30 mg/dL / Nitrite: Positive   Leuk Esterase: Large / RBC: 1 /hpf /  /HPF   Sq Epi: x / Non Sq Epi: 0 /hpf / Bacteria: Negative        Culture - Urine (collected 23 Aug 2022 00:44)  Source: Clean Catch Clean Catch (Midstream)  Preliminary Report (24 Aug 2022 12:11):    >100,000 CFU/ml Pseudomonas aeruginosa Culture in progress      SARS-CoV-2: [facility] (22 Aug 2022 23:35)      RADIOLOGY & ADDITIONAL TESTS:  New Results Reviewed Today: cbc cmp ucx  New Imaging Personally Reviewed Today: n/a  New Electrocardiogram Personally Reviewed Today: n/a  Prior or Outpatient Records Reviewed Today: n/a    COMMUNICATION:  Care Discussed with Consultants/Other Providers and Details of Discussion: acp  Discussions with Patient/Family: daughter  PCP Communication:

## 2022-08-24 NOTE — PHYSICAL THERAPY INITIAL EVALUATION ADULT - PERTINENT HX OF CURRENT PROBLEM, REHAB EVAL
83 y/o F w/ hx of arrhythmia s/p ablation and incarcerated R femoral hernia s/p small bowel resection presents with several months of generalized weakness and 2 weeks of lower abdominal discomfort. Being treated for UTI, PT/OT eval. She says for the past several months she has been experiencing generalized weakness which has been worsening over the last 2 weeks. She ambulates with help of cane but during this timeframe, she has been feeling weak and unsteady on her feet. CXR: moderate left pleural effusion. CTH: No acute intracranial processes. CTAP: Moderate left pleural effusion. Bladder, uterine, and rectal prolapse through the pelvic floor. No bowel obstruction. Amorphous soft tissue in the retroperitoneal space adjacent to the vena cava and aorta which is similar to but more pronounced than prior exam likely representing retroperitoneal lymphadenopathy.

## 2022-08-24 NOTE — PROGRESS NOTE ADULT - ASSESSMENT
81 y/o F w/ hx of arrhythmia s/p ablation and incarcerated R femoral hernia s/p small bowel resection presents with several months of generalized weakness and 2 weeks of lower abdominal discomfort being evaluated for a UTI, retroperitoneal lymphadenopathy and small asymptomatic pleural effusion found on US imaging    #small-moderate asymptomatic pleural effusion  -Patient satting well on room air in no acute distress;   -etiology: idiopathic vs occult malignancy vs cardiogenic   -Diagnostic thoracentesis likely 8/24 if patient amenable; patient hesitant for doing it in the ED right away; no hx suggestive of exudative effusion (no fevers/chills/n/v/d/ha/SOB,WBC elevations); simple in nature (no loculations, sediments, striations) on US imaging.   -Hold AM AC if/when patient amenable for the procedure after talking to family (daughter/). Does not necessarily have to happen inpatient.   -f/u echo, BNP   83 y/o F w/ hx of arrhythmia s/p ablation and incarcerated R femoral hernia s/p small bowel resection presents with several months of generalized weakness and 2 weeks of lower abdominal discomfort being evaluated for a UTI, retroperitoneal lymphadenopathy and small asymptomatic pleural effusion found on US imaging    #small-moderate asymptomatic pleural effusion  -Patient satting well on room air in no acute distress;   -etiology: idiopathic vs occult malignancy vs cardiogenic   -Diagnostic thoracentesis likely 8/24 if patient amenable; patient hesitant for doing it in the ED right away; no hx suggestive of exudative effusion (no fevers/chills/n/v/d/ha/SOB,WBC elevations); simple in nature (no loculations, sediments, striations) on US imaging.   -Hold AM AC if/when patient amenable for the procedure after talking to family (daughter/). Does not necessarily have to happen inpatient. Patient still in the process of deciding to tap; is deferring to daughter.  -f/u echo

## 2022-08-24 NOTE — PHYSICAL THERAPY INITIAL EVALUATION ADULT - ADDITIONAL COMMENTS
Per pt, she lives in an apt with . Has 5-6 steps to enter (+HR) and 5 flights inside (+HR). Reports being independent with functional mobility/ADLs with cane, until ~2 weeks ago. Owns RW.

## 2022-08-24 NOTE — PROGRESS NOTE ADULT - SUBJECTIVE AND OBJECTIVE BOX
CHIEF COMPLAINT:Patient is a 82y old  Female who presents with a chief complaint of CC: generalized weakness, abdominal discomfort (23 Aug 2022 15:08)      Interval Events:    REVIEW OF SYSTEMS:  [x] All other systems negative except per HPI   [ ] Unable to assess ROS because ________    OBJECTIVE:  ICU Vital Signs Last 24 Hrs  T(C): 36.7 (24 Aug 2022 05:30), Max: 36.8 (23 Aug 2022 15:32)  T(F): 98.1 (24 Aug 2022 05:30), Max: 98.3 (23 Aug 2022 21:58)  HR: 64 (24 Aug 2022 05:30) (64 - 82)  BP: 126/84 (24 Aug 2022 05:30) (112/78 - 126/84)  BP(mean): --  ABP: --  ABP(mean): --  RR: 18 (24 Aug 2022 05:30) (18 - 18)  SpO2: 94% (24 Aug 2022 05:30) (94% - 96%)    O2 Parameters below as of 24 Aug 2022 05:30  Patient On (Oxygen Delivery Method): room air                PHYSICAL EXAM:  GENERAL: NAD, well-groomed, well-developed  HEAD:  Atraumatic, Normocephalic  EYES: EOMI, PERRLA, conjunctiva and sclera clear  ENMT: No tonsillar erythema, exudates, or enlargement; Moist mucous membranes, Good dentition, No lesions  NECK: Supple, No JVD, Normal thyroid  CHEST/LUNG: Clear to auscultation bilaterally; No rales, rhonchi, wheezing, or rubs  HEART: Regular rate and rhythm; No murmurs, rubs, or gallops  ABDOMEN: Soft, Nontender, Nondistended; Bowel sounds present  VASCULAR:  2+ Peripheral Pulses, No clubbing, cyanosis, or edema  LYMPH: No lymphadenopathy noted  SKIN: No rashes or lesions  NERVOUS SYSTEM:  Alert & Oriented X3, Good concentration; Motor Strength 5/5 B/L upper and lower extremities; DTRs 2+ intact and symmetric    HOSPITAL MEDICATIONS:  MEDICATIONS  (STANDING):  cefTRIAXone   IVPB 1000 milliGRAM(s) IV Intermittent every 24 hours    MEDICATIONS  (PRN):  melatonin 3 milliGRAM(s) Oral at bedtime PRN Insomnia      LABS:    The Labs were reviewed by me   The Radiology was reviewed by me    EKG tracing reviewed by me        141  |  107  |  29<H>  ----------------------------<  80  4.1   |  25  |  0.78      143  |  107  |  28<H>  ----------------------------<  173<H>  4.6   |  25  |  0.99      145  |  110<H>  |  29<H>  ----------------------------<  130<H>  3.9   |  25  |  0.70    Ca    8.9      24 Aug 2022 08:05  Ca    9.3      23 Aug 2022 14:40  Ca    9.3      22 Aug 2022 23:34  Phos  2.9       Mg     2.3         TPro  5.9<L>  /  Alb  4.0  /  TBili  0.4  /  DBili  x   /  AST  19  /  ALT  16  /  AlkPhos  112    TPro  6.0  /  Alb  4.1  /  TBili  0.4  /  DBili  x   /  AST  17  /  ALT  19  /  AlkPhos  120      Magnesium, Serum: 2.3 mg/dL (22 @ 23:34)    Phosphorus Level, Serum: 2.9 mg/dL (22 @ 23:34)      PT/INR - ( 23 Aug 2022 14:40 )   PT: 11.6 sec;   INR: 1.00 ratio         PTT - ( 23 Aug 2022 14:40 )  PTT:26.8 sec              Urinalysis Basic - ( 23 Aug 2022 00:44 )    Color: Yellow / Appearance: Slightly Turbid / S.028 / pH: x  Gluc: x / Ketone: Negative  / Bili: Negative / Urobili: Negative   Blood: x / Protein: 30 mg/dL / Nitrite: Positive   Leuk Esterase: Large / RBC: 1 /hpf /  /HPF   Sq Epi: x / Non Sq Epi: 0 /hpf / Bacteria: Negative                              12.6   8.16  )-----------( 212      ( 24 Aug 2022 08:05 )             38.8                         12.5   7.04  )-----------( 229      ( 22 Aug 2022 23:34 )             38.3     CAPILLARY BLOOD GLUCOSE            MICROBIOLOGY:     RADIOLOGY:  [ ] Reviewed and interpreted by me    Point of Care Ultrasound Findings:    PFT:    EKG:   CHIEF COMPLAINT:Patient is a 82y old  Female who presents with a chief complaint of CC: generalized weakness, abdominal discomfort (23 Aug 2022 15:08)      Interval Events: Patient still reports feeling weak; in no acute distress on examination. Patient does not co difficulty breathing and is in the process of deciding if she wants thoracentesis or not.     REVIEW OF SYSTEMS:  [x] All other systems negative except per HPI   [ ] Unable to assess ROS because ________    OBJECTIVE:  ICU Vital Signs Last 24 Hrs  T(C): 36.7 (24 Aug 2022 05:30), Max: 36.8 (23 Aug 2022 15:32)  T(F): 98.1 (24 Aug 2022 05:30), Max: 98.3 (23 Aug 2022 21:58)  HR: 64 (24 Aug 2022 05:30) (64 - 82)  BP: 126/84 (24 Aug 2022 05:30) (112/78 - 126/84)  BP(mean): --  ABP: --  ABP(mean): --  RR: 18 (24 Aug 2022 05:30) (18 - 18)  SpO2: 94% (24 Aug 2022 05:30) (94% - 96%)    O2 Parameters below as of 24 Aug 2022 05:30  Patient On (Oxygen Delivery Method): room air                PHYSICAL EXAM:  GENERAL: NAD, well-groomed, well-developed  HEAD:  Atraumatic, Normocephalic  EYES: EOMI, PERRLA, conjunctiva and sclera clear  ENMT: No tonsillar erythema, exudates, or enlargement; Moist mucous membranes, Good dentition, No lesions  NECK: Supple, No JVD, Normal thyroid  CHEST/LUNG: Clear to auscultation bilaterally; No rales, rhonchi, wheezing, or rubs  HEART: Regular rate and rhythm; No murmurs, rubs, or gallops  ABDOMEN: Soft, Nontender, Nondistended; Bowel sounds present  VASCULAR:  2+ Peripheral Pulses, No clubbing, cyanosis, or edema  LYMPH: No lymphadenopathy noted  SKIN: No rashes or lesions  NERVOUS SYSTEM:  Alert & Oriented X3, Good concentration; Motor Strength 5/5 B/L upper and lower extremities; DTRs 2+ intact and symmetric    HOSPITAL MEDICATIONS:  MEDICATIONS  (STANDING):  cefTRIAXone   IVPB 1000 milliGRAM(s) IV Intermittent every 24 hours    MEDICATIONS  (PRN):  melatonin 3 milliGRAM(s) Oral at bedtime PRN Insomnia      LABS:    The Labs were reviewed by me   The Radiology was reviewed by me    EKG tracing reviewed by me        141  |  107  |  29<H>  ----------------------------<  80  4.1   |  25  |  0.78      143  |  107  |  28<H>  ----------------------------<  173<H>  4.6   |  25  |  0.99      145  |  110<H>  |  29<H>  ----------------------------<  130<H>  3.9   |  25  |  0.70    Ca    8.9      24 Aug 2022 08:05  Ca    9.3      23 Aug 2022 14:40  Ca    9.3      22 Aug 2022 23:34  Phos  2.9       Mg     2.3         TPro  5.9<L>  /  Alb  4.0  /  TBili  0.4  /  DBili  x   /  AST  19  /  ALT  16  /  AlkPhos  112    TPro  6.0  /  Alb  4.1  /  TBili  0.4  /  DBili  x   /  AST  17  /  ALT  19  /  AlkPhos  120      Magnesium, Serum: 2.3 mg/dL (22 @ 23:34)    Phosphorus Level, Serum: 2.9 mg/dL (22 @ 23:34)      PT/INR - ( 23 Aug 2022 14:40 )   PT: 11.6 sec;   INR: 1.00 ratio         PTT - ( 23 Aug 2022 14:40 )  PTT:26.8 sec              Urinalysis Basic - ( 23 Aug 2022 00:44 )    Color: Yellow / Appearance: Slightly Turbid / S.028 / pH: x  Gluc: x / Ketone: Negative  / Bili: Negative / Urobili: Negative   Blood: x / Protein: 30 mg/dL / Nitrite: Positive   Leuk Esterase: Large / RBC: 1 /hpf /  /HPF   Sq Epi: x / Non Sq Epi: 0 /hpf / Bacteria: Negative                              12.6   8.16  )-----------( 212      ( 24 Aug 2022 08:05 )             38.8                         12.5   7.04  )-----------( 229      ( 22 Aug 2022 23:34 )             38.3     CAPILLARY BLOOD GLUCOSE            MICROBIOLOGY:     RADIOLOGY:  [ ] Reviewed and interpreted by me    Point of Care Ultrasound Findings:    PFT:    EKG:

## 2022-08-24 NOTE — PROGRESS NOTE ADULT - ASSESSMENT
83 y/o F w/ hx of arrhythmia s/p ablation and incarcerated R femoral hernia s/p small bowel resection presents with several months of generalized weakness and 2 weeks of lower abdominal discomfort. Being treated for UTI, PT/OT eval.

## 2022-08-25 NOTE — PROGRESS NOTE ADULT - ASSESSMENT
83 y/o F w/ hx of arrhythmia s/p ablation and incarcerated R femoral hernia s/p small bowel resection presents with several months of generalized weakness and 2 weeks of lower abdominal discomfort, being treated for pseudomonal UTI, also found to have bilateral pleff, possibly 2/2 to acute on chronic HFrEF

## 2022-08-25 NOTE — PROGRESS NOTE ADULT - ASSESSMENT
83 y/o F w/ hx of arrhythmia s/p ablation and incarcerated R femoral hernia s/p small bowel resection presents with several months of generalized weakness and 2 weeks of lower abdominal discomfort being evaluated for a UTI, retroperitoneal lymphadenopathy and small asymptomatic pleural effusion found on US imaging    #small-moderate asymptomatic pleural effusion  -Patient satting well on room air in no acute distress;   -etiology: idiopathic vs occult malignancy vs cardiogenic   -Diagnostic thoracentesis likely 8/24 if patient amenable; patient hesitant for doing it in the ED right away; no hx suggestive of exudative effusion (no fevers/chills/n/v/d/ha/SOB,WBC elevations); simple in nature (no loculations, sediments, striations) on US imaging.   -Hold AM AC if/when patient amenable for the procedure after talking to family (daughter/). Does not necessarily have to happen inpatient. Patient still in the process of deciding to tap; is deferring to daughter.  -f/u echo

## 2022-08-25 NOTE — CONSULT NOTE ADULT - ASSESSMENT
Patient IT is a 83 yo F PMHx arrhythmia s/p ablation and incarcerated R femoral hernia s/p small bowel resection presents with several months of progressive generalized weakness and 2 weeks of lower abdominal discomfort. States weakness has worsened over the last two weeks particularly involving fine finger movement (performing daily tasks such as cooking, dressing herself) as well as getting up from seating position. Now needs help from her spouse. Has previously ambulated with cane and walker but now requires more assistance to get to bathroom. States she sleeps 7-8 hours a night but does experience leg twitching at night in her legs. Otherwise denies constipation loss of taste/ smell, crawling sensation on legs. Currently, being treated for UTI with positive UA, s/p thoracentesis for moderate L pleural effusion. Reports not being on medications w/ parkinsonism side effects.      Impression: Progressive weakness with L>R bradykinesia, mild cogwheel rigidity potentially consistent with parkinsonism features likely exacerbated by multiple acute medical issues including UTI, pleural effusion.     Recommendations:  [ ] Recommend to obtain MRI brain and cervical spine w/o con while inpatient  [ ] consider trial of sinemet 25/100 0.5 tab three times a day (can space out by 4 hours)   [ ] follow up with Dr Booth in outpatient setting once medically stable for discharge     Patient was seen on rounds with attending and neurology team. Recommendations finalized once signed by attending. Discussed recommendations with patient's family.  Patient IT is a 81 yo F PMHx arrhythmia s/p ablation and incarcerated R femoral hernia s/p small bowel resection presents with several months of progressive generalized weakness and 2 weeks of lower abdominal discomfort. States weakness has worsened over the last two weeks particularly involving fine finger movement (performing daily tasks such as cooking, dressing herself) as well as getting up from seating position. Now needs help from her spouse. Has previously ambulated with cane and walker but now requires more assistance to get to bathroom. States she sleeps 7-8 hours a night but does experience leg twitching at night in her legs. Otherwise denies constipation loss of taste/ smell, crawling sensation on legs. Currently, being treated for UTI with positive UA, s/p thoracentesis for moderate L pleural effusion. Reports not being on medications w/ parkinsonism side effects.      Impression: Progressive weakness with L>R bradykinesia, mild cogwheel rigidity potentially consistent with parkinsonism features likely exacerbated by multiple acute medical issues including UTI, pleural effusion.     Recommendations:  [ ] MRI brain and cervical spine w/o con  [ ] Consider trial of sinemet 25/100 0.5 tab three times a day (can space out by 4 hours)   [ ] Follow up with Dr Booth in outpatient setting once medically stable for discharge     Patient was seen on rounds with attending and neurology team. Recommendations finalized once signed by attending. Discussed recommendations with patient's family.  Patient IT is a 83 yo F PMHx arrhythmia s/p ablation and incarcerated R femoral hernia s/p small bowel resection presents with several months of progressive generalized weakness and 2 weeks of lower abdominal discomfort. States weakness has worsened over the last two weeks particularly involving fine finger movement (performing daily tasks such as cooking, dressing herself) as well as getting up from seating position. Now needs help from her spouse. Has previously ambulated with cane and walker but now requires more assistance to get to bathroom. States she sleeps 7-8 hours a night but does experience leg twitching at night in her legs. Otherwise denies constipation loss of taste/ smell, crawling sensation on legs. Currently, being treated for UTI with positive UA, s/p thoracentesis for moderate L pleural effusion. Reports not being on medications w/ parkinsonism side effects.      Impression: Progressive weakness with L>R bradykinesia, mild cogwheel rigidity potentially consistent with parkinsonism features likely exacerbated by multiple acute medical issues including UTI, pleural effusion. In addition may also be experiencing leg myoclonus at night.    Recommendations:  [ ] MRI brain and cervical spine w/o con  [ ] Consider trial of sinemet 25/100 0.5 tab three times a day (can space out by 4 hours)   [ ] Follow up with Dr Booth in outpatient setting once medically stable for discharge     Patient was seen on rounds with attending and neurology team. Recommendations finalized once signed by attending. Discussed recommendations with patient's family.

## 2022-08-25 NOTE — PROGRESS NOTE ADULT - SUBJECTIVE AND OBJECTIVE BOX
CHIEF COMPLAINT:Patient is a 82y old  Female who presents with a chief complaint of CC: generalized weakness, abdominal discomfort (24 Aug 2022 15:40)      Interval Events:    REVIEW OF SYSTEMS:  [x] All other systems negative except per HPI   [ ] Unable to assess ROS because ________    OBJECTIVE:  ICU Vital Signs Last 24 Hrs  T(C): 36.3 (25 Aug 2022 05:31), Max: 37 (24 Aug 2022 20:44)  T(F): 97.4 (25 Aug 2022 05:31), Max: 98.6 (24 Aug 2022 20:44)  HR: 81 (25 Aug 2022 05:31) (81 - 86)  BP: 116/81 (25 Aug 2022 05:31) (101/67 - 120/82)  BP(mean): --  ABP: --  ABP(mean): --  RR: 18 (25 Aug 2022 05:31) (18 - 18)  SpO2: 98% (25 Aug 2022 05:31) (98% - 98%)    O2 Parameters below as of 25 Aug 2022 05:31  Patient On (Oxygen Delivery Method): room air                PHYSICAL EXAM:  GENERAL: NAD, well-groomed, well-developed  HEAD:  Atraumatic, Normocephalic  EYES: EOMI, PERRLA, conjunctiva and sclera clear  ENMT: No tonsillar erythema, exudates, or enlargement; Moist mucous membranes, Good dentition, No lesions  NECK: Supple, No JVD, Normal thyroid  CHEST/LUNG: Clear to auscultation bilaterally; No rales, rhonchi, wheezing, or rubs  HEART: Regular rate and rhythm; No murmurs, rubs, or gallops  ABDOMEN: Soft, Nontender, Nondistended; Bowel sounds present  VASCULAR:  2+ Peripheral Pulses, No clubbing, cyanosis, or edema  LYMPH: No lymphadenopathy noted  SKIN: No rashes or lesions  NERVOUS SYSTEM:  Alert & Oriented X3, Good concentration; Motor Strength 5/5 B/L upper and lower extremities; DTRs 2+ intact and symmetric    HOSPITAL MEDICATIONS:  MEDICATIONS  (STANDING):  piperacillin/tazobactam IVPB.. 3.375 Gram(s) IV Intermittent every 8 hours    MEDICATIONS  (PRN):  melatonin 3 milliGRAM(s) Oral at bedtime PRN Insomnia      LABS:    The Labs were reviewed by me   The Radiology was reviewed by me    EKG tracing reviewed by me    08-24    141  |  107  |  29<H>  ----------------------------<  80  4.1   |  25  |  0.78  08-23    143  |  107  |  28<H>  ----------------------------<  173<H>  4.6   |  25  |  0.99  08-22    145  |  110<H>  |  29<H>  ----------------------------<  130<H>  3.9   |  25  |  0.70    Ca    8.9      24 Aug 2022 08:05  Ca    9.3      23 Aug 2022 14:40  Ca    9.3      22 Aug 2022 23:34    TPro  5.9<L>  /  Alb  4.0  /  TBili  0.4  /  DBili  x   /  AST  19  /  ALT  16  /  AlkPhos  112  08-23  TPro  6.0  /  Alb  4.1  /  TBili  0.4  /  DBili  x   /  AST  17  /  ALT  19  /  AlkPhos  120  08-22    Magnesium, Serum: 2.3 mg/dL (08-22-22 @ 23:34)    Phosphorus Level, Serum: 2.9 mg/dL (08-22-22 @ 23:34)      PT/INR - ( 23 Aug 2022 14:40 )   PT: 11.6 sec;   INR: 1.00 ratio         PTT - ( 23 Aug 2022 14:40 )  PTT:26.8 sec                                        12.6   8.16  )-----------( 212      ( 24 Aug 2022 08:05 )             38.8                         12.5   7.04  )-----------( 229      ( 22 Aug 2022 23:34 )             38.3     CAPILLARY BLOOD GLUCOSE            MICROBIOLOGY:     RADIOLOGY:  [ ] Reviewed and interpreted by me    Point of Care Ultrasound Findings:    PFT:    EKG:

## 2022-08-25 NOTE — CONSULT NOTE ADULT - ATTENDING COMMENTS
HPI outlined above and confirmed    On exam, patient has hypomimia and hypophonia. EOMI. full strength with IO, thenar hand atrophy as well as diffuse reduction in muscle bulk. There is L>R mild-moderate bradykinesia with cogwheel rigidity in his her UE L>R. There is no dysmetria. Gait deferred    Imp: Patient with parkinsonism based on presence of bradykinesia and rigidity.     - agree with plan outlined and will initiate low dose levodopa trial reg in patient HPI outlined above and confirmed    On exam, patient has hypomimia and hypophonia. EOMI. full strength with IO, thenar hand atrophy as well as diffuse reduction in muscle bulk. There is L>R mild-moderate bradykinesia with cogwheel rigidity in his her UE L>R. There is no dysmetria. Gait deferred    Imp: Patient with parkinsonism based on presence of bradykinesia and rigidity. UTI likely exacerbated underlying motor symptoms    - agree with plan outlined and will initiate low dose levodopa trial reg in patient

## 2022-08-25 NOTE — PROGRESS NOTE ADULT - ATTENDING COMMENTS
83 y/o F w/no significant history presenting with weeks of weakness found to have L sided pleural effusion. Pleural effusion is small-moderate sized and simple appearing on bedside US. No fevers, chills, weight loss, or loss of appetite. Unclear etiology of effusion, however TTE with diastolic dysfunction and elevated BNP suggest acute on chronic HFpEF as etiology of effusion.    - Will plan for diagnostic thora as patient is agreeable  - Diuresis goal net negative

## 2022-08-25 NOTE — CONSULT NOTE ADULT - SUBJECTIVE AND OBJECTIVE BOX
Neurology Consultation     HPI: Patient IT is a 83 yo F PMHx arrhythmia s/p ablation and incarcerated R femoral hernia s/p small bowel resection presents with several months of progressive generalized weakness and 2 weeks of lower abdominal discomfort. States weakness has worsened over the last two weeks particularly involving fine finger movement (performing daily tasks such as cooking, dressing herself) as well as getting up from seating position. Now needs help from her spouse. Has previously ambulated with cane and walker but now requires more assistance to get to bathroom. States she sleeps 7-8 hours a night but does experience leg twitching at night in her legs. Otherwise denies constipation loss of taste/ smell, crawling sensation on legs. Currently, being treated for UTI with positive UA, s/p thoracentesis for moderate L pleural effusion. Reports not being on medications w/ parkinsonism side effects.      PAST MEDICAL & SURGICAL HISTORY:  Dyslipidemia    Hypertension    Atrial fibrillation, unspecified type  Unspecified Atrial arrhthymia, S/p ablation on Bblocker    Small bowel obstruction    H/O cardiac radiofrequency ablation    Small bowel obstruction  abdominal sx    FAMILY HISTORY:  No pertinent family history in first degree relatives    MEDICATIONS (HOME):  Home Medications:    MEDICATIONS  (STANDING):  levoFLOXacin  Tablet 500 milliGRAM(s) Oral every 24 hours    MEDICATIONS  (PRN):  melatonin 3 milliGRAM(s) Oral at bedtime PRN Insomnia    ALLERGIES/INTOLERANCES:  Allergies  No Known Allergies    Intolerances    VITALS & EXAMINATION:  Vital Signs Last 24 Hrs  T(C): 36.6 (25 Aug 2022 11:41), Max: 37 (24 Aug 2022 20:44)  T(F): 97.9 (25 Aug 2022 11:41), Max: 98.6 (24 Aug 2022 20:44)  HR: 86 (25 Aug 2022 11:41) (81 - 86)  BP: 115/82 (25 Aug 2022 11:41) (101/67 - 116/81)  BP(mean): --  RR: 18 (25 Aug 2022 11:41) (18 - 18)  SpO2: 98% (25 Aug 2022 11:41) (98% - 98%)    Parameters below as of 25 Aug 2022 11:41  Patient On (Oxygen Delivery Method): room air    General:  Constitutional: Female, appears stated age, pleasant comfortable, thin appearing   Head: Normocephalic; Eyes: clear sclera;   Extremities: No cyanosis; Skin: No chelle edema of LE  Resp: breathing comfortably     Neurological (>12):  MS: Awake, alert.  Follows commands. Attends to examiner  Language: Speech is mildly hypophonic with mildly prolonged speech latency. Normal repetition, comprehension, registration of words.    CNs: PERRL. VFF. EOMI. V1-3 intact LT, No chelle facial asymmetry b/l, full. Hearing grossly normal (rubbing fingers) b/l. Tongue midline. Tongue with mild tongue tremor.    Motor - Reduced bulk and mildly increased tone particularly in upper extremities. No pronator drift. MAEx4 5/5 proximally and distally.   Has increased tone and cogwheel rigidity in L hand/wrist than R side.   Sensation: Intact to LT b/l. Cortical: Extinction on DSS (neglect): none   Coordination: No dysmetria to FTN b/l UE  Gait: could not assess, she deferred    LABORATORY:  CBC                       12.6   8.16  )-----------( 212      ( 24 Aug 2022 08:05 )             38.8     Chem 08-24    141  |  107  |  29<H>  ----------------------------<  80  4.1   |  25  |  0.78    Ca    8.9      24 Aug 2022 08:05    LFTs   Coagulopathy   Lipid Panel   A1c   Cardiac enzymes     U/A   CSF  Other    STUDIES & IMAGING: (EEG, CT, MR, U/S, TTE/JANEL):    < from: CT Head No Cont (08.23.22 @ 00:23) >    ACC: [id number] EXAM:  CT BRAIN                          PROCEDURE DATE:  08/23/2022      INTERPRETATION:  CLINICAL INFORMATION: Worsening cognitive function.    TECHNIQUE: Noncontrast CT head from the skull base to the vertex. Coronal   and sagittal reformats were obtained.    COMPARISON: None available    FINDINGS:    There is no intracranial hemorrhage, mass effect, hydrocephalus, or   midline shift. No abnormal extra-axial collection.    Sulci and ventricles are prominent consistent with cerebral volume loss.   Nonspecific hypoattenuation in the white matter likely represents chronic   microvascular changes.    The visualized intraorbital compartments are unremarkable. The visualized   paranasal sinuses and mastoid air cells are clear. The calvarium is   intact.    IMPRESSION:  No intracranial hemorrhage, mass effect, or midline shift. If clinical   symptoms persist or worsen, more sensitive evaluation with brain MRI may   be obtained, if no contraindications exist.    --- End of Report ---     JULIANA FRANCISCO MD; Resident Radiologist  This document has been electronically signed.  CHRIS MARX MD; Attending Radiologist  This document has been electronically signed. Aug 23 2022  1:27AM    < end of copied text >

## 2022-08-25 NOTE — PROGRESS NOTE ADULT - PROBLEM SELECTOR PLAN 1
Vague lower abdominal pain. Denied dysuria, difficulties with urination. UA: , +leuk esterase, +nitrate. Started on ceftriaxone for UTI by ED team.   -UCx +pseudomonas -> switch to zosyn for now, pending sensitivies Vague lower abdominal pain. Denied dysuria, difficulties with urination. UA: , +leuk esterase, +nitrate. Started on ceftriaxone for UTI by ED team.   -UCx +pseudomonas -> on zosyn  (8/24). will switch to levaquin 500 qD (qtc 411) for 3 more days.

## 2022-08-25 NOTE — PROCEDURE NOTE - NSPROCDETAILS_GEN_ALL_CORE
location identified, draped/prepped, sterile technique used, needle inserted/introduced/connection to syringe/ultrasound assessment of effusion (localization)

## 2022-08-25 NOTE — PROGRESS NOTE ADULT - SUBJECTIVE AND OBJECTIVE BOX
[facility] Division of Hospital Medicine  Guillermo Gan MD  Available via MS Teams  Pager: [phone]    SUBJECTIVE / OVERNIGHT EVENTS:  -Denies any chest pain, nausea, vomiting, headaches, shortness of breath, cough. Sitting on chair comfortable.     ADDITIONAL REVIEW OF SYSTEMS:    MEDICATIONS  (STANDING):  piperacillin/tazobactam IVPB.. 3.375 Gram(s) IV Intermittent every 8 hours    MEDICATIONS  (PRN):  melatonin 3 milliGRAM(s) Oral at bedtime PRN Insomnia      I&O's Summary    25 Aug 2022 07:01  -  25 Aug 2022 13:51  --------------------------------------------------------  IN: 0 mL / OUT: 300 mL / NET: -300 mL        PHYSICAL EXAM:  Vital Signs Last 24 Hrs  T(C): 36.6 (25 Aug 2022 11:41), Max: 37 (24 Aug 2022 20:44)  T(F): 97.9 (25 Aug 2022 11:41), Max: 98.6 (24 Aug 2022 20:44)  HR: 86 (25 Aug 2022 11:41) (81 - 86)  BP: 115/82 (25 Aug 2022 11:41) (101/67 - 116/81)  BP(mean): --  RR: 18 (25 Aug 2022 11:41) (18 - 18)  SpO2: 98% (25 Aug 2022 11:41) (98% - 98%)    Parameters below as of 25 Aug 2022 11:41  Patient On (Oxygen Delivery Method): room air      CONSTITUTIONAL: NAD, well-developed, well-groomed  EYES: PERRLA; conjunctiva and sclera clear  ENMT: Moist oral mucosa, no pharyngeal injection or exudates; normal dentition  NECK: Supple, no palpable masses; no thyromegaly  RESPIRATORY: Normal respiratory effort; lungs are clear to auscultation bilaterally  CARDIOVASCULAR: Regular rate and rhythm, normal S1 and S2, no murmur/rub/gallop; No lower extremity edema; Peripheral pulses are 2+ bilaterally  ABDOMEN: Nontender to palpation, normoactive bowel sounds, no rebound/guarding; No hepatosplenomegaly  MUSCULOSKELETAL:  Normal gait; no clubbing or cyanosis of digits; no joint swelling or tenderness to palpation  PSYCH: A+O to person, place, and time; affect appropriate  NEUROLOGY: CN 2-12 are intact and symmetric; no gross sensory deficits   SKIN: No rashes; no palpable lesions    LABS:                        12.6   8.16  )-----------( 212      ( 24 Aug 2022 08:05 )             38.8     08-24    141  |  107  |  29<H>  ----------------------------<  80  4.1   |  25  |  0.78    Ca    8.9      24 Aug 2022 08:05    TPro  5.9<L>  /  Alb  4.0  /  TBili  0.4  /  DBili  x   /  AST  19  /  ALT  16  /  AlkPhos  112  08-23    PT/INR - ( 23 Aug 2022 14:40 )   PT: 11.6 sec;   INR: 1.00 ratio         PTT - ( 23 Aug 2022 14:40 )  PTT:26.8 sec          Culture - Urine (collected 23 Aug 2022 00:44)  Source: Clean Catch Clean Catch (Midstream)  Final Report (25 Aug 2022 09:24):    >100,000 CFU/ml Pseudomonas aeruginosa  Organism: Pseudomonas aeruginosa (25 Aug 2022 09:24)  Organism: Pseudomonas aeruginosa (25 Aug 2022 09:24)      SARS-CoV-2: NotDetec (22 Aug 2022 23:35)      RADIOLOGY & ADDITIONAL TESTS:  New Results Reviewed Today: none  New Imaging Personally Reviewed Today: TTE- mild diastolic dysfunction (grade I)  New Electrocardiogram Personally Reviewed Today: n/a  Prior or Outpatient Records Reviewed Today: none    COMMUNICATION:  Care Discussed with Consultants/Other Providers and Details of Discussion: pulm  Discussions with Patient/Family: family  PCP Communication: n/a

## 2022-08-25 NOTE — PROGRESS NOTE ADULT - NSPROGADDITIONALINFOA_GEN_ALL_CORE
d/w acp and pulmonary     Guillermo Gan MD  [facility] Division of Hospital Medicine  Available via MS Teams  Pager: [phone]

## 2022-08-26 NOTE — DIETITIAN INITIAL EVALUATION ADULT - NSICDXPASTMEDICALHX_GEN_ALL_CORE_FT
PAST MEDICAL HISTORY:  Atrial fibrillation, unspecified type Unspecified Atrial arrhthymia, S/p ablation on Bblocker    Dyslipidemia     Hypertension     Small bowel obstruction

## 2022-08-26 NOTE — PROGRESS NOTE ADULT - ASSESSMENT
Patient IT is a 83 yo F PMHx arrhythmia s/p ablation and incarcerated R femoral hernia s/p small bowel resection presents with several months of progressive generalized weakness and 2 weeks of lower abdominal discomfort. States weakness has worsened over the last two weeks particularly involving fine finger movement (performing daily tasks such as cooking, dressing herself) as well as getting up from seating position. Now needs help from her spouse. Has previously ambulated with cane and walker but now requires more assistance to get to bathroom. States she sleeps 7-8 hours a night but does experience leg twitching at night in her legs. Otherwise denies constipation loss of taste/ smell, crawling sensation on legs. Currently, being treated for UTI with positive UA, s/p thoracentesis for moderate L pleural effusion. Reports not being on medications w/ parkinsonism side effects.      Impression: Progressive weakness with L>R bradykinesia, mild cogwheel rigidity potentially consistent with parkinsonism features likely exacerbated by multiple acute medical issues including UTI, pleural effusion. In addition may also be experiencing leg myoclonus at night.    Recommendations:  [ ] MRI brain and cervical spine w/o con  [ ] Tolerated trial of sinemet 25/100 0.5 tab three times a day: each week can increase dosage so next week can increase to 1 tab / 0.5 tab/ 0.5 tab per day, the following week increase to 1 tab/ 1 tab/ 0.5 tab per day, and the final week if tolerated can increase to 1 tab/ 1 tab/ 1 tab per day with a total of 3 tablets per day.     [ ] Follow up with Dr Booth in outpatient setting once medically stable for discharge     Patient was seen on rounds with attending and neurology team. Recommendations finalized once signed by attending. Discussed recommendations with patient's family.

## 2022-08-26 NOTE — PROGRESS NOTE ADULT - NSPROGADDITIONALINFOA_GEN_ALL_CORE
d/w acp, daughter (dr. syed), neurology team and pulmonary team     Guillermo Gan MD  [facility] Division of Hospital Medicine  Available via MS Teams  Pager: [phone]

## 2022-08-26 NOTE — PROGRESS NOTE ADULT - ATTENDING COMMENTS
81 y/o F w/no significant history presenting with weeks of weakness found to have L sided pleural effusion. Pleural effusion is small-moderate sized and simple appearing on bedside US. No fevers, chills, weight loss, or loss of appetite. Unclear etiology of effusion, however TTE with diastolic dysfunction and elevated BNP suggest acute on chronic HFpEF as etiology of effusion. Fluid slightly exudative, no evidence of infection.     - Follow up fluid cytology (does not need to remain inpatient for results)  - Diuresis goal net negative

## 2022-08-26 NOTE — PROGRESS NOTE ADULT - SUBJECTIVE AND OBJECTIVE BOX
[facility] Division of Hospital Medicine  Guillermo Gan MD  Available via MS Teams  Pager: [phone]    SUBJECTIVE / OVERNIGHT EVENTS:  -Denies any acute issues overnight. tolerating thoracentesis well. denies any chest pain, nausea, vomiting, abdominal pain, cough, shortness of breath.     ADDITIONAL REVIEW OF SYSTEMS:    MEDICATIONS  (STANDING):  carbidopa/levodopa  25/100 0.5 Tablet(s) Oral <User Schedule>  levoFLOXacin  Tablet 500 milliGRAM(s) Oral every 24 hours    MEDICATIONS  (PRN):  melatonin 3 milliGRAM(s) Oral at bedtime PRN Insomnia      I&O's Summary    25 Aug 2022 07:01  -  26 Aug 2022 07:00  --------------------------------------------------------  IN: 0 mL / OUT: 300 mL / NET: -300 mL    26 Aug 2022 07:01  -  26 Aug 2022 14:35  --------------------------------------------------------  IN: 480 mL / OUT: 0 mL / NET: 480 mL        PHYSICAL EXAM:  Vital Signs Last 24 Hrs  T(C): 36.9 (26 Aug 2022 11:40), Max: 37.6 (25 Aug 2022 21:39)  T(F): 98.4 (26 Aug 2022 11:40), Max: 99.7 (25 Aug 2022 21:39)  HR: 89 (26 Aug 2022 11:40) (64 - 96)  BP: 110/76 (26 Aug 2022 11:40) (109/74 - 136/86)  BP(mean): --  RR: 17 (26 Aug 2022 11:40) (17 - 18)  SpO2: 97% (26 Aug 2022 11:40) (97% - 97%)    Parameters below as of 26 Aug 2022 11:40  Patient On (Oxygen Delivery Method): room air      CONSTITUTIONAL: NAD, well-developed, well-groomed  EYES: PERRLA; conjunctiva and sclera clear  ENMT: Moist oral mucosa, no pharyngeal injection or exudates; normal dentition  NECK: Supple, no palpable masses; no thyromegaly  RESPIRATORY: Normal respiratory effort; lungs are clear to auscultation bilaterally  CARDIOVASCULAR: Regular rate and rhythm, normal S1 and S2, no murmur/rub/gallop; No lower extremity edema; Peripheral pulses are 2+ bilaterally  ABDOMEN: Nontender to palpation, normoactive bowel sounds, no rebound/guarding; No hepatosplenomegaly  MUSCULOSKELETAL:  Normal gait; no clubbing or cyanosis of digits; no joint swelling or tenderness to palpation  PSYCH: A+O to person, place, and time; affect appropriate  NEUROLOGY: CN 2-12 are intact and symmetric; no gross sensory deficits   SKIN: No rashes; no palpable lesions    LABS:                        13.0   5.73  )-----------( 188      ( 26 Aug 2022 07:15 )             38.7     08-26    141  |  108  |  26<H>  ----------------------------<  90  4.2   |  25  |  0.82    Ca    8.9      26 Aug 2022 07:16  Phos  2.8     08-26  Mg     2.0     08-26    TPro  5.6<L>  /  Alb  3.5  /  TBili  0.2  /  DBili  x   /  AST  13  /  ALT  7<L>  /  AlkPhos  108  08-26              Culture - Acid Fast - Body Fluid w/Smear (collected 25 Aug 2022 15:18)  Source: .Body Fluid Bronchial Lavage    Culture - Body Fluid with Gram Stain (collected 25 Aug 2022 15:18)  Source: Pleural Fl Pleural Fluid  Gram Stain (26 Aug 2022 04:24):    polymorphonuclear leukocytes seen    No organisms seen    by cytocentrifuge      SARS-CoV-2: NotDetec (22 Aug 2022 23:35)      RADIOLOGY & ADDITIONAL TESTS:  New Results Reviewed Today: cbc cmp  New Imaging Personally Reviewed Today: cxr  New Electrocardiogram Personally Reviewed Today: n/a  Prior or Outpatient Records Reviewed Today: n/a    COMMUNICATION:  Care Discussed with Consultants/Other Providers and Details of Discussion: PULM  Discussions with Patient/Family: dtr   PCP Communication: none

## 2022-08-26 NOTE — PROGRESS NOTE ADULT - ASSESSMENT
81 y/o F w/ hx of arrhythmia s/p ablation and incarcerated R femoral hernia s/p small bowel resection presents with several months of generalized weakness and 2 weeks of lower abdominal discomfort being evaluated for a UTI, retroperitoneal lymphadenopathy and small asymptomatic pleural effusion found on US imaging    #small-moderate asymptomatic simple pleural effusion  -Patient satting well on room air in no acute distress;   -etiology: idiopathic vs occult malignancy vs cardiogenic   -Diagnostic thoracentesis 8/25 borderline exudative by protein alone although LDH low, ph high, glucose high, gram stain and cell count not indicative of infection, cytopath sent, post procedure radiographs observed  - if symptomatic can start diuresis to assist with pleural fluid removal   - from pulmonary standpoint no contraindication to being discharged although will need to follow up with [facility] provider to obtain final results from thoracentesis and monitor size of pleural effusion  -f/u echo   :1. Normal left ventricular internal dimensions and wall  thicknesses.  2. Normal left ventricular systolic function. No segmental  wall motion abnormalities.  3. Mild diastolic dysfunction (Stage I).  4. Normal right ventricular size and function.  5. Normal tricuspid valve. Severe tricuspid regurgitation.  6. Estimated pulmonary artery systolic pressure equals 46  mm Hg, assuming right atrial pressure equals 8 mm Hg,  consistent with mild pulmonary pressures.  7. Bilateral pleural effusions.    Giuseppe Chávez MD  [facility] PGY4  [facility] [id number], [facility] [phone]

## 2022-08-26 NOTE — PROGRESS NOTE ADULT - PROBLEM SELECTOR PLAN 1
Vague lower abdominal pain. Denied dysuria, difficulties with urination. UA: , +leuk esterase, +nitrate. Started on ceftriaxone for UTI by ED team.   -UCx +pseudomonas -> on zosyn  (8/24). will switch to levaquin 500 qD (qtc 411) for 3 more days.

## 2022-08-26 NOTE — DIETITIAN INITIAL EVALUATION ADULT - EDUCATION DIETARY MODIFICATIONS
Encouraged continued good PO intake as tolerated. Pt made aware RD remains available./teach back/(2) meets goals/outcomes/verbalization

## 2022-08-26 NOTE — DIETITIAN INITIAL EVALUATION ADULT - PERTINENT MEDS FT
MEDICATIONS  (STANDING):  carbidopa/levodopa  25/100 0.5 Tablet(s) Oral <User Schedule>  levoFLOXacin  Tablet 500 milliGRAM(s) Oral every 24 hours    MEDICATIONS  (PRN):  melatonin 3 milliGRAM(s) Oral at bedtime PRN Insomnia

## 2022-08-26 NOTE — PROGRESS NOTE ADULT - ASSESSMENT
81 y/o F w/ hx of arrhythmia s/p ablation and incarcerated R femoral hernia s/p small bowel resection presents with several months of generalized weakness and 2 weeks of lower abdominal discomfort, being treated for pseudomonal UTI, also found to have bilateral pleff, possibly 2/2 to acute on chronic HFrEF s/p thoracentesis.

## 2022-08-26 NOTE — DIETITIAN INITIAL EVALUATION ADULT - ETIOLOGY
related to ?aging related to ?inadequate protein energy intake vs. increased nutrient needs in setting of possible acute on chronic HF

## 2022-08-26 NOTE — DIETITIAN INITIAL EVALUATION ADULT - PERTINENT LABORATORY DATA
08-26    141  |  108  |  26<H>  ----------------------------<  90  4.2   |  25  |  0.82    Ca    8.9      26 Aug 2022 07:16  Phos  2.8     08-26  Mg     2.0     08-26    TPro  5.6<L>  /  Alb  3.5  /  TBili  0.2  /  DBili  x   /  AST  13  /  ALT  7<L>  /  AlkPhos  108  08-26

## 2022-08-26 NOTE — PROGRESS NOTE ADULT - SUBJECTIVE AND OBJECTIVE BOX
CHIEF COMPLAINT:Patient is a 82y old  Female who presents with a chief complaint of CC: generalized weakness, abdominal discomfort (25 Aug 2022 15:40)      Interval Events:    REVIEW OF SYSTEMS:  [x] All other systems negative except per HPI   [ ] Unable to assess ROS because ________    OBJECTIVE:  ICU Vital Signs Last 24 Hrs  T(C): 36.6 (26 Aug 2022 05:07), Max: 37.6 (25 Aug 2022 21:39)  T(F): 97.8 (26 Aug 2022 05:07), Max: 99.7 (25 Aug 2022 21:39)  HR: 64 (26 Aug 2022 05:07) (64 - 96)  BP: 136/86 (26 Aug 2022 05:07) (109/74 - 136/86)  BP(mean): --  ABP: --  ABP(mean): --  RR: 18 (26 Aug 2022 05:07) (18 - 18)  SpO2: 97% (26 Aug 2022 05:07) (97% - 98%)    O2 Parameters below as of 26 Aug 2022 05:07  Patient On (Oxygen Delivery Method): room air              08-25 @ 07:01  -  08-26 @ 07:00  --------------------------------------------------------  IN: 0 mL / OUT: 300 mL / NET: -300 mL        PHYSICAL EXAM:  GENERAL: NAD, well-groomed, well-developed  HEAD:  Atraumatic, Normocephalic  EYES: EOMI, PERRLA, conjunctiva and sclera clear  ENMT: No tonsillar erythema, exudates, or enlargement; Moist mucous membranes, Good dentition, No lesions  NECK: Supple, No JVD, Normal thyroid  CHEST/LUNG: Clear to auscultation bilaterally; No rales, rhonchi, wheezing, or rubs  HEART: Regular rate and rhythm; No murmurs, rubs, or gallops  ABDOMEN: Soft, Nontender, Nondistended; Bowel sounds present  VASCULAR:  2+ Peripheral Pulses, No clubbing, cyanosis, or edema  LYMPH: No lymphadenopathy noted  SKIN: No rashes or lesions  NERVOUS SYSTEM:  Alert & Oriented X3, Good concentration; Motor Strength 5/5 B/L upper and lower extremities; DTRs 2+ intact and symmetric    HOSPITAL MEDICATIONS:  MEDICATIONS  (STANDING):  carbidopa/levodopa  25/100 0.5 Tablet(s) Oral <User Schedule>  levoFLOXacin  Tablet 500 milliGRAM(s) Oral every 24 hours    MEDICATIONS  (PRN):  melatonin 3 milliGRAM(s) Oral at bedtime PRN Insomnia      LABS:    The Labs were reviewed by me   The Radiology was reviewed by me    EKG tracing reviewed by me    08-26    141  |  108  |  26<H>  ----------------------------<  90  4.2   |  25  |  0.82  08-24    141  |  107  |  29<H>  ----------------------------<  80  4.1   |  25  |  0.78  08-23    143  |  107  |  28<H>  ----------------------------<  173<H>  4.6   |  25  |  0.99    Ca    8.9      26 Aug 2022 07:16  Ca    8.9      24 Aug 2022 08:05  Ca    9.3      23 Aug 2022 14:40  Phos  2.8     08-26  Mg     2.0     08-26    TPro  5.6<L>  /  Alb  3.5  /  TBili  0.2  /  DBili  x   /  AST  13  /  ALT  7<L>  /  AlkPhos  108  08-26  TPro  5.9<L>  /  Alb  4.0  /  TBili  0.4  /  DBili  x   /  AST  19  /  ALT  16  /  AlkPhos  112  08-23    Magnesium, Serum: 2.0 mg/dL (08-26-22 @ 07:16)    Phosphorus Level, Serum: 2.8 mg/dL (08-26-22 @ 07:16)                                              13.0   5.73  )-----------( 188      ( 26 Aug 2022 07:15 )             38.7                         12.6   8.16  )-----------( 212      ( 24 Aug 2022 08:05 )             38.8     CAPILLARY BLOOD GLUCOSE            MICROBIOLOGY:     RADIOLOGY:  [ ] Reviewed and interpreted by me    Point of Care Ultrasound Findings:    PFT:    EKG: CHIEF COMPLAINT:Patient is a 82y old  Female who presents with a chief complaint of CC: generalized weakness, abdominal discomfort (25 Aug 2022 15:40)      Interval Events: no overnight events, patient tolerated procedure well. no increased SOB, HARRISON, chest pain, fevers, chills, leg edema     REVIEW OF SYSTEMS:  [x] All other systems negative except per HPI   [ ] Unable to assess ROS because ________    OBJECTIVE:  ICU Vital Signs Last 24 Hrs  T(C): 36.6 (26 Aug 2022 05:07), Max: 37.6 (25 Aug 2022 21:39)  T(F): 97.8 (26 Aug 2022 05:07), Max: 99.7 (25 Aug 2022 21:39)  HR: 64 (26 Aug 2022 05:07) (64 - 96)  BP: 136/86 (26 Aug 2022 05:07) (109/74 - 136/86)  BP(mean): --  ABP: --  ABP(mean): --  RR: 18 (26 Aug 2022 05:07) (18 - 18)  SpO2: 97% (26 Aug 2022 05:07) (97% - 98%)    O2 Parameters below as of 26 Aug 2022 05:07  Patient On (Oxygen Delivery Method): room air              08-25 @ 07:01  -  08-26 @ 07:00  --------------------------------------------------------  IN: 0 mL / OUT: 300 mL / NET: -300 mL        PHYSICAL EXAM:  GENERAL: NAD, well-groomed, thin  HEAD:  Atraumatic, Normocephalic  EYES: EOMI, PERRLA, conjunctiva and sclera clear  ENMT: No tonsillar erythema, exudates, or enlargement; Moist mucous membranes, Good dentition, No lesions  NECK: Supple, No JVD, Normal thyroid  CHEST/LUNG: Clear to auscultation bilaterally; No rales, rhonchi, wheezing, or rubs. decreased breath sounds in left base  HEART: Regular rate and rhythm; No murmurs, rubs, or gallops  ABDOMEN: Soft, Nontender, Nondistended; Bowel sounds present  VASCULAR:  2+ Peripheral Pulses, No clubbing, cyanosis, or edema  LYMPH: No lymphadenopathy noted  SKIN: No rashes or lesions  NERVOUS SYSTEM:  Alert & Oriented X3, Good concentration; Motor Strength 5/5 B/L upper and lower extremities; DTRs 2+ intact and symmetric    HOSPITAL MEDICATIONS:  MEDICATIONS  (STANDING):  carbidopa/levodopa  25/100 0.5 Tablet(s) Oral <User Schedule>  levoFLOXacin  Tablet 500 milliGRAM(s) Oral every 24 hours    MEDICATIONS  (PRN):  melatonin 3 milliGRAM(s) Oral at bedtime PRN Insomnia      LABS:    The Labs were reviewed by me   The Radiology was reviewed by me    EKG tracing reviewed by me    08-26    141  |  108  |  26<H>  ----------------------------<  90  4.2   |  25  |  0.82  08-24    141  |  107  |  29<H>  ----------------------------<  80  4.1   |  25  |  0.78  08-23    143  |  107  |  28<H>  ----------------------------<  173<H>  4.6   |  25  |  0.99    Ca    8.9      26 Aug 2022 07:16  Ca    8.9      24 Aug 2022 08:05  Ca    9.3      23 Aug 2022 14:40  Phos  2.8     08-26  Mg     2.0     08-26    TPro  5.6<L>  /  Alb  3.5  /  TBili  0.2  /  DBili  x   /  AST  13  /  ALT  7<L>  /  AlkPhos  108  08-26  TPro  5.9<L>  /  Alb  4.0  /  TBili  0.4  /  DBili  x   /  AST  19  /  ALT  16  /  AlkPhos  112  08-23    Magnesium, Serum: 2.0 mg/dL (08-26-22 @ 07:16)    Phosphorus Level, Serum: 2.8 mg/dL (08-26-22 @ 07:16)                                              13.0   5.73  )-----------( 188      ( 26 Aug 2022 07:15 )             38.7                         12.6   8.16  )-----------( 212      ( 24 Aug 2022 08:05 )             38.8     CAPILLARY BLOOD GLUCOSE            MICROBIOLOGY:     RADIOLOGY:  [ ] Reviewed and interpreted by me    Point of Care Ultrasound Findings:    PFT:    EKG:

## 2022-08-26 NOTE — PROGRESS NOTE ADULT - SUBJECTIVE AND OBJECTIVE BOX
Neurology Progress Note    SUBJECTIVE/OBJECTIVE/INTERVAL EVENTS: Patient seen and examined at bedside w/ neuro attending and team. Did not experience any major side effects with sinemet and reports some improvement in tone of her L hand. Was able to tolerate more ambulation with PT.     VITALS & EXAMINATION:  Vital Signs Last 24 Hrs  T(C): 36.9 (26 Aug 2022 11:40), Max: 37.6 (25 Aug 2022 21:39)  T(F): 98.4 (26 Aug 2022 11:40), Max: 99.7 (25 Aug 2022 21:39)  HR: 89 (26 Aug 2022 11:40) (64 - 96)  BP: 110/76 (26 Aug 2022 11:40) (109/74 - 136/86)  BP(mean): --  RR: 17 (26 Aug 2022 11:40) (17 - 18)  SpO2: 97% (26 Aug 2022 11:40) (97% - 97%)    Parameters below as of 26 Aug 2022 11:40  Patient On (Oxygen Delivery Method): room air     General:  Constitutional: Female, appears stated age, pleasant comfortable,   Head: Normocephalic; Eyes: clear sclera;   Extremities: No cyanosis; Skin: No chelle edema of LE  Resp: breathing comfortably     Neurological (>12):  MS: Awake, alert.  Follows commands. Attends to examiner  Language: Speech is mildly hypophonic with mildly prolonged speech latency. Normal repetition, comprehension, registration of words.    CNs: PERRL. VFF. EOMI. V1-3 intact LT, No chelle facial asymmetry b/l, full. Hearing grossly normal (rubbing fingers) b/l. Tongue midline. Tongue with mild tongue tremor.    Motor - Reduced bulk and mildly increased tone particularly in upper extremities. No pronator drift. MAEx4 5/5 proximally and distally.   Tone and cogwheel rigidity in L hand/wrist than R side has improved from prior day   Sensation: Intact to LT b/l. Cortical: Extinction on DSS (neglect): none   Coordination: No dysmetria to FTN b/l UE  Gait: deferred    LABORATORY:  CBC                       13.0   5.73  )-----------( 188      ( 26 Aug 2022 07:15 )             38.7     Chem 08-26    141  |  108  |  26<H>  ----------------------------<  90  4.2   |  25  |  0.82    Ca    8.9      26 Aug 2022 07:16  Phos  2.8     08-26  Mg     2.0     08-26    TPro  5.6<L>  /  Alb  3.5  /  TBili  0.2  /  DBili  x   /  AST  13  /  ALT  7<L>  /  AlkPhos  108  08-26    LFTs LIVER FUNCTIONS - ( 26 Aug 2022 07:16 )  Alb: 3.5 g/dL / Pro: 5.6 g/dL / ALK PHOS: 108 U/L / ALT: 7 U/L / AST: 13 U/L / GGT: x           Coagulopathy   Lipid Panel   A1c   Cardiac enzymes     U/A   CSF  Immunological  Other    STUDIES & IMAGING: (EEG, CT, MR, U/S, TTE/JANEL):

## 2022-08-26 NOTE — CONSULT NOTE ADULT - SUBJECTIVE AND OBJECTIVE BOX
Patient is a 82y old  Female who presents with a chief complaint of CC: generalized weakness, abdominal discomfort (26 Aug 2022 08:27)      HPI:  83 y/o F w/ hx of arrhythmia s/p ablation and incarcerated R femoral hernia s/p small bowel resection presents with several months of generalized weakness and 2 weeks of lower abdominal discomfort.   She says for the past several months she has been experiencing generalized weakness which has been worsening over the last 2 weeks. Prior to this she has been able to manage chores on her own like doing laundry but now she feels she is too weak and needs help with ADLs. She ambulates with help of cane but during this timeframe, she has been feeling weak and unsteady on her feet. She also reports lower abdominal discomfort. She denies fevers, chills, CP, SOB, n/v, diarrhea, dysuria. Denies any difficulties with urination or bowel movements.   In the ED, afebrile, HR 69, 140/91, on RA with satts in the 90s. BMP and CBC grossly unremarkable. UA: , +leuk esterase, +nitrate. CXR: moderate left pleural effusion. CTH: No acute intracranial processes. CTAP: Moderate left pleural effusion. Bladder, uterine, and rectal prolapse through the pelvic floor. No bowel obstruction. Amorphous soft tissue in the retroperitoneal space adjacent to the vena cava and aorta which is similar to but more pronounced than prior exam   likely representing retroperitoneal lymphadenopathy.    (23 Aug 2022 12:00)    Interval History:  Patient with persistent weakness.  Evaluated by neurology- w/u in progress- noted to have parkinsonian features.  Course has been complicated by UTI, pleural effusion, CHF.    REVIEW OF SYSTEMS: No chest pain, shortness of breath, nausea, vomiting or diarhea; other ROS neg     PAST MEDICAL & SURGICAL HISTORY  Dyslipidemia  Hypertension  Atrial fibrillation, unspecified type  Small bowel obstruction  H/O cardiac radiofrequency ablation  Small bowel obstruction    FUNCTIONAL HISTORY:   Lives w spouse in  home w 5-6 [place]  PTA Independent    CURRENT FUNCTIONAL STATUS:  Min A transfers and gait.    FAMILY HISTORY   No pertinent family history in first degree relatives    MEDICATIONS   carbidopa/levodopa  25/100 0.5 Tablet(s) Oral <User Schedule>  levoFLOXacin  Tablet 500 milliGRAM(s) Oral every 24 hours  melatonin 3 milliGRAM(s) Oral at bedtime PRN    ALLERGIES  No Known Allergies    VITALS  T(C): 36.6 (08-26-22 @ 05:07), Max: 37.6 (08-25-22 @ 21:39)  HR: 64 (08-26-22 @ 05:07) (64 - 96)  BP: 136/86 (08-26-22 @ 05:07) (109/74 - 136/86)  RR: 18 (08-26-22 @ 05:07) (18 - 18)  SpO2: 97% (08-26-22 @ 05:07) (97% - 98%)  Wt(kg): --    PHYSICAL EXAM  Constitutional - NAD, Comfortable  HEENT - NCAT, EOMI  Neck - Supple, No limited ROM  Chest - CTA bilaterally, No wheeze, No rhonchi, No crackles  Cardiovascular - RRR, S1S2, No murmurs  Abdomen - BS+, Soft, NTND  Extremities - No C/C/E, No calf tenderness   Neurologic Exam -                    Cognitive - Awake, Alert, AAO to self, place, date, year, situation     Communication - Fluent, No dysarthria, no aphasia     Cranial Nerves - CN 2-12 intact     Motor - No focal deficits      Sensory - Intact to LT     Reflexes - DTR Intact, No primitive reflexive  Psychiatric - Mood stable, Affect WNL    RECENT LABS/IMAGING  CBC Full  -  ( 26 Aug 2022 07:15 )  WBC Count : 5.73 K/uL  RBC Count : 4.01 M/uL  Hemoglobin : 13.0 g/dL  Hematocrit : 38.7 %  Platelet Count - Automated : 188 K/uL  Mean Cell Volume : 96.5 fl  Mean Cell Hemoglobin : 32.4 pg  Mean Cell Hemoglobin Concentration : 33.6 gm/dL  Auto Neutrophil # : 3.65 K/uL  Auto Lymphocyte # : 1.00 K/uL  Auto Monocyte # : 0.76 K/uL  Auto Eosinophil # : 0.26 K/uL  Auto Basophil # : 0.04 K/uL  Auto Neutrophil % : 63.7 %  Auto Lymphocyte % : 17.5 %  Auto Monocyte % : 13.3 %  Auto Eosinophil % : 4.5 %  Auto Basophil % : 0.7 %    08-26    141  |  108  |  26<H>  ----------------------------<  90  4.2   |  25  |  0.82    Ca    8.9      26 Aug 2022 07:16  Phos  2.8     08-26  Mg     2.0     08-26    TPro  5.6<L>  /  Alb  3.5  /  TBili  0.2  /  DBili  x   /  AST  13  /  ALT  7<L>  /  AlkPhos  108  08-26    Impression:  83 yo with functional deficits secondary to diagnosis of debility, parkinsonianism    Plan:  Continue w/u per primary team and neurology  PT- ROM, Bed Mob, Transfers, Amb w AD and bracing as needed  OT- ADLs, bracing  SLP- Dysphagia eval and treat  Prec- Falls, Cardiac  DVT Prophylaxis- SCDs  carbidopa/levodopa  Skin- Turn q2 h  Dispo-  Patient is a 82y old  Female who presents with a chief complaint of CC: generalized weakness, abdominal discomfort (26 Aug 2022 08:27)      HPI:  81 y/o F w/ hx of arrhythmia s/p ablation and incarcerated R femoral hernia s/p small bowel resection presents with several months of generalized weakness and 2 weeks of lower abdominal discomfort.   She says for the past several months she has been experiencing generalized weakness which has been worsening over the last 2 weeks. Prior to this she has been able to manage chores on her own like doing laundry but now she feels she is too weak and needs help with ADLs. She ambulates with help of cane but during this timeframe, she has been feeling weak and unsteady on her feet. She also reports lower abdominal discomfort. She denies fevers, chills, CP, SOB, n/v, diarrhea, dysuria. Denies any difficulties with urination or bowel movements.   In the ED, afebrile, HR 69, 140/91, on RA with satts in the 90s. BMP and CBC grossly unremarkable. UA: , +leuk esterase, +nitrate. CXR: moderate left pleural effusion. CTH: No acute intracranial processes. CTAP: Moderate left pleural effusion. Bladder, uterine, and rectal prolapse through the pelvic floor. No bowel obstruction. Amorphous soft tissue in the retroperitoneal space adjacent to the vena cava and aorta which is similar to but more pronounced than prior exam   likely representing retroperitoneal lymphadenopathy.    (23 Aug 2022 12:00)    Interval History:  Patient with persistent weakness.  Evaluated by neurology- w/u in progress- noted to have parkinsonian features.  Course has been complicated by UTI, pleural effusion, CHF.    REVIEW OF SYSTEMS: + LE weakness, + poor balance, Denies pain, No chest pain, shortness of breath, nausea, vomiting or diarhea; other ROS neg     PAST MEDICAL & SURGICAL HISTORY  Dyslipidemia  Hypertension  Atrial fibrillation, unspecified type  Small bowel obstruction  H/O cardiac radiofrequency ablation  Small bowel obstruction    FUNCTIONAL HISTORY:   Lives w spouse in  home w 5-6 [place]  PTA Independent    CURRENT FUNCTIONAL STATUS:  Min A transfers and gait.    FAMILY HISTORY   No pertinent family history in first degree relatives    MEDICATIONS   carbidopa/levodopa  25/100 0.5 Tablet(s) Oral <User Schedule>  levoFLOXacin  Tablet 500 milliGRAM(s) Oral every 24 hours  melatonin 3 milliGRAM(s) Oral at bedtime PRN    ALLERGIES  No Known Allergies    VITALS  T(C): 36.6 (08-26-22 @ 05:07), Max: 37.6 (08-25-22 @ 21:39)  HR: 64 (08-26-22 @ 05:07) (64 - 96)  BP: 136/86 (08-26-22 @ 05:07) (109/74 - 136/86)  RR: 18 (08-26-22 @ 05:07) (18 - 18)  SpO2: 97% (08-26-22 @ 05:07) (97% - 98%)  Wt(kg): --    PHYSICAL EXAM  Constitutional - NAD, Comfortable  HEENT - NCAT, EOMI  Neck - Supple, No limited ROM  Chest - CTA bilaterally, No wheeze, No rhonchi, No crackles  Cardiovascular - RRR, S1S2, No murmurs  Abdomen - BS+, Soft, NTND  Extremities - No C/C/E, No calf tenderness   Neurologic Exam -                 Alert follows verbal instruction  Slight hypertonia and cogwheeling  Minimal tremor of the 5th digit  Motor 4+/5 bl UE and LE     Psychiatric - Mood stable, Affect WNL    RECENT LABS/IMAGING  CBC Full  -  ( 26 Aug 2022 07:15 )  WBC Count : 5.73 K/uL  RBC Count : 4.01 M/uL  Hemoglobin : 13.0 g/dL  Hematocrit : 38.7 %  Platelet Count - Automated : 188 K/uL  Mean Cell Volume : 96.5 fl  Mean Cell Hemoglobin : 32.4 pg  Mean Cell Hemoglobin Concentration : 33.6 gm/dL  Auto Neutrophil # : 3.65 K/uL  Auto Lymphocyte # : 1.00 K/uL  Auto Monocyte # : 0.76 K/uL  Auto Eosinophil # : 0.26 K/uL  Auto Basophil # : 0.04 K/uL  Auto Neutrophil % : 63.7 %  Auto Lymphocyte % : 17.5 %  Auto Monocyte % : 13.3 %  Auto Eosinophil % : 4.5 %  Auto Basophil % : 0.7 %    08-26    141  |  108  |  26<H>  ----------------------------<  90  4.2   |  25  |  0.82    Ca    8.9      26 Aug 2022 07:16  Phos  2.8     08-26  Mg     2.0     08-26    TPro  5.6<L>  /  Alb  3.5  /  TBili  0.2  /  DBili  x   /  AST  13  /  ALT  7<L>  /  AlkPhos  108  08-26    Impression:  83 yo with functional deficits secondary to diagnosis of debility, parkinsonianism    Plan:  Continue w/u per primary team and neurology  PT- ROM, Bed Mob, Transfers, Amb w AD and bracing as needed  OT- ADLs, bracing  SLP- Dysphagia eval and treat  Prec- Falls, Cardiac  DVT Prophylaxis- SCDs  Continue carbidopa/levodopa  Skin- Turn q2 h  Dispo- Acute Rehab- can tolerate 3h/d of therapies and requires daily physician visits for titration of medications.

## 2022-08-26 NOTE — DIETITIAN INITIAL EVALUATION ADULT - NS FNS WEIGHT CHANGE REASON
Pt reports unintentional weight loss "since the pandemic" however unsure of reason, reports her appetite and PO intake did not change. States UBW previously 130 pounds, lost to 120 pounds, last weighed this "last year." Reports recent weight stability at ~110 pounds, unable to recall when she last weighed this. Weight loss may be in setting of fluid shifts (noted with possible acute on chronic heart failure). Bed scale weight obtained at time of visit 110.7 pounds suggests weight stability. Will continue to monitor and trend weights as able.

## 2022-08-26 NOTE — DIETITIAN INITIAL EVALUATION ADULT - REASON
deferred at this time as patient eating lunch, observed mild-moderate muscle wasting to temple regions

## 2022-08-26 NOTE — DIETITIAN INITIAL EVALUATION ADULT - REASON FOR ADMISSION
Pt is an 81 yo female with PMH of arrhythmia s/p ablation and incarcerated R femoral hernia s/p small bowel resection who presented with several months of generalized weakness and 2 weeks of lower abdominal discomfort. Admitted 8/23. Pt being treated for pseudomonal UTI, also found to have bilateral pleural effusions, possibly secondary to acute on chronic HFrEF s/p thoracentesis.

## 2022-08-27 NOTE — PROGRESS NOTE ADULT - ASSESSMENT
83 y/o F w/ hx of arrhythmia s/p ablation and incarcerated R femoral hernia s/p small bowel resection presents with several months of generalized weakness and 2 weeks of lower abdominal discomfort, being treated for pseudomonal UTI, also found to have bilateral pleff, possibly 2/2 to acute on chronic HFrEF s/p thoracentesis.

## 2022-08-27 NOTE — PROGRESS NOTE ADULT - PROBLEM SELECTOR PLAN 1
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 Primary lesion is unknown but with unilateral L plef, concern for metastatic process in chest  - Pt and family would like to pursue diagnosis  - Awaiting cytology from L thoracentesis  - CTC w/ IV contrast and plan to pursue biopsy of most accessible lesion if additional tissue is needed  - Onc consult after tissue diagnosis

## 2022-08-27 NOTE — PATIENT PROFILE ADULT - FALL HARM RISK - HARM RISK INTERVENTIONS

## 2022-08-27 NOTE — PROGRESS NOTE ADULT - SUBJECTIVE AND OBJECTIVE BOX
[facility] Division of Hospital Medicine  Brock Keita MD  Available via MS Teams    SUBJECTIVE / OVERNIGHT EVENTS:    ADDITIONAL REVIEW OF SYSTEMS:    MEDICATIONS  (STANDING):  carbidopa/levodopa  25/100 0.5 Tablet(s) Oral <User Schedule>  levoFLOXacin  Tablet 500 milliGRAM(s) Oral every 24 hours    MEDICATIONS  (PRN):  melatonin 3 milliGRAM(s) Oral at bedtime PRN Insomnia      I&O's Summary    26 Aug 2022 07:01  -  27 Aug 2022 07:00  --------------------------------------------------------  IN: 480 mL / OUT: 0 mL / NET: 480 mL    CONSTITUTIONAL: NAD, well-developed, well-groomed  EYES: PERRLA; conjunctiva and sclera clear  ENMT: Moist oral mucosa, no pharyngeal injection or exudates; normal dentition  NECK: Supple, no palpable masses; no thyromegaly  RESPIRATORY: Normal respiratory effort; lungs are clear to auscultation bilaterally  CARDIOVASCULAR: Regular rate and rhythm, normal S1 and S2, no murmur/rub/gallop; No lower extremity edema; Peripheral pulses are 2+ bilaterally  ABDOMEN: Nontender to palpation, normoactive bowel sounds, no rebound/guarding; No hepatosplenomegaly  MUSCULOSKELETAL:  Normal gait; no clubbing or cyanosis of digits; no joint swelling or tenderness to palpation  PSYCH: A+O to person, place, and time; affect appropriate  NEUROLOGY: CN 2-12 are intact and symmetric; no gross sensory deficits   SKIN: No rashes; no palpable lesions    LABS:                        13.0   5.73  )-----------( 188      ( 26 Aug 2022 07:15 )             38.7     08-26    141  |  108  |  26<H>  ----------------------------<  90  4.2   |  25  |  0.82    Ca    8.9      26 Aug 2022 07:16  Phos  2.8     08-26  Mg     2.0     08-26    TPro  5.6<L>  /  Alb  3.5  /  TBili  0.2  /  DBili  x   /  AST  13  /  ALT  7<L>  /  AlkPhos  108  08-26              Culture - Acid Fast - Body Fluid w/Smear (collected 25 Aug 2022 15:18)  Source: .Body Fluid Bronchial Lavage  Preliminary Report (27 Aug 2022 15:04):    Culture is being performed.    Culture - Body Fluid with Gram Stain (collected 25 Aug 2022 15:18)  Source: Pleural Fl Pleural Fluid  Gram Stain (26 Aug 2022 04:24):    polymorphonuclear leukocytes seen    No organisms seen    by cytocentrifuge  Preliminary Report (26 Aug 2022 17:51):    No growth      SARS-CoV-2: NotDete (22 Aug 2022 23:35)      RADIOLOGY & ADDITIONAL TESTS:  New Results Reviewed Today:  MRI  New Imaging Personally Reviewed Today:  New Electrocardiogram Personally Reviewed Today:  Prior or Outpatient Records Reviewed Today:    COMMUNICATION:  Care Discussed with Consultants/Other Providers and Details of Discussion: Discussed with ACP  Discussions with Patient/Family: Dr. Franklin (daughter) on phone, son in room  PCP Communication: [facility] Division of Hospital Medicine  Brock Keita MD  Available via MS Teams    SUBJECTIVE / OVERNIGHT EVENTS: No acute events overnight. Pt seen and examined at bedside. Pt c/o R leg "heaviness" compared to left. No chest pain or SOB.     ADDITIONAL REVIEW OF SYSTEMS:    MEDICATIONS  (STANDING):  carbidopa/levodopa  25/100 0.5 Tablet(s) Oral <User Schedule>  levoFLOXacin  Tablet 500 milliGRAM(s) Oral every 24 hours    MEDICATIONS  (PRN):  melatonin 3 milliGRAM(s) Oral at bedtime PRN Insomnia      I&O's Summary    26 Aug 2022 07:01  -  27 Aug 2022 07:00  --------------------------------------------------------  IN: 480 mL / OUT: 0 mL / NET: 480 mL    CONSTITUTIONAL: NAD, sitting up   EYES: PERRL; conjunctiva and sclera clear  ENMT: Moist oral mucosa, no pharyngeal injection or exudates; normal dentition  NECK: Supple, no palpable masses; no thyromegaly  RESPIRATORY: decreased breath sounds over left lung, no accessory muscle usage  CARDIOVASCULAR: RRR, systolic murmur, no LE edema  ABDOMEN: Nontender to palpation, normoactive bowel sounds, no rebound/guarding; No hepatosplenomegaly  PSYCH: A+O to person, place, and time; affect appropriate  NEUROLOGY: CN 2-12 are intact and symmetric; dec sensation to light touch b/l feet, R hip flexor weaker than L hip flexor, otherwise symmetric lower ext weakness, 4-4+ strength  SKIN: No rashes; no palpable lesions    LABS:                        13.0   5.73  )-----------( 188      ( 26 Aug 2022 07:15 )             38.7     08-26    141  |  108  |  26<H>  ----------------------------<  90  4.2   |  25  |  0.82    Ca    8.9      26 Aug 2022 07:16  Phos  2.8     08-26  Mg     2.0     08-26    TPro  5.6<L>  /  Alb  3.5  /  TBili  0.2  /  DBili  x   /  AST  13  /  ALT  7<L>  /  AlkPhos  108  08-26    Culture - Acid Fast - Body Fluid w/Smear (collected 25 Aug 2022 15:18)  Source: .Body Fluid Bronchial Lavage  Preliminary Report (27 Aug 2022 15:04):    Culture is being performed.    Culture - Body Fluid with Gram Stain (collected 25 Aug 2022 15:18)  Source: Pleural Fl Pleural Fluid  Gram Stain (26 Aug 2022 04:24):    polymorphonuclear leukocytes seen    No organisms seen    by cytocentrifuge  Preliminary Report (26 Aug 2022 17:51):    No growth    SARS-CoV-2: NotDetec (22 Aug 2022 23:35)      RADIOLOGY & ADDITIONAL TESTS:  New Results Reviewed Today:  MRI  New Imaging Personally Reviewed Today:  New Electrocardiogram Personally Reviewed Today:  Prior or Outpatient Records Reviewed Today:    COMMUNICATION:  Care Discussed with Consultants/Other Providers and Details of Discussion: Discussed with ACP  Discussions with Patient/Family: Dr. Franklin (daughter) on phone, son in room  PCP Communication:

## 2022-08-27 NOTE — CHART NOTE - NSCHARTNOTEFT_GEN_A_CORE
Case reviewed with DR Littlejohn  Gyn resident , recommending OP  eval with DR Dg Hopper .  Neli Roper NP-C [id number]

## 2022-08-28 NOTE — PROGRESS NOTE ADULT - ATTENDING COMMENTS
Agree with HPI, A/P. Patient has modest response of bradykinesia and rigidity to low dose levodopa. Recent discovery of possible metastatic spine lesions due to underlying cancer is likely exacerbating her condition and can also limit efficacy of levodopa. Will try raising to 1 tab TID and f/u repeat spine imaging with contrast. Agree with checking paraneoplastic panel. Agree with HPI, A/P. Patient has modest response of bradykinesia and rigidity to low dose levodopa. Recent discovery of possible metastatic spine lesions is likely exacerbating her condition and can also limit efficacy of levodopa. Will try raising to 1 tab TID and f/u repeat spine imaging with contrast. Agree with checking paraneoplastic panel.

## 2022-08-28 NOTE — PROGRESS NOTE ADULT - ASSESSMENT
Patient IT is a 81 yo F PMHx arrhythmia s/p ablation and incarcerated R femoral hernia s/p small bowel resection presents with several months of progressive generalized weakness and 2 weeks of lower abdominal discomfort. States weakness has worsened over the last two weeks particularly involving fine finger movement (performing daily tasks such as cooking, dressing herself) as well as getting up from seating position. Now needs help from her spouse. Has previously ambulated with cane and walker but now requires more assistance to get to bathroom. States she sleeps 7-8 hours a night but does experience leg twitching at night in her legs. Otherwise denies constipation loss of taste/ smell, crawling sensation on legs. Currently, being treated for UTI with positive UA, s/p thoracentesis for moderate L pleural effusion. Reports not being on medications w/ parkinsonism side effects.      Interval events: started trial of sinemet 0.5 tab TID (10AM, 3PM, 8PM) with mild improvement. Obtained MR brain and cervical spine showing abnormal marrow signal within T3-T6 vertebral bodies including the posterior elements suspicious for metastatic disease with associated epidural spread posteriorly and narrowing of the spinal canal.    Impression: Progressive weakness with L>R bradykinesia, mild cogwheel rigidity potentially consistent with parkinsonism features likely exacerbated by multiple acute medical issues including UTI, pleural effusion. Differential for parkinsonism symptoms include primary parkinsons, cervical myelopathy/ radiculopathy, and less likely but still included in differential of paraneoplastic syndrome in setting of recent MRI spine showing potential thoracic metastatic lesions. In addition may also be experiencing leg myoclonus at night which may be related to above etiologies.     Recommendations:  [x] MRI brain and cervical spine w/o con  [ ] MRI brain, cervical, thoracic, lumbar spine w/ w/o con (contrast study to aide in lesion evaluation)  [ ] paraneoplastic panel, thiamine, copper, CK, myoglobin, aldolase  [x] B12 600, TSh 5.04, folate 19.5, ferritin 109  [ ] Tolerated trial of sinemet 25/100 0.5 tab three times a day: can try increasing today to 1 tab TID (10AM, 3PM, 8PM). If develops side effects, can decrease back to 0.5 tab three times a day scheduled similarly.    [ ] Follow up with Dr Booth in outpatient setting once medically stable for discharge     Patient was seen on rounds with attending and neurology team. Recommendations finalized once signed by attending. Discussed recommendations with patient's family.

## 2022-08-28 NOTE — PROGRESS NOTE ADULT - SUBJECTIVE AND OBJECTIVE BOX
Neurology Progress Note    SUBJECTIVE/OBJECTIVE/INTERVAL EVENTS: Patient seen and examined at bedside w/ neuro attending. She reports no nausea, GI symptoms from starting sinemet. Does report heaviness from yesterday AM in b/l LE R worse than L. Has some minimal improvements in dexterity and rigidity reported of upper extremities. MRI brain and C spine performed.     VITALS & EXAMINATION:  Vital Signs Last 24 Hrs  T(C): 36.3 (28 Aug 2022 04:46), Max: 36.8 (27 Aug 2022 20:45)  T(F): 97.3 (28 Aug 2022 04:46), Max: 98.2 (27 Aug 2022 20:45)  HR: 71 (28 Aug 2022 04:46) (63 - 83)  BP: 119/73 (28 Aug 2022 04:46) (103/70 - 136/61)  BP(mean): --  RR: 18 (28 Aug 2022 04:46) (18 - 18)  SpO2: 96% (28 Aug 2022 04:46) (95% - 98%)    Parameters below as of 28 Aug 2022 04:46  Patient On (Oxygen Delivery Method): room air    General:  Constitutional: Female, appears stated age, pleasant comfortable, thin appearing  Head: Normocephalic; Eyes: clear sclera;   Extremities: No cyanosis; Skin: No chelle edema of LE  Resp: breathing comfortably     Neurological (>12):  MS: Awake, alert.  Follows commands. Attends to examiner  Language: Speech is mildly hypophonic with mildly prolonged speech latency. Normal repetition, comprehension, registration of words.    CNs: PERRL. VFF. EOMI. V1-3 intact LT, No facial asymmetry b/l, full. Hearing grossly normal (rubbing fingers) b/l. Tongue midline. Tongue with mild tongue tremor.    Motor - Reduced bulk and mildly increased tone particularly in upper extremities (now R similar to L side). Improvement in cogwheel rigidity. No pronator drift. MAEx4 5/5 proximally and distally.   Sensation: Intact to LT b/l. Cortical: Extinction on DSS (neglect): none   Reflexes: L/R biceps 3/3, BR 3/3, triceps 1/1 patellar 3/3 ankle 1/1  Coordination: No dysmetria to FTN b/l UE  Gait: deferred    LABORATORY:  CBC   Chem     LFTs   Coagulopathy   Lipid Panel   A1c   Cardiac enzymes     U/A   CSF  Immunological  Other    STUDIES & IMAGING: (EEG, CT, MR, U/S, TTE/JANEL):    < from: MR Cervical Spine No Cont (08.26.22 @ 18:22) >    ACC: [id number] EXAM:  MR SPINE CERVICAL                        ACC: [id number] EXAM:  MR BRAIN                          PROCEDURE DATE:  08/26/2022      INTERPRETATION:  MR brain  without gadolinium    CLINICAL INFORMATION:   Lymphadenopathy Altered mental status    TECHNIQUE:   Sagittal and axial T1-weighted images, axial FLAIR images,   axial gradient echo and T2-weighted images and axial diffusion weighted   images of the brain were obtained.    FINDINGS:   No prior similar studies are available for review.    The brain demonstrates minimal periventricular and bifrontal subcortical   white matter ischemia. No acute cerebral cortical infarct is found.   No   intracranial hemorrhage is recognized. No mass effect is found in the   brain.    The ventricles, sulci and basal cisterns show mild global atrophy, most   prominent in the cerebellum.    The vertebral and internal carotid arteries demonstrate expected flow   voids indicating their patency.    The orbits are unremarkable.  The paranasal sinuses are clear.  The nasal   cavity appears intact.  The nasopharynx is symmetric.  The central skull   base and temporal bones are intact.  The calvarium appears unremarkable.    MR cervical  without gadolinium    CLINICAL INFORMATION:Cervical spondylosis.    TECHNIQUE:   Sagittal T1-weighted images, sagittal STIR images, sagittal   T2-weighted images and axial T1 andT2-weighted gradient-echo images of   the cervical spine were obtained.    FINDINGS:   No prior similar studies areavailable for review.    Cervical vertebral alignment is intact.  Cervical vertebral body heights   are maintained.  Marrow signal intensity within cervical vertebral bodies   and posterior elements is significant for abnormal marrow signal within   T3-T6 vertebral bodies including the posterior elements suspicious for   metastatic disease, with associated epidural spread posteriorly and   narrowing of the spinal canal. Minimal degenerative edema seen at C2.    Cervical intervertebral discs show mild degenerative disc disease at   C4-5, C5-6 and C6-7 with loss of disc height and associated degenerative   endplate changes. There is narrowing of the RIGHT C3-4, room LEFT C5-C6   and RIGHT C6-7 neural foramina due to uncovertebral spurring.    The cervical cord maintains intact morphology  No cord signal intensity   abnormality or focal cord lesion is appreciated.  The cervical medullary   junction remains intact.    Moderate LEFT pleural effusion.    IMPRESSION:    MR brain: Minimal periventricular and bifrontal subcortical white matter   ischemia.  Mild global atrophy, most prominent in the cerebellum.    MR cervical spine:  Abnormal marrow signal within T3-T6 vertebral bodies   including the posterior elements suspicious for metastatic disease, with   associated epidural spread posteriorly and narrowing of the spinal canal.   Recommend dedicated MRI of thoracic spine with gadolinium for complete   evaluation. Mild degenerative disc disease at C4-5, C5-6 and C6-7 with   loss ofdisc height and associated degenerative endplate changes. There   is narrowing of the RIGHT C3-4, room LEFT C5-C6 and RIGHT C6-7 neural   foramina due to uncovertebral spurring. Moderate LEFT pleural effusion.    --- End of Report ---    JOAN TREJO MD; Attending Radiologist  This document has been electronically signed. Aug 26 2022  7:37PM    < end of copied text >

## 2022-08-28 NOTE — PROGRESS NOTE ADULT - ASSESSMENT
83 y/o F w/ hx of arrhythmia s/p ablation and incarcerated R femoral hernia s/p small bowel resection presents with several months of generalized weakness and 2 weeks of lower abdominal discomfort, being treated for pseudomonal UTI, also found to have bilateral pleff, possibly 2/2 to acute on chronic HFrEF s/p thoracentesis.  83 y/o F w/ hx of arrhythmia s/p ablation and incarcerated R femoral hernia s/p small bowel resection presents with several months of generalized weakness and 2 weeks of lower abdominal discomfort, being treated for pseudomonal UTI, also found to have bilateral pleff, possibly 2/2 to acute on chronic HFrEF s/p thoracentesis, workup for new metastatic malignancy.

## 2022-08-28 NOTE — PROGRESS NOTE ADULT - PROBLEM SELECTOR PLAN 1
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 Primary lesion is unknown but with unilateral L plef, concern for metastatic process in chest  - Pt and family would like to pursue diagnosis  - Awaiting cytology from L thoracentesis  - CTC w/ IV contrast showing anterior mediastinal mass, L axillary lymph node. Ddx includes thymoma vs lymphoma vs other  - Repeat MRI brain, c, t spine w/w/o IV. Will wait for further imaging before deciding on biopsy site  - Paraneoplastic workup  - Onc consult after tissue diagnosis

## 2022-08-28 NOTE — PROGRESS NOTE ADULT - SUBJECTIVE AND OBJECTIVE BOX
[facility] Division of Hospital Medicine  Brock Keita MD  Available via MS Teams    SUBJECTIVE / OVERNIGHT EVENTS:    ADDITIONAL REVIEW OF SYSTEMS:    MEDICATIONS  (STANDING):  carbidopa/levodopa  25/100 0.5 Tablet(s) Oral <User Schedule>    MEDICATIONS  (PRN):  melatonin 3 milliGRAM(s) Oral at bedtime PRN Insomnia      I&O's Summary      CONSTITUTIONAL: NAD, sitting up   EYES: PERRL; conjunctiva and sclera clear  ENMT: Moist oral mucosa, no pharyngeal injection or exudates; normal dentition  NECK: Supple, no palpable masses; no thyromegaly  RESPIRATORY: decreased breath sounds over left lung, no accessory muscle usage  CARDIOVASCULAR: RRR, systolic murmur, no LE edema  ABDOMEN: Nontender to palpation, normoactive bowel sounds, no rebound/guarding; No hepatosplenomegaly  PSYCH: A+O to person, place, and time; affect appropriate  NEUROLOGY: CN 2-12 are intact and symmetric; dec sensation to light touch b/l feet, R hip flexor weaker than L hip flexor, otherwise symmetric lower ext weakness, 4-4+ strength  SKIN: No rashes; no palpable lesions    LABS:            COVID-19 PCR: NotDetec (28 Aug 2022 06:45)  SARS-CoV-2: NotDetec (22 Aug 2022 23:35)      RADIOLOGY & ADDITIONAL TESTS:  New Results Reviewed Today:   New Imaging Personally Reviewed Today:  New Electrocardiogram Personally Reviewed Today:  Prior or Outpatient Records Reviewed Today:    COMMUNICATION:  Care Discussed with Consultants/Other Providers and Details of Discussion: Discussed with ACP  Discussions with Patient/Family:  PCP Communication: [facility] Division of Hospital Medicine  Brock Keita MD  Available via MS Teams    SUBJECTIVE / OVERNIGHT EVENTS: No acute events overnight. Pt seen and examined at bedside. C/o heaviness in legs, R > L. No chest pain or difficulty breathing. No headache.     ADDITIONAL REVIEW OF SYSTEMS:    MEDICATIONS  (STANDING):  carbidopa/levodopa  25/100 0.5 Tablet(s) Oral <User Schedule>    MEDICATIONS  (PRN):  melatonin 3 milliGRAM(s) Oral at bedtime PRN Insomnia      I&O's Summary      CONSTITUTIONAL: NAD, sitting up, later sitting in chair  EYES: PERRL; conjunctiva and sclera clear  ENMT: Moist oral mucosa, no pharyngeal injection or exudates; normal dentition  NECK: Supple, no palpable masses; no thyromegaly  RESPIRATORY: decreased breath sounds over left lung, no accessory muscle usage  CARDIOVASCULAR: RRR, systolic murmur, no LE edema  ABDOMEN: Nontender to palpation, normoactive bowel sounds, no rebound/guarding; No hepatosplenomegaly  PSYCH: A+O to person, place, and time; affect appropriate  NEUROLOGY: CN 2-12 are intact and symmetric; dec sensation to light touch b/l feet, R hip flexor weaker than L hip flexor, otherwise symmetric lower ext weakness, 4-4+ strength  SKIN: No rashes; no palpable lesions    LABS:          COVID-19 PCR: NotDetec (28 Aug 2022 06:45)  SARS-CoV-2: NotDetec (22 Aug 2022 23:35)      RADIOLOGY & ADDITIONAL TESTS:  New Results Reviewed Today:   New Imaging Personally Reviewed Today:  New Electrocardiogram Personally Reviewed Today:  Prior or Outpatient Records Reviewed Today:    COMMUNICATION:  Care Discussed with Consultants/Other Providers and Details of Discussion: Discussed with ACP  Discussions with Patient/Family: Son and daughter at bedside  PCP Communication:

## 2022-08-29 NOTE — PROGRESS NOTE ADULT - ATTENDING COMMENTS
HPI as per resident note, personally verified by me. Patient feels her BLE myoclonus is improved but still feel somewhat "heavy". She is tolerating the Sinemet well without issue. No other acute neurologic complaints.    MS: AAOX3, speech fluent, rep/naming intact, follow commands; recent and remote memory intact; normal attention/concentration and fund of knowledge.   CN: EOMI, PERRL, no NALINI, no APD, VFF, V1-3 intact, no facial asymmetry but mild facial hypomimia b/l, hearing intact b/l, tongue/palate midline, SCM/trap intact. Myerson's/Glabellar weakly positive  Motor: Strength: BUE's 4+/5, BLE's 4+-5/5. Tone: normal. Bulk: decreased throughout. Mild bradykinesia noted  DTR 2+ symm.  Plantar flex b/l.  Sensation: intact to LT 4x.   Coordination: intact FTN.   Gait and station: Due to fall risk/safety concerns did not assess    A/P:  Parkinsonism  Myoclonus  Thoracic spinal cord masses/lesions (mets?)  Pleural effusion    - Etiology for symptoms is broad and includes inflammatory, neoplastic, and event paraneoplastic. Also need to consider unmasking of underlying Parkinson Disease in the setting of acute medical issues. Patient feels improvement on current Sinemet dose  - Continue Sinemet 25/100mg 1 tab PO TID  - Labs as per resident note  - MRI brain, c-spine, t-spine, and l-spine w/ and w/o given possible spinal (vertebral body) malignancies  - PT/OT as tolerated  - Continue to address above medical and surgical issues, as you are doing  - Will continue to follow patient with you

## 2022-08-29 NOTE — PROGRESS NOTE ADULT - SUBJECTIVE AND OBJECTIVE BOX
CHIEF COMPLAINT:Patient is a 82y old  Female who presents with a chief complaint of CC: generalized weakness, abdominal discomfort (28 Aug 2022 15:43)      Interval Events: patient still feeling weak, no issues with breathing, no new chest pain or cough    REVIEW OF SYSTEMS:  [x] All other systems negative except per HPI   [ ] Unable to assess ROS because ________    OBJECTIVE:  ICU Vital Signs Last 24 Hrs  T(C): 36.4 (29 Aug 2022 11:50), Max: 36.7 (28 Aug 2022 20:43)  T(F): 97.6 (29 Aug 2022 11:50), Max: 98.1 (28 Aug 2022 20:43)  HR: 86 (29 Aug 2022 11:50) (70 - 89)  BP: 133/75 (29 Aug 2022 11:50) (113/76 - 133/75)  BP(mean): --  ABP: --  ABP(mean): --  RR: 18 (29 Aug 2022 11:50) (18 - 18)  SpO2: 94% (29 Aug 2022 11:50) (94% - 96%)    O2 Parameters below as of 29 Aug 2022 11:50  Patient On (Oxygen Delivery Method): room air              08-28 @ 07:01  -  08-29 @ 07:00  --------------------------------------------------------  IN: 240 mL / OUT: 0 mL / NET: 240 mL        PHYSICAL EXAM:  GENERAL: NAD, well-groomed, thin  HEAD:  Atraumatic, Normocephalic  EYES: EOMI, PERRLA, conjunctiva and sclera clear  ENMT: No tonsillar erythema, exudates, or enlargement; Moist mucous membranes, Good dentition, No lesions  NECK: Supple, No JVD, Normal thyroid  CHEST/LUNG: Clear to auscultation bilaterally; No rales, rhonchi, wheezing, or rubs  HEART: Regular rate and rhythm; No murmurs, rubs, or gallops  ABDOMEN: Soft, Nontender, Nondistended; Bowel sounds present  VASCULAR:  2+ Peripheral Pulses, No clubbing, cyanosis, or edema  LYMPH: No lymphadenopathy noted  SKIN: No rashes or lesions  NERVOUS SYSTEM:  Alert & Oriented X3, Good concentration; Motor Strength 5/5 B/L upper and lower extremities; DTRs 2+ intact and symmetric    HOSPITAL MEDICATIONS:  MEDICATIONS  (STANDING):  carbidopa/levodopa  25/100 1 Tablet(s) Oral <User Schedule>  enoxaparin Injectable 40 milliGRAM(s) SubCutaneous every 24 hours    MEDICATIONS  (PRN):  melatonin 3 milliGRAM(s) Oral at bedtime PRN Insomnia      LABS:    The Labs were reviewed by me   The Radiology was reviewed by me    EKG tracing reviewed by me    08-29    140  |  106  |  35<H>  ----------------------------<  87  4.1   |  26  |  0.84    Ca    8.9      29 Aug 2022 06:43  Phos  3.3     08-29  Mg     2.0     08-29      Magnesium, Serum: 2.0 mg/dL (08-29-22 @ 06:43)    Phosphorus Level, Serum: 3.3 mg/dL (08-29-22 @ 06:43)                                              11.8   5.09  )-----------( 207      ( 29 Aug 2022 06:44 )             36.0     CAPILLARY BLOOD GLUCOSE            MICROBIOLOGY:     RADIOLOGY:  [ ] Reviewed and interpreted by me    Point of Care Ultrasound Findings:    PFT:    EKG:

## 2022-08-29 NOTE — PROGRESS NOTE ADULT - ASSESSMENT
83 y/o F w/ hx of arrhythmia s/p ablation and incarcerated R femoral hernia s/p small bowel resection presents with several months of generalized weakness and 2 weeks of lower abdominal discomfort being evaluated for a UTI, retroperitoneal lymphadenopathy and small asymptomatic pleural effusion found on US imaging s/p diagnostic thoracentesis which exudative and possibly malignant in the setting of abnormal imaging     #small-moderate asymptomatic simple pleural effusion  -Patient satting well on room air in no acute distress;   -etiology: idiopathic vs occult malignancy vs cardiogenic   -Diagnostic thoracentesis 8/25 borderline exudative by protein alone although LDH low, ph high, glucose high, gram stain and cell count not indicative of infection, cytopath sent, post procedure radiographs observed  - if symptomatic can start diuresis to assist with pleural fluid removal   - from pulmonary standpoint no contraindication to being discharged although will need to follow up with [facility] provider to obtain final results from thoracentesis and monitor size of pleural effusion  - at this point patient currently undergoing workup for possible new malignancy given anterior mediastinal mass and metastatic bone lesions, should let pulmonary know if needs to pursue additional bx although cytopath still in lab    -f/u echo   :1. Normal left ventricular internal dimensions and wall  thicknesses.  2. Normal left ventricular systolic function. No segmental  wall motion abnormalities.  3. Mild diastolic dysfunction (Stage I).  4. Normal right ventricular size and function.  5. Normal tricuspid valve. Severe tricuspid regurgitation.  6. Estimated pulmonary artery systolic pressure equals 46  mm Hg, assuming right atrial pressure equals 8 mm Hg,  consistent with mild pulmonary pressures.  7. Bilateral pleural effusions.    Giuseppe Chávez MD  [facility] PGY4  [facility] [id number], [facility] [phone]

## 2022-08-29 NOTE — PROGRESS NOTE ADULT - ASSESSMENT
Patient IT is a 81 yo F PMHx arrhythmia s/p ablation and incarcerated R femoral hernia s/p small bowel resection presents with several months of progressive generalized weakness and 2 weeks of lower abdominal discomfort. States weakness has worsened over the last two weeks particularly involving fine finger movement (performing daily tasks such as cooking, dressing herself) as well as getting up from seating position. Now needs help from her spouse. Has previously ambulated with cane and walker but now requires more assistance to get to bathroom. States she sleeps 7-8 hours a night but does experience leg twitching at night in her legs. Otherwise denies constipation loss of taste/ smell, crawling sensation on legs. Currently, being treated for UTI with positive UA, s/p thoracentesis for moderate L pleural effusion. Reports not being on medications w/ parkinsonism side effects.      Interval events: started trial of sinemet 0.5 tab TID (10AM, 3PM, 8PM) with mild improvement. Obtained MR brain and cervical spine showing abnormal marrow signal within T3-T6 vertebral bodies including the posterior elements suspicious for metastatic disease with associated epidural spread posteriorly and narrowing of the spinal canal.    Impression: Progressive weakness with L>R bradykinesia, mild cogwheel rigidity potentially consistent with parkinsonism features likely exacerbated by multiple acute medical issues including UTI, pleural effusion. Differential for parkinsonism symptoms include primary parkinsons, cervical myelopathy/ radiculopathy, and less likely but still included in differential of paraneoplastic syndrome in setting of recent MRI spine showing potential thoracic metastatic lesions. In addition may also be experiencing leg myoclonus at night which may be related to above etiologies.     Recommendations:  [x] MRI brain and cervical spine w/o con  [ ] MRI brain, cervical, thoracic, lumbar spine w/ w/o con (contrast study to aide in lesion evaluation) pending  [] Thoracocentesis cytopathology pending   [] Biopsy pending repeat MR imaging with contrast   [ ] paraneoplastic panel, thiamine, copper, CK, myoglobin, aldolase PENDING  [x] B12 600, TSh 5.04, folate 19.5, ferritin 109  [ ] Continue sinemet 25/100 1 tab TID (10AM, 3PM, 8PM).  [ ] Follow up with Dr Booth in outpatient setting once medically stable for discharge     Patient was seen on rounds with attending. Recommendations finalized once signed by attending. Discussed recommendations with patient's family.

## 2022-08-29 NOTE — PROGRESS NOTE ADULT - ASSESSMENT
81 y/o F w/ hx of arrhythmia s/p ablation and incarcerated R femoral hernia s/p small bowel resection presents with several months of generalized weakness and 2 weeks of lower abdominal discomfort, being treated for pseudomonal UTI, also found to have bilateral pleff, possibly 2/2 to acute on chronic HFrEF s/p thoracentesis, workup for new metastatic malignancy.

## 2022-08-29 NOTE — PROGRESS NOTE ADULT - SUBJECTIVE AND OBJECTIVE BOX
Interval History:  Patient seen and examined at the bedside. No acute events overnight. States that she feels better and that her legs dont feel as heavy as they did yesterday. Tolerating Sinemet well.    ROS: Pertinent positives in HPI, all other ROS were reviewed and are negative.      MEDICATIONS  (STANDING):  carbidopa/levodopa  25/100 1 Tablet(s) Oral <User Schedule>  enoxaparin Injectable 40 milliGRAM(s) SubCutaneous every 24 hours    MEDICATIONS  (PRN):  melatonin 3 milliGRAM(s) Oral at bedtime PRN Insomnia    Allergies  No Known Allergies    Intolerances      Vital Signs Last 24 Hrs  T(C): 36.4 (29 Aug 2022 11:50), Max: 36.7 (28 Aug 2022 20:43)  T(F): 97.6 (29 Aug 2022 11:50), Max: 98.1 (28 Aug 2022 20:43)  HR: 86 (29 Aug 2022 11:50) (70 - 89)  BP: 133/75 (29 Aug 2022 11:50) (113/76 - 133/75)  BP(mean): --  RR: 18 (29 Aug 2022 11:50) (18 - 18)  SpO2: 94% (29 Aug 2022 11:50) (94% - 96%)    Parameters below as of 29 Aug 2022 11:50  Patient On (Oxygen Delivery Method): room air        NEUROLOGICAL EXAM:  MS: AAOX3, fluent, attends b/l; recent and remote memory intact; normal attention, language and fund of knowledge.   CN: EOMI, PERRL, no NALINI, no APD,  V1-3 intact, no facial asymmetry, t/p midline, SCM/trap intact.  Motor: Strength: 4+/5 4x. Tone: normal. Bulk: decreased throughout. DTR 2+ symm.  Plantar flex b/l. Sensation: intact to LT 4x.   Coordination: intact FTN.       Labs:   cbc                      11.8   5.09  )-----------( 207      ( 29 Aug 2022 06:44 )             36.0     [id number]    140  |  106  |  35<H>  ----------------------------<  87  4.1   |  26  |  0.84    Ca    8.9      29 Aug 2022 06:43  Phos  3.3     08-29  Mg     2.0     08-29      Coags  Lipids  A1C  CardiacMarkers    LFTs  UA Interval History:  Patient seen and examined at the bedside. No acute events overnight. States that she feels better and that her legs dont feel as heavy as they did yesterday. Tolerating Sinemet well.    FHx: Non-contributory    ROS: Pertinent positives in HPI, all other ROS were reviewed and are negative.      MEDICATIONS  (STANDING):  carbidopa/levodopa  25/100 1 Tablet(s) Oral <User Schedule>  enoxaparin Injectable 40 milliGRAM(s) SubCutaneous every 24 hours    MEDICATIONS  (PRN):  melatonin 3 milliGRAM(s) Oral at bedtime PRN Insomnia    Allergies  No Known Allergies    Intolerances      Vital Signs Last 24 Hrs  T(C): 36.4 (29 Aug 2022 11:50), Max: 36.7 (28 Aug 2022 20:43)  T(F): 97.6 (29 Aug 2022 11:50), Max: 98.1 (28 Aug 2022 20:43)  HR: 86 (29 Aug 2022 11:50) (70 - 89)  BP: 133/75 (29 Aug 2022 11:50) (113/76 - 133/75)  BP(mean): --  RR: 18 (29 Aug 2022 11:50) (18 - 18)  SpO2: 94% (29 Aug 2022 11:50) (94% - 96%)    Parameters below as of 29 Aug 2022 11:50  Patient On (Oxygen Delivery Method): room air        NEUROLOGICAL EXAM:  MS: AAOX3, fluent, attends b/l; recent and remote memory intact; normal attention, language and fund of knowledge.   CN: EOMI, PERRL, no NALINI, no APD,  V1-3 intact, no facial asymmetry, t/p midline, SCM/trap intact.  Motor: Strength: 4+/5 4x. Tone: normal. Bulk: decreased throughout. DTR 2+ symm.  Plantar flex b/l. Sensation: intact to LT 4x.   Coordination: intact FTN.       Labs:   cbc                      11.8   5.09  )-----------( 207      ( 29 Aug 2022 06:44 )             36.0     [id number]    140  |  106  |  35<H>  ----------------------------<  87  4.1   |  26  |  0.84    Ca    8.9      29 Aug 2022 06:43  Phos  3.3     08-29  Mg     2.0     08-29      Coags  Lipids  A1C  CardiacMarkers    LFTs  UA

## 2022-08-29 NOTE — PROGRESS NOTE ADULT - SUBJECTIVE AND OBJECTIVE BOX
[facility] Division of Hospital Medicine  Brock Keita MD  Available via MS Teams    SUBJECTIVE / OVERNIGHT EVENTS:    ADDITIONAL REVIEW OF SYSTEMS:    MEDICATIONS  (STANDING):  carbidopa/levodopa  25/100 1 Tablet(s) Oral <User Schedule>  enoxaparin Injectable 40 milliGRAM(s) SubCutaneous every 24 hours    MEDICATIONS  (PRN):  melatonin 3 milliGRAM(s) Oral at bedtime PRN Insomnia      I&O's Summary    28 Aug 2022 07:01  -  29 Aug 2022 07:00  --------------------------------------------------------  IN: 240 mL / OUT: 0 mL / NET: 240 mL    CONSTITUTIONAL: NAD, sitting up, later sitting in chair  EYES: PERRL; conjunctiva and sclera clear  ENMT: Moist oral mucosa, no pharyngeal injection or exudates; normal dentition  NECK: Supple, no palpable masses; no thyromegaly  RESPIRATORY: decreased breath sounds over left lung, no accessory muscle usage  CARDIOVASCULAR: RRR, systolic murmur, no LE edema  ABDOMEN: Nontender to palpation, normoactive bowel sounds, no rebound/guarding; No hepatosplenomegaly  PSYCH: A+O to person, place, and time; affect appropriate  NEUROLOGY: CN 2-12 are intact and symmetric; dec sensation to light touch b/l feet, R hip flexor weaker than L hip flexor, otherwise symmetric lower ext weakness, 4-4+ strength  SKIN: No rashes; no palpable lesions    LABS:                        11.8   5.09  )-----------( 207      ( 29 Aug 2022 06:44 )             36.0     08-29    140  |  106  |  35<H>  ----------------------------<  87  4.1   |  26  |  0.84    Ca    8.9      29 Aug 2022 06:43  Phos  3.3     08-29  Mg     2.0     08-29    COVID-19 PCR: NotDetec (28 Aug 2022 06:45)  SARS-CoV-2: NotDetec (22 Aug 2022 23:35)      RADIOLOGY & ADDITIONAL TESTS:  New Results Reviewed Today:   New Imaging Personally Reviewed Today:  New Electrocardiogram Personally Reviewed Today:  Prior or Outpatient Records Reviewed Today:    COMMUNICATION:  Care Discussed with Consultants/Other Providers and Details of Discussion: Discussed with ACP  Discussions with Patient/Family: Daughter  PCP Communication: [facility] Division of Hospital Medicine  Brock Keita MD  Available via MS Teams    SUBJECTIVE / OVERNIGHT EVENTS: No acute events overnight. Pt seen and examined at bedside. C/o heaviness in b/l legs. No chest pain or sob or headache.     ADDITIONAL REVIEW OF SYSTEMS:    MEDICATIONS  (STANDING):  carbidopa/levodopa  25/100 1 Tablet(s) Oral <User Schedule>  enoxaparin Injectable 40 milliGRAM(s) SubCutaneous every 24 hours    MEDICATIONS  (PRN):  melatonin 3 milliGRAM(s) Oral at bedtime PRN Insomnia      I&O's Summary    28 Aug 2022 07:01  -  29 Aug 2022 07:00  --------------------------------------------------------  IN: 240 mL / OUT: 0 mL / NET: 240 mL    CONSTITUTIONAL: NAD, sitting up, later sitting in chair  EYES: PERRL; conjunctiva and sclera clear  ENMT: Moist oral mucosa, no pharyngeal injection or exudates; normal dentition  NECK: Supple, no palpable masses; no thyromegaly  RESPIRATORY: decreased breath sounds over left lung, no accessory muscle usage  CARDIOVASCULAR: RRR, systolic murmur, no LE edema  ABDOMEN: Nontender to palpation, normoactive bowel sounds, no rebound/guarding; No hepatosplenomegaly  PSYCH: A+O to person, place, and time; affect appropriate  NEUROLOGY: CN 2-12 are intact and symmetric; dec sensation to light touch b/l feet, R hip flexor weaker than L hip flexor, otherwise symmetric lower ext weakness, 4-4+ strength  SKIN: No rashes; no palpable lesions    LABS:                        11.8   5.09  )-----------( 207      ( 29 Aug 2022 06:44 )             36.0     08-29    140  |  106  |  35<H>  ----------------------------<  87  4.1   |  26  |  0.84    Ca    8.9      29 Aug 2022 06:43  Phos  3.3     08-29  Mg     2.0     08-29    COVID-19 PCR: Ozzy (28 Aug 2022 06:45)  SARS-CoV-2: NotDetec (22 Aug 2022 23:35)      RADIOLOGY & ADDITIONAL TESTS:  New Results Reviewed Today:   New Imaging Personally Reviewed Today:  New Electrocardiogram Personally Reviewed Today:  Prior or Outpatient Records Reviewed Today:    COMMUNICATION:  Care Discussed with Consultants/Other Providers and Details of Discussion: Discussed with ACP  Discussions with Patient/Family: Daughter  PCP Communication:

## 2022-08-29 NOTE — PROGRESS NOTE ADULT - ATTENDING COMMENTS
81 yo female with  exudative pleural effusion. Cytology is negative. CT chest reviewed  Pt with large mediastinal mass likely causing lymph obstruction and pleural effusion.    Recommend;  bx of  anterior mediastinal mass or axillary lymph node to r/o malignance. L? lymphoma.   Once find and treat cause of mass, pleural effusion will likely dissipated.

## 2022-08-30 NOTE — PROGRESS NOTE ADULT - PROBLEM SELECTOR PLAN 1
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 Primary lesion is unknown but with unilateral L plef, concern for metastatic process in chest  - Pt and family would like to pursue diagnosis  - Awaiting cytology from L thoracentesis  - CTC w/ IV contrast showing anterior mediastinal mass, L axillary lymph node. Ddx includes thymoma vs lymphoma vs other  - Repeat MRI brain, c, t spine w/w/o IV  - Plan for IR biopsy of mediastinal mass vs L axillary node if unable  - Paraneoplastic workup  - Establish care with onc consult after biopsy

## 2022-08-30 NOTE — PROGRESS NOTE ADULT - SUBJECTIVE AND OBJECTIVE BOX
[facility] Division of Hospital Medicine  Brock Keita MD  Available via MS Teams    SUBJECTIVE / OVERNIGHT EVENTS: No acute events overnight. Pt seen and examined at bedside. Pt feels well overall, persistent heaviness feeling in b/l legs. Had an episode of transient tremors but now resolved. No chest pain, headaches.     MEDICATIONS  (STANDING):  carbidopa/levodopa  25/100 1 Tablet(s) Oral <User Schedule>    MEDICATIONS  (PRN):  melatonin 3 milliGRAM(s) Oral at bedtime PRN Insomnia      I&O's Summary    29 Aug 2022 07:01  -  30 Aug 2022 07:00  --------------------------------------------------------  IN: 0 mL / OUT: 750 mL / NET: -750 mL    CONSTITUTIONAL: NAD, sitting up in bed  EYES: PERRL; conjunctiva and sclera clear  ENMT: Moist oral mucosa, no pharyngeal injection or exudates; normal dentition  NECK: Supple, no palpable masses; no thyromegaly  RESPIRATORY: decreased breath sounds over left lung, no accessory muscle usage  CARDIOVASCULAR: RRR, systolic murmur, no LE edema  ABDOMEN: Nontender to palpation, normoactive bowel sounds, no rebound/guarding; No hepatosplenomegaly  PSYCH: A+O to person, place, and time; affect appropriate  NEUROLOGY: CN 2-12 are intact and symmetric  SKIN: No rashes; no palpable lesions    LABS:                        11.9   4.67  )-----------( 203      ( 30 Aug 2022 07:03 )             36.0     08-30    140  |  104  |  30<H>  ----------------------------<  81  4.0   |  26  |  0.65    Ca    9.1      30 Aug 2022 07:03  Phos  3.3     08-29  Mg     2.0     08-29        CARDIAC MARKERS ( 29 Aug 2022 14:23 )  x     / x     / 69 U/L / x     / x              COVID-19 PCR: NotDetec (28 Aug 2022 06:45)  SARS-CoV-2: NotDetec (22 Aug 2022 23:35)      RADIOLOGY & ADDITIONAL TESTS:  New Results Reviewed Today:   New Imaging Personally Reviewed Today:  New Electrocardiogram Personally Reviewed Today:  Prior or Outpatient Records Reviewed Today:    COMMUNICATION:  Care Discussed with Consultants/Other Providers and Details of Discussion: Discussed with ACP  Discussions with Patient/Family:  PCP Communication:

## 2022-08-30 NOTE — CONSULT NOTE ADULT - SUBJECTIVE AND OBJECTIVE BOX
Interventional Radiology    Evaluate for Procedure: biopsy of enlarged left axillary node    HPI: 81 y/o F w/ hx of arrhythmia s/p ablation and incarcerated R femoral hernia s/p small bowel resection presents with several months of generalized weakness and 2 weeks of lower abdominal discomfort, being treated for pseudomonal UTI, also found to have bilateral pleff, possibly 2/2 to acute on chronic HFrEF s/p thoracentesis, workup for new metastatic malignancy. IR consulted for Biopsy of mediastinal mass. If mediastinal mass not amenable to biopsy, biopsy of enlarged left axillary node.    Allergies:   Medications (Abx/Cardiac/Anticoagulation/Blood Products)    enoxaparin Injectable: 40 milliGRAM(s) SubCutaneous (08-29 @ 21:37)    Data:  T(C): 36.6  HR: 82  BP: 131/76  RR: 18  SpO2: 94%    -WBC 4.67 / HgB 11.9 / Hct 36.0 / Plt 203  -Na 140 / Cl 104 / BUN 30 / Glucose 81  -K 4.0 / CO2 26 / Cr 0.65  -ALT -- / Alk Phos -- / T.Bili --  -INR 1.00 / PTT 26.8    Radiology: < from: CT Chest w/ IV Cont (08.27.22 @ 18:22) >  PROCEDURE DATE:  08/27/2022      INTERPRETATION:  CLINICAL INFORMATION: Retroperitoneal   mass/lymphadenopathy. Evaluate for metastatic disease. Upper thoracic   spinal cord abnormal signal on MR cervical spine, concerning for   metastatic disease    COMPARISON: MR cervical spine 8/26/2022, CT abdomen pelvis 8/23/2022.    CONTRAST/COMPLICATIONS:  IV Contrast: Omnipaque 350  50 cc administered   0 cc discarded  Oral Contrast: NONE  Complications: None reported at time of study completion    PROCEDURE:  CT scan of the chest was obtained with intravenous contrast.    FINDINGS:    LYMPH NODES:  Confluent paraspinal and posterior mediastinal soft tissue surrounding   the descending thoracic aorta extending from the aortic arch to the level   of T11.  Anterior mediastinal mass measuring approximately 8.6 x 3.1 x 1.8 cm   (3:86 and 5:31).    Enlarged left axillary lymph nodes, measuring up to 3.6 x 2.5 x 3.3 cm   (3:24 and 5:60). Otherwise no mediastinal hilar, or supraclavicular   lymphadenopathy.    HEART/VASCULATURE: Heart is normal size. No pericardial effusion. Aortic   valvular and coronary artery calcifications.    AIRWAYS/LUNGS/PLEURA: Moderate to large left pleural effusion with   associated left lower lobe partial passive atelectasis.  Patent central airways. Right lung calcified granulomas. A 5 mm   perifissural right lower lobe nodule (4:281), likely a benign   intrapulmonary lymph node.. Linear subsegmental atelectasis.    UPPER ABDOMEN: Partially imaged retroperitoneal soft tissue better   evaluated on CT abdomen pelvis 8/23/2021.    BONES/SOFT TISSUES: Lytic and sclerotic foci most pronounced at the T3-T5   vertebral bodies. Defined 1.1 cm lytic focus at the left T4   costovertebral joint (3:23). There is associated extension of soft tissue   into the spinal canal most pronounced at T3-T5 level, the extent of which   is not well evaluated on CT. Epidural involvement is partially imaged on   recent MR cervical spine 8/26/2022.    IMPRESSION:    5.5 x 1.8 cm mediastinal mass. Left axillary lymphadenopathy.    Large left pleural effusion.    Confluent paraspinal and posterior mediastinal soft tissue infiltrate   surrounding the descending thoracic aorta to approximately the level of   T11. There is bony involvement most pronounced at the T3-T5 vertebral   bodies. Spinal canal/epidural extension of soft at 3-T5 levels. Recommend   dedicated thoracic spinal MRI for evaluation of extent of spinal canal   involvement.      Assessment/Plan:  81 y/o F w/ hx of arrhythmia s/p ablation and incarcerated R femoral hernia s/p small bowel resection presents with several months of generalized weakness and 2 weeks of lower abdominal discomfort, being treated for pseudomonal UTI, also found to have bilateral pleff, possibly 2/2 to acute on chronic HFrEF s/p thoracentesis, workup for new metastatic malignancy. IR consulted for Biopsy of mediastinal mass. If mediastinal mass not amenable to biopsy, biopsy of enlarged left axillary node.    - Mediastinal mass not amenable to biopsy. Will plan for biopsy of enlarged left axillary node.  - Place IR procedure order for biopsy of enlarged left axillary node. Will perform procedure tomorrow 8/31/22, Procedure approved by Dr. Aragon  - Patient does not need to be NPO (plan to use local anesthesia)  - Send AM labs including coags  - Please hold anticoagulation  - Maintain active type and screen  - COVID swab within 5 days of procedure  - Case discussed with Dr. Aragon

## 2022-08-30 NOTE — CHART NOTE - NSCHARTNOTEFT_GEN_A_CORE
final cytopath with NO malignant cells.   discussed with patient and primary team.  no further pulmonary interventions planned    please reconsult as necessary.    Giuseppe Chávez MD  [facility] PGY4  [facility] [address], [facility] [phone]

## 2022-08-31 NOTE — PROGRESS NOTE ADULT - SUBJECTIVE AND OBJECTIVE BOX
NEUROLOGY FOLLOW-UP CONSULT NOTE    RFC: Weakness, parkinsonism    Interval history: No acute neurologic events overnight. Felt some tremors in R > L LE overnight but now improved. Tolerating Sinemet without incident. Pending axillary lymph node biopsy today under IR.    Meds:  MEDICATIONS  (STANDING):  carbidopa/levodopa  25/100 1 Tablet(s) Oral <User Schedule>    MEDICATIONS  (PRN):  melatonin 3 milliGRAM(s) Oral at bedtime PRN Insomnia      PMHx/PSHx/FHx/SHx:  Other specified health status    No pertinent family history in first degree relatives    No pertinent family history in first degree relatives    Handoff    MEWS Score    Dyslipidemia    Hypertension    Atrial fibrillation, unspecified type    Small bowel obstruction    Decreased activities of daily living (ADL)    UTI (urinary tract infection)    Generalized weakness    Uterine prolapse    Pleural effusion    Acute on chronic diastolic heart failure    Multiple lesions of metastatic malignancy    No significant past surgical history    H/O cardiac radiofrequency ablation    Small bowel obstruction    UTERUS ALL THE WAY OUT    90+    Unintentional weight loss    Mild dehydration    Urinary tract infection    [email or web address]        Allergies:  No Known Allergies      ROS: All systems negative except as documented in Interval history    O:  T(C): 37.1 (08-31-22 @ 10:42), Max: 37.1 (08-30-22 @ 20:51)  HR: 78 (08-31-22 @ 10:42) (78 - 85)  BP: 111/74 (08-31-22 @ 10:42) (105/72 - 111/74)  RR: 18 (08-31-22 @ 10:42) (18 - 18)  SpO2: 97% (08-31-22 @ 10:42) (95% - 97%)    Focused neurologic exam:  MS: AAOX3, speech fluent, rep/naming intact, follow commands; recent and remote memory intact; normal attention/concentration and fund of knowledge.   CN: EOMI, PERRL, no NALINI, no APD, VFF, V1-3 intact, no facial asymmetry but mild facial hypomimia b/l, hearing intact b/l, tongue/palate midline, SCM/trap intact. Myerson's/Glabellar weakly positive  Motor: Strength: BUE's 4+/5, BLE's 4+-5/5. Tone: normal. Bulk: decreased throughout. Mild bradykinesia noted  DTR 2+ symm.  Plantar flex b/l.  Sensation: intact to LT 4x.   Coordination: intact FTN.   Gait and station: Due to fall risk/safety concerns did not assess    Pertinent labs/studies:  CBC essentially WNL  PT/INR WNL, PTT dec 27.2  BMP essentially WNL  Mg WNL, Phos WNL  Copper WNL, CPK WNL    MRI brain, c-spine, t-spine, l-spine w/ and w/o 8/30 - No abnormal brain parenchymal or leptomeningeal enhancement.    Widespread osseous metastatic disease involving the entire   spine, as discussed.    Circumferential epidural tumor involvement spanning the T3-T5 levels with   epidural tumor encroachment worst at the T4 level surrounding the cord   which appears flattened. Evaluation for cord edema is limited secondary   to lack of T2 and STIR sequences through this area.    Additional epidural tumor within the sacral canal adjacent to osseous   metastatic lesions, more asymmetric replaced to the left.    Suggestion of abnormal leptomeningeal enhancement involving the cauda   equina nerve roots also suspicious for neoplastic involvement.    Redemonstration of abnormal confluent enhancing soft tissue within the   posterior mediastinum surrounding the aorta as well as confluent soft   tissue adjacent to the IVC and descending aorta within the abdominal   cavity suspicious for adenopathy and/or tumor.    Mild concave superior endplate deformities of T12 and L1 without bony   retropulsion.

## 2022-08-31 NOTE — PROGRESS NOTE ADULT - PROBLEM SELECTOR PLAN 1
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 Primary lesion is unknown but with unilateral L plef, concern for metastatic process in chest  - Pt and family would like to pursue diagnosis  - Awaiting cytology from L thoracentesis - no malignant cells  - CTC w/ IV contrast showing anterior mediastinal mass, L axillary lymph node. Ddx includes thymoma vs lymphoma vs other  - Repeat MRI brain, c, t spine w/w/o IV - diffuse spinal involvement noted; no cord compression  - s/p IR biopsy of L axillary node 8/31  - Paraneoplastic workup  - Establish care with onc consult after biopsy

## 2022-08-31 NOTE — PROGRESS NOTE ADULT - ASSESSMENT
83 y/o F w/ hx of arrhythmia s/p ablation and incarcerated R femoral hernia s/p small bowel resection presents with several months of generalized weakness and 2 weeks of lower abdominal discomfort, being treated for pseudomonal UTI, also found to have bilateral pleff, possibly 2/2 to acute on chronic HFrEF s/p thoracentesis, workup for new metastatic malignancy.

## 2022-08-31 NOTE — PROGRESS NOTE ADULT - SUBJECTIVE AND OBJECTIVE BOX
[facility] Division of Hospital Medicine  Brock Keita MD  Available via MS Teams    SUBJECTIVE / OVERNIGHT EVENTS: No acute events overnight. Pt seen and examined at bedside. S/p MRI showing diffuse metastatic lesions. Had twitching in her R leg overnight, chronic. No chest pain, sob, dizziness.     ADDITIONAL REVIEW OF SYSTEMS:    MEDICATIONS  (STANDING):  carbidopa/levodopa  25/100 1 Tablet(s) Oral <User Schedule>    MEDICATIONS  (PRN):  melatonin 3 milliGRAM(s) Oral at bedtime PRN Insomnia      I&O's Summary    30 Aug 2022 07:01  -  31 Aug 2022 07:00  --------------------------------------------------------  IN: 240 mL / OUT: 0 mL / NET: 240 mL    31 Aug 2022 07:01  -  31 Aug 2022 17:04  --------------------------------------------------------  IN: 480 mL / OUT: 0 mL / NET: 480 mL    CONSTITUTIONAL: NAD, sitting up in bed  EYES: PERRL; conjunctiva and sclera clear  ENMT: Moist oral mucosa, no pharyngeal injection or exudates; normal dentition  NECK: Supple, no palpable masses; no thyromegaly  RESPIRATORY: decreased breath sounds over left lung, no accessory muscle usage  CARDIOVASCULAR: RRR, systolic murmur, no LE edema  ABDOMEN: Nontender to palpation, normoactive bowel sounds, no rebound/guarding; No hepatosplenomegaly  PSYCH: A+O to person, place, and time; affect appropriate  NEUROLOGY: CN 2-12 are grossly intact and symmetric  SKIN: No rashes; no palpable lesions  LABS:                        12.4   5.49  )-----------( 229      ( 31 Aug 2022 06:59 )             36.6     08-31    139  |  105  |  33<H>  ----------------------------<  84  4.2   |  26  |  0.76    Ca    9.5      31 Aug 2022 07:31  Phos  3.5     08-31  Mg     2.2     08-31      PT/INR - ( 31 Aug 2022 07:31 )   PT: 10.9 sec;   INR: 0.94 ratio         PTT - ( 31 Aug 2022 07:31 )  PTT:27.2 sec      COVID-19 PCR: NotDetec (28 Aug 2022 06:45)  SARS-CoV-2: NotDetec (22 Aug 2022 23:35)      RADIOLOGY & ADDITIONAL TESTS:  New Results Reviewed Today:     mm< from: MR Lumbar Spine w/wo IV Cont (08.30.22 @ 17:30) >  IMPRESSION: Widespread osseous metastatic disease involving the entire   spine, as discussed.    Circumferential epidural tumor involvement spanning the T3-T5 levels with   epidural tumor encroachment worst at the T4 level surrounding the cord   which appears flattened. Evaluation for cord edema is limited secondary   to lack of T2 and STIR sequences through this area.    Additional epidural tumor within the sacral canal adjacent to osseous   metastatic lesions, more asymmetric replaced to the left.    Suggestion of abnormal leptomeningeal enhancement involving the cauda   equina nerve roots also suspicious for neoplastic involvement.    Redemonstration of abnormal confluent enhancing soft tissue within the   posteriormediastinum surrounding the aorta as well as confluent soft   tissue adjacent to the IVC and descending aorta within the abdominal   cavity suspicious for adenopathy and/or tumor.    Mild concave superior endplate deformities of T12 and L1 without bony   retropulsion.    < end of copied text >    < from: MR Head w/wo IV Cont (08.30.22 @ 16:58) >  IMPRESSION: No abnormal brain parenchymal or leptomeningeal enhancement.    < end of copied text >    New Imaging Personally Reviewed Today:  New Electrocardiogram Personally Reviewed Today:  Prior or Outpatient Records Reviewed Today:    COMMUNICATION:  Care Discussed with Consultants/Other Providers and Details of Discussion: Discussed with ACP  Discussions with Patient/Family:  PCP Communication:

## 2022-08-31 NOTE — CHART NOTE - NSCHARTNOTEFT_GEN_A_CORE
Nutrition Follow Up Note  Patient seen for: initial malnutrition follow-up     Hospital course as per chart: Pt is an 81 yo female with PMH of arrhythmia s/p ablation and incarcerated R femoral hernia s/p small bowel resection who presented with several months of generalized weakness and 2 weeks of lower abdominal discomfort. Admitted 8/23. Pt being treated for pseudomonal UTI, also found to have bilateral pleural effusions, possibly secondary to acute on chronic HFrEF s/p thoracentesis, workup for new metastatic malignancy. Chart reviewed, events noted.    Source: [] Patient       [x] EMR        [] RN        [] Family at bedside       [x] Other: medical team   - patient off floor at IR despite multiple attempts to visit     Diet Order:   Diet, Regular (08-23-22)    - Is current order appropriate/adequate? [x] Yes  []  No:     - PO intake :   [] >75%  Adequate    [x] 50-75%  Fair       [] <50%  Poor    GI:  Last BM 8/29.   Bowel Regimen? [] Yes   [x] No    Weights: no new weights to assess at this time   Will continue to monitor and trend weights as able     Nutritionally Pertinent Medications: Sinemet, melatonin PRN    Pertinent Labs: 08-31 @ 07:31: Na 139, BUN 33<H>, Cr 0.76, BG 84, K+ 4.2, Phos 3.5, Mg 2.2, Alk Phos --, ALT/SGPT --, AST/SGOT --, HbA1c --    Skin per nursing documentation: no pressure injuries  Edema per flowsheets: none    Estimated Needs: no change since previous assessment  Based on weight obtained at time of initial assessment 50.2 kg  Estimated Energy Needs (30-35 kcal/kg): 3259-3130 kcal/day  Estimated Protein Needs (1.2-1.4 g/kg): 60-70 g/day  Defer fluids to team    Previous Nutrition Diagnosis: Mild Malnutrition, Underweight   Nutrition Diagnosis is: [x] ongoing  [] resolved [] not applicable   Being addressed with PO diet     New Nutrition Diagnosis: not applicable    Nutrition Care Plan:  [x] In Progress  [] Achieved  [] Not applicable    Nutrition Interventions:     Education Provided:       [] Yes:  [x] No: unable at this time as patient off floor       Recommendations:      1) Continue Regular diet, adjust as needed  2) RD to provide Mighty Shake 2x/day (200 kcal, 6 gm protein in each) to optimize intake     Monitoring and Evaluation: Monitor PO intake, weight, labs, skin, GI status, diet     RD remains available upon request and will follow up per protocol  Aminah Gipson MS, RD, CDN [facility] Pager #[phone]

## 2022-08-31 NOTE — PROGRESS NOTE ADULT - ASSESSMENT
Parkinsonism  Myoclonus  Thoracic spinal cord masses/lesions (mets?)  Pleural effusion    - Etiology for symptoms is broad and includes inflammatory, neoplastic, and event paraneoplastic. Given spinal canal findings with cervical epidural mass and impingement on the spinal cord (no edema) likely also large contributor to symptoms. Can consider unmasking of underlying Parkinson Disease in the setting of acute medical issues but less likely. Patient feels improvement on current Sinemet dose  - Continue Sinemet 25/100mg 1 tab PO TID  - MRI brain, c-spine, t-spine, and l-spine w/ and w/o given possible spinal (vertebral body) malignancies with results as above  - For left axillary lymph node biopsy today under IR  - PT/OT as tolerated  - Continue to address above medical and surgical issues, as you are doing  - Will continue to follow patient with you

## 2022-09-01 NOTE — DISCHARGE NOTE PROVIDER - ATTENDING DISCHARGE PHYSICAL EXAMINATION:
Patient seen and examined at bedside. See same day progress note for further details.   GENERAL: NAD, lying comfortably in bed  HEENT:  S/p Ommaya placement; site w/ dried old blood; no significant erythyema, Normocephalic, EOMI, conjunctiva and sclera clear  CHEST/LUNG: Clear to auscultation bilaterally; No wheeze  HEART: RRR, S1 and S2 No murmurs, rubs, or gallops  ABDOMEN: Soft, Nontender, Nondistended; Bowel sounds present. no rebound or guarding   EXTREMITIES:  2+ Peripheral Pulses, No clubbing, cyanosis, or edema.   NEURO: AAOx3, non-focal, moving all extremities  SKIN: No rashes or lesions

## 2022-09-01 NOTE — DISCHARGE NOTE PROVIDER - NSDCMRMEDTOKEN_GEN_ALL_CORE_FT
allopurinol 300 mg oral tablet: 1 tab(s) orally once a day (at bedtime)  carbidopa-levodopa 25 mg-100 mg oral tablet: 1 tab(s) orally 3 times a day (10a,3p, 8p)  dexamethasone 4 mg oral tablet: 1 tab(s) orally every 8 hours through 9/24/22  diphenhydrAMINE 50 mg oral capsule: 1 cap(s) orally once, As needed, Reaction  latanoprost 0.005% ophthalmic solution: 1 drop(s) to each affected eye once a day (at bedtime)  melatonin 3 mg oral tablet: 1 tab(s) orally once a day (at bedtime), As needed, Insomnia  pantoprazole 40 mg oral delayed release tablet: 1 tab(s) orally once a day (before a meal)  predniSONE 20 mg oral tablet: 3 tab(s) orally once a day* start 9/25/22. Decrease by 10mg every 2 days  senna leaf extract oral tablet: 2 tab(s) orally once a day (at bedtime)

## 2022-09-01 NOTE — DISCHARGE NOTE PROVIDER - HOSPITAL COURSE
81 y/o F w/ hx of arrhythmia s/p ablation and incarcerated R femoral hernia s/p small bowel resection presents with several months of generalized weakness and 2 weeks of lower abdominal discomfort, being treated for pseudomonal UTI, also found to have bilateral pleff, possibly 2/2 to acute on chronic HFrEF s/p thoracentesis, workup for new metastatic malignancy s/p IR biosy of left axillary lymph node with ?low grade follicular lymphoma. s/p bone marrow biopsy and RP LAD biopsy, started on mini-chop 9/16, tolerating it well.     Problem/Plan - 1:  ·  Problem: Multiple lesions of metastatic malignancy.   ·  Plan: MR spine with abnormal marrow signal in T3-T6, epidural extension without evidence of compression, c/w metastatic disease. Primary lesion is unknown but with unilateral L plef, concern for metastatic process in chest  - cytology from L thoracentesis - no malignant cells  - CTC w/ IV contrast showing anterior mediastinal mass, L axillary lymph node  - Repeat MRI brain, c, t spine w/w/o IV - diffuse spinal involvement noted; no cord compression  - s/p IR biopsy of L axillary node 8/31- cytology B cell lymphoma, pathology results low grade follicular lymphoma   - Paraneoplastic panel neg  -Bone Marrow Bx on 9/7:  Suboptimal biopsy with minute marrow fragment. Adequate aspirate smears showing slightly erythroid predominant maturing trilineage hematopoiesis. Minute, CD10 negative, monotypic B cell population detected by flow cytometry analysis  -S/p IR RP lad biopsy 9/9 - confirm high grade lymphoma   -Heme/onc following   -PICC placed; S/p Rituximab 9/12  -Rad onc consult per neurosurg   -Monitor TLS labs daily - stable thus far  -s/p Ommaya placement 9/15. F/u CSF flow cytometry and cytopathology   -s/p mini-CHOP C1 to be given 9/16/22 (50% dose reduction)  -Allopurinol 300mg daily started.     Problem/Plan - 2:  ·  Problem: Painful wrist, left.   ·  Plan: Xray left wrist- Moderately severe arthrosis at left triscaphe and basal joints.  -s/p trial of ibuprofen 400mg TID for 2 days  -resolved.     Problem/Plan - 3:  ·  Problem: Pleural effusion.   ·  Plan: CT showing moderate left pleural effusion. Unclear etiology.  Reported some dyspnea while doing house chores and weight loss. She is on RA with satts in the 90s. Discussed with pulmonary team regarding ultrasound of the effusion for further evaluation.  -BNP elevated to 716, TTE w/ diastolic dysfunction, perhaps this is the etiology of pleff  -d/w pulmonary -> s/p thoracentesis 8/25 : exudative by lights criteria BUT there's a serum-ascites protein gradient of 3.3 so can still be HF as etiology of effusions  - cytopathology without malignant cells.     Problem/Plan - 4:  ·  Problem: Acute on chronic diastolic heart failure.   ·  Plan: BNP elevated and TTE w/ diastolic dysfunction (mild)  -hold off on diuretics for now   -otherwise looks euvolemic  -bp stable  -outpt follow up.     Problem/Plan - 5:  ·  Problem: Generalized weakness.   ·  Plan: Several months of generalized weakness and fatigue. Reports difficulties with ADLs and unsteadiness with ambulation.   -PMR recs acute rehab -> family wants anant velazqueze  -d/w Neurology: may also  be related to early PD -> start sinemet (ordered). tolerating increased dose.  -LE symptoms may be secondary to spinal cord/epidural involvement of suspected metastatic process with leptomeningeal enhancement seen at cauda equina nerve roots.     Problem/Plan - 6:  ·  Problem: Uterine prolapse.   ·  Plan: Known hx of uterine prolapse. CTAP significant for bladder, uterine, and rectal prolapse through the pelvic floor. Patient reports she has outpatient f/u with gyn next month for evaluation.  -obgyn rec outpt follow up with Dr. Dg Hopper . Initially was told urogynecology would be available on 9/29, however, gynecology returned call and stated to contact Dr. Hopper for outpatient f/u.   81 y/o F w/ hx of arrhythmia s/p ablation and incarcerated R femoral hernia s/p small bowel resection presents with several months of generalized weakness and 2 weeks of lower abdominal discomfort, being treated for pseudomonal UTI, also found to have bilateral pleff, possibly 2/2 to acute on chronic HFrEF s/p thoracentesis, workup for new metastatic malignancy s/p IR biopsy of left axillary lymph node with ?low grade follicular lymphoma. s/p bone marrow biopsy and RP LAD biopsy, started on mini-chop 9/16, tolerating it well.     Multiple lesions of metastatic malignancy.   -MR spine with abnormal marrow signal in T3-T6, epidural extension without evidence of compression, c/w metastatic disease. Primary lesion is unknown but with unilateral L plef, concern for metastatic process in chest  - cytology from L thoracentesis - no malignant cells  - CTC w/ IV contrast showing anterior mediastinal mass, L axillary lymph node  - Repeat MRI brain, c, t spine w/w/o IV - diffuse spinal involvement noted; no cord compression  - s/p IR biopsy of L axillary node 8/31- cytology B cell lymphoma, pathology results low grade follicular lymphoma   - Paraneoplastic panel neg  -Bone Marrow Bx on 9/7:  Suboptimal biopsy with minute marrow fragment. Adequate aspirate smears showing slightly erythroid predominant maturing trilineage hematopoiesis. Minute, CD10 negative, monotypic B cell population detected by flow cytometry analysis  -S/p IR RP lad biopsy 9/9 - confirm high grade lymphoma   -Heme/onc following   -PICC placed; S/p Rituximab 9/12  -Rad onc consult per neurosurg   -Monitor TLS labs daily - stable thus far  -s/p Ommaya placement 9/15. F/u CSF flow cytometry and cytopathology   -s/p mini-CHOP C1 to be given 9/16/22 (50% dose reduction)  -Allopurinol 300mg daily started.    Painful wrist, left.   -Xray left wrist- Moderately severe arthrosis at left triscaphe and basal joints.  -s/p trial of ibuprofen 400mg TID for 2 days  -resolved.    Pleural effusion.   -CT showing moderate left pleural effusion. Unclear etiology.  Reported some dyspnea while doing house chores and weight loss. She is on RA with satts in the 90s. Discussed with pulmonary team regarding ultrasound of the effusion for further evaluation.  -BNP elevated to 716, TTE w/ diastolic dysfunction, perhaps this is the etiology of pleff  -d/w pulmonary -> s/p thoracentesis 8/25 : exudative by lights criteria BUT there's a serum-ascites protein gradient of 3.3 so can still be HF as etiology of effusions  - cytopathology without malignant cells.    Acute on chronic diastolic heart failure.   -BNP elevated and TTE w/ diastolic dysfunction (mild)  -hold off on diuretics for now   -otherwise looks euvolemic  -bp stable  -outpt follow up.     Generalized weakness.   -Several months of generalized weakness and fatigue. Reports difficulties with ADLs and unsteadiness with ambulation.   -PMR recs acute rehab -> family wants anant cove  -d/w Neurology: may also  be related to early PD -> start sinemet (ordered). tolerating increased dose.  -LE symptoms may be secondary to spinal cord/epidural involvement of suspected metastatic process with leptomeningeal enhancement seen at cauda equina nerve roots.    Uterine prolapse.   Known hx of uterine prolapse. CTAP significant for bladder, uterine, and rectal prolapse through the pelvic floor. Patient reports she has outpatient f/u with gyn next month for evaluation.  -obgyn rec outpt follow up with Dr. Dg Hopper . Initially was told urogynecology would be available on 9/29, however, gynecology returned call and stated to contact Dr. Hopper for outpatient f/u.        Patient seen and examined at the bedside on ______. No significant events on telemetry overnight. AM labs wnl, VSS/HD stable. Denies any complaints at this time. Patient has been medically cleared for discharge as per  ______. Patient has been given appropriate discharge instructions including medication regimen, and follow up. Medications that patient needs refills on (+/- new medications) have been e-prescribed to preferred pharmacy. Patient will f/u with  ______ in 1-2 weeks for further management.     Temp HR BP SpO2 RR; VSS  Gen: NAD, A&O x3  Cards: RRR, clear S1 and S2 without murmur  Pulm: CTA B/L without w/r/r  Vascular: Peripheral pulses 2+ B/L  Abd: soft, NT  Ext: no LE edema or ulcerations B/L 81 y/o F w/ hx of arrhythmia s/p ablation and incarcerated R femoral hernia s/p small bowel resection presents with several months of generalized weakness and 2 weeks of lower abdominal discomfort, being treated for pseudomonal UTI, also found to have bilateral pleff, possibly 2/2 to acute on chronic HFrEF s/p thoracentesis, workup for new metastatic malignancy s/p IR biopsy of left axillary lymph node with ?low grade follicular lymphoma. s/p bone marrow biopsy and RP LAD biopsy, started on mini-chop 9/16, tolerating it well.     Multiple lesions of metastatic malignancy.   -MR spine with abnormal marrow signal in T3-T6, epidural extension without evidence of compression, c/w metastatic disease. Primary lesion is unknown but with unilateral L plef, concern for metastatic process in chest  - cytology from L thoracentesis - no malignant cells  - CTC w/ IV contrast showing anterior mediastinal mass, L axillary lymph node  - Repeat MRI brain, c, t spine w/w/o IV - diffuse spinal involvement noted; no cord compression  - s/p IR biopsy of L axillary node 8/31- cytology B cell lymphoma, pathology results low grade follicular lymphoma   - Paraneoplastic panel neg  -Bone Marrow Bx on 9/7:  Suboptimal biopsy with minute marrow fragment. Adequate aspirate smears showing slightly erythroid predominant maturing trilineage hematopoiesis. Minute, CD10 negative, monotypic B cell population detected by flow cytometry analysis  -S/p IR RP lad biopsy 9/9 - confirm high grade lymphoma   -Heme/onc following   -PICC placed; S/p Rituximab 9/12  -Rad onc consult per neurosurg   -Monitor TLS labs daily - stable thus far  -s/p Ommaya placement 9/15. F/u CSF flow cytometry and cytopathology   -s/p mini-CHOP C1 to be given 9/16/22 (50% dose reduction)  -Allopurinol 300mg daily started.    Painful wrist, left.   -Xray left wrist- Moderately severe arthrosis at left triscaphe and basal joints.  -s/p trial of ibuprofen 400mg TID for 2 days  -resolved.    Pleural effusion.   -CT showing moderate left pleural effusion. Unclear etiology.  Reported some dyspnea while doing house chores and weight loss. She is on RA with satts in the 90s. Discussed with pulmonary team regarding ultrasound of the effusion for further evaluation.  -BNP elevated to 716, TTE w/ diastolic dysfunction, perhaps this is the etiology of pleff  -d/w pulmonary -> s/p thoracentesis 8/25 : exudative by lights criteria BUT there's a serum-ascites protein gradient of 3.3 so can still be HF as etiology of effusions  - cytopathology without malignant cells.    Acute on chronic diastolic heart failure.   -BNP elevated and TTE w/ diastolic dysfunction (mild)  -hold off on diuretics for now   -otherwise looks euvolemic  -bp stable  -outpt follow up.     Generalized weakness.   -Several months of generalized weakness and fatigue. Reports difficulties with ADLs and unsteadiness with ambulation.   -PMR recs acute rehab -> family wants anant c[facility]  -d/w Neurology: may also  be related to early PD -> start sinemet (ordered). tolerating increased dose.  -LE symptoms may be secondary to spinal cord/epidural involvement of suspected metastatic process with leptomeningeal enhancement seen at cauda equina nerve roots.    Uterine prolapse.   Known hx of uterine prolapse. CTAP significant for bladder, uterine, and rectal prolapse through the pelvic floor. Patient reports she has outpatient f/u with gyn next month for evaluation.  -obgyn rec outpt follow up with Dr. Dg Hopper . Initially was told urogynecology would be available on 9/29, however, gynecology returned call and stated to contact Dr. Hopper for outpatient f/u.        Patient seen and examined at the bedside on 9/23/22. No significant events on telemetry overnight. AM labs wnl, VSS/HD stable. Denies any complaints at this time. Patient has been medically cleared for discharge as per Dr. Laguna. Patient has been given appropriate discharge instructions including medication regimen, and follow up. Medications that patient needs refills on (+/- new medications) have been e-prescribed to preferred pharmacy. Patient will f/u with Dr. Delgadillo as directed    Temp HR BP SpO2 RR; VSS  Gen: NAD, A&O x3  Cards: RRR, clear S1 and S2 without murmur  Pulm: CTA B/L without w/r/r  Vascular: Peripheral pulses 2+ B/L  Abd: soft, NT  Ext: no LE edema or ulcerations B/L 83 y/o F w/ hx of arrhythmia s/p ablation and incarcerated R femoral hernia s/p small bowel resection presents with several months of generalized weakness and 2 weeks of lower abdominal discomfort, being treated for pseudomonal UTI, also found to have bilateral pleff, possibly 2/2 to acute on chronic HFrEF s/p thoracentesis, workup for new metastatic malignancy s/p IR biopsy of left axillary lymph node with ?low grade follicular lymphoma. Dx w/ Follicular Lymphoma s/p C1 R-miniCHOP on 9/12, 9/16.      Multiple lesions of metastatic malignancy.   #Follicular lymphoma   -MR spine with abnormal marrow signal in T3-T6, epidural extension without evidence of compression, c/w metastatic disease. Primary lesion is unknown but with unilateral L plef, concern for metastatic process in chest  - cytology from L thoracentesis - no malignant cells  - CTC w/ IV contrast showing anterior mediastinal mass, L axillary lymph node  - Repeat MRI brain, c, t spine w/w/o IV - diffuse spinal involvement noted; no cord compression  - s/p IR biopsy of L axillary node 8/31- cytology B cell lymphoma, pathology results low grade follicular lymphoma   - Paraneoplastic panel neg  -Bone Marrow Bx on 9/7:  Suboptimal biopsy with minute marrow fragment. Adequate aspirate smears showing slightly erythroid predominant maturing trilineage hematopoiesis. Minute, CD10 negative, monotypic B cell population detected by flow cytometry analysis  -S/p IR RP lad biopsy 9/9 - confirm high grade lymphoma   -Heme/onc following   -PICC placed; S/p Rituximab 9/12  -Rad onc consulted; no RT at this time; can f/u outpatient.   -Monitor TLS labs daily - stable thus far  -s/p Ommaya placement 9/15. CSF Flow cytometry and Cytopathology negative for malignant cells. Patient will ultimately require IT MTX, to be given outpatient with C2.   -s/p mini-CHOP C1 to be given 9/16/22 (50% dose reduction)  -Allopurinol 300mg daily started.       Steroid taper as below:  Dexamethasone 4mg q8h (D1: 9/22-9/24) x 3 days THEN   ---Prednisone 60mg daily x2 days THEN  ---Prednisone 50mg daily x2 days THEN  ---Prednisone 40mg daily x2 days THEN  ---Prednisone 30mg daily x2 days THEN  ---Prednisone 20mg daily x2 days THEN  ---Prednisone 10mg daily x2 days THEN  ---Prednisone 5mg daily x2 days THEN  ---STOP    She is now scheduled for follow-up with Dr. Delgadillo on 9/28/22 at 2:30pm at [facility].        Painful wrist, left.   -Xray left wrist- Moderately severe arthrosis at left triscaphe and basal joints.  -s/p trial of ibuprofen 400mg TID for 2 days  -resolved.    Pleural effusion.   -CT showing moderate left pleural effusion. Unclear etiology.  Reported some dyspnea while doing house chores and weight loss. She is on RA with satts in the 90s. Discussed with pulmonary team regarding ultrasound of the effusion for further evaluation.  -BNP elevated to 716, TTE w/ diastolic dysfunction, perhaps this is the etiology of pleff  -d/w pulmonary -> s/p thoracentesis 8/25 : exudative by lights criteria BUT there's a serum-ascites protein gradient of 3.3 so can still be HF as etiology of effusions  - cytopathology without malignant cells.    Acute on chronic diastolic heart failure.   -BNP elevated and TTE w/ diastolic dysfunction (mild)  -hold off on diuretics for now   -otherwise looks euvolemic  -bp stable  -outpt follow up.     Generalized weakness.   -Several months of generalized weakness and fatigue. Reports difficulties with ADLs and unsteadiness with ambulation.   -PMR recs acute rehab -> family wants an[facility] c[facility]  -d/w Neurology: may also  be related to early PD -> start sinemet (ordered). tolerating increased dose.  -LE symptoms may be secondary to spinal cord/epidural involvement of suspected metastatic process with leptomeningeal enhancement seen at cauda equina nerve roots.    Uterine prolapse.   Known hx of uterine prolapse. CTAP significant for bladder, uterine, and rectal prolapse through the pelvic floor. Patient reports she has outpatient f/u with gyn next month for evaluation.  -obgyn rec outpt follow up with Dr. Dg Hopper . Initially was told urogynecology would be available on 9/29, however, gynecology returned call and stated to contact Dr. Hopper for outpatient f/u.

## 2022-09-01 NOTE — DISCHARGE NOTE PROVIDER - PROVIDER TOKENS
PROVIDER:[TOKEN:[[id number]],FOLLOWUP:[1 week],ESTABLISHEDPATIENT:[T]] PROVIDER:[TOKEN:[[id number]],FOLLOWUP:[1 week],ESTABLISHEDPATIENT:[T]],PROVIDER:[TOKEN:[[id number]]] PROVIDER:[TOKEN:[[id number]],FOLLOWUP:[1 week],ESTABLISHEDPATIENT:[T]],PROVIDER:[TOKEN:[[id number]]],PROVIDER:[TOKEN:[[id number]]]

## 2022-09-01 NOTE — DISCHARGE NOTE PROVIDER - NSDCFUSCHEDAPPT_GEN_ALL_CORE_FT
[facility]  [facility] [address]  Scheduled Appointment: 10/03/2022     [facility]  [address] Romero  Scheduled Appointment: 09/22/2022    [facility]  [facility] [address]  Scheduled Appointment: 10/03/2022     Kenan Delgadillo  [facility]  [address]  Scheduled Appointment: 09/28/2022    [facility]  [facility] [address]  Scheduled Appointment: 10/03/2022

## 2022-09-01 NOTE — DISCHARGE NOTE PROVIDER - CARE PROVIDERS DIRECT ADDRESSES
,[email or web address],DirectAddress_Unknown ,[email or web address],DirectAddress_Unknown,DirectAddress_Unknown

## 2022-09-01 NOTE — PROGRESS NOTE ADULT - SUBJECTIVE AND OBJECTIVE BOX
[facility] Division of Hospital Medicine  Brock Keita MD  Available via MS Teams    SUBJECTIVE / OVERNIGHT EVENTS: No acute events overnight. Pt seen and examined at bedside. S/p IR biopsy of L axillary node. No pain at site. Pt slept well last night with SCDs on, which helped with her leg twitching.     MEDICATIONS  (STANDING):  carbidopa/levodopa  25/100 1 Tablet(s) Oral <User Schedule>    MEDICATIONS  (PRN):  melatonin 3 milliGRAM(s) Oral at bedtime PRN Insomnia      I&O's Summary    31 Aug 2022 07:01  -  01 Sep 2022 07:00  --------------------------------------------------------  IN: 840 mL / OUT: 0 mL / NET: 840 mL    01 Sep 2022 07:01  -  01 Sep 2022 12:36  --------------------------------------------------------  IN: 240 mL / OUT: 0 mL / NET: 240 mL    CONSTITUTIONAL: NAD, sitting up in bed  EYES: PERRL; conjunctiva and sclera clear  ENMT: Moist oral mucosa, no pharyngeal injection or exudates; normal dentition  NECK: Supple, no palpable masses; no thyromegaly  RESPIRATORY: decreased breath sounds over left lung, no accessory muscle usage  CARDIOVASCULAR: RRR, systolic murmur, no LE edema  ABDOMEN: Nontender to palpation, normoactive bowel sounds, no rebound/guarding; No hepatosplenomegaly  PSYCH: A+O to person, place, and time; affect appropriate  NEUROLOGY: CN 2-12 are grossly intact and symmetric  SKIN: No rashes; no palpable lesions    LABS:                        12.4   5.49  )-----------( 229      ( 31 Aug 2022 06:59 )             36.6     08-31    139  |  105  |  33<H>  ----------------------------<  84  4.2   |  26  |  0.76    Ca    9.5      31 Aug 2022 07:31  Phos  3.5     08-31  Mg     2.2     08-31      PT/INR - ( 31 Aug 2022 07:31 )   PT: 10.9 sec;   INR: 0.94 ratio         PTT - ( 31 Aug 2022 07:31 )  PTT:27.2 sec          COVID-19 PCR: NotDetec (28 Aug 2022 06:45)  SARS-CoV-2: NotDetec (22 Aug 2022 23:35)      RADIOLOGY & ADDITIONAL TESTS:  New Results Reviewed Today:   New Imaging Personally Reviewed Today:  New Electrocardiogram Personally Reviewed Today:  Prior or Outpatient Records Reviewed Today:    COMMUNICATION:  Care Discussed with Consultants/Other Providers and Details of Discussion: Discussed with ACP  Discussions with Patient/Family:  PCP Communication:

## 2022-09-01 NOTE — DISCHARGE NOTE PROVIDER - DETAILS OF MALNUTRITION DIAGNOSIS/DIAGNOSES
This patient has been assessed with a concern for Malnutrition and was treated during this hospitalization for the following Nutrition diagnosis/diagnoses:     -  08/26/2022: Mild protein-calorie malnutrition   -  08/26/2022: Underweight (BMI < 19)

## 2022-09-01 NOTE — PROGRESS NOTE ADULT - SUBJECTIVE AND OBJECTIVE BOX
Interventional Radiology Follow-Up Note.    This is a 82y Female s/p left axillary lymph biopsy on 8/31 in Interventional Radiology with Dr. Davis.     Patient seen and examined @ bedside. No complaint offered.    Medication:       Vitals:  T(F): 97.5, Max: 98.7 (10:42)  HR: 66  BP: 114/71  RR: 18  SpO2: 95%    Physical Exam:  General: Nontoxic, in NAD.  Axillary: Left axilla dressing c/d/i (now removed). Site soft w/o evidence of hematoma noted. Nttp. Site nonerythematous no oozing noted.        LABS:  Na: 139 (08-31 @ 07:31), 140 (08-30 @ 07:03)  K: 4.2 (08-31 @ 07:31), 4.0 (08-30 @ 07:03)  Cl: 105 (08-31 @ 07:31), 104 (08-30 @ 07:03)  CO2: 26 (08-31 @ 07:31), 26 (08-30 @ 07:03)  BUN: 33 (08-31 @ 07:31), 30 (08-30 @ 07:03)  Cr: 0.76 (08-31 @ 07:31), 0.65 (08-30 @ 07:03)  Glu: 84(08-31 @ 07:31), 81(08-30 @ 07:03)    Hgb: 12.4 (08-31 @ 06:59), 11.9 (08-30 @ 07:03)  Hct: 36.6 (08-31 @ 06:59), 36.0 (08-30 @ 07:03)  WBC: 5.49 (08-31 @ 06:59), 4.67 (08-30 @ 07:03)  Plt: 229 (08-31 @ 06:59), 203 (08-30 @ 07:03)    INR: 0.94 08-31-22 @ 07:31  PTT: 27.2 08-31-22 @ 07:31        Assessment/Plan:  81 y/o F w/ hx of arrhythmia s/p ablation and incarcerated R femoral hernia s/p small bowel resection presents with several months of generalized weakness and 2 weeks of lower abdominal discomfort, being treated for pseudomonal UTI, also found to have bilateral pleff, possibly 2/2 to acute on chronic HFrEF s/p thoracentesis, workup for new metastatic malignancy. Patient is currently s/p left axillary node on 8/31 with Dr. Davis.     -f/u path results  -no concern for post biopsy bleed.   -IR will sign off.   -Global care per primary team.     Please call IR at  [phone] with any questions, concerns, or issues regarding above.    Also available on Teams.

## 2022-09-01 NOTE — DISCHARGE NOTE PROVIDER - CARE PROVIDER_API CALL
Dg Hopper)  Female Pelvic MedReconst Surg; Obstetrics and Gynecology  [address] [facility] [facility] [address]  [place] [address]  Phone: [phone]  Fax: [phone]  Established Patient  Follow Up Time: 1 week   Dg Hopper)  Female Pelvic MedReconst Surg; Obstetrics and Gynecology  [address] [facility] [address]  [place] [address]  Phone: [phone]  Fax: [phone]  Established Patient  Follow Up Time: 1 week    Edmundo Garcia)  Clinical Neurophysiology; EEGEpilepsy; Neurology; Sleep Medicine  [address] [place] [address]  Phone: [phone]  Fax: [phone]  Follow Up Time:    Dg Hopper)  Female Pelvic MedReconst Surg; Obstetrics and Gynecology  [address] [facility]  [place] [address]  Phone: [phone]  Fax: [phone]  Established Patient  Follow Up Time: 1 week    Edmundo Garcia)  Clinical Neurophysiology; EEGEpilepsy; Neurology; Sleep Medicine  [address] [place] [address]  Phone: [phone]  Fax: [phone]  Follow Up Time:     Kenan Delgadillo (DO)  Internal Medicine; Medical Oncology  [address] [place] [address]  Phone: [phone]  Fax: [phone]  Follow Up Time:

## 2022-09-01 NOTE — DISCHARGE NOTE PROVIDER - NSFOLLOWUPCLINICS_GEN_ALL_ED_FT
[facility]  Hematology/Oncology  [address] [id number]  Phone: [phone]  Fax:

## 2022-09-01 NOTE — PROGRESS NOTE ADULT - PROBLEM SELECTOR PLAN 1
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 Primary lesion is unknown but with unilateral L plef, concern for metastatic process in chest  - Pt and family would like to pursue diagnosis  - Awaiting cytology from L thoracentesis - no malignant cells  - CTC w/ IV contrast showing anterior mediastinal mass, L axillary lymph node. Ddx includes thymoma vs lymphoma vs other  - Repeat MRI brain, c, t spine w/w/o IV - diffuse spinal involvement noted; no cord compression  - s/p IR biopsy of L axillary node 8/31  - Paraneoplastic panel sent  - Establish care with onc consult after biopsy - emailed [email or web address] to follow-up and establish care when pathology results.

## 2022-09-01 NOTE — DISCHARGE NOTE PROVIDER - NSDCCPCAREPLAN_GEN_ALL_CORE_FT
PRINCIPAL DISCHARGE DIAGNOSIS  Diagnosis: Multiple lesions of metastatic malignancy  Assessment and Plan of Treatment: plan on discharge to Rehab and [place] follow up for subsequent treatments. Have discussed with [facility] for palliative spine RT. [facility]      SECONDARY DISCHARGE DIAGNOSES  Diagnosis: Uterine prolapse  Assessment and Plan of Treatment: Known hx of uterine prolapse. CT findings significant for bladder, uterine, and rectal prolapse through the pelvic floor.   - obgyn rec outpt follow up with Dr. Dg Hopper [phone] - please call for outpatient follow up.    Diagnosis: Lymphoma, high grade  Assessment and Plan of Treatment:     Diagnosis: Mild dehydration  Assessment and Plan of Treatment:     Diagnosis: Urinary tract infection  Assessment and Plan of Treatment:     Diagnosis: Multiple lesions of metastatic malignancy  Assessment and Plan of Treatment:      PRINCIPAL DISCHARGE DIAGNOSIS  Diagnosis: Lymphoma, high grade  Assessment and Plan of Treatment: -status post ommaya placement on 9/15/22. CSF Flow cytometry and Cytopathology negative for malignant cells. Patient will ultimately require IT MTX, to be given outpatient with C2.   -Radiation Oncology consulted. Patient will follow-up outpatient for RT  -Continue zarxio 300mcg daily x3 days 9/18-9/20.  -Continue Allopurinol 300mg daily  [facility]      SECONDARY DISCHARGE DIAGNOSES  Diagnosis: Multiple lesions of metastatic malignancy  Assessment and Plan of Treatment: plan on discharge to Rehab and [facility] follow up for subsequent treatments. Have discussed with [facility] for palliative spine RT.    Diagnosis: Generalized weakness  Assessment and Plan of Treatment: - Etiology for symptoms was initially broad and included inflammatory, neoplastic, and even paraneoplastic. Given spinal canal findings with cervical epidural mass and impingement on the spinal cord (no edema) likely also large contributor to symptoms. Can consider unmasking of underlying Parkinson Disease in the setting of acute medical issues but less likely. Patient feels improvement on current Sinemet dose  - Continue Sinemet 25/100mg 1 tab PO TID  Continue medications, Follow up with Neurology and PMD for managment    Diagnosis: Urinary tract infection  Assessment and Plan of Treatment: HOME CARE INSTRUCTIONS  Antibiotics completed   Drink enough water and fluids to keep your urine clear or pale yellow.  Avoid caffeine, tea, and carbonated beverages. They tend to irritate your bladder.  Empty your bladder often. Avoid holding urine for long periods of time.  Empty your bladder before and after sexual intercourse.  After a bowel movement, women should cleanse from front to back. Use each tissue only once.  SEEK MEDICAL CARE IF:  You have back pain.  You develop a fever.  Your symptoms do not begin to resolve within 3 days.  SEEK IMMEDIATE MEDICAL CARE IF:  You have severe back pain or lower abdominal pain.  You develop chills.  You have nausea or vomiting.  You have continued burning or discomfort with urination.      Diagnosis: Uterine prolapse  Assessment and Plan of Treatment: Known hx of uterine prolapse. CT findings significant for bladder, uterine, and rectal prolapse through the pelvic floor.   - obgyn rec outpt follow up with Dr. Dg Hopper [phone] - please call for outpatient follow up.     PRINCIPAL DISCHARGE DIAGNOSIS  Diagnosis: Lymphoma, high grade  Assessment and Plan of Treatment: -status post ommaya placement on 9/15/22. CSF Flow cytometry and Cytopathology negative for malignant cells. Patient will ultimately require IT MTX, to be given outpatient with C2.   -Radiation Oncology consulted. Patient will follow-up outpatient for RT  -Continue zarxio 300mcg daily x3 days 9/18-9/20.  -Continue Allopurinol 300mg daily  Follow up with Dr. Delgadillo on 9/28/22 at 2:30pm at [facility]. .      SECONDARY DISCHARGE DIAGNOSES  Diagnosis: Multiple lesions of metastatic malignancy  Assessment and Plan of Treatment: plan on discharge to Rehab and [facility] follow up for subsequent treatments. Have discussed with [facility] for palliative spine RT.    Diagnosis: Generalized weakness  Assessment and Plan of Treatment: - Etiology for symptoms was initially broad and included inflammatory, neoplastic, and even paraneoplastic. Given spinal canal findings with cervical epidural mass and impingement on the spinal cord (no edema) likely also large contributor to symptoms. Can consider unmasking of underlying Parkinson Disease in the setting of acute medical issues but less likely. Patient feels improvement on current Sinemet dose  - Continue Sinemet 25/100mg 1 tab PO TID  Continue medications, Follow up with Neurology and PMD for managment    Diagnosis: Urinary tract infection  Assessment and Plan of Treatment: HOME CARE INSTRUCTIONS  Antibiotics completed   Drink enough water and fluids to keep your urine clear or pale yellow.  Avoid caffeine, tea, and carbonated beverages. They tend to irritate your bladder.  Empty your bladder often. Avoid holding urine for long periods of time.  Empty your bladder before and after sexual intercourse.  After a bowel movement, women should cleanse from front to back. Use each tissue only once.  SEEK MEDICAL CARE IF:  You have back pain.  You develop a fever.  Your symptoms do not begin to resolve within 3 days.  SEEK IMMEDIATE MEDICAL CARE IF:  You have severe back pain or lower abdominal pain.  You develop chills.  You have nausea or vomiting.  You have continued burning or discomfort with urination.      Diagnosis: Uterine prolapse  Assessment and Plan of Treatment: Known hx of uterine prolapse. CT findings significant for bladder, uterine, and rectal prolapse through the pelvic floor.   - obgyn rec outpt follow up with Dr. Dg Hopper [phone] - please call for outpatient follow up.     PRINCIPAL DISCHARGE DIAGNOSIS  Diagnosis: Lymphoma, high grade  Assessment and Plan of Treatment: -status post ommaya placement on 9/15/22. CSF Flow cytometry and Cytopathology negative for malignant cells. Patient will ultimately require IT MTX, to be given outpatient with C2.   -Radiation Oncology consulted. Patient will follow-up outpatient for RT  -Continue zarxio 300mcg daily x3 days 9/18-9/20.  -Continue Allopurinol 300mg daily  Continue dexamethasone thru 9/24/22 , Start prednisone 60mg 9/25/22 and decrease by 10mg every 2 days.   Follow up with Dr. Delgadillo on 9/28/22 at 2:30pm at [facility]. .      SECONDARY DISCHARGE DIAGNOSES  Diagnosis: Multiple lesions of metastatic malignancy  Assessment and Plan of Treatment: plan on discharge [facility] Rehab [facility] for [facility] treatments[facility]    Diagnosis: Generalized weakness  Assessment and Plan of Treatment: - Etiology for symptoms was initially broad and included inflammatory, neoplastic, and even paraneoplastic. Given spinal canal findings with cervical epidural mass and impingement on the spinal cord (no edema) likely also large contributor to symptoms. Can consider unmasking of underlying Parkinson Disease in the setting of acute medical issues but less likely. Patient feels improvement on current Sinemet dose  - Continue Sinemet 25/100mg 1 tab PO TID  Continue medications, Follow up with Neurology and PMD for managment    Diagnosis: Urinary tract infection  Assessment and Plan of Treatment: HOME CARE INSTRUCTIONS  Antibiotics completed   Drink enough water and fluids to keep your urine clear or pale yellow.  Avoid caffeine, tea, and carbonated beverages. They tend to irritate your bladder.  Empty your bladder often. Avoid holding urine for long periods of time.  Empty your bladder before and after sexual intercourse.  After a bowel movement, women should cleanse from front to back. Use each tissue only once.  SEEK MEDICAL CARE IF:  You have back pain.  You develop a fever.  Your symptoms do not begin to resolve within 3 days.  SEEK IMMEDIATE MEDICAL CARE IF:  You have severe back pain or lower abdominal pain.  You develop chills.  You have nausea or vomiting.  You have continued burning or discomfort with urination.      Diagnosis: Uterine prolapse  Assessment and Plan of Treatment: Known hx of uterine prolapse. CT findings significant for bladder, uterine, and rectal prolapse through the pelvic floor.   - obgyn rec outpt follow up with Dr. Dg Hopper [phone] - please call for outpatient follow up.

## 2022-09-02 NOTE — PROGRESS NOTE ADULT - PROBLEM SELECTOR PLAN 1
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 Primary lesion is unknown but with unilateral L plef, concern for metastatic process in chest  - Pt and family would like to pursue diagnosis  - Awaiting cytology from L thoracentesis - no malignant cells  - CTC w/ IV contrast showing anterior mediastinal mass, L axillary lymph node. Ddx includes thymoma vs lymphoma vs other  - Repeat MRI brain, c, t spine w/w/o IV - diffuse spinal involvement noted; no cord compression  - s/p IR biopsy of L axillary node 8/31  - Paraneoplastic panel sent  - Establish care with onc consult after biopsy - emailed [email or web address] to follow-up and establish care when pathology results.

## 2022-09-02 NOTE — PROGRESS NOTE ADULT - SUBJECTIVE AND OBJECTIVE BOX
[facility] Division of Hospital Medicine  Brock Keita MD  Available via MS Teams    SUBJECTIVE / OVERNIGHT EVENTS: No acute events overnight. Pt seen and examined at bedside. No acute complaints. No axillary pain, chest pain, sob.     MEDICATIONS  (STANDING):  carbidopa/levodopa  25/100 1 Tablet(s) Oral <User Schedule>  enoxaparin Injectable 40 milliGRAM(s) SubCutaneous every 24 hours    MEDICATIONS  (PRN):  melatonin 3 milliGRAM(s) Oral at bedtime PRN Insomnia      I&O's Summary    01 Sep 2022 07:01  -  02 Sep 2022 07:00  --------------------------------------------------------  IN: 480 mL / OUT: 0 mL / NET: 480 mL    CONSTITUTIONAL: NAD, sitting up in bed  EYES: PERRL; conjunctiva and sclera clear  ENMT: Moist oral mucosa, no pharyngeal injection or exudates; normal dentition  RESPIRATORY: decreased breath sounds over left lung, no accessory muscle usage  CHEST: L axilla LAD, no bleeding, skin intact  CARDIOVASCULAR: RRR, systolic murmur, no LE edema  ABDOMEN: Nontender to palpation, normoactive bowel sounds, no rebound/guarding; No hepatosplenomegaly  PSYCH: A+O to person, place, and time; affect appropriate  NEUROLOGY: CN 2-12 are grossly intact and symmetric  SKIN: No rashes; no palpable lesions    LABS:                    COVID-19 PCR: NotDetec (28 Aug 2022 06:45)  SARS-CoV-2: NotDetec (22 Aug 2022 23:35)      RADIOLOGY & ADDITIONAL TESTS:  New Results Reviewed Today:   New Imaging Personally Reviewed Today:  New Electrocardiogram Personally Reviewed Today:  Prior or Outpatient Records Reviewed Today:    COMMUNICATION:  Care Discussed with Consultants/Other Providers and Details of Discussion: Discussed with ACP  Discussions with Patient/Family:  PCP Communication:

## 2022-09-02 NOTE — PROGRESS NOTE ADULT - ASSESSMENT
Parkinsonism  Myoclonus  Thoracic spinal cord masses/lesions (mets?)  Pleural effusion    - Etiology for symptoms is broad and includes inflammatory, neoplastic, and event paraneoplastic. Given spinal canal findings with cervical epidural mass and impingement on the spinal cord (no edema) likely also large contributor to symptoms. Can consider unmasking of underlying Parkinson Disease in the setting of acute medical issues but less likely. Patient feels improvement on current Sinemet dose  - Continue Sinemet 25/100mg 1 tab PO TID  - MRI brain, c-spine, t-spine, and l-spine w/ and w/o given possible spinal (vertebral body) malignancies with results as above  - S/p left axillary lymph node biopsy under IR on 8/31, awaiting results  - PT/OT as tolerated  - Continue to address above medical and surgical issues, as you are doing  - Will continue to follow patient with you

## 2022-09-02 NOTE — PROGRESS NOTE ADULT - SUBJECTIVE AND OBJECTIVE BOX
NEUROLOGY FOLLOW-UP CONSULT NOTE    RFC: Weakness, parkinsonism    Interval history: No acute neurologic events overnight. Felt some "heaviness" in RLE as well as tremors in R > L LE overnight but now improved (especially with use of SCD's). Tolerating Sinemet without incident. Tolerated left axillary lymph node biopsy under IR without incident, awaiting results.    Meds:  MEDICATIONS  (STANDING):  carbidopa/levodopa  25/100 1 Tablet(s) Oral <User Schedule>  enoxaparin Injectable 40 milliGRAM(s) SubCutaneous every 24 hours    MEDICATIONS  (PRN):  melatonin 3 milliGRAM(s) Oral at bedtime PRN Insomnia      PMHx/PSHx/FHx/SHx:  Other specified health status    No pertinent family history in first degree relatives    No pertinent family history in first degree relatives    Handoff    MEWS Score    Dyslipidemia    Hypertension    Atrial fibrillation, unspecified type    Small bowel obstruction    Decreased activities of daily living (ADL)    UTI (urinary tract infection)    Generalized weakness    Uterine prolapse    Pleural effusion    Acute on chronic diastolic heart failure    Multiple lesions of metastatic malignancy    No significant past surgical history    H/O cardiac radiofrequency ablation    Small bowel obstruction    UTERUS ALL THE WAY OUT    90+    Unintentional weight loss    Mild dehydration    Urinary tract infection    [email or web address]        Allergies:  No Known Allergies      ROS: All systems negative except as documented in Interval history    O:  T(C): 36.6 (09-02-22 @ 11:36), Max: 36.8 (09-01-22 @ 21:00)  HR: 83 (09-02-22 @ 11:36) (71 - 95)  BP: 103/71 (09-02-22 @ 11:36) (103/71 - 111/75)  RR: 18 (09-02-22 @ 11:36) (18 - 18)  SpO2: 94% (09-02-22 @ 11:36) (94% - 98%)    Focused neurologic exam:  MS: AAOX3, speech fluent, rep/naming intact, follow commands; recent and remote memory intact; normal attention/concentration and fund of knowledge.   CN: EOMI, PERRL, no NALINI, no APD, VFF, V1-3 intact, no facial asymmetry but mild facial hypomimia b/l, hearing intact b/l, tongue/palate midline, SCM/trap intact. Myerson's/Glabellar weakly positive  Motor: Strength: BUE's 4+/5, BLE's 4+-5/5. Tone: normal. Bulk: decreased throughout. Mild bradykinesia noted  DTR 2+ symm.  Plantar flex b/l.  Sensation: intact to LT 4x.   Coordination: intact FTN.   Gait and station: Due to fall risk/safety concerns did not assess    Pertinent labs/studies:  CBC essentially WNL  PT/INR WNL, PTT dec 27.2  BMP essentially WNL  Mg WNL, Phos WNL  Copper WNL, CPK WNL    MRI brain, c-spine, t-spine, l-spine w/ and w/o 8/30 - No abnormal brain parenchymal or leptomeningeal enhancement.    Widespread osseous metastatic disease involving the entire   spine, as discussed.    Circumferential epidural tumor involvement spanning the T3-T5 levels with   epidural tumor encroachment worst at the T4 level surrounding the cord   which appears flattened. Evaluation for cord edema is limited secondary   to lack of T2 and STIR sequences through this area.    Additional epidural tumor within the sacral canal adjacent to osseous   metastatic lesions, more asymmetric replaced to the left.    Suggestion of abnormal leptomeningeal enhancement involving the cauda   equina nerve roots also suspicious for neoplastic involvement.    Redemonstration of abnormal confluent enhancing soft tissue within the   posterior mediastinum surrounding the aorta as well as confluent soft   tissue adjacent to the IVC and descending aorta within the abdominal   cavity suspicious for adenopathy and/or tumor.    Mild concave superior endplate deformities of T12 and L1 without bony   retropulsion.

## 2022-09-03 NOTE — PROGRESS NOTE ADULT - PROBLEM SELECTOR PLAN 1
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 Primary lesion is unknown but with unilateral L plef, concern for metastatic process in chest  - Pt and family would like to pursue diagnosis  - Awaiting cytology from L thoracentesis - no malignant cells  - CTC w/ IV contrast showing anterior mediastinal mass, L axillary lymph node. Ddx includes thymoma vs lymphoma vs other  - Repeat MRI brain, c, t spine w/w/o IV - diffuse spinal involvement noted; no cord compression  - s/p IR biopsy of L axillary node 8/31  - Paraneoplastic panel sent  - Establish care with onc consult after biopsy - emailed [email or web address] to follow-up and establish care when pathology results.

## 2022-09-03 NOTE — CHART NOTE - NSCHARTNOTEFT_GEN_A_CORE
Patient is a 82y old  Female who presents with a chief complaint of CC: generalized weakness, abdominal discomfort (02 Sep 2022 18:53)    Event:   Notified by RN, patient woke up this morning with L wrist pain and noticed L wrist area to be slightly swollen.   Patient seen at bed, patient awake, denies CP, palpitations, n/v. Has + sensation on LUE, +2 pulses.  Endorsing pain with ROM.        Vital Signs Last 24 Hrs  T(C): 36.7 (03 Sep 2022 04:20), Max: 36.8 (02 Sep 2022 20:21)  T(F): 98 (03 Sep 2022 04:20), Max: 98.3 (02 Sep 2022 20:21)  HR: 70 (03 Sep 2022 04:20) (65 - 83)  BP: 122/79 (03 Sep 2022 04:20) (103/71 - 125/85)  BP(mean): --  RR: 18 (03 Sep 2022 04:20) (18 - 18)  SpO2: 95% (03 Sep 2022 04:20) (94% - 95%)    Parameters below as of 03 Sep 2022 04:20  Patient On (Oxygen Delivery Method): room air      Radiology:    < from: MR Lumbar Spine w/wo IV Cont (08.30.22 @ 17:30) >      IMPRESSION: Widespread osseous metastatic disease involving the entire   spine, as discussed.    Circumferential epidural tumor involvement spanning the T3-T5 levels with   epidural tumor encroachment worst at the T4 level surrounding the cord   which appears flattened. Evaluation for cord edema is limited secondary   to lack of T2 and STIR sequences through this area.    Additional epidural tumor within the sacral canal adjacent to osseous   metastatic lesions, more asymmetric replaced to the left.    Suggestion of abnormal leptomeningeal enhancement involving the cauda   equina nerve roots also suspicious for neoplastic involvement.    Redemonstration of abnormal confluent enhancing soft tissue within the   posteriormediastinum surrounding the aorta as well as confluent soft   tissue adjacent to the IVC and descending aorta within the abdominal   cavity suspicious for adenopathy and/or tumor.    Mild concave superior endplate deformities of T12 and L1 without bony   retropulsion.    --- End of Report ---    < end of copied text >        Physical Exam:  General: WN/WD NAD  Neurology: A&Ox3, nonfocal, REINOSO x 4  Head:  Normocephalic, atraumatic  Respiratory: CTA B/L  CV: RRR, S1S2, no murmur  Abdominal: Soft, NT, ND no palpable mass  MSK:  + peripheral pulses. Mild L wrist edema, no redness, area not warm to touch.  Limited ROM on L hand 2/2 pain.     Assessment & Plan:  HPI:  81 y/o F w/ hx of arrhythmia s/p ablation and incarcerated R femoral hernia s/p small bowel resection presents with several months of generalized weakness and 2 weeks of lower abdominal discomfort.   She says for the past several months she has been experiencing generalized weakness which has been worsening over the last 2 weeks.     Patient c/o L wrist pain and swelling this am.   >Could be 2/2 mets disease, less likely infection or trauma  >L wrist Xray w/ 3 views ordered, f/u results  >Tylenol PRN   >Elevate L arm  >        Follow up with Attending in AM. HPI  Patient is a 82y old  Female who presents with a chief complaint of generalized weakness, abdominal discomfort (02 Sep 2022 18:53)    Event:   Notified by RN, patient woke up this morning with L wrist pain and noticed L wrist area to be slightly swollen.   Patient seen at bed, patient awake, denies CP, palpitations, n/v. Has + sensation on LUE, +2 pulses.  Endorsing pain with ROM.        Vital Signs Last 24 Hrs  T(C): 36.7 (03 Sep 2022 04:20), Max: 36.8 (02 Sep 2022 20:21)  T(F): 98 (03 Sep 2022 04:20), Max: 98.3 (02 Sep 2022 20:21)  HR: 70 (03 Sep 2022 04:20) (65 - 83)  BP: 122/79 (03 Sep 2022 04:20) (103/71 - 125/85)  BP(mean): --  RR: 18 (03 Sep 2022 04:20) (18 - 18)  SpO2: 95% (03 Sep 2022 04:20) (94% - 95%)    Parameters below as of 03 Sep 2022 04:20  Patient On (Oxygen Delivery Method): room air      Radiology:    < from: MR Lumbar Spine w/wo IV Cont (08.30.22 @ 17:30) >      IMPRESSION: Widespread osseous metastatic disease involving the entire   spine, as discussed.    Circumferential epidural tumor involvement spanning the T3-T5 levels with   epidural tumor encroachment worst at the T4 level surrounding the cord   which appears flattened. Evaluation for cord edema is limited secondary   to lack of T2 and STIR sequences through this area.    Additional epidural tumor within the sacral canal adjacent to osseous   metastatic lesions, more asymmetric replaced to the left.    Suggestion of abnormal leptomeningeal enhancement involving the cauda   equina nerve roots also suspicious for neoplastic involvement.    Redemonstration of abnormal confluent enhancing soft tissue within the   posteriormediastinum surrounding the aorta as well as confluent soft   tissue adjacent to the IVC and descending aorta within the abdominal   cavity suspicious for adenopathy and/or tumor.    Mild concave superior endplate deformities of T12 and L1 without bony   retropulsion.    --- End of Report ---    < end of copied text >        Physical Exam:  General: WN/WD NAD  Neurology: A&Ox3, nonfocal, REINOSO x 4  Head:  Normocephalic, atraumatic  Respiratory: CTA B/L  CV: RRR, S1S2, no murmur  Abdominal: Soft, NT, ND no palpable mass  MSK:  + peripheral pulses. Mild L wrist edema, no redness, area not warm to touch.  Limited ROM on L hand 2/2 pain.     Assessment & Plan:  HPI:  81 y/o F w/ hx of arrhythmia s/p ablation and incarcerated R femoral hernia s/p small bowel resection presents with several months of generalized weakness and 2 weeks of lower abdominal discomfort.   She says for the past several months she has been experiencing generalized weakness which has been worsening over the last 2 weeks.     Patient c/o L wrist pain and swelling this am.   Could be 2/2 mets disease, positioning while sleeping, less likely infection or trauma  >L wrist Xray w/ 3 views ordered, f/u results  >Tylenol PRN   >Elevate L arm  >Removed PIV  >Uric acid level sent    Plan of care discussed with patient and RN at bedside. Patient agreed with plan of care    Will f/u with attending and ACP team in the AM.     Emelin Reyes [place], [facility]  Medicine Deparment   Spectralink [phone]        Follow up with Attending in AM.

## 2022-09-03 NOTE — PROGRESS NOTE ADULT - ASSESSMENT
83 y/o F w/ hx of arrhythmia s/p ablation and incarcerated R femoral hernia s/p small bowel resection presents with several months of generalized weakness and 2 weeks of lower abdominal discomfort, being treated for pseudomonal UTI, also found to have bilateral pleff, possibly 2/2 to acute on chronic HFrEF s/p thoracentesis, workup for new metastatic malignancy s/p IR biosy of left axillary lymph node

## 2022-09-03 NOTE — PROGRESS NOTE ADULT - SUBJECTIVE AND OBJECTIVE BOX
Patient is a 82y old  Female who presents with a chief complaint of CC: generalized weakness, abdominal discomfort (02 Sep 2022 18:53)        SUBJECTIVE / OVERNIGHT EVENTS: Patient had no acute events overnight. Wrist pain the improved with tylenol. Patient seen and examined at bedside this morning. She reports left wrist pain this morning is better, but painful when grasping for objects. No swelling.     ROS: [ - ] Fever [ - ] Chills [ - ] Nausea/Vomiting [ - ] Chest Pain [ - ] Shortness of breath     MEDICATIONS  (STANDING):  carbidopa/levodopa  25/100 1 Tablet(s) Oral <User Schedule>  enoxaparin Injectable 40 milliGRAM(s) SubCutaneous every 24 hours  ibuprofen  Tablet. 400 milliGRAM(s) Oral every 8 hours  pantoprazole    Tablet 40 milliGRAM(s) Oral before breakfast    MEDICATIONS  (PRN):  melatonin 3 milliGRAM(s) Oral at bedtime PRN Insomnia      Vital Signs Last 24 Hrs  T(C): 36.6 (03 Sep 2022 12:20), Max: 36.8 (02 Sep 2022 20:21)  T(F): 97.8 (03 Sep 2022 12:20), Max: 98.3 (02 Sep 2022 20:21)  HR: 76 (03 Sep 2022 12:20) (65 - 76)  BP: 119/77 (03 Sep 2022 12:20) (119/77 - 125/85)  BP(mean): --  RR: 18 (03 Sep 2022 12:20) (18 - 18)  SpO2: 95% (03 Sep 2022 12:20) (95% - 95%)    Parameters below as of 03 Sep 2022 12:20  Patient On (Oxygen Delivery Method): room air      CAPILLARY BLOOD GLUCOSE        I&O's Summary      PHYSICAL EXAM  GENERAL: NAD, lying comfortably in bed   HEENT:  Atraumatic, Normocephalic, EOMI, conjunctiva and sclera clear, Left axillary LAD  CHEST/LUNG: Clear to auscultation bilaterally; No wheeze  HEART: RRR, S1 and S2 No murmurs, rubs, or gallops  ABDOMEN: Soft, Nontender, Nondistended; Bowel sounds present  EXTREMITIES:  2+ Peripheral Pulses, No clubbing, cyanosis, or edema. Left first metatarsal tenderness. No phalen or tinel sign. Mild swelling but no erythema or warmth   NEURO: AAOx3, non-focal  SKIN: No rashes or lesions    LABS:                        12.0   5.39  )-----------( 241      ( 03 Sep 2022 07:10 )             35.9     09-03    140  |  104  |  40<H>  ----------------------------<  83  4.0   |  26  |  0.67    Ca    9.2      03 Sep 2022 07:09                  RADIOLOGY & ADDITIONAL TESTS:  < from: Xray Wrist 3 Views, Left (09.03.22 @ 09:25) >  INTERPRETATION:  CLINICAL INFORMATION: Pain    COMPARISON: None available.    TECHNIQUE: 3 views of the left wrist.    FINDINGS:  No acute displaced fracture or dislocation.  Chronic healed mildly dorsally impacted Colles' type fracture of the   distal radius with corresponding flattening of the normal palmar   inclination at the radiocarpal joint and positive ulnar variance.  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  Osteoporosis.  No aggressive osseous neoplasm.  No focal soft tissue swelling appreciated.  No radiopaque foreign body.    IMPRESSION:  1.  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  2.  Osteoporosis.    --- End of Report ---    < end of copied text >      Labs Personally Reviewed  Imaging Personally Reviewed  Consultant(s) Notes Reviewed

## 2022-09-04 NOTE — PROGRESS NOTE ADULT - ASSESSMENT
81 y/o F w/ hx of arrhythmia s/p ablation and incarcerated R femoral hernia s/p small bowel resection presents with several months of generalized weakness and 2 weeks of lower abdominal discomfort, being treated for pseudomonal UTI, also found to have bilateral pleff, possibly 2/2 to acute on chronic HFrEF s/p thoracentesis, workup for new metastatic malignancy s/p IR biosy of left axillary lymph node

## 2022-09-04 NOTE — PROGRESS NOTE ADULT - SUBJECTIVE AND OBJECTIVE BOX
Patient is a 82y old  Female who presents with a chief complaint of CC: generalized weakness, abdominal discomfort (03 Sep 2022 15:21)        SUBJECTIVE / OVERNIGHT EVENTS: Patient had no acute events overnight. Patient seen and examined at bedside this morning. Wrist pain improved    ROS: [ - ] Fever [ - ] Chills [ - ] Nausea/Vomiting [ - ] Chest Pain [ - ] Shortness of breath     MEDICATIONS  (STANDING):  carbidopa/levodopa  25/100 1 Tablet(s) Oral <User Schedule>  enoxaparin Injectable 40 milliGRAM(s) SubCutaneous every 24 hours  ibuprofen  Tablet. 400 milliGRAM(s) Oral every 8 hours  pantoprazole    Tablet 40 milliGRAM(s) Oral before breakfast    MEDICATIONS  (PRN):  melatonin 3 milliGRAM(s) Oral at bedtime PRN Insomnia      Vital Signs Last 24 Hrs  T(C): 36.9 (04 Sep 2022 12:19), Max: 36.9 (04 Sep 2022 12:19)  T(F): 98.5 (04 Sep 2022 12:19), Max: 98.5 (04 Sep 2022 12:19)  HR: 73 (04 Sep 2022 12:19) (63 - 87)  BP: 96/66 (04 Sep 2022 12:19) (96/66 - 105/73)  BP(mean): --  RR: 16 (04 Sep 2022 12:19) (16 - 18)  SpO2: 96% (04 Sep 2022 04:28) (95% - 96%)    Parameters below as of 04 Sep 2022 04:28  Patient On (Oxygen Delivery Method): room air      CAPILLARY BLOOD GLUCOSE        I&O's Summary      PHYSICAL EXAM  GENERAL: NAD, lying comfortably in bed   HEENT:  Atraumatic, Normocephalic, EOMI, conjunctiva and sclera clear, Left axillary LAD  CHEST/LUNG: Clear to auscultation bilaterally; No wheeze  HEART: RRR, S1 and S2 No murmurs, rubs, or gallops  ABDOMEN: Soft, Nontender, Nondistended; Bowel sounds present  EXTREMITIES:  2+ Peripheral Pulses, No clubbing, cyanosis, or edema. Left first metatarsal tenderness. No phalen or tinel sign. Mild swelling but no erythema or warmth   NEURO: AAOx3, non-focal  SKIN: No rashes or lesions      LABS:                        11.9   4.94  )-----------( 233      ( 04 Sep 2022 06:55 )             36.2     09-04    140  |  105  |  33<H>  ----------------------------<  81  4.1   |  27  |  0.66    Ca    9.4      04 Sep 2022 06:55                  RADIOLOGY & ADDITIONAL TESTS:      Labs Personally Reviewed  Imaging Personally Reviewed  Consultant(s) Notes Reviewed

## 2022-09-04 NOTE — PROGRESS NOTE ADULT - PROBLEM SELECTOR PLAN 1
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 Primary lesion is unknown but with unilateral L plef, concern for metastatic process in chest  - Pt and family would like to pursue diagnosis  - Awaiting cytology from L thoracentesis - no malignant cells  - CTC w/ IV contrast showing anterior mediastinal mass, L axillary lymph node. Ddx includes thymoma vs lymphoma vs other  - Repeat MRI brain, c, t spine w/w/o IV - diffuse spinal involvement noted; no cord compression  - s/p IR biopsy of L axillary node 8/31  - Paraneoplastic panel sent  - Establish care with onc consult after biopsy - emailed [email or web address] to follow-up and establish care when pathology results.

## 2022-09-05 NOTE — PROGRESS NOTE ADULT - PROBLEM SELECTOR PLAN 1
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 Primary lesion is unknown but with unilateral L plef, concern for metastatic process in chest  - Pt and family would like to pursue diagnosis  - Awaiting cytology from L thoracentesis - no malignant cells  - CTC w/ IV contrast showing anterior mediastinal mass, L axillary lymph node. Ddx includes thymoma vs lymphoma vs other  - Repeat MRI brain, c, t spine w/w/o IV - diffuse spinal involvement noted; no cord compression  - s/p IR biopsy of L axillary node 8/31  - Paraneoplastic panel sent  - Establish care with onc consult after biopsy - emailed [email or web address] to follow-up and establish care when pathology results.

## 2022-09-05 NOTE — PROGRESS NOTE ADULT - SUBJECTIVE AND OBJECTIVE BOX
Patient is a 82y old  Female who presents with a chief complaint of CC: generalized weakness, abdominal discomfort (04 Sep 2022 16:26)        SUBJECTIVE / OVERNIGHT EVENTS: Patient had no acute events overnight. Patient seen and examined at bedside this morning. Pain in hands improved. No acute complaints. Tolerating diet    ROS: [ - ] Fever [ - ] Chills [ - ] Nausea/Vomiting [ - ] Chest Pain [ - ] Shortness of breath     MEDICATIONS  (STANDING):  carbidopa/levodopa  25/100 1 Tablet(s) Oral <User Schedule>  enoxaparin Injectable 40 milliGRAM(s) SubCutaneous every 24 hours  ibuprofen  Tablet. 400 milliGRAM(s) Oral every 8 hours  latanoprost 0.005% Ophthalmic Solution 1 Drop(s) Both EYES at bedtime  pantoprazole    Tablet 40 milliGRAM(s) Oral before breakfast    MEDICATIONS  (PRN):  melatonin 3 milliGRAM(s) Oral at bedtime PRN Insomnia      Vital Signs Last 24 Hrs  T(C): 36.4 (05 Sep 2022 11:34), Max: 36.6 (04 Sep 2022 21:06)  T(F): 97.5 (05 Sep 2022 11:34), Max: 97.9 (04 Sep 2022 21:06)  HR: 71 (05 Sep 2022 11:34) (66 - 84)  BP: 100/68 (05 Sep 2022 11:34) (93/59 - 137/84)  BP(mean): --  RR: 18 (05 Sep 2022 11:34) (16 - 18)  SpO2: 95% (05 Sep 2022 11:34) (95% - 97%)    Parameters below as of 05 Sep 2022 11:34  Patient On (Oxygen Delivery Method): room air      CAPILLARY BLOOD GLUCOSE        I&O's Summary    04 Sep 2022 07:01  -  05 Sep 2022 07:00  --------------------------------------------------------  IN: 240 mL / OUT: 0 mL / NET: 240 mL    05 Sep 2022 07:01  -  05 Sep 2022 16:49  --------------------------------------------------------  IN: 240 mL / OUT: 0 mL / NET: 240 mL        PHYSICAL EXAM  GENERAL: NAD, lying comfortably in bed   HEENT:  Atraumatic, Normocephalic, EOMI, conjunctiva and sclera clear, Left axillary LAD  CHEST/LUNG: Clear to auscultation bilaterally; No wheeze  HEART: RRR, S1 and S2 No murmurs, rubs, or gallops  ABDOMEN: Soft, Nontender, Nondistended; Bowel sounds present  EXTREMITIES:  2+ Peripheral Pulses, No clubbing, cyanosis, or edema. Left first metatarsal mild tenderness.  NEURO: AAOx3, non-focal  SKIN: No rashes or lesions      LABS:                        11.9   4.94  )-----------( 233      ( 04 Sep 2022 06:55 )             36.2     09-04    140  |  105  |  33<H>  ----------------------------<  81  4.1   |  27  |  0.66    Ca    9.4      04 Sep 2022 06:55                  RADIOLOGY & ADDITIONAL TESTS:      Labs Personally Reviewed  Imaging Personally Reviewed  Consultant(s) Notes Reviewed

## 2022-09-06 NOTE — CONSULT NOTE ADULT - ASSESSMENT
82F hx of arrhythmia s/p ablation and incarcerated R femoral hernia s/p small bowel resection presents with several months of generalized weakness and 2 weeks of lower abdominal discomfort. Found to have large mediastinal mass and flow cytometry showing lymphoma, cytopath still pending. Hematology consulted for management of lymphoma.    #B-cell Lymphoma  Presented with lower abdominal discomfort and weakness, found to have large mediastinal mass.  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 Could be DLBCL if large cells. If small cells, could be follicular lymphoma or hairy cell leukemia.  - Patient needs to remain admitted at this time as she may require inpatient treatment of the lymphoma    Landry Porter MD  Hematology/Oncology Fellow PGY-4  Pager: [facility] [phone] / [place] [phone]   After 5pm and on weekends please page on-call fellow  82F hx of arrhythmia s/p ablation and incarcerated R femoral hernia s/p small bowel resection presents with several months of generalized weakness and 2 weeks of lower abdominal discomfort. Found to have large mediastinal mass and flow cytometry showing lymphoma, cytopath still pending. Hematology consulted for management of lymphoma.    #B-cell Lymphoma  Presented with lower abdominal discomfort and weakness, found to have large mediastinal mass.  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 Could be DLBCL if large cells. If small cells, could be follicular lymphoma or hairy cell leukemia.  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  - Patient needs to remain admitted at this time as she may require inpatient treatment of the lymphoma  - Please monitor TLS labs (CMP, Mg, Phos, LDH, uric acid) daily   - Given CNS involvement, recommend lumbar puncture. Please get flow cytometry and cytopathology for the CSF sample.  - Will perform bone marrow biopsy tomorrow 9/7/22  - Hematology will continue to follow. Please page with questions      Landry Porter MD  Hematology/Oncology Fellow PGY-4  Pager: [facility] [phone] / [place] [phone]   After 5pm and on weekends please page on-call fellow

## 2022-09-06 NOTE — CONSULT NOTE ADULT - ATTENDING COMMENTS
The patient was seen and evaluated. Pathology report favors initial diagnosis of a follicular lymphoma-flow cytometry reviewed CD 10 positive; however she has bone lesions and areas suggestive of epidural lesions with possible leptomeningeal disease. I would request additional pathology review of tissue including a bone marrow aspiration and biopsy. Patient condition was discussed with the daughter a neurology physician by telephone

## 2022-09-06 NOTE — PROGRESS NOTE ADULT - SUBJECTIVE AND OBJECTIVE BOX
Patient is a 82y old  Female who presents with a chief complaint of CC: generalized weakness, abdominal discomfort (06 Sep 2022 13:51)      SUBJECTIVE / OVERNIGHT EVENTS: Patient had no acute events overnight. Patient seen and examined at bedside this morning. Watching tv, tolerating diet. Pain in hands and wrist improved. Endorsed generalized weakness.     ROS: [ - ] Fever [ - ] Chills [ - ] Nausea/Vomiting [ - ] Chest Pain [ - ] Shortness of breath     MEDICATIONS  (STANDING):  carbidopa/levodopa  25/100 1 Tablet(s) Oral <User Schedule>  enoxaparin Injectable 40 milliGRAM(s) SubCutaneous every 24 hours  ibuprofen  Tablet. 400 milliGRAM(s) Oral once  latanoprost 0.005% Ophthalmic Solution 1 Drop(s) Both EYES at bedtime  pantoprazole    Tablet 40 milliGRAM(s) Oral before breakfast    MEDICATIONS  (PRN):  melatonin 3 milliGRAM(s) Oral at bedtime PRN Insomnia      Vital Signs Last 24 Hrs  T(C): 36.4 (06 Sep 2022 12:00), Max: 36.6 (05 Sep 2022 20:27)  T(F): 97.6 (06 Sep 2022 12:00), Max: 97.9 (05 Sep 2022 20:27)  HR: 85 (06 Sep 2022 12:00) (62 - 85)  BP: 111/76 (06 Sep 2022 12:00) (103/64 - 113/77)  BP(mean): --  RR: 18 (06 Sep 2022 12:00) (16 - 18)  SpO2: 96% (06 Sep 2022 12:00) (96% - 97%)    Parameters below as of 06 Sep 2022 12:00  Patient On (Oxygen Delivery Method): room air      CAPILLARY BLOOD GLUCOSE        I&O's Summary    05 Sep 2022 07:01  -  06 Sep 2022 07:00  --------------------------------------------------------  IN: 240 mL / OUT: 0 mL / NET: 240 mL    06 Sep 2022 07:01  -  06 Sep 2022 14:00  --------------------------------------------------------  IN: 240 mL / OUT: 0 mL / NET: 240 mL        PHYSICAL EXAM  GENERAL: NAD, lying comfortably in bed   HEENT:  Atraumatic, Normocephalic, EOMI, conjunctiva and sclera clear, Left axillary LAD  CHEST/LUNG: Clear to auscultation bilaterally; No wheeze  HEART: RRR, S1 and S2 No murmurs, rubs, or gallops  ABDOMEN: Soft, Nontender, Nondistended; Bowel sounds present  EXTREMITIES:  2+ Peripheral Pulses, No clubbing, cyanosis, or edema. Left first metatarsal mild tenderness.  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   Please see interpretation.   INTERPRETATION:   MORPHOLOGY:   CYTOSPIN: Few small cells   IMMUNOPHENOTYPE: Monotypic B-cells (31% of cells), positive for lambda, CD19, CD20, CD10, CD23;   negative CD5, .   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 Correlation with histology is   necessary.     Labs Personally Reviewed  Imaging Personally Reviewed  Consultant(s) Notes Reviewed

## 2022-09-06 NOTE — PROGRESS NOTE ADULT - PROBLEM SELECTOR PLAN 1
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 Primary lesion is unknown but with unilateral L plef, concern for metastatic process in chest  - Pt and family would like to pursue diagnosis  - cytology from L thoracentesis - no malignant cells  - CTC w/ IV contrast showing anterior mediastinal mass, L axillary lymph node  - Repeat MRI brain, c, t spine w/w/o IV - diffuse spinal involvement noted; no cord compression  - s/p IR biopsy of L axillary node 8/31- cytology B cell lymphoma, pending pathology results  - Paraneoplastic panel sent  - Establish care with onc consult after biopsy - emailed [email or web address] to follow-up and establish care when pathology results.  -Heme consulted

## 2022-09-06 NOTE — CONSULT NOTE ADULT - SUBJECTIVE AND OBJECTIVE BOX
Hematology Consult Note    HPI as per admitting team:   83 y/o F w/ hx of arrhythmia s/p ablation and incarcerated R femoral hernia s/p small bowel resection presents with several months of generalized weakness and 2 weeks of lower abdominal discomfort.   She says for the past several months she has been experiencing generalized weakness which has been worsening over the last 2 weeks. Prior to this she has been able to manage chores on her own like doing laundry but now she feels she is too weak and needs help with ADLs. She ambulates with help of cane but during this timeframe, she has been feeling weak and unsteady on her feet. She also reports lower abdominal discomfort. She denies fevers, chills, CP, SOB, n/v, diarrhea, dysuria. Denies any difficulties with urination or bowel movements.   In the ED, afebrile, HR 69, 140/91, on RA with satts in the 90s. BMP and CBC grossly unremarkable. UA: , +leuk esterase, +nitrate. CXR: moderate left pleural effusion. CTH: No acute intracranial processes. CTAP: Moderate left pleural effusion. Bladder, uterine, and rectal prolapse through the pelvic floor. No bowel obstruction. Amorphous soft tissue in the retroperitoneal space adjacent to the vena cava and aorta which is similar to but more pronounced than prior exam   likely representing retroperitoneal lymphadenopathy.    (23 Aug 2022 12:00)        REVIEW OF SYSTEMS:    CONSTITUTIONAL: No weakness, fevers or chills  EYES/ENT: No visual changes;  No vertigo or throat pain   NECK: No pain or stiffness  RESPIRATORY: No cough, wheezing, hemoptysis; No shortness of breath  CARDIOVASCULAR: No chest pain or palpitations  GASTROINTESTINAL: No abdominal or epigastric pain. No nausea, vomiting, or hematemesis; No diarrhea or constipation. No melena or hematochezia.  GENITOURINARY: No dysuria, frequency or hematuria  NEUROLOGICAL: No numbness or weakness  SKIN: No itching, burning, rashes, or lesions   All other review of systems is negative unless indicated above.    PAST MEDICAL & SURGICAL HISTORY:  Dyslipidemia      Hypertension      Atrial fibrillation, unspecified type  Unspecified Atrial arrhthymia, S/p ablation on Bblocker      Small bowel obstruction      H/O cardiac radiofrequency ablation      Small bowel obstruction  abdominal sx          FAMILY HISTORY:  No pertinent family history in first degree relatives        SOCIAL HISTORY:     Allergies    No Known Allergies    Intolerances        MEDICATIONS  (STANDING):  carbidopa/levodopa  25/100 1 Tablet(s) Oral <User Schedule>  enoxaparin Injectable 40 milliGRAM(s) SubCutaneous every 24 hours  ibuprofen  Tablet. 400 milliGRAM(s) Oral once  latanoprost 0.005% Ophthalmic Solution 1 Drop(s) Both EYES at bedtime  pantoprazole    Tablet 40 milliGRAM(s) Oral before breakfast    MEDICATIONS  (PRN):  melatonin 3 milliGRAM(s) Oral at bedtime PRN Insomnia      OBJECTIVE       T(F): 97.6 (09-06-22 @ 12:00), Max: 97.9 (09-05-22 @ 20:27)  HR: 85 (09-06-22 @ 12:00)  BP: 111/76 (09-06-22 @ 12:00)  RR: 18 (09-06-22 @ 12:00)  SpO2: 96% (09-06-22 @ 12:00)  Wt(kg): --    PHYSICAL EXAM   GENERAL: NAD, well-developed  HEAD:  Atraumatic, Normocephalic  EYES: EOMI, PERRLA, conjunctiva and sclera clear  NECK: Supple, No JVD  CHEST/LUNG: Clear to auscultation bilaterally; No wheeze  HEART: Regular rate and rhythm; No murmurs, rubs, or gallops  ABDOMEN: Soft, Nontender, Nondistended; Bowel sounds present  EXTREMITIES:  2+ Peripheral Pulses, No clubbing, cyanosis, or edema  NEUROLOGY: non-focal  SKIN: No rashes or lesions                     Hematology Consult Note    HPI as per admitting team:   83 y/o F w/ hx of arrhythmia s/p ablation and incarcerated R femoral hernia s/p small bowel resection presents with several months of generalized weakness and 2 weeks of lower abdominal discomfort.   She says for the past several months she has been experiencing generalized weakness which has been worsening over the last 2 weeks. Prior to this she has been able to manage chores on her own like doing laundry but now she feels she is too weak and needs help with ADLs. She ambulates with help of cane but during this timeframe, she has been feeling weak and unsteady on her feet. She also reports lower abdominal discomfort. She denies fevers, chills, CP, SOB, n/v, diarrhea, dysuria. Denies any difficulties with urination or bowel movements.   In the ED, afebrile, HR 69, 140/91, on RA with satts in the 90s. BMP and CBC grossly unremarkable. UA: , +leuk esterase, +nitrate. CXR: moderate left pleural effusion. CTH: No acute intracranial processes. CTAP: Moderate left pleural effusion. Bladder, uterine, and rectal prolapse through the pelvic floor. No bowel obstruction. Amorphous soft tissue in the retroperitoneal space adjacent to the vena cava and aorta which is similar to but more pronounced than prior exam   likely representing retroperitoneal lymphadenopathy.    (23 Aug 2022 12:00)        REVIEW OF SYSTEMS:    CONSTITUTIONAL: No weakness, fevers or chills  EYES/ENT: No visual changes;  No vertigo or throat pain   NECK: No pain or stiffness  RESPIRATORY: No cough, wheezing, hemoptysis; No shortness of breath  CARDIOVASCULAR: No chest pain or palpitations  GASTROINTESTINAL: No abdominal or epigastric pain. No nausea, vomiting, or hematemesis; No diarrhea or constipation. No melena or hematochezia.  GENITOURINARY: No dysuria, frequency or hematuria  NEUROLOGICAL: No numbness or weakness  SKIN: No itching, burning, rashes, or lesions   All other review of systems is negative unless indicated above.    PAST MEDICAL & SURGICAL HISTORY:  Dyslipidemia      Hypertension      Atrial fibrillation, unspecified type  Unspecified Atrial arrhthymia, S/p ablation on Bblocker      Small bowel obstruction      H/O cardiac radiofrequency ablation      Small bowel obstruction  abdominal sx          FAMILY HISTORY:  No pertinent family history in first degree relatives        SOCIAL HISTORY:     Allergies    No Known Allergies    Intolerances        MEDICATIONS  (STANDING):  carbidopa/levodopa  25/100 1 Tablet(s) Oral <User Schedule>  enoxaparin Injectable 40 milliGRAM(s) SubCutaneous every 24 hours  ibuprofen  Tablet. 400 milliGRAM(s) Oral once  latanoprost 0.005% Ophthalmic Solution 1 Drop(s) Both EYES at bedtime  pantoprazole    Tablet 40 milliGRAM(s) Oral before breakfast    MEDICATIONS  (PRN):  melatonin 3 milliGRAM(s) Oral at bedtime PRN Insomnia      OBJECTIVE       T(F): 97.6 (09-06-22 @ 12:00), Max: 97.9 (09-05-22 @ 20:27)  HR: 85 (09-06-22 @ 12:00)  BP: 111/76 (09-06-22 @ 12:00)  RR: 18 (09-06-22 @ 12:00)  SpO2: 96% (09-06-22 @ 12:00)  Wt(kg): --    PHYSICAL EXAM   GENERAL: NAD, well-developed  HEAD:  Atraumatic, Normocephalic  EYES: EOMI, PERRLA, conjunctiva and sclera clear  NECK: Supple, No JVD  CHEST/LUNG: Clear to auscultation bilaterally; No wheeze  HEART: Regular rate and rhythm; No murmurs, rubs, or gallops  ABDOMEN: Soft, Nontender, Nondistended; Bowel sounds present  EXTREMITIES:  2+ Peripheral Pulses, No clubbing, cyanosis, or edema  NEUROLOGY: non-focal  SKIN: No rashes or lesions      TM Interpretation:   Flow Cytometry Final Report  ________________________________________________________________________  Specimen: Left axillary lymph node tissue  Date Collected: 08/31/2022 11:45  Date Received: 08/31/2022 14:55  Date Processed: 08/31/2022 15:00  Date Reported: 09/06/2022 11:46  Accession #: [id number]  JJ  ________________________________________________________________________  CLINICAL DATA: Rule out lymphoma    ________________________________________________________________________  CD45/Side Scatter Differential  ________________________________________________________________________  DIAGNOSIS:   Left axillary lymph node tissue_       - Monotypic B-cells (31% of cells), positive for lambda, CD19, CD20, CD10, CD23; negative CD5,  , 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  Please see interpretation.    INTERPRETATION:  MORPHOLOGY:  CYTOSPIN: Few small cells    IMMUNOPHENOTYPE: Monotypic B-cells (31% of cells), positive for lambda, CD19, CD20, CD10, CD23;  negative CD5, .    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 Correlation with histology is  necessary.  _____________________________________________________________________    Viability ................. 95 %  Values reported are based on the lymphocyte gate. (Bright CD45 positive; low side scatter, low  forward scatter).  46_% of cells.  CD45 .......... 100 %  CD2 ........... 35 %  CD3 ........... 38 %  CD5 ........... 33 %  CD7 ........... 28 %  CD4 ........... 28 %  CD8 ........... 2 %  TCR2+/CD3+ .... 1 %  CD10 .......... 70 %  CD19 .......... 70 %  CD20 .......... 68 %  CD23 .......... 66 %  CD38 .......... 91 %  .......... 2 %  CD26........... 12 %  TRCB-1......... 18 %  CD19+/Kappa+... 2 %  CD19+/Lambda+.. 69 %  CD5+/19+....... 2 %      Verified By: Devika Borjas M.D., M.D.  (Electronic Signature)    This test was developed and its performance characteristics determined by the Flow Cytometry  Laboratory at [facility] laboratories. [address] [place] [address] [place] [address].  Medical Director: Rebecca العراقي MD. It has not been cleared or approved by the U.S. Food and Drug  Administration.  The FDA has determined that such clearance or approval is not necessary. This test is used for  clinical purposes. It should not be regarded as investigational or for research. This laboratory is  certified under the Clinical Laboratory Improvement Amendment of 1988 ("CLIA") as qualified to  perform high complexity clinicaltesting. (08.31.22 @ 11:45)                   Hematology Consult Note    HPI as per admitting team:   83 y/o F w/ hx of arrhythmia s/p ablation and incarcerated R femoral hernia s/p small bowel resection presents with several months of generalized weakness and 2 weeks of lower abdominal discomfort.   She says for the past several months she has been experiencing generalized weakness which has been worsening over the last 2 weeks. Prior to this she has been able to manage chores on her own like doing laundry but now she feels she is too weak and needs help with ADLs. She ambulates with help of cane but during this timeframe, she has been feeling weak and unsteady on her feet. She also reports lower abdominal discomfort. She denies fevers, chills, CP, SOB, n/v, diarrhea, dysuria. Denies any difficulties with urination or bowel movements.   In the ED, afebrile, HR 69, 140/91, on RA with satts in the 90s. BMP and CBC grossly unremarkable. UA: , +leuk esterase, +nitrate. CXR: moderate left pleural effusion. CTH: No acute intracranial processes. CTAP: Moderate left pleural effusion. Bladder, uterine, and rectal prolapse through the pelvic floor. No bowel obstruction. Amorphous soft tissue in the retroperitoneal space adjacent to the vena cava and aorta which is similar to but more pronounced than prior exam   likely representing retroperitoneal lymphadenopathy.    (23 Aug 2022 12:00)        REVIEW OF SYSTEMS:  CONSTITUTIONAL: +Weakness. No fevers or chills  EYES/ENT: No visual changes;  No vertigo or throat pain   NECK: No pain or stiffness  RESPIRATORY: No cough, wheezing, hemoptysis; No shortness of breath  CARDIOVASCULAR: No chest pain or palpitations  GASTROINTESTINAL: No abdominal or epigastric pain. No nausea, vomiting, or hematemesis; No diarrhea or constipation. No melena or hematochezia.  GENITOURINARY: No dysuria, frequency or hematuria  NEUROLOGICAL: No numbness or weakness  SKIN: No itching, burning, rashes, or lesions   All other review of systems is negative unless indicated above.    PAST MEDICAL & SURGICAL HISTORY:  Dyslipidemia      Hypertension      Atrial fibrillation, unspecified type  Unspecified Atrial arrhthymia, S/p ablation on Bblocker      Small bowel obstruction      H/O cardiac radiofrequency ablation      Small bowel obstruction  abdominal sx          FAMILY HISTORY:  No pertinent family history in first degree relatives        SOCIAL HISTORY:     Allergies    No Known Allergies    Intolerances        MEDICATIONS  (STANDING):  carbidopa/levodopa  25/100 1 Tablet(s) Oral <User Schedule>  enoxaparin Injectable 40 milliGRAM(s) SubCutaneous every 24 hours  ibuprofen  Tablet. 400 milliGRAM(s) Oral once  latanoprost 0.005% Ophthalmic Solution 1 Drop(s) Both EYES at bedtime  pantoprazole    Tablet 40 milliGRAM(s) Oral before breakfast    MEDICATIONS  (PRN):  melatonin 3 milliGRAM(s) Oral at bedtime PRN Insomnia      OBJECTIVE       T(F): 97.6 (09-06-22 @ 12:00), Max: 97.9 (09-05-22 @ 20:27)  HR: 85 (09-06-22 @ 12:00)  BP: 111/76 (09-06-22 @ 12:00)  RR: 18 (09-06-22 @ 12:00)  SpO2: 96% (09-06-22 @ 12:00)  Wt(kg): --    PHYSICAL EXAM   GENERAL: NAD, well-developed  HEAD:  Atraumatic, Normocephalic  CHEST/LUNG: Clear to auscultation bilaterally; No wheeze. Left axillary lymphadenopathy noted  HEART: Regular rate and rhythm; No murmurs, rubs, or gallops  ABDOMEN: Soft, Nontender, Nondistended; Bowel sounds present  EXTREMITIES:  2+ Peripheral Pulses, No clubbing, cyanosis, or edema  NEUROLOGY: non-focal  SKIN: No rashes or lesions      TM Interpretation:   Flow Cytometry Final Report  ________________________________________________________________________  Specimen: Left axillary lymph node tissue  Date Collected: 08/31/2022 11:45  Date Received: 08/31/2022 14:55  Date Processed: 08/31/2022 15:00  Date Reported: 09/06/2022 11:46  Accession #: [id number]  [id number]  ________________________________________________________________________  CLINICAL DATA: Rule out lymphoma    ________________________________________________________________________  CD45/Side Scatter Differential  ________________________________________________________________________  DIAGNOSIS:   Left axillary lymph node tissue_       - Monotypic B-cells (31% of cells), positive for lambda, CD19, CD20, CD10, CD23; negative CD5,  , 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  Please see interpretation.    INTERPRETATION:  MORPHOLOGY:  CYTOSPIN: Few small cells    IMMUNOPHENOTYPE: Monotypic B-cells (31% of cells), positive for lambda, CD19, CD20, CD10, CD23;  negative CD5, .    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 Correlation with histology is  necessary.  _____________________________________________________________________    Viability ................. 95 %  Values reported are based on the lymphocyte gate. (Bright CD45 positive; low side scatter, low  forward scatter).  46_% of cells.  CD45 .......... 100 %  CD2 ........... 35 %  CD3 ........... 38 %  CD5 ........... 33 %  CD7 ........... 28 %  CD4 ........... 28 %  CD8 ........... 2 %  TCR2+/CD3+ .... 1 %  CD10 .......... 70 %  CD19 .......... 70 %  CD20 .......... 68 %  CD23 .......... 66 %  CD38 .......... 91 %  .......... 2 %  CD26........... 12 %  TRCB-1......... 18 %  CD19+/Kappa+... 2 %  CD19+/Lambda+.. 69 %  CD5+/19+....... 2 %      Verified By: Devika Borjas M.D., M.D.  (Electronic Signature)    This test was developed and its performance characteristics determined by the Flow Cytometry  Laboratory at [facility] laboratories. [address] [place] [address] [place] [address].  Medical Director: Rebceca العراقي MD. It has not been cleared or approved by the U.S. Food and Drug  Administration.  The FDA has determined that such clearance or approval is not necessary. This test is used for  clinical purposes. It should not be regarded as investigational or for research. This laboratory is  certified under the Clinical Laboratory Improvement Amendment of 1988 ("CLIA") as qualified to  perform high complexity clinicaltesting. (08.31.22 @ 11:45)

## 2022-09-06 NOTE — PROGRESS NOTE ADULT - ASSESSMENT
83 y/o F w/ hx of arrhythmia s/p ablation and incarcerated R femoral hernia s/p small bowel resection presents with several months of generalized weakness and 2 weeks of lower abdominal discomfort, being treated for pseudomonal UTI, also found to have bilateral pleff, possibly 2/2 to acute on chronic HFrEF s/p thoracentesis, workup for new metastatic malignancy s/p IR biosy of left axillary lymph node with flow +B-cell lypmhoma

## 2022-09-07 NOTE — PROGRESS NOTE ADULT - SUBJECTIVE AND OBJECTIVE BOX
INTERVAL HPI/OVERNIGHT EVENTS:  Patient S&E at bedside. No o/n events. Tolerated BMBx well today with minimal pain when seen on rounds.      VITAL SIGNS:  T(F): 98.6 (09-07-22 @ 12:31)  HR: 74 (09-07-22 @ 12:31)  BP: 138/89 (09-07-22 @ 12:31)  RR: 18 (09-07-22 @ 12:31)  SpO2: 95% (09-07-22 @ 12:31)  Wt(kg): --    PHYSICAL EXAM:    Constitutional: NAD  Eyes: EOMI, sclera non-icteric  Neck: supple  Respiratory: CTAB, no wheezes or crackles   Cardiovascular: RRR  Gastrointestinal: soft, NTND, + BS  Extremities: no cyanosis, clubbing or edema   Neurological: awake and alert      MEDICATIONS  (STANDING):  carbidopa/levodopa  25/100 1 Tablet(s) Oral <User Schedule>  ibuprofen  Tablet. 400 milliGRAM(s) Oral once  latanoprost 0.005% Ophthalmic Solution 1 Drop(s) Both EYES at bedtime  pantoprazole    Tablet 40 milliGRAM(s) Oral before breakfast    MEDICATIONS  (PRN):  acetaminophen     Tablet .. 650 milliGRAM(s) Oral every 6 hours PRN Moderate Pain (4 - 6)  melatonin 3 milliGRAM(s) Oral at bedtime PRN Insomnia      Allergies    No Known Allergies    Intolerances        LABS:                        11.6   5.73  )-----------( 265      ( 07 Sep 2022 06:10 )             36.0     09-07    140  |  104  |  37<H>  ----------------------------<  83  4.1   |  27  |  0.83    Ca    9.4      07 Sep 2022 06:10      PT/INR - ( 07 Sep 2022 06:14 )   PT: 10.6 sec;   INR: 0.92 ratio         PTT - ( 07 Sep 2022 06:14 )  PTT:27.4 sec      RADIOLOGY & ADDITIONAL TESTS:  Studies reviewed.

## 2022-09-07 NOTE — PROGRESS NOTE ADULT - ATTENDING COMMENTS
Discussion with patient's daughter regarding MR findings and difficulty in placement of lumbar needle for spinal fluid sampling. May consider neurosurgical opinion for Ommaya placement if leptomeningeal disease is present. Patient is moving all extremities. May consider steroid therapy as axillary biopsy has been performed and bone marrow biopsy has been performed. Note left flank old hematoma in region of left axillary biopsy

## 2022-09-07 NOTE — PROGRESS NOTE ADULT - ASSESSMENT
82F hx of arrhythmia s/p ablation and incarcerated R femoral hernia s/p small bowel resection presents with several months of generalized weakness and 2 weeks of lower abdominal discomfort. 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 Hematology consulted for management of lymphoma.    #Follicular Lymphoma  - CT TAP on admission showing Moderate left pleural effusion. Amorphous soft tissue in the retroperitoneal space adjacent to the vena cava and aorta which is similar to but more pronounced than prior exam likely representing retroperitoneal lymphadenopathy. 5.5 x 1.8 cm mediastinal mass. Left axillary lymphadenopathy. Confluent paraspinal and posterior mediastinal soft tissue infiltrate   surrounding the descending thoracic aorta to approximately the level of T11. There is bony involvement most pronounced at the T3-T5 vertebral bodies. Spinal canal/epidural extension of soft at 3-T5 levels.   -MRI C/T/L Spine showing Widespread osseous metastatic disease involving the entire spine. Circumferential epidural tumor involvement spanning the T3-T5 levels with epidural tumor encroachment worst at the T4 level surrounding the cord which appears flattened.Additional epidural tumor within the sacral canal adjacent to osseous metastatic lesions, more asymmetric replaced to the left. Suggestion of abnormal leptomeningeal enhancement involving the cauda equina nerve roots also suspicious for neoplastic involvement. Redemonstration of abnormal confluent enhancing soft tissue within the posterior mediastinum surrounding the aorta as well as confluent soft tissue adjacent to the IVC and descending aorta within the abdominal cavity suspicious for adenopathy and/or tumor.  -MRI Brain showing Minimal periventricular and bifrontal subcortical white matter ischemia.  Mild global atrophy, most prominent in the cerebellum.  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    --------Immunohistochemical stains positive for CD20, CD10, CD23, BCL6, BCL2; negative for CD5, cyclin D1, MUM1, CD43. Ki67 proliferation index is 20-30%. CD21 highlights the follicular dendritic meshwork. The interspersed T cells are stained by CD3, CD5, LEF1, CD43.  -s/p BMBx on 9/7/22  -A diagnosis of low-grade follicular lymphoma does not fit patient's clinical picture with epidural involvement. We would ordinarily request a diagnostic LP to confirm CNS involvement, however per Neuro Radiology, given the location of patient's soft tissue mass, there is no safe window for an LP. We will await preliminary results from patient's BMBx. If this is confirming a higher grade lymphoma, then we may request ommaya placement for diagnostic CSF sampling as well as possible intrathecal chemotherapy. This was discussed with the patient's daughter and she is agreeable. Will await preliminary BMBx results prior to pursuing an ommaya. We may also require an additional biopsy (potentially of the mediastinal mass) pending BMBx results. For these reasons, we recommend patient remain admitted as she requires continued work-up for her lymphoma.   - Please monitor TLS labs (CMP, Mg, Phos, LDH, uric acid) daily         Traci Almaraz MD  Hematology/Oncology Fellow, PGY-6  Pager: [phone]  After 5pm and on weekends please page on-call fellow

## 2022-09-07 NOTE — PROCEDURE NOTE - SUPERVISORY STATEMENT
Patient tolerated the procedure and no post procedure bleeding was noted in afternoon attending rounds

## 2022-09-07 NOTE — PROGRESS NOTE ADULT - NSPROGADDITIONALINFOA_GEN_ALL_CORE
d/w ACP    Updated daughter via phone at the bedside    Aileen Gomez MD  Division of Hospital Medicine  Available on Microsoft Teams

## 2022-09-07 NOTE — PROGRESS NOTE ADULT - PROBLEM SELECTOR PLAN 1
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 Primary lesion is unknown but with unilateral L plef, concern for metastatic process in chest  - Pt and family would like to pursue diagnosis  - cytology from L thoracentesis - no malignant cells  - CTC w/ IV contrast showing anterior mediastinal mass, L axillary lymph node  - Repeat MRI brain, c, t spine w/w/o IV - diffuse spinal involvement noted; no cord compression  - s/p IR biopsy of L axillary node 8/31- cytology B cell lymphoma, pathology results low grade follicular lymphoma   - Paraneoplastic panel sent  -Heme consulted- bone marrow biopsy 9/7

## 2022-09-07 NOTE — PROCEDURE NOTE - ADDITIONAL PROCEDURE DETAILS
Hematology/Oncology Procedure Note    Bone Marrow Aspiration/Biopsy    Indication: Newly diagnosed follicular lymphoma    Bone marrow aspiration and biopsy procedure description, risks, and benefits were discussed in detail with the patient.  All questions were answered.  Informed consent was obtained and time-out performed.      The area of the right/left posterior iliac crest was prepped and draped using sterile technique. Local anesthetic with 2% Lidocaine.    Bone marrow aspiration and biopsy  was performed using sterile technique by Dr. Landry Porter with Dr. Corea/Dr. Almaraz assist and supervision. Specimens were obtained. No complications and less than 2 cc of blood loss.     The procedure was well tolerated and no local bleeding or other complications were observed.  Pressure was applied to the procedure site and a wound dressing was placed.  The patient and nursing staff were advised that the patient is to lie flat for 30 minutes post procedure and not to shower or change the dressing for 24 hours. Tylenol may be used if no contraindications for pain at the procedure site. Hematology/Oncology Procedure Note    Bone Marrow Aspiration/Biopsy    Indication: Newly diagnosed follicular lymphoma    Bone marrow aspiration and biopsy procedure description, risks, and benefits were discussed in detail with the patient.  All questions were answered.  Informed consent was obtained and time-out performed.      The area of the left posterior iliac crest was prepped and draped using sterile technique. Local anesthetic with 2% Lidocaine.    Bone marrow aspiration and biopsy  was performed using sterile technique by Dr. Landry Porter with Dr. Corea/Dr. Almaraz assist and supervision. Specimens were obtained. No complications and less than 2 cc of blood loss.     The procedure was well tolerated and no local bleeding or other complications were observed.  Pressure was applied to the procedure site and a wound dressing was placed.  The patient and nursing staff were advised that the patient is to lie flat for 30 minutes post procedure and not to shower or change the dressing for 24 hours. Tylenol may be used if no contraindications for pain at the procedure site.

## 2022-09-07 NOTE — PROGRESS NOTE ADULT - SUBJECTIVE AND OBJECTIVE BOX
Patient CG transfers and gait.    MEDICATIONS  (STANDING):  carbidopa/levodopa  25/100 1 Tablet(s) Oral <User Schedule>  enoxaparin Injectable 40 milliGRAM(s) SubCutaneous every 24 hours  ibuprofen  Tablet. 400 milliGRAM(s) Oral once  latanoprost 0.005% Ophthalmic Solution 1 Drop(s) Both EYES at bedtime  pantoprazole    Tablet 40 milliGRAM(s) Oral before breakfast    MEDICATIONS  (PRN):  melatonin 3 milliGRAM(s) Oral at bedtime PRN Insomnia    Vital Signs Last 24 Hrs  T(C): 36.5 (07 Sep 2022 04:47), Max: 36.9 (06 Sep 2022 20:31)  T(F): 97.7 (07 Sep 2022 04:47), Max: 98.5 (06 Sep 2022 20:31)  HR: 66 (07 Sep 2022 04:47) (66 - 86)  BP: 121/81 (07 Sep 2022 04:47) (103/71 - 121/81)  BP(mean): --  RR: 18 (07 Sep 2022 04:47) (18 - 18)  SpO2: 96% (07 Sep 2022 04:47) (96% - 96%)    PHYSICAL EXAM  Constitutional - NAD, Comfortable  HEENT - NCAT, EOMI  Neck - Supple, No limited ROM  Chest - CTA bilaterally, No wheeze, No rhonchi, No crackles  Cardiovascular - RRR, S1S2, No murmurs  Abdomen - BS+, Soft, NTND  Extremities - No C/C/E, No calf tenderness   Neurologic Exam -                 Alert follows verbal instruction  Slight hypertonia and cogwheeling  Minimal tremor of the 5th digit  Motor 4+/5 bl UE and LE     Psychiatric - Mood stable, Affect WNL                          11.6   5.73  )-----------( 265      ( 07 Sep 2022 06:10 )             36.0     09-07    140  |  104  |  37<H>  ----------------------------<  83  4.1   |  27  |  0.83    Ca    9.4      07 Sep 2022 06:10    Impression:  83 yo with functional deficits secondary to diagnosis of debility, parkinsonianism    Plan:  PT- ROM, Bed Mob, Transfers, Amb w AD and bracing as needed  OT- ADLs, bracing  SLP- Dysphagia eval and treat  Prec- Falls, Cardiac  DVT Prophylaxis- SCDs, lovenox  Continue carbidopa/levodopa  Skin- Turn q2 h  Dispo- Acute Rehab- can tolerate 3h/d of therapies and requires daily physician visits for titration of medications.   Denied by insurance- awaiting call back for peer to peer

## 2022-09-07 NOTE — PROGRESS NOTE ADULT - SUBJECTIVE AND OBJECTIVE BOX
Patient is a 82y old  Female who presents with a chief complaint of CC: generalized weakness, abdominal discomfort (06 Sep 2022 13:59)        SUBJECTIVE / OVERNIGHT EVENTS: Patient had no acute events overnight. Patient seen and examined at bedside this morning.     ROS: [ - ] Fever [ - ] Chills [ - ] Nausea/Vomiting [ - ] Chest Pain [ - ] Shortness of breath     MEDICATIONS  (STANDING):  carbidopa/levodopa  25/100 1 Tablet(s) Oral <User Schedule>  enoxaparin Injectable 40 milliGRAM(s) SubCutaneous every 24 hours  ibuprofen  Tablet. 400 milliGRAM(s) Oral once  latanoprost 0.005% Ophthalmic Solution 1 Drop(s) Both EYES at bedtime  pantoprazole    Tablet 40 milliGRAM(s) Oral before breakfast    MEDICATIONS  (PRN):  melatonin 3 milliGRAM(s) Oral at bedtime PRN Insomnia      Vital Signs Last 24 Hrs  T(C): 36.5 (07 Sep 2022 04:47), Max: 36.9 (06 Sep 2022 20:31)  T(F): 97.7 (07 Sep 2022 04:47), Max: 98.5 (06 Sep 2022 20:31)  HR: 66 (07 Sep 2022 04:47) (66 - 86)  BP: 121/81 (07 Sep 2022 04:47) (103/71 - 121/81)  BP(mean): --  RR: 18 (07 Sep 2022 04:47) (18 - 18)  SpO2: 96% (07 Sep 2022 04:47) (96% - 96%)    Parameters below as of 07 Sep 2022 04:47  Patient On (Oxygen Delivery Method): room air      CAPILLARY BLOOD GLUCOSE        I&O's Summary    06 Sep 2022 07:01  -  07 Sep 2022 07:00  --------------------------------------------------------  IN: 840 mL / OUT: 0 mL / NET: 840 mL        PHYSICAL EXAM  GENERAL: NAD, lying comfortably in bed   HEENT:  Atraumatic, Normocephalic, EOMI, conjunctiva and sclera clear, no LAD  CHEST/LUNG: Clear to auscultation bilaterally; No wheeze  HEART: RRR, S1 and S2 No murmurs, rubs, or gallops  ABDOMEN: Soft, Nontender, Nondistended; Bowel sounds present  EXTREMITIES:  2+ Peripheral Pulses, No clubbing, cyanosis, or edema  NEURO: AAOx3, non-focal  SKIN: No rashes or lesions    LABS:                        11.6   5.73  )-----------( 265      ( 07 Sep 2022 06:10 )             36.0     09-07    140  |  104  |  37<H>  ----------------------------<  83  4.1   |  27  |  0.83    Ca    9.4      07 Sep 2022 06:10      PT/INR - ( 07 Sep 2022 06:14 )   PT: 10.6 sec;   INR: 0.92 ratio         PTT - ( 07 Sep 2022 06:14 )  PTT:27.4 sec            RADIOLOGY & ADDITIONAL TESTS:      Labs Personally Reviewed  Imaging Personally Reviewed  Consultant(s) Notes Reviewed        Patient is a 82y old  Female who presents with a chief complaint of CC: generalized weakness, abdominal discomfort (06 Sep 2022 13:59)        SUBJECTIVE / OVERNIGHT EVENTS: Patient had no acute events overnight. Patient seen and examined at bedside this morning.     ROS: [ - ] Fever [ - ] Chills [ - ] Nausea/Vomiting [ - ] Chest Pain [ - ] Shortness of breath     MEDICATIONS  (STANDING):  carbidopa/levodopa  25/100 1 Tablet(s) Oral <User Schedule>  enoxaparin Injectable 40 milliGRAM(s) SubCutaneous every 24 hours  ibuprofen  Tablet. 400 milliGRAM(s) Oral once  latanoprost 0.005% Ophthalmic Solution 1 Drop(s) Both EYES at bedtime  pantoprazole    Tablet 40 milliGRAM(s) Oral before breakfast    MEDICATIONS  (PRN):  melatonin 3 milliGRAM(s) Oral at bedtime PRN Insomnia      Vital Signs Last 24 Hrs  T(C): 36.5 (07 Sep 2022 04:47), Max: 36.9 (06 Sep 2022 20:31)  T(F): 97.7 (07 Sep 2022 04:47), Max: 98.5 (06 Sep 2022 20:31)  HR: 66 (07 Sep 2022 04:47) (66 - 86)  BP: 121/81 (07 Sep 2022 04:47) (103/71 - 121/81)  BP(mean): --  RR: 18 (07 Sep 2022 04:47) (18 - 18)  SpO2: 96% (07 Sep 2022 04:47) (96% - 96%)    Parameters below as of 07 Sep 2022 04:47  Patient On (Oxygen Delivery Method): room air      CAPILLARY BLOOD GLUCOSE        I&O's Summary    06 Sep 2022 07:01  -  07 Sep 2022 07:00  --------------------------------------------------------  IN: 840 mL / OUT: 0 mL / NET: 840 mL        PHYSICAL EXAM  GENERAL: NAD, lying comfortably in bed   HEENT:  Atraumatic, Normocephalic, EOMI, conjunctiva and sclera clear, Left axillary LAD  CHEST/LUNG: Clear to auscultation bilaterally; No wheeze  HEART: RRR, S1 and S2 No murmurs, rubs, or gallops  ABDOMEN: Soft, Nontender, Nondistended; Bowel sounds present  EXTREMITIES:  2+ Peripheral Pulses, No clubbing, cyanosis, or edema. Left first metatarsal mild tenderness.  NEURO: AAOx3, non-focal  SKIN: No rashes or lesions    LABS:                        11.6   5.73  )-----------( 265      ( 07 Sep 2022 06:10 )             36.0     09-07    140  |  104  |  37<H>  ----------------------------<  83  4.1   |  27  |  0.83    Ca    9.4      07 Sep 2022 06:10      PT/INR - ( 07 Sep 2022 06:14 )   PT: 10.6 sec;   INR: 0.92 ratio         PTT - ( 07 Sep 2022 06:14 )  PTT:27.4 sec            RADIOLOGY & ADDITIONAL TESTS:  LYMPH NODE, AXILLARY, LEFT, US GUIDED CORE BIOPSY   Final Diagnosis   LYMPH NODE, AXILLARY, LEFT, US GUIDED CORE BIOPSY   POSITIVE FOR MALIGNANT CELLS.   Low-grade follicular lymphoma. See note.     Labs Personally Reviewed  Imaging Personally Reviewed  Consultant(s) Notes Reviewed

## 2022-09-08 NOTE — PROGRESS NOTE ADULT - SUBJECTIVE AND OBJECTIVE BOX
Patient is a 82y old  Female who presents with a chief complaint of CC: generalized weakness, abdominal discomfort (07 Sep 2022 15:50)        SUBJECTIVE / OVERNIGHT EVENTS: Patient had no acute events overnight. Patient seen and examined at bedside this morning.     ROS: [ - ] Fever [ - ] Chills [ - ] Nausea/Vomiting [ - ] Chest Pain [ - ] Shortness of breath     MEDICATIONS  (STANDING):  carbidopa/levodopa  25/100 1 Tablet(s) Oral <User Schedule>  ibuprofen  Tablet. 400 milliGRAM(s) Oral once  latanoprost 0.005% Ophthalmic Solution 1 Drop(s) Both EYES at bedtime  pantoprazole    Tablet 40 milliGRAM(s) Oral before breakfast    MEDICATIONS  (PRN):  acetaminophen     Tablet .. 650 milliGRAM(s) Oral every 6 hours PRN Moderate Pain (4 - 6)  melatonin 3 milliGRAM(s) Oral at bedtime PRN Insomnia      Vital Signs Last 24 Hrs  T(C): 36.7 (08 Sep 2022 05:00), Max: 37 (07 Sep 2022 12:31)  T(F): 98.1 (08 Sep 2022 05:00), Max: 98.6 (07 Sep 2022 12:31)  HR: 70 (08 Sep 2022 05:00) (70 - 95)  BP: 112/78 (08 Sep 2022 05:00) (112/78 - 138/89)  BP(mean): --  RR: 18 (08 Sep 2022 05:00) (18 - 18)  SpO2: 96% (08 Sep 2022 05:00) (95% - 96%)    Parameters below as of 08 Sep 2022 05:00  Patient On (Oxygen Delivery Method): room air      CAPILLARY BLOOD GLUCOSE        I&O's Summary    07 Sep 2022 07:01  -  08 Sep 2022 07:00  --------------------------------------------------------  IN: 300 mL / OUT: 450 mL / NET: -150 mL        PHYSICAL EXAM  GENERAL: NAD, lying comfortably in bed   HEENT:  Atraumatic, Normocephalic, EOMI, conjunctiva and sclera clear, no LAD  CHEST/LUNG: Clear to auscultation bilaterally; No wheeze  HEART: RRR, S1 and S2 No murmurs, rubs, or gallops  ABDOMEN: Soft, Nontender, Nondistended; Bowel sounds present  EXTREMITIES:  2+ Peripheral Pulses, No clubbing, cyanosis, or edema  NEURO: AAOx3, non-focal  SKIN: No rashes or lesions    LABS:                        11.9   6.06  )-----------( 283      ( 08 Sep 2022 06:23 )             36.4     09-08    143  |  106  |  32<H>  ----------------------------<  86  4.1   |  24  |  0.63    Ca    9.5      08 Sep 2022 06:23  Phos  3.5     09-08  Mg     1.9     09-08    TPro  5.8<L>  /  Alb  3.3  /  TBili  0.4  /  DBili  x   /  AST  20  /  ALT  6<L>  /  AlkPhos  114  09-08    PT/INR - ( 07 Sep 2022 06:14 )   PT: 10.6 sec;   INR: 0.92 ratio         PTT - ( 07 Sep 2022 06:14 )  PTT:27.4 sec            RADIOLOGY & ADDITIONAL TESTS:      Labs Personally Reviewed  Imaging Personally Reviewed  Consultant(s) Notes Reviewed        Patient is a 82y old  Female who presents with a chief complaint of CC: generalized weakness, abdominal discomfort (07 Sep 2022 15:50)        SUBJECTIVE / OVERNIGHT EVENTS: Patient had no acute events overnight. Patient seen and examined at bedside this morning. Endorsed mild pain at the left hip where the bone marrow biopsy taken. No acute complaints. Patient expressed concerns for progressive weakness and inability to get frequent PT.     ROS: [ - ] Fever [ - ] Chills [ - ] Nausea/Vomiting [ - ] Chest Pain [ - ] Shortness of breath     MEDICATIONS  (STANDING):  carbidopa/levodopa  25/100 1 Tablet(s) Oral <User Schedule>  ibuprofen  Tablet. 400 milliGRAM(s) Oral once  latanoprost 0.005% Ophthalmic Solution 1 Drop(s) Both EYES at bedtime  pantoprazole    Tablet 40 milliGRAM(s) Oral before breakfast    MEDICATIONS  (PRN):  acetaminophen     Tablet .. 650 milliGRAM(s) Oral every 6 hours PRN Moderate Pain (4 - 6)  melatonin 3 milliGRAM(s) Oral at bedtime PRN Insomnia      Vital Signs Last 24 Hrs  T(C): 36.7 (08 Sep 2022 05:00), Max: 37 (07 Sep 2022 12:31)  T(F): 98.1 (08 Sep 2022 05:00), Max: 98.6 (07 Sep 2022 12:31)  HR: 70 (08 Sep 2022 05:00) (70 - 95)  BP: 112/78 (08 Sep 2022 05:00) (112/78 - 138/89)  BP(mean): --  RR: 18 (08 Sep 2022 05:00) (18 - 18)  SpO2: 96% (08 Sep 2022 05:00) (95% - 96%)    Parameters below as of 08 Sep 2022 05:00  Patient On (Oxygen Delivery Method): room air      CAPILLARY BLOOD GLUCOSE        I&O's Summary    07 Sep 2022 07:01  -  08 Sep 2022 07:00  --------------------------------------------------------  IN: 300 mL / OUT: 450 mL / NET: -150 mL        PHYSICAL EXAM  GENERAL: NAD, lying comfortably in bed, frail  HEENT:  Atraumatic, Normocephalic, EOMI, conjunctiva and sclera clear, Left axillary LAD  CHEST/LUNG: Clear to auscultation bilaterally; No wheeze  HEART: RRR, S1 and S2 No murmurs, rubs, or gallops  ABDOMEN: Soft, Nontender, Nondistended; Bowel sounds present. Left lower back dressing cdi with no hematoma. Left hip bruising   EXTREMITIES:  2+ Peripheral Pulses, No clubbing, cyanosis, or edema. NEURO: AAOx3, non-focal  SKIN: No rashes or lesions    LABS:                        11.9   6.06  )-----------( 283      ( 08 Sep 2022 06:23 )             36.4     09-08    143  |  106  |  32<H>  ----------------------------<  86  4.1   |  24  |  0.63    Ca    9.5      08 Sep 2022 06:23  Phos  3.5     09-08  Mg     1.9     09-08    TPro  5.8<L>  /  Alb  3.3  /  TBili  0.4  /  DBili  x   /  AST  20  /  ALT  6<L>  /  AlkPhos  114  09-08    PT/INR - ( 07 Sep 2022 06:14 )   PT: 10.6 sec;   INR: 0.92 ratio         PTT - ( 07 Sep 2022 06:14 )  PTT:27.4 sec            RADIOLOGY & ADDITIONAL TESTS:      Labs Personally Reviewed  Imaging Personally Reviewed  Consultant(s) Notes Reviewed

## 2022-09-08 NOTE — PROGRESS NOTE ADULT - ASSESSMENT
83 y/o F w/ hx of arrhythmia s/p ablation and incarcerated R femoral hernia s/p small bowel resection presents with several months of generalized weakness and 2 weeks of lower abdominal discomfort, being treated for pseudomonal UTI, also found to have bilateral pleff, possibly 2/2 to acute on chronic HFrEF s/p thoracentesis, workup for new metastatic malignancy s/p IR biosy of left axillary lymph node with ?low grade follicular lymphoma

## 2022-09-08 NOTE — CONSULT NOTE ADULT - ASSESSMENT
Lucita Franklin  82F admitted patient, found to have large mediastinal mass and flow cytometry of L axillary node showing low-grade follicular lymphoma. MRI showing epidural tumor involvement spanning the T3-T5 levels as well as leptomeningeal enhancement involving cauda equina. Hematology was unable to aquire LP to confirm CNS involvement; Dr. Madera felt was unsafe due to presence of compressive mass. We are consulted for placement of O[facility] reservoir for CSF sampling and possible intrathecal chemotherapy. Bone marrow and mediastinal lymph node biopsy results pending. Exam: intact    -No ommaya reservoir placement for diagnostic CSF sampling  -Can place ommaya reservoir once intrathecal chemotherapy need is confirmed

## 2022-09-08 NOTE — PROGRESS NOTE ADULT - PROBLEM SELECTOR PLAN 1
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 Primary lesion is unknown but with unilateral L plef, concern for metastatic process in chest  - Pt and family would like to pursue diagnosis  - cytology from L thoracentesis - no malignant cells  - CTC w/ IV contrast showing anterior mediastinal mass, L axillary lymph node  - Repeat MRI brain, c, t spine w/w/o IV - diffuse spinal involvement noted; no cord compression  - s/p IR biopsy of L axillary node 8/31- cytology B cell lymphoma, pathology results low grade follicular lymphoma   - Paraneoplastic panel sent  -Heme consulted- bone marrow biopsy 9/7  -IR consult for mediastinal biopsy   -Neurosurgery consult for Kathryn parry 98.5

## 2022-09-08 NOTE — CONSULT NOTE ADULT - SUBJECTIVE AND OBJECTIVE BOX
Interventional Radiology    Evaluate for Procedure:   Flow cytometry and cytopath of 8/31 IR axillary LN bx discordance in results.  Requesting repeat RP LN bx for diagnosis and treatment planning      HPI: 82F hx of arrhythmia s/p ablation and incarcerated R femoral hernia s/p small bowel resection presents with several months of generalized weakness and 2 weeks of lower abdominal discomfort. 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    IR reconsulted on 9/8 Heme/onc requesting repeat RP LN bx for diagnosis and treatment planning      Allergies:   nkda  Medications (Abx/Cardiac/Anticoagulation/Blood Products)      Data:  T(C): 36.5  HR: 84  BP: 114/79  RR: 18  SpO2: 97%    -WBC 6.06 / HgB 11.9 / Hct 36.4 / Plt 283  -Na 143 / Cl 106 / BUN 32 / Glucose 86  -K 4.1 / CO2 24 / Cr 0.63  -ALT 6 / Alk Phos 114 / T.Bili 0.4  -INR 0.92 / PTT 27.4    Radiology: reviewed    Assessment/Plan:  82F hx of arrhythmia s/p ablation and incarcerated R femoral hernia s/p small bowel resection presents with several months of generalized weakness and 2 weeks of lower abdominal discomfort. 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    IR reconsulted on 9/8 Heme/onc requesting repeat RP LN bx for diagnosis and treatment planning    - Flow cytometry and cytopath done w/IR on 8/31 of L axillary LN bx show discordance in results.   - B-cell lymphoma (findings flow cytometry) vs low grade follicular lymphoma path from IR bx 8/31  - IR reconsulted on 9/8 Heme/onc requesting repeat RP LN bx for diagnosis and treatment planning    - case reviewed and approved for Friday 9/9 w/ CT guided RP lymph node bx w/ aesthesia and cyto  - please place IR procedure order under Dr. Flores  - STAT labs in AM (cbc,coags, bmp, T&S)  - hold AC x 24hrs  - NPO on 9/8 at 11pm  - COVID PCR results within 5 days of planned procedure  - d/w primary team & Heme/onc regarding  above plans.     Sadaf Jalili, IR RUBIO, available on TEAMS or IR callback [phone]   Interventional Radiology    Evaluate for Procedure:   Flow cytometry and cytopath of 8/31 IR axillary LN bx discordance in results.  Requesting repeat RP LN bx for diagnosis and treatment planning      HPI: 82F hx of arrhythmia s/p ablation and incarcerated R femoral hernia s/p small bowel resection presents with several months of generalized weakness and 2 weeks of lower abdominal discomfort. 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    IR reconsulted on 9/8 Heme/onc requesting repeat RP LN bx for diagnosis and treatment planning      Allergies:   nkda  Medications (Abx/Cardiac/Anticoagulation/Blood Products)      Data:  T(C): 36.5  HR: 84  BP: 114/79  RR: 18  SpO2: 97%    -WBC 6.06 / HgB 11.9 / Hct 36.4 / Plt 283  -Na 143 / Cl 106 / BUN 32 / Glucose 86  -K 4.1 / CO2 24 / Cr 0.63  -ALT 6 / Alk Phos 114 / T.Bili 0.4  -INR 0.92 / PTT 27.4    Radiology: reviewed    Assessment/Plan:  82F hx of arrhythmia s/p ablation and incarcerated R femoral hernia s/p small bowel resection presents with several months of generalized weakness and 2 weeks of lower abdominal discomfort. 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    IR reconsulted on 9/8 Heme/onc requesting repeat RP LN bx for diagnosis and treatment planning    - Flow cytometry and cytopath done w/IR on 8/31 of L axillary LN bx show discordance in results.   - B-cell lymphoma (findings flow cytometry) vs low grade follicular lymphoma path from IR bx 8/31  - IR reconsulted on 9/8 Heme/onc requesting repeat RP LN bx for diagnosis and treatment planning    - case reviewed and approved for Friday 9/9 w/ CT guided RP lymph node bx w/ anesthesia and cyto  - please place IR procedure order under Dr. Flores  - STAT labs in AM (cbc,coags, bmp, T&S)  - hold AC x 24hrs  - NPO on 9/8 at 11pm  - COVID PCR results within 5 days of planned procedure  - d/w primary team & Heme/onc regarding  above plans.     Sadaf Jalili, IR RUBIO, available on TEAMS or IR callback [phone]

## 2022-09-08 NOTE — PROGRESS NOTE ADULT - SUBJECTIVE AND OBJECTIVE BOX
NEUROLOGY FOLLOW-UP CONSULT NOTE    RFC: Weakness, parkinsonism    Interval history: No acute neurologic events overnight. Legs felt the same if not better . Her SCD's help the sensation of restlessness and "tics" at times. Feels more deconditioned due to lack of walking. Tolerating Sinemet without incident. Tolerated bone marrow biopsy yesterday without incident, awaiting results.    Meds:  MEDICATIONS  (STANDING):  carbidopa/levodopa  25/100 1 Tablet(s) Oral <User Schedule>  ibuprofen  Tablet. 400 milliGRAM(s) Oral once  latanoprost 0.005% Ophthalmic Solution 1 Drop(s) Both EYES at bedtime  pantoprazole    Tablet 40 milliGRAM(s) Oral before breakfast    MEDICATIONS  (PRN):  acetaminophen     Tablet .. 650 milliGRAM(s) Oral every 6 hours PRN Moderate Pain (4 - 6)  melatonin 3 milliGRAM(s) Oral at bedtime PRN Insomnia      PMHx/PSHx/FHx/SHx:  Other specified health status    No pertinent family history in first degree relatives    No pertinent family history in first degree relatives    Handoff    MEWS Score    Dyslipidemia    Hypertension    Atrial fibrillation, unspecified type    Small bowel obstruction    Decreased activities of daily living (ADL)    UTI (urinary tract infection)    Generalized weakness    Uterine prolapse    Pleural effusion    Acute on chronic diastolic heart failure    Multiple lesions of metastatic malignancy    Painful wrist, left    No significant past surgical history    H/O cardiac radiofrequency ablation    Small bowel obstruction    UTERUS ALL THE WAY OUT    90+    Unintentional weight loss    Mild dehydration    Urinary tract infection    SysAdmin_VisitLink        Allergies:  No Known Allergies      ROS: All systems negative except as documented in Interval history    O:  T(C): 36.5 (09-08-22 @ 12:03), Max: 36.9 (09-07-22 @ 20:47)  HR: 84 (09-08-22 @ 12:03) (70 - 95)  BP: 114/79 (09-08-22 @ 12:03) (112/78 - 117/80)  RR: 18 (09-08-22 @ 12:03) (18 - 18)  SpO2: 97% (09-08-22 @ 12:03) (95% - 97%)    Focused neurologic exam:  MS: AAOX3, speech fluent, rep/naming intact, follow commands; recent and remote memory intact; normal attention/concentration and fund of knowledge.   CN: EOMI, PERRL, no NALINI, no APD, VFF, V1-3 intact, no facial asymmetry but mild facial hypomimia b/l, hearing intact b/l, tongue/palate midline, SCM/trap intact. Myerson's/Glabellar weakly positive  Motor: Strength: BUE's 4+/5, BLE's 4+-5/5. Tone: normal. Bulk: decreased throughout. Mild bradykinesia noted  DTR 2+ symm.  Plantar flex b/l.  Sensation: intact to LT 4x.   Coordination: intact FTN.   Gait and station: Due to fall risk/safety concerns did not assess    Pertinent labs/studies:  CBC essentially WNL  PT/INR WNL, PTT dec 27.2  BMP essentially WNL  Albumin WNL, LFT's WNL  Mg WNL, Phos WNL  Copper WNL, CPK WNL    LN biopsy 8/31 - The findings in this biopsy are consistent with low-grade follicular  lymphoma.  Suggest correlation with clinical and radiological findings.    Cytology slide and cell block   reveal a hypercellular specimen composed  of abundant monomorphic intermediate lymphocytes with scattered larger  cells.    Core biopsy sections  show multiple tissue cores with atypical nodular and  diffuse lymphoid proliferation consisting of mostly medium-sized cells  with scattered larger cells.    MRI brain, c-spine, t-spine, l-spine w/ and w/o 8/30 - No abnormal brain parenchymal or leptomeningeal enhancement.    Widespread osseous metastatic disease involving the entire   spine, as discussed.    Circumferential epidural tumor involvement spanning the T3-T5 levels with   epidural tumor encroachment worst at the T4 level surrounding the cord   which appears flattened. Evaluation for cord edema is limited secondary   to lack of T2 and STIR sequences through this area.    Additional epidural tumor within the sacral canal adjacent to osseous   metastatic lesions, more asymmetric replaced to the left.    Suggestion of abnormal leptomeningeal enhancement involving the cauda   equina nerve roots also suspicious for neoplastic involvement.    Redemonstration of abnormal confluent enhancing soft tissue within the   posterior mediastinum surrounding the aorta as well as confluent soft   tissue adjacent to the IVC and descending aorta within the abdominal   cavity suspicious for adenopathy and/or tumor.    Mild concave superior endplate deformities of T12 and L1 without bony   retropulsion. No

## 2022-09-08 NOTE — CONSULT NOTE ADULT - SUBJECTIVE AND OBJECTIVE BOX
Lucita Franklin  82F admitted patient, found to have large mediastinal mass and flow cytometry of L axillary node showing low-grade follicular lymphoma. MRI showing epidural tumor involvement spanning the T3-T5 levels as well as leptomeningeal enhancement involving cauda equina. Hematology was unable to aquire LP to confirm CNS involvement; Dr. Madera felt was unsafe due to presence of compressive mass. We are consulted for placement of Ommaya reservoir for CSF sampling and possible intrathecal chemotherapy. Bone marrow and mediastinal lymph node biopsy results pending.     --Anticoagulation--    T(C): 36.5 (09-08-22 @ 12:03), Max: 36.9 (09-07-22 @ 20:47)  HR: 84 (09-08-22 @ 12:03) (70 - 95)  BP: 114/79 (09-08-22 @ 12:03) (112/78 - 117/80)  RR: 18 (09-08-22 @ 12:03) (18 - 18)  SpO2: 97% (09-08-22 @ 12:03) (95% - 97%)  Wt(kg): --    Exam: AO3, PERRL, EOMI, no droop/drift, REINOSO 5/5

## 2022-09-08 NOTE — PROGRESS NOTE ADULT - NSPROGADDITIONALINFOA_GEN_ALL_CORE
d/w ACP    IR consult for Mediastinal mass biopsy. Neurosurgery consult for Ommaya.    Aileen Gomez MD  Division of Hospital Medicine  Available on [employer] Teams

## 2022-09-08 NOTE — PROGRESS NOTE ADULT - SUBJECTIVE AND OBJECTIVE BOX
Hematology Follow-up    INTERVAL HPI/OVERNIGHT EVENTS:  Patient S&E at bedside. No o/n events, patient resting comfortably. Had left knee pain for several hours after the bone marrow biopsy yesterday but able to ambulate today. Patient denies fever, chills, dizziness, weakness, CP, palpitations, SOB, cough, N/V/D/C, dysuria, changes in bowel movements, LE edema.    VITAL SIGNS:  T(F): 97.7 (09-08-22 @ 12:03)  HR: 84 (09-08-22 @ 12:03)  BP: 114/79 (09-08-22 @ 12:03)  RR: 18 (09-08-22 @ 12:03)  SpO2: 97% (09-08-22 @ 12:03)  Wt(kg): --    PHYSICAL EXAM:    Constitutional: AAOx3, NAD,   Eyes: PERRL, EOMI, sclera non-icteric  Neck: supple, no masses, no JVD  Respiratory: CTA b/l, good air entry b/l, no wheezing, rhonchi, rales, with normal respiratory effort and no intercostal retractions  Cardiovascular: RRR, normal S1S2, no M/R/G  Gastrointestinal: soft, NTND, no masses palpable, BS normal in all four quadrants, no HSM  Extremities:  no c/c/e  Neurological: Grossly intact  Skin: No bleeding from bone marrow biopsy site    MEDICATIONS  (STANDING):  carbidopa/levodopa  25/100 1 Tablet(s) Oral <User Schedule>  dexAMETHasone  Injectable 4 milliGRAM(s) IV Push every 6 hours  dexAMETHasone  IVPB 10 milliGRAM(s) IV Intermittent once  ibuprofen  Tablet. 400 milliGRAM(s) Oral once  latanoprost 0.005% Ophthalmic Solution 1 Drop(s) Both EYES at bedtime  pantoprazole    Tablet 40 milliGRAM(s) Oral before breakfast    MEDICATIONS  (PRN):  acetaminophen     Tablet .. 650 milliGRAM(s) Oral every 6 hours PRN Moderate Pain (4 - 6)  melatonin 3 milliGRAM(s) Oral at bedtime PRN Insomnia      No Known Allergies      LABS:                        11.9   6.06  )-----------( 283      ( 08 Sep 2022 06:23 )             36.4     09-08    143  |  106  |  32<H>  ----------------------------<  86  4.1   |  24  |  0.63    Ca    9.5      08 Sep 2022 06:23  Phos  3.5     09-08  Mg     1.9     09-08    TPro  5.8<L>  /  Alb  3.3  /  TBili  0.4  /  DBili  x   /  AST  20  /  ALT  6<L>  /  AlkPhos  114  09-08    PT/INR - ( 07 Sep 2022 06:14 )   PT: 10.6 sec;   INR: 0.92 ratio    PTT - ( 07 Sep 2022 06:14 )  PTT:27.4 sec Lactate Dehydrogenase, Serum: 109 U/L (09-08 @ 06:23)      RADIOLOGY & ADDITIONAL TESTS:  Studies reviewed.

## 2022-09-08 NOTE — PROGRESS NOTE ADULT - ATTENDING COMMENTS
The patient is seen in follow up; we have called her physician daughter and have recommended an additional interventional radiology biopsy of a retroperitoneal mass (conglomeration of lymph nodes). Although a mediastinal biopsy was considered IR recommends cardiothoracic evaluation for mediastinoscopy as a safety measure if a mediastinal mass biopsy is contemplated.   I recommend dexamethasone to begin today given the epidural disease. She has lack of vibratory sensation in her lower extremities (256 tuning fork). She is able to stand and support her weight with a walker.  We will plan for rituximab treatment when schedule permits on basisi of the current lymphoma diagnosis

## 2022-09-08 NOTE — PROGRESS NOTE ADULT - ASSESSMENT
Parkinsonism  Myoclonus  Thoracic spinal cord masses/lesions (mets?)  Pleural effusion    - Etiology for symptoms was initially broad and included inflammatory, neoplastic, and even paraneoplastic. Given spinal canal findings with cervical epidural mass and impingement on the spinal cord (no edema) likely also large contributor to symptoms. Can consider unmasking of underlying Parkinson Disease in the setting of acute medical issues but less likely. Patient feels improvement on current Sinemet dose  - Continue Sinemet 25/100mg 1 tab PO TID  - MRI brain, c-spine, t-spine, and l-spine w/ and w/o given possible spinal (vertebral body) malignancies with results as above  - S/p left axillary lymph node biopsy under IR on 8/31 and resultants consistent with lymphoma but need more specifics. Had bone marrow biopsy on 9/7 and awaiting results. Due to location of epidural mass not safe for LP but may consider Ommaya for diagnostic and treatment considerations, if needed  - PT/OT as tolerated  - Continue to address above medical and surgical issues, as you are doing  - Will continue to follow patient with you

## 2022-09-08 NOTE — PROGRESS NOTE ADULT - ASSESSMENT
82F hx of arrhythmia s/p ablation and incarcerated R femoral hernia s/p small bowel resection presents with several months of generalized weakness and 2 weeks of lower abdominal discomfort. 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 Hematology consulted for management of lymphoma.    #Follicular Lymphoma  CT TAP on admission showing Moderate left pleural effusion. Amorphous soft tissue in the retroperitoneal space adjacent to the vena cava and aorta which is similar to but more pronounced than prior exam likely representing retroperitoneal lymphadenopathy. 5.5 x 1.8 cm mediastinal mass. Left axillary lymphadenopathy. Confluent paraspinal and posterior mediastinal soft tissue infiltrate   surrounding the descending thoracic aorta to approximately the level of T11. There is bony involvement most pronounced at the T3-T5 vertebral bodies. Spinal canal/epidural extension of soft at 3-T5 levels. MRI C/T/L Spine showing Widespread osseous metastatic disease involving the entire spine. Circumferential epidural tumor involvement spanning the T3-T5 levels with epidural tumor encroachment worst at the T4 level surrounding the cord which appears flattened.Additional epidural tumor within the sacral canal adjacent to osseous metastatic lesions, more asymmetric replaced to the left. Suggestion of abnormal leptomeningeal enhancement involving the cauda equina nerve roots also suspicious for neoplastic involvement. Redemonstration of abnormal confluent enhancing soft tissue within the posterior mediastinum surrounding the aorta as well as confluent soft tissue adjacent to the IVC and descending aorta within the abdominal cavity suspicious for adenopathy and/or tumor. MRI Brain showing Minimal periventricular and bifrontal subcortical white matter ischemia.  Mild global atrophy, most prominent in the cerebellum.  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    --------Immunohistochemical stains positive for CD20, CD10, CD23, BCL6, BCL2; negative for CD5, cyclin D1, MUM1, CD43. Ki67 proliferation index is 20-30%. CD21 highlights the follicular dendritic meshwork. The interspersed T cells are stained by CD3, CD5, LEF1, CD43.  - s/p BMBx on 9/7/22  - A diagnosis of low-grade follicular lymphoma does not fit patient's clinical picture with epidural involvement. We would ordinarily request a diagnostic LP to confirm CNS involvement, however per Neuro Radiology, given the location of patient's soft tissue mass, there is no safe window for an LP.   - Neurosurgery to evaluate for potential Ommaya placement for diagnostic CSF sampling as well as possible intrathecal chemotherapy.   - Discussed with IR; mediastinal mass biopsy would require mediastinoscopy with CT surgery. Plan for RP lymph node biopsy with IR tomorrow 9/9/22.  - Please monitor TLS labs (CMP, Mg, Phos, LDH, uric acid) daily   - Tentatively planning to initiate rituximab on Monday 9/12/22  - Daughter Dr. Larisa Franklin was updated today 9/8/22 regarding the plan as above    Landry Porter MD  Hematology/Oncology Fellow PGY-4  Pager: [facility] [phone] / [place] [phone]   After 5pm and on weekends please page on-call fellow

## 2022-09-09 NOTE — PROGRESS NOTE ADULT - PROBLEM SELECTOR PLAN 1
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 Primary lesion is unknown but with unilateral L plef, concern for metastatic process in chest  - Pt and family would like to pursue diagnosis  - cytology from L thoracentesis - no malignant cells  - CTC w/ IV contrast showing anterior mediastinal mass, L axillary lymph node  - Repeat MRI brain, c, t spine w/w/o IV - diffuse spinal involvement noted; no cord compression  - s/p IR biopsy of L axillary node 8/31- cytology B cell lymphoma, pathology results low grade follicular lymphoma   - Paraneoplastic panel neg  -Heme consulted- bone marrow biopsy 9/7  -IR RP lad biopsy today   -Neurosurgery consult for Kathryn parry  -plan for rutixan 9/12

## 2022-09-09 NOTE — PROGRESS NOTE ADULT - ASSESSMENT
81 y/o F w/ hx of arrhythmia s/p ablation and incarcerated R femoral hernia s/p small bowel resection presents with several months of generalized weakness and 2 weeks of lower abdominal discomfort, being treated for pseudomonal UTI, also found to have bilateral pleff, possibly 2/2 to acute on chronic HFrEF s/p thoracentesis, workup for new metastatic malignancy s/p IR biosy of left axillary lymph node with ?low grade follicular lymphoma. s/p bone marrow biopsy and RP LAD biopsy.

## 2022-09-09 NOTE — PROGRESS NOTE ADULT - SUBJECTIVE AND OBJECTIVE BOX
Patient is a 82y old  Female who presents with a chief complaint of CC: generalized weakness, abdominal discomfort (08 Sep 2022 18:20)        SUBJECTIVE / OVERNIGHT EVENTS: Patient had no acute events overnight. Patient seen and examined at bedside this morning.     ROS: [ - ] Fever [ - ] Chills [ - ] Nausea/Vomiting [ - ] Chest Pain [ - ] Shortness of breath     MEDICATIONS  (STANDING):  carbidopa/levodopa  25/100 1 Tablet(s) Oral <User Schedule>  dexAMETHasone  Injectable 4 milliGRAM(s) IV Push every 6 hours  ibuprofen  Tablet. 400 milliGRAM(s) Oral once  latanoprost 0.005% Ophthalmic Solution 1 Drop(s) Both EYES at bedtime  pantoprazole    Tablet 40 milliGRAM(s) Oral before breakfast    MEDICATIONS  (PRN):  acetaminophen     Tablet .. 650 milliGRAM(s) Oral every 6 hours PRN Moderate Pain (4 - 6)  melatonin 3 milliGRAM(s) Oral at bedtime PRN Insomnia      Vital Signs Last 24 Hrs  T(C): 36.4 (09 Sep 2022 05:16), Max: 36.5 (08 Sep 2022 12:03)  T(F): 97.5 (09 Sep 2022 05:16), Max: 97.7 (08 Sep 2022 12:03)  HR: 67 (09 Sep 2022 05:16) (67 - 84)  BP: 127/77 (09 Sep 2022 05:16) (114/79 - 127/77)  BP(mean): --  RR: 18 (09 Sep 2022 05:16) (18 - 18)  SpO2: 94% (09 Sep 2022 05:16) (94% - 97%)    Parameters below as of 08 Sep 2022 20:30  Patient On (Oxygen Delivery Method): room air      CAPILLARY BLOOD GLUCOSE        I&O's Summary    08 Sep 2022 07:01  -  09 Sep 2022 07:00  --------------------------------------------------------  IN: 120 mL / OUT: 0 mL / NET: 120 mL        PHYSICAL EXAM  GENERAL: NAD, lying comfortably in bed   HEENT:  Atraumatic, Normocephalic, EOMI, conjunctiva and sclera clear, no LAD  CHEST/LUNG: Clear to auscultation bilaterally; No wheeze  HEART: RRR, S1 and S2 No murmurs, rubs, or gallops  ABDOMEN: Soft, Nontender, Nondistended; Bowel sounds present  EXTREMITIES:  2+ Peripheral Pulses, No clubbing, cyanosis, or edema  NEURO: AAOx3, non-focal  SKIN: No rashes or lesions    LABS:                        11.6   5.04  )-----------( 301      ( 09 Sep 2022 07:18 )             35.2     09-09    140  |  105  |  34<H>  ----------------------------<  137<H>  4.3   |  24  |  0.64    Ca    9.9      09 Sep 2022 07:16  Phos  3.5     09-08  Mg     1.9     09-08    TPro  5.8<L>  /  Alb  3.3  /  TBili  0.4  /  DBili  x   /  AST  20  /  ALT  6<L>  /  AlkPhos  114  09-08    PT/INR - ( 09 Sep 2022 07:18 )   PT: 10.4 sec;   INR: 0.90 ratio         PTT - ( 09 Sep 2022 07:18 )  PTT:21.8 sec            RADIOLOGY & ADDITIONAL TESTS:      Labs Personally Reviewed  Imaging Personally Reviewed  Consultant(s) Notes Reviewed        Patient is a 82y old  Female who presents with a chief complaint of CC: generalized weakness, abdominal discomfort (08 Sep 2022 18:20)        SUBJECTIVE / OVERNIGHT EVENTS: Patient had no acute events overnight. Patient seen and examined at bedside this morning. Patient feels overwhelmed, but no acute complaints. She recently discussed with heme team and her daughter on starting rutixan next week.     ROS: [ - ] Fever [ - ] Chills [ - ] Nausea/Vomiting [ - ] Chest Pain [ - ] Shortness of breath     MEDICATIONS  (STANDING):  carbidopa/levodopa  25/100 1 Tablet(s) Oral <User Schedule>  dexAMETHasone  Injectable 4 milliGRAM(s) IV Push every 6 hours  ibuprofen  Tablet. 400 milliGRAM(s) Oral once  latanoprost 0.005% Ophthalmic Solution 1 Drop(s) Both EYES at bedtime  pantoprazole    Tablet 40 milliGRAM(s) Oral before breakfast    MEDICATIONS  (PRN):  acetaminophen     Tablet .. 650 milliGRAM(s) Oral every 6 hours PRN Moderate Pain (4 - 6)  melatonin 3 milliGRAM(s) Oral at bedtime PRN Insomnia      Vital Signs Last 24 Hrs  T(C): 36.4 (09 Sep 2022 05:16), Max: 36.5 (08 Sep 2022 12:03)  T(F): 97.5 (09 Sep 2022 05:16), Max: 97.7 (08 Sep 2022 12:03)  HR: 67 (09 Sep 2022 05:16) (67 - 84)  BP: 127/77 (09 Sep 2022 05:16) (114/79 - 127/77)  BP(mean): --  RR: 18 (09 Sep 2022 05:16) (18 - 18)  SpO2: 94% (09 Sep 2022 05:16) (94% - 97%)    Parameters below as of 08 Sep 2022 20:30  Patient On (Oxygen Delivery Method): room air      CAPILLARY BLOOD GLUCOSE        I&O's Summary    08 Sep 2022 07:01  -  09 Sep 2022 07:00  --------------------------------------------------------  IN: 120 mL / OUT: 0 mL / NET: 120 mL      PHYSICAL EXAM  GENERAL: NAD, lying comfortably in bed, frail  HEENT:  Atraumatic, Normocephalic, EOMI, conjunctiva and sclera clear, Left axillary LAD  CHEST/LUNG: Clear to auscultation bilaterally; No wheeze  HEART: RRR, S1 and S2 No murmurs, rubs, or gallops  ABDOMEN: Soft, Nontender, Nondistended; Bowel sounds present. Left lower back dressing cdi with no hematoma. Left hip bruising   EXTREMITIES:  2+ Peripheral Pulses, No clubbing, cyanosis, or edema.   NEURO: AAOx3, non-focal, moving all extremities. Working with physical therapy   SKIN: No rashes or lesions    LABS:                        11.6   5.04  )-----------( 301      ( 09 Sep 2022 07:18 )             35.2     09-09    140  |  105  |  34<H>  ----------------------------<  137<H>  4.3   |  24  |  0.64    Ca    9.9      09 Sep 2022 07:16  Phos  3.5     09-08  Mg     1.9     09-08    TPro  5.8<L>  /  Alb  3.3  /  TBili  0.4  /  DBili  x   /  AST  20  /  ALT  6<L>  /  AlkPhos  114  09-08    PT/INR - ( 09 Sep 2022 07:18 )   PT: 10.4 sec;   INR: 0.90 ratio         PTT - ( 09 Sep 2022 07:18 )  PTT:21.8 sec            RADIOLOGY & ADDITIONAL TESTS:      Labs Personally Reviewed  Imaging Personally Reviewed  Consultant(s) Notes Reviewed

## 2022-09-09 NOTE — PRE PROCEDURE NOTE - PRE PROCEDURE EVALUATION
Interventional Radiology    HPI: 82F hx of arrhythmia s/p ablation and incarcerated R femoral hernia s/p small bowel resection presents with several months of generalized weakness and 2 weeks of lower abdominal discomfort. 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  Referred for  repeat RP LN bx for diagnosis and treatment planninng    NPO: NPO past midnight.     Allergies:   Medications (Abx/Cardiac/Anticoagulation/Blood Products)      Data:    53.3  T(C): 36.4  HR: 78  BP: 147/92  RR: 18  SpO2: 97%    Other specified health status    No pertinent family history in first degree relatives    No pertinent family history in first degree relatives    Handoff    MEWS Score    Dyslipidemia    Hypertension    Atrial fibrillation, unspecified type    Small bowel obstruction    Decreased activities of daily living (ADL)    UTI (urinary tract infection)    Generalized weakness    Uterine prolapse    Pleural effusion    Acute on chronic diastolic heart failure    Multiple lesions of metastatic malignancy    Painful wrist, left    No significant past surgical history    H/O cardiac radiofrequency ablation    Small bowel obstruction    UTERUS ALL THE WAY OUT    90+    Unintentional weight loss    Mild dehydration    Urinary tract infection    SysAdmin_VisitLink        Exam  General: No acute distress  Chest: Non labored breathing    -WBC 5.04 / HgB 11.6 / Hct 35.2 / Plt 301  -Na 140 / Cl 105 / BUN 34 / Glucose 137  -K 4.3 / CO2 24 / Cr 0.64  -ALT -- / Alk Phos -- / T.Bili --  -INR0.90    Imaging:     Plan: 82y Female presents for RP lymphnode biopsy   -Risks/Benefits/alternatives explained with the patient and witnessed informed consent obtained.   
Interventional Radiology    HPI: 82y Female with new mediastinal mass / lymphadenopathy. IR to perform image-guided left axillary lymph node biopsy.     Allergies:   Medications (Abx/Cardiac/Anticoagulation/Blood Products)    enoxaparin Injectable: 40 milliGRAM(s) SubCutaneous (08-29 @ 21:37)    Data:    T(C): 37.1  HR: 78  BP: 111/74  RR: 18  SpO2: 97%    Exam  General: No acute distress  Chest: Non labored breathing  Abdomen: Non-distended  Extremities: No swelling, warm    -WBC 5.49 / HgB 12.4 / Hct 36.6 / Plt 229  -Na 139 / Cl 105 / BUN 33 / Glucose 84  -K 4.2 / CO2 26 / Cr 0.76  -ALT -- / Alk Phos -- / T.Bili --  -INR0.94    Plan:   82y Female with new mediastinal mass / lymphadenopathy. IR to perform image-guided left axillary lymph node biopsy.   -- Relevant imaging and labs were reviewed.   -- Risks, benefits, and alternatives were explained to the patient and informed consent was obtained.

## 2022-09-09 NOTE — PROCEDURE NOTE - SPECIMEN OBTAINED
Cores given to Cytopathology Technologist/Pathologist
Cores given to Cytopathology Technologist/Pathologist

## 2022-09-09 NOTE — PROGRESS NOTE ADULT - ATTENDING COMMENTS
Patient seen and evaluated by our staff in morning rounds and physician daughter contacted by telephone for consent for the administration of rituximab on 09/12/2022;  On my afternoon rounds patient is absent from room with interventional radiology procedure planned for biopsy of a retroperitoneal lymph node mass. 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

## 2022-09-09 NOTE — PROGRESS NOTE ADULT - SUBJECTIVE AND OBJECTIVE BOX
****************************************  Wesley Corea PGY5  Hematology/Oncology  [phone]  ****************************************  SUBJECTIVE  Patient seen and examined at bedside. No acute events overnight  Reported intermittent knee pain (chronic)  Denied HA, CP, SOB, n/v/d/c , fevers, chills    OBJECTIVE   Vital Signs Last 24 Hrs  T(C): 36.4 (09 Sep 2022 15:15), Max: 36.6 (09 Sep 2022 11:50)  T(F): 97.5 (09 Sep 2022 15:15), Max: 97.9 (09 Sep 2022 11:50)  HR: 78 (09 Sep 2022 15:15) (67 - 78)  BP: 147/92 (09 Sep 2022 15:15) (126/83 - 147/92)  BP(mean): --  RR: 18 (09 Sep 2022 15:15) (18 - 18)  SpO2: 97% (09 Sep 2022 15:15) (92% - 97%)    Parameters below as of 09 Sep 2022 11:50  Patient On (Oxygen Delivery Method): room air        09-08-22 @ 07:01  -  09-09-22 @ 07:00  --------------------------------------------------------  IN: 120 mL / OUT: 0 mL / NET: 120 mL      PHYSICAL EXAM:  Constitutional: AAOx3, NAD,   Eyes: PERRL, EOMI, sclera non-icteric  Neck: supple, no masses, no JVD  Respiratory: CTA b/l, good air entry b/l retractions  Cardiovascular: RRR, normal S1S2, no M/R/G  Gastrointestinal: soft, NTND, no masses palpable, BS normal in all four quadrants, no HSM  Extremities:  no c/c/e  Neurological: Grossly intact  Skin: No bleeding from bone marrow biopsy site            LABS                        11.6   5.04  )-----------( 301      ( 09 Sep 2022 07:18 )             35.2       09-09    140  |  105  |  34<H>  ----------------------------<  137<H>  4.3   |  24  |  0.64    Ca    9.9      09 Sep 2022 07:16  Phos  3.5     09-08  Mg     1.9     09-08    TPro  5.8<L>  /  Alb  3.3  /  TBili  0.4  /  DBili  x   /  AST  20  /  ALT  6<L>  /  AlkPhos  114  09-08          Follow Up:      RADIOLOGY:

## 2022-09-09 NOTE — PROGRESS NOTE ADULT - ASSESSMENT
82F hx of arrhythmia s/p ablation and incarcerated R femoral hernia s/p small bowel resection presents with several months of generalized weakness and 2 weeks of lower abdominal discomfort. 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 Hematology consulted for management of lymphoma.    Follicular Lymphoma  CT TAP on admission with Moderate left pleural effusion. Amorphous soft tissue in the retroperitoneal space adjacent to the vena cava and aorta which is similar to but more pronounced than prior exam likely representing retroperitoneal lymphadenopathy. 5.5 x 1.8 cm mediastinal mass. Left axillary lymphadenopathy. Confluent paraspinal and posterior mediastinal soft tissue infiltrate   surrounding the descending thoracic aorta to approximately the level of T11. There is bony involvement most pronounced at the T3-T5 vertebral bodies. Spinal canal/epidural extension of soft at 3-T5 levels. MRI C/T/L Spine showing Widespread osseous metastatic disease involving the entire spine. Circumferential epidural tumor involvement spanning the T3-T5 levels with epidural tumor encroachment worst at the T4 level surrounding the cord which appears flattened. Additional epidural tumor within the sacral canal adjacent to osseous metastatic lesions, more asymmetric replaced to the left. Suggestion of abnormal leptomeningeal enhancement involving the cauda equina nerve roots also suspicious for neoplastic involvement. Redemonstration of abnormal confluent enhancing soft tissue within the posterior mediastinum surrounding the aorta as well as confluent soft tissue adjacent to the IVC and descending aorta within the abdominal cavity suspicious for adenopathy and/or tumor. MRI Brain showing Minimal periventricular and bifrontal subcortical white matter ischemia.  Mild global atrophy, most prominent in the cerebellum.  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    --------Immunohistochemical stains positive for CD20, CD10, CD23, BCL6, BCL2; negative for CD5, cyclin D1, MUM1, CD43. Ki67 proliferation index is 20-30%. CD21 highlights the follicular dendritic meshwork. The interspersed T cells are stained by CD3, CD5, LEF1, CD43.  - s/p BMBx on 9/7/22; results pending   - A diagnosis of low-grade follicular lymphoma does not fit patient's clinical picture with epidural involvement. We would ordinarily request a diagnostic LP to confirm CNS involvement, however per Neuro Radiology, given the location of patient's soft tissue mass, there is no safe window for an LP.   - Neurosurgery to evaluated for potential Ommaya placement for diagnostic CSF sampling as well as possible intrathecal chemotherapy; deferred until need confirmed   - Discussed with IR; mediastinal mass biopsy would require mediastinoscopy with CT surgery. Plan for RP lymph node biopsy with IR today 9/9/22.  - Plan to initiate rituximab on Monday 9/12/22. Patient consented today 9/9/2022 with daughter Dr. Larisa Franklin present on phone.  - Please monitor TLS labs (CMP, Mg, Phos, LDH, uric acid) daily

## 2022-09-09 NOTE — PROCEDURE NOTE - PROCEDURE FINDINGS AND DETAILS
retroperitoneal/periaortic lymph node biopsy, 4 20G cores
successful ultrasound-guided left axillary lymph node biopsy with local anesthesia.   3 x 18G core needle biopsies taken.  no complications.

## 2022-09-09 NOTE — PROGRESS NOTE ADULT - NSPROGADDITIONALINFOA_GEN_ALL_CORE
d/w ACP    Discussed with heme team and updated Daughter.     Aileen Gomez MD  Division of Hospital Medicine  Available on [employer] Teams

## 2022-09-09 NOTE — PROCEDURE NOTE - NSPERFORMEDBY_GEN_A_CORE
Myself
Attending
Myself
Myself/Attending
Pt presents with abscess on left buttocks. Will do I&D. Return to the ED in 2 days for dressing change/wound check. Strict return precautions given. Instructed to continue antibiotic course. F/u with PMD.

## 2022-09-09 NOTE — PRE-OP CHECKLIST - ORDERS/MEDICATION ADMINISTRATION RECORD ON CHART
DATE:    

 

SUBJECTIVE:  This is day #10 of hospitalization.  He is a 79-year-old, who comes in with

infected knee, status post washout, started to bleed profusely.  He was transferred to

[facility] for monitoring.  He received 2 units of RBC fresh frozen plasma.  Melenic stools

noted. 

 

OBJECTIVE:  VITAL SIGNS:  Temperature 98.9, heart rate 94, respiration 18, and blood

pressure of 138/54. 

HEENT:  He is normocephalic, not icteric. 

NECK:  Supple. 

CHEST:  Few crackles bilateral. 

HEART:  S1 and S2.  No S3, S4, or murmurs. 

ABDOMEN:  Soft.  Bowel sounds present.  No tenderness. 

EXTREMITIES:  No edema. 

SKIN:  No rash.

 

IMPRESSION:  

1. Infected knee.  Continue with antibiotic as ordered.

2. End-stage renal disease, on hemodialysis.

3. Acute gastrointestinal bleed with acute anemia, transfused.  Bleeding disorder has

been monitored.  He is currently on cefazolin after dialysis. 

4. History of gout.

5. The patient is stable from Infectious Disease point of view.

 

 

 

 

______________________________

MD ORA Prince/AC

D:  05/24/2020 12:38:23

T:  05/24/2020 13:06:02

Job #:  [id number] see surise  emar

## 2022-09-10 NOTE — PROGRESS NOTE ADULT - SUBJECTIVE AND OBJECTIVE BOX
SUBJECTIVE / OVERNIGHT EVENTS:  Patient was seen and examined at bedside. No significant overnight events reported. Feels well overall. Denies any acute complaints. Denies fevers, chills, CP, sob, n/v, abdominal pain, diarrhea, dysuria.       MEDICATIONS  (STANDING):  carbidopa/levodopa  25/100 1 Tablet(s) Oral <User Schedule>  dexAMETHasone  Injectable 4 milliGRAM(s) IV Push every 6 hours  ibuprofen  Tablet. 400 milliGRAM(s) Oral once  latanoprost 0.005% Ophthalmic Solution 1 Drop(s) Both EYES at bedtime  pantoprazole    Tablet 40 milliGRAM(s) Oral before breakfast    MEDICATIONS  (PRN):  acetaminophen     Tablet .. 650 milliGRAM(s) Oral every 6 hours PRN Moderate Pain (4 - 6)  melatonin 3 milliGRAM(s) Oral at bedtime PRN Insomnia      I&O's Summary    09 Sep 2022 07:01  -  10 Sep 2022 07:00  --------------------------------------------------------  IN: 0 mL / OUT: 300 mL / NET: -300 mL    10 Sep 2022 07:01  -  10 Sep 2022 12:48  --------------------------------------------------------  IN: 200 mL / OUT: 0 mL / NET: 200 mL        PHYSICAL EXAM:  Vital Signs Last 24 Hrs  T(C): 36.6 (10 Sep 2022 12:10), Max: 36.6 (10 Sep 2022 12:10)  T(F): 97.9 (10 Sep 2022 12:10), Max: 97.9 (10 Sep 2022 12:10)  HR: 70 (10 Sep 2022 12:10) (70 - 95)  BP: 143/88 (10 Sep 2022 12:10) (106/70 - 147/92)  BP(mean): 94 (09 Sep 2022 17:45) (94 - 95)  RR: 18 (10 Sep 2022 12:10) (14 - 18)  SpO2: 94% (10 Sep 2022 12:10) (93% - 98%)    Parameters below as of 10 Sep 2022 12:10  Patient On (Oxygen Delivery Method): room air    GENERAL: Chronically ill appearing, NAD, lying comfortably in bed,   HEENT:  Atraumatic, Normocephalic, EOMI, conjunctiva and sclera clear, Left axillary LAD  CHEST/LUNG: Clear to auscultation bilaterally; No wheeze  HEART: RRR, S1 and S2 No murmurs, rubs, or gallops  ABDOMEN: Soft, Nontender, Nondistended; Bowel sounds present. Left lower back dressing cdi with no hematoma. Left hip bruising   EXTREMITIES:  2+ Peripheral Pulses, No clubbing, cyanosis, or edema.   NEURO: AAOx3, non-focal, moving all extremities  SKIN: No rashes or lesions    LABS:                        11.6   9.94  )-----------( 328      ( 10 Sep 2022 07:04 )             35.5     09-10    143  |  108  |  35<H>  ----------------------------<  141<H>  4.2   |  24  |  0.54    Ca    9.4      10 Sep 2022 07:04  Phos  3.1     09-10  Mg     2.1     09-10    TPro  6.0  /  Alb  3.5  /  TBili  0.3  /  DBili  x   /  AST  22  /  ALT  23  /  AlkPhos  111  09-10    PT/INR - ( 09 Sep 2022 07:18 )   PT: 10.4 sec;   INR: 0.90 ratio         PTT - ( 09 Sep 2022 07:18 )  PTT:21.8 sec          COVID-19 PCR: NotDetec (08 Sep 2022 17:39)  COVID-19 PCR: NotDetec (04 Sep 2022 07:33)  COVID-19 PCR: NotDetec (28 Aug 2022 06:45)  SARS-CoV-2: NotDetec (22 Aug 2022 23:35)

## 2022-09-10 NOTE — PROGRESS NOTE ADULT - PROBLEM SELECTOR PLAN 1
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 Primary lesion is unknown but with unilateral L plef, concern for metastatic process in chest  - Pt and family would like to pursue diagnosis  - cytology from L thoracentesis - no malignant cells  - CTC w/ IV contrast showing anterior mediastinal mass, L axillary lymph node  - Repeat MRI brain, c, t spine w/w/o IV - diffuse spinal involvement noted; no cord compression  - s/p IR biopsy of L axillary node 8/31- cytology B cell lymphoma, pathology results low grade follicular lymphoma   - Paraneoplastic panel neg  -Heme consulted- bone marrow biopsy 9/7  -S/p IR RP lad biopsy 9/9  -Neurosurgery consult for Kathryn parry  -plan for rutixan 9/12

## 2022-09-11 NOTE — PROGRESS NOTE ADULT - SUBJECTIVE AND OBJECTIVE BOX
SUBJECTIVE / OVERNIGHT EVENTS:  Patient was seen and examined at bedside. No significant overnight events reported. Feels well overall. Denies any acute complaints. Denies fevers, chills, CP, sob, n/v, abdominal pain, diarrhea, dysuria.       MEDICATIONS  (STANDING):  carbidopa/levodopa  25/100 1 Tablet(s) Oral <User Schedule>  dexAMETHasone  Injectable 4 milliGRAM(s) IV Push every 6 hours  ibuprofen  Tablet. 400 milliGRAM(s) Oral once  latanoprost 0.005% Ophthalmic Solution 1 Drop(s) Both EYES at bedtime  pantoprazole    Tablet 40 milliGRAM(s) Oral before breakfast    MEDICATIONS  (PRN):  acetaminophen     Tablet .. 650 milliGRAM(s) Oral every 6 hours PRN Moderate Pain (4 - 6)  melatonin 3 milliGRAM(s) Oral at bedtime PRN Insomnia      I&O's Summary    09 Sep 2022 07:01  -  10 Sep 2022 07:00  --------------------------------------------------------  IN: 0 mL / OUT: 300 mL / NET: -300 mL    10 Sep 2022 07:01  -  10 Sep 2022 12:48  --------------------------------------------------------  IN: 200 mL / OUT: 0 mL / NET: 200 mL        PHYSICAL EXAM:  Vital Signs Last 24 Hrs  T(C): 36.6 (10 Sep 2022 12:10), Max: 36.6 (10 Sep 2022 12:10)  T(F): 97.9 (10 Sep 2022 12:10), Max: 97.9 (10 Sep 2022 12:10)  HR: 70 (10 Sep 2022 12:10) (70 - 95)  BP: 143/88 (10 Sep 2022 12:10) (106/70 - 147/92)  BP(mean): 94 (09 Sep 2022 17:45) (94 - 95)  RR: 18 (10 Sep 2022 12:10) (14 - 18)  SpO2: 94% (10 Sep 2022 12:10) (93% - 98%)    Parameters below as of 10 Sep 2022 12:10  Patient On (Oxygen Delivery Method): room air    GENERAL: Chronically ill appearing, NAD, lying comfortably in bed,   HEENT:  Atraumatic, Normocephalic, EOMI, conjunctiva and sclera clear, Left axillary LAD  CHEST/LUNG: Clear to auscultation bilaterally; No wheeze  HEART: RRR, S1 and S2 No murmurs, rubs, or gallops  ABDOMEN: Soft, Nontender, Nondistended; Bowel sounds present. Left lower back dressing cdi with no hematoma. Left hip bruising   EXTREMITIES:  2+ Peripheral Pulses, No clubbing, cyanosis, or edema.   NEURO: AAOx3, non-focal, moving all extremities  SKIN: No rashes or lesions    LABS:                         11.7   9.15  )-----------( 333      ( 11 Sep 2022 06:42 )             35.7     09-10    143  |  108  |  35<H>  ----------------------------<  141<H>  4.2   |  24  |  0.54    Ca    9.4      10 Sep 2022 07:04  Phos  3.1     09-10  Mg     2.1     09-10    TPro  6.0  /  Alb  3.5  /  TBili  0.3  /  DBili  x   /  AST  22  /  ALT  23  /  AlkPhos  111  09-10

## 2022-09-12 NOTE — PROGRESS NOTE ADULT - PROBLEM SELECTOR PLAN 1
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 Primary lesion is unknown but with unilateral L plef, concern for metastatic process in chest  - Pt and family would like to pursue diagnosis  - cytology from L thoracentesis - no malignant cells  - CTC w/ IV contrast showing anterior mediastinal mass, L axillary lymph node  - Repeat MRI brain, c, t spine w/w/o IV - diffuse spinal involvement noted; no cord compression  - s/p IR biopsy of L axillary node 8/31- cytology B cell lymphoma, pathology results low grade follicular lymphoma   - Paraneoplastic panel neg  -Bone Marrow Bx on 9/7:  Suboptimal biopsy with minute marrow fragment. Adequate aspirate smears showing slightly erythroid predominant maturing trilineage hematopoiesis. Minute, CD10 negative, monotypic B cell population detected by flow cytometry analysis  -S/p IR RP lad biopsy 9/9  -Neurosurgery consult for Kathryn parry; deferred until need confirmed   -Heme following  -Rituximab today 9/12  -Started on Dex 10mg x1--> Dex 4mg q6h on 9/8/22. Continue for now  -Monitor TLS labs daily

## 2022-09-12 NOTE — PROGRESS NOTE ADULT - ASSESSMENT
82F hx of arrhythmia s/p ablation and incarcerated R femoral hernia s/p small bowel resection presents with several months of generalized weakness and 2 weeks of lower abdominal discomfort. 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 Hematology consulted for management of lymphoma.    #Follicular Lymphoma  -CT TAP on admission with Moderate left pleural effusion. Amorphous soft tissue in the retroperitoneal space adjacent to the vena cava and aorta which is similar to but more pronounced than prior exam likely representing retroperitoneal lymphadenopathy. 5.5 x 1.8 cm mediastinal mass. Left axillary lymphadenopathy. Confluent paraspinal and posterior mediastinal soft tissue infiltrate   surrounding the descending thoracic aorta to approximately the level of T11. There is bony involvement most pronounced at the T3-T5 vertebral bodies. Spinal canal/epidural extension of soft at 3-T5 levels.   -MRI C/T/L Spine showing Widespread osseous metastatic disease involving the entire spine. Circumferential epidural tumor involvement spanning the T3-T5 levels with epidural tumor encroachment worst at the T4 level surrounding the cord which appears flattened. Additional epidural tumor within the sacral canal adjacent to osseous metastatic lesions, more asymmetric replaced to the left. Suggestion of abnormal leptomeningeal enhancement involving the cauda equina nerve roots also suspicious for neoplastic involvement. Redemonstration of abnormal confluent enhancing soft tissue within the posterior mediastinum surrounding the aorta as well as confluent soft tissue adjacent to the IVC and descending aorta within the abdominal cavity suspicious for adenopathy and/or tumor.   -MRI Brain showing Minimal periventricular and bifrontal subcortical white matter ischemia.  Mild global atrophy, most prominent in the cerebellum.  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    --------Immunohistochemical stains positive for CD20, CD10, CD23, BCL6, BCL2; negative for CD5, cyclin D1, MUM1, CD43. Ki67 proliferation index is 20-30%. CD21 highlights the follicular dendritic meshwork. The interspersed T cells are stained by CD3, CD5, LEF1, CD43.  - s/p BMBx on 9/7/22: Suboptimal biopsy with minute marrow fragment. Adequate aspirate smears showing slightly erythroid predominant maturing trilineage hematopoiesis. Minute, CD10 negative, monotypic B cell population detected by flow cytometry analysis  - A diagnosis of low-grade follicular lymphoma does not fit patient's clinical picture with epidural involvement. We would ordinarily request a diagnostic LP to confirm CNS involvement, however per Neuro Radiology, given the location of patient's soft tissue mass, there is no safe window for an LP. Neurosurgery to evaluated for potential Ommaya placement for diagnostic CSF sampling as well as possible intrathecal chemotherapy; deferred until need confirmed   -s/p RP lymph node biopsy with IR on 9/9/22 to confirm high grade lymphoma. Results pending  -Plan to initiate rituximab today, Monday 9/12/22  -Started on Dex 10mg x1--> Dex 4mg q6h on 9/8/22. Continue for now  -HIV, hepatitis B/C negative  - Please monitor TLS labs (CMP, Mg, Phos, LDH, uric acid) daily     Traci Almaraz MD  Hematology/Oncology Fellow, PGY-6  Pager: [phone]  After 5pm and on weekends please page on-call fellow 82F hx of arrhythmia s/p ablation and incarcerated R femoral hernia s/p small bowel resection presents with several months of generalized weakness and 2 weeks of lower abdominal discomfort. 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 Hematology consulted for management of lymphoma.    #Follicular Lymphoma  -CT TAP on admission with Moderate left pleural effusion. Amorphous soft tissue in the retroperitoneal space adjacent to the vena cava and aorta which is similar to but more pronounced than prior exam likely representing retroperitoneal lymphadenopathy. 5.5 x 1.8 cm mediastinal mass. Left axillary lymphadenopathy. Confluent paraspinal and posterior mediastinal soft tissue infiltrate   surrounding the descending thoracic aorta to approximately the level of T11. There is bony involvement most pronounced at the T3-T5 vertebral bodies. Spinal canal/epidural extension of soft at 3-T5 levels.   -MRI C/T/L Spine showing Widespread osseous metastatic disease involving the entire spine. Circumferential epidural tumor involvement spanning the T3-T5 levels with epidural tumor encroachment worst at the T4 level surrounding the cord which appears flattened. Additional epidural tumor within the sacral canal adjacent to osseous metastatic lesions, more asymmetric replaced to the left. Suggestion of abnormal leptomeningeal enhancement involving the cauda equina nerve roots also suspicious for neoplastic involvement. Redemonstration of abnormal confluent enhancing soft tissue within the posterior mediastinum surrounding the aorta as well as confluent soft tissue adjacent to the IVC and descending aorta within the abdominal cavity suspicious for adenopathy and/or tumor.   -MRI Brain showing Minimal periventricular and bifrontal subcortical white matter ischemia.  Mild global atrophy, most prominent in the cerebellum.  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    --------Immunohistochemical stains positive for CD20, CD10, CD23, BCL6, BCL2; negative for CD5, cyclin D1, MUM1, CD43. Ki67 proliferation index is 20-30%. CD21 highlights the follicular dendritic meshwork. The interspersed T cells are stained by CD3, CD5, LEF1, CD43.  - s/p BMBx on 9/7/22: Suboptimal biopsy with minute marrow fragment. Adequate aspirate smears showing slightly erythroid predominant maturing trilineage hematopoiesis. Minute, CD10 negative, monotypic B cell population detected by flow cytometry analysis  - A diagnosis of low-grade follicular lymphoma does not fit patient's clinical picture with epidural involvement. We would ordinarily request a diagnostic LP to confirm CNS involvement, however per Neuro Radiology, given the location of patient's soft tissue mass, there is no safe window for an LP. Neurosurgery to evaluated for potential Ommaya placement for diagnostic CSF sampling as well as possible intrathecal chemotherapy; deferred until need confirmed   -s/p RP lymph node biopsy with IR on 9/9/22 to confirm high grade lymphoma. Results pending  -Plan to initiate rituximab today, Monday 9/12/22  -We will also potentially treat with mini-CHOP this week. In anticipation of treatment, please place PICC Line  -Started on Dex 10mg x1--> Dex 4mg q6h on 9/8/22. Continue for now  -HIV, hepatitis B/C negative  - Please monitor TLS labs (CMP, Mg, Phos, LDH, uric acid) daily     Traci Almaraz MD  Hematology/Oncology Fellow, PGY-6  Pager: [phone]  After 5pm and on weekends please page on-call fellow 82F hx of arrhythmia s/p ablation and incarcerated R femoral hernia s/p small bowel resection presents with several months of generalized weakness and 2 weeks of lower abdominal discomfort. 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 Hematology consulted for management of lymphoma.    #Follicular Lymphoma  -CT TAP on admission with Moderate left pleural effusion. Amorphous soft tissue in the retroperitoneal space adjacent to the vena cava and aorta which is similar to but more pronounced than prior exam likely representing retroperitoneal lymphadenopathy. 5.5 x 1.8 cm mediastinal mass. Left axillary lymphadenopathy. Confluent paraspinal and posterior mediastinal soft tissue infiltrate   surrounding the descending thoracic aorta to approximately the level of T11. There is bony involvement most pronounced at the T3-T5 vertebral bodies. Spinal canal/epidural extension of soft at 3-T5 levels.   -MRI C/T/L Spine showing Widespread osseous metastatic disease involving the entire spine. Circumferential epidural tumor involvement spanning the T3-T5 levels with epidural tumor encroachment worst at the T4 level surrounding the cord which appears flattened. Additional epidural tumor within the sacral canal adjacent to osseous metastatic lesions, more asymmetric replaced to the left. Suggestion of abnormal leptomeningeal enhancement involving the cauda equina nerve roots also suspicious for neoplastic involvement. Redemonstration of abnormal confluent enhancing soft tissue within the posterior mediastinum surrounding the aorta as well as confluent soft tissue adjacent to the IVC and descending aorta within the abdominal cavity suspicious for adenopathy and/or tumor.   -MRI Brain showing Minimal periventricular and bifrontal subcortical white matter ischemia.  Mild global atrophy, most prominent in the cerebellum.  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    --------Immunohistochemical stains positive for CD20, CD10, CD23, BCL6, BCL2; negative for CD5, cyclin D1, MUM1, CD43. Ki67 proliferation index is 20-30%. CD21 highlights the follicular dendritic meshwork. The interspersed T cells are stained by CD3, CD5, LEF1, CD43.  - s/p BMBx on 9/7/22: Suboptimal biopsy with minute marrow fragment. Adequate aspirate smears showing slightly erythroid predominant maturing trilineage hematopoiesis. Minute, CD10 negative, monotypic B cell population detected by flow cytometry analysis  - A diagnosis of low-grade follicular lymphoma does not fit patient's clinical picture with epidural involvement. We would ordinarily request a diagnostic LP to confirm CNS involvement, however per Neuro Radiology, given the location of patient's soft tissue mass, there is no safe window for an LP. Neurosurgery to evaluated for potential Ommaya placement for diagnostic CSF sampling as well as possible intrathecal chemotherapy; deferred until need confirmed   -s/p RP lymph node biopsy with IR on 9/9/22 to confirm high grade lymphoma. Results pending  -Plan to initiate rituximab today, Monday 9/12/22  -We will also potentially treat with mini-CHOP this week. In anticipation of treatment, please place PICC Line  -Started on Dex 10mg x1--> Dex 4mg q6h on 9/8/22. Continue for now  -Please re-consult NSGY for ommaya placement. Patient will need prophylactic CNS treatment   -HIV, hepatitis B/C negative  - Please monitor TLS labs (CMP, Mg, Phos, LDH, uric acid) daily     Traci Almaraz MD  Hematology/Oncology Fellow, PGY-6  Pager: [phone]  After 5pm and on weekends please page on-call fellow

## 2022-09-12 NOTE — CONSULT NOTE ADULT - ASSESSMENT
82F, found to have low-grade follicular lymphoma from Left axillary lymph node biopsy this admission. Consulted for ultimate Ommaya placement for c/f leptomeningeal disease seen on 8/30 MRI neuraxis. 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 Exam: AOx3, CN II-XII intact. LHG 4+/5, otherwise REINOSO 5/5. But c/o increased "hand clumsiness." Slightly decreased sensation in all toes. No Orr's/clonus  -Admitted to Medicine, q4h neurochecks  -Has 8/30 MRI stereo, needs CT stereo to merge with this for operative planning  -Plt and coags wnl  -Will discuss with attending timing for Ommaya placement this week  -Consult Rad Onc for T3-5 epidural tumor, Bilsky Grade 3 given that tumor is likely radiosensitive, no acute neurosurgical spine intervention

## 2022-09-12 NOTE — PROGRESS NOTE ADULT - ATTENDING COMMENTS
82-yr-old woman with widespread disease including osseous and CNS involvement seen in follow up. Axillary LN biopsy reported as low grade FL with Ki97 of 20%. The disease appears to be much aggressive. She also has had B-symptoms, PS ~3. Plan to have rebiopsy from another location. Would suggest treating her with R-mini-chop for the first cycle. Receiving R today. Monitor TLS labs; appropriate ppx, GSCF. Supportive.

## 2022-09-12 NOTE — PROGRESS NOTE ADULT - SUBJECTIVE AND OBJECTIVE BOX
INTERVAL HPI/OVERNIGHT EVENTS:  Patient S&E at bedside. No o/n events.     VITAL SIGNS:  T(F): 97.3 (09-12-22 @ 04:22)  HR: 54 (09-12-22 @ 04:22)  BP: 156/76 (09-12-22 @ 04:22)  RR: 18 (09-12-22 @ 04:22)  SpO2: 96% (09-12-22 @ 04:22)  Wt(kg): --    PHYSICAL EXAM:    Constitutional: NAD  Eyes: EOMI, sclera non-icteric  Neck: supple  Respiratory: CTAB, no wheezes or crackles   Cardiovascular: RRR  Gastrointestinal: soft, NTND, + BS  Extremities: no cyanosis, clubbing or edema   Neurological: awake and alert      MEDICATIONS  (STANDING):  carbidopa/levodopa  25/100 1 Tablet(s) Oral <User Schedule>  dexAMETHasone  Injectable 4 milliGRAM(s) IV Push every 6 hours  ibuprofen  Tablet. 400 milliGRAM(s) Oral once  latanoprost 0.005% Ophthalmic Solution 1 Drop(s) Both EYES at bedtime  pantoprazole    Tablet 40 milliGRAM(s) Oral before breakfast    MEDICATIONS  (PRN):  acetaminophen     Tablet .. 650 milliGRAM(s) Oral every 6 hours PRN Moderate Pain (4 - 6)  melatonin 3 milliGRAM(s) Oral at bedtime PRN Insomnia      Allergies    No Known Allergies    Intolerances        LABS:                        13.0   8.38  )-----------( 369      ( 12 Sep 2022 10:37 )             39.6     09-12    137  |  100  |  29<H>  ----------------------------<  287<H>  4.1   |  22  |  0.60    Ca    9.3      12 Sep 2022 10:37  Phos  2.9     09-12  Mg     1.9     09-12    TPro  6.3  /  Alb  4.1  /  TBili  0.4  /  DBili  x   /  AST  19  /  ALT  30  /  AlkPhos  114  09-12          RADIOLOGY & ADDITIONAL TESTS:  Studies reviewed.

## 2022-09-12 NOTE — PROGRESS NOTE ADULT - ASSESSMENT
83 y/o F w/ hx of arrhythmia s/p ablation and incarcerated R femoral hernia s/p small bowel resection presents with several months of generalized weakness and 2 weeks of lower abdominal discomfort, being treated for pseudomonal UTI, also found to have bilateral pleff, possibly 2/2 to acute on chronic HFrEF s/p thoracentesis, workup for new metastatic malignancy s/p IR biosy of left axillary lymph node with ?low grade follicular lymphoma. s/p bone marrow biopsy and RP LAD biopsy.

## 2022-09-12 NOTE — ADVANCED PRACTICE NURSE CONSULT - ASSESSMENT
Pt with day 1/1 tx cycle 1, labs noted in sunrise, height, weight and bsa verified, okay to proceed with tx as per MD Cruz.  Pt with piv placed by chemo rn clean stick + blood return noted, no pain, redness or swelling noted at site.  Pt premedicated as noted in sunrise.  Pt administered Rituximab 375 mg/m2 = 611 mg.  Pt monitored as per protocol and observed by chemo rn for duration of tx till completion.  No adverse reaction noted.  Vital signs documented in sunrise.  Pt educated on Rituximab regimen and signs and symptoms to report to rn and staff, pt verbalized understanding.  Emotional support provided.  Report given to area rn.

## 2022-09-12 NOTE — PROGRESS NOTE ADULT - SUBJECTIVE AND OBJECTIVE BOX
SUBJECTIVE / OVERNIGHT EVENTS:  Patient was seen and examined at bedside. No significant overnight events reported. Feels well overall. Denies any acute complaints. Denies fevers, chills, CP, sob, n/v, abdominal pain, diarrhea, dysuria.       MEDICATIONS  (STANDING):  carbidopa/levodopa  25/100 1 Tablet(s) Oral <User Schedule>  dexAMETHasone  Injectable 4 milliGRAM(s) IV Push every 6 hours  ibuprofen  Tablet. 400 milliGRAM(s) Oral once  latanoprost 0.005% Ophthalmic Solution 1 Drop(s) Both EYES at bedtime  pantoprazole    Tablet 40 milliGRAM(s) Oral before breakfast    MEDICATIONS  (PRN):  acetaminophen     Tablet .. 650 milliGRAM(s) Oral every 6 hours PRN Moderate Pain (4 - 6)  melatonin 3 milliGRAM(s) Oral at bedtime PRN Insomnia      I&O's Summary    09 Sep 2022 07:01  -  10 Sep 2022 07:00  --------------------------------------------------------  IN: 0 mL / OUT: 300 mL / NET: -300 mL    10 Sep 2022 07:01  -  10 Sep 2022 12:48  --------------------------------------------------------  IN: 200 mL / OUT: 0 mL / NET: 200 mL        PHYSICAL EXAM:  Vital Signs Last 24 Hrs  T(C): 36.6 (10 Sep 2022 12:10), Max: 36.6 (10 Sep 2022 12:10)  T(F): 97.9 (10 Sep 2022 12:10), Max: 97.9 (10 Sep 2022 12:10)  HR: 70 (10 Sep 2022 12:10) (70 - 95)  BP: 143/88 (10 Sep 2022 12:10) (106/70 - 147/92)  BP(mean): 94 (09 Sep 2022 17:45) (94 - 95)  RR: 18 (10 Sep 2022 12:10) (14 - 18)  SpO2: 94% (10 Sep 2022 12:10) (93% - 98%)    Parameters below as of 10 Sep 2022 12:10  Patient On (Oxygen Delivery Method): room air    GENERAL: NAD, lying comfortably in bed  HEENT:  Atraumatic, Normocephalic, EOMI, conjunctiva and sclera clear, Left axillary LAD  CHEST/LUNG: Clear to auscultation bilaterally; No wheeze  HEART: RRR, S1 and S2 No murmurs, rubs, or gallops  ABDOMEN: Soft, Nontender, Nondistended; Bowel sounds present. Left lower back dressing cdi with no hematoma. Left hip bruising   EXTREMITIES:  2+ Peripheral Pulses, No clubbing, cyanosis, or edema.   NEURO: AAOx3, non-focal, moving all extremities  SKIN: No rashes or lesions    LABS:                         13.0   8.38  )-----------( 369      ( 12 Sep 2022 10:37 )             39.6     09-12    137  |  100  |  29<H>  ----------------------------<  287<H>  4.1   |  22  |  0.60    Ca    9.3      12 Sep 2022 10:37  Phos  2.9     09-12  Mg     1.9     09-12    TPro  6.3  /  Alb  4.1  /  TBili  0.4  /  DBili  x   /  AST  19  /  ALT  30  /  AlkPhos  114  09-12

## 2022-09-12 NOTE — PROGRESS NOTE ADULT - SUBJECTIVE AND OBJECTIVE BOX
Interventional Radiology Follow-Up Note.    Patient seen and examined @ bedside around 8am.    This is a 82y Female s/p left RP lymph node biopsy on 9/9/22 in Interventional Radiology with Dr. Keenan.     No complaint offered.           Vitals:   T(F): 97.3, Max: 98.4 (12:55)  HR: 54  BP: 156/76  RR: 18  SpO2: 96%    Physical Exam:  General: Nontoxic, in NAD.  Back: Left RP biopsy site dressing c/d/i. No hematoma noted. No ttp Dressing removed.           LABS:  Na: 143 (09-10 @ 07:04)  K: 4.2 (09-10 @ 07:04)  Cl: 108 (09-10 @ 07:04)  CO2: 24 (09-10 @ 07:04)  BUN: 35 (09-10 @ 07:04)  Cr: 0.54 (09-10 @ 07:04)  Glu: 141(09-10 @ 07:04)    Hgb: 11.7 (09-11 @ 06:42), 11.6 (09-10 @ 07:04)  Hct: 35.7 (09-11 @ 06:42), 35.5 (09-10 @ 07:04)  WBC: 9.15 (09-11 @ 06:42), 9.94 (09-10 @ 07:04)  Plt: 333 (09-11 @ 06:42), 328 (09-10 @ 07:04)                       Assessment/Plan:  82F hx of arrhythmia s/p ablation and incarcerated R femoral hernia s/p small bowel resection presents with several months of generalized weakness and 2 weeks of lower abdominal discomfort. 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  Pt most recently s/p left RP lymph node biopsy on 9/9/22 in Interventional Radiology.      - F/u cytopathology results.  - IR will sign off.     Please call IR at  [phone] with any questions, concerns, or issues regarding above.    Also available on Teams.

## 2022-09-12 NOTE — CONSULT NOTE ADULT - SUBJECTIVE AND OBJECTIVE BOX
p ([id number])     HPI:  82F, found to have low-grade follicular lymphoma from Left axillary lymph node biopsy this admission. Consulted for ultimate Ommaya placement for c/f leptomeningeal disease seen on 8/30 MRI neuraxis. 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 Exam: AOx3, CN II-XII intact. LHG 4+/5, otherwise REINOSO 5/5. But c/o increased "hand clumsiness." Slightly decreased sensation in all toes. No Orr's/clonus  -Given that tumor is likely radiosensitive, consult Rad Onc. No acute neurosurgical spine intervention    =====================  PAST MEDICAL HISTORY   Dyslipidemia    Hypertension    Atrial fibrillation, unspecified type    Small bowel obstruction      PAST SURGICAL HISTORY   No significant past surgical history    H/O cardiac radiofrequency ablation    Small bowel obstruction          MEDICATIONS:  Antibiotics:    Neuro:  acetaminophen     Tablet .. 650 milliGRAM(s) Oral every 6 hours PRN  carbidopa/levodopa  25/100 1 Tablet(s) Oral <User Schedule>  diphenhydrAMINE 50 milliGRAM(s) Oral once PRN  ibuprofen  Tablet. 400 milliGRAM(s) Oral once  melatonin 3 milliGRAM(s) Oral at bedtime PRN  meperidine     Injectable 12.5 milliGRAM(s) IV Push once PRN    Other:  dexAMETHasone  Injectable 4 milliGRAM(s) IV Push every 6 hours  hydrocortisone sodium succinate Injectable 100 milliGRAM(s) IV Push once PRN  pantoprazole    Tablet 40 milliGRAM(s) Oral before breakfast      SOCIAL HISTORY:   Occupation:   Marital Status:     FAMILY HISTORY:  No pertinent family history in first degree relatives    No pertinent family history in first degree relatives        ROS: Negative except per HPI    LABS:                          13.0   8.38  )-----------( 369      ( 12 Sep 2022 10:37 )             39.6     09-12    137  |  100  |  29<H>  ----------------------------<  287<H>  4.1   |  22  |  0.60    Ca    9.3      12 Sep 2022 10:37  Phos  2.9     09-12  Mg     1.9     09-12    TPro  6.3  /  Alb  4.1  /  TBili  0.4  /  DBili  x   /  AST  19  /  ALT  30  /  AlkPhos  114  09-12

## 2022-09-13 NOTE — PROGRESS NOTE ADULT - ASSESSMENT
82F, found to have low-grade follicular lymphoma from Left axillary lymph node biopsy this admission. Consulted for ultimate Ommaya placement for c/f leptomeningeal disease seen on 8/30 MRI neuraxis. 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 Exam: AOx3, CN II-XII intact. LHG 4+/5, otherwise REINOSO 5/5. But c/o increased "hand clumsiness." Slightly decreased sensation in all toes. No Orr's/clonus  -Admitted to Medicine, q4h neurochecks  -Has 8/30 MRI stereo, needs CT stereo to merge with this for operative planning (ORDERED, PLEASE FU)  -Plt and coags wnl  -Will discuss with attending timing for Ommaya placement this week  -Consult Rad Onc for T3-5 epidural tumor, Bilsky Grade 3 given that tumor is likely radiosensitive, no acute neurosurgical spine intervention

## 2022-09-13 NOTE — PROGRESS NOTE ADULT - SUBJECTIVE AND OBJECTIVE BOX
Patient seen and examined at bedside.    --Anticoagulation--    T(C): 36.6 (09-12-22 @ 21:57), Max: 36.6 (09-12-22 @ 21:57)  HR: 62 (09-12-22 @ 21:57) (62 - 66)  BP: 122/82 (09-12-22 @ 21:57) (122/82 - 142/87)  RR: 18 (09-12-22 @ 21:57) (18 - 18)  SpO2: 96% (09-12-22 @ 21:57) (96% - 96%)  Wt(kg): --    Exam: AOx3, CN II-XII intact. LHG 4+/5, otherwise REINOSO 5/5. But c/o increased "hand clumsiness." Slightly decreased sensation in all toes. No Orr's/clonus

## 2022-09-13 NOTE — PROGRESS NOTE ADULT - SUBJECTIVE AND OBJECTIVE BOX
SUBJECTIVE / OVERNIGHT EVENTS:  Patient was seen and examined at bedside. No significant overnight events reported. Feels well overall. Had some nausea yesterday evening that resolved. No vomiting. Denies any acute complaints. Denies fevers, chills, CP, sob, n/v, abdominal pain, diarrhea, dysuria.       MEDICATIONS  (STANDING):  carbidopa/levodopa  25/100 1 Tablet(s) Oral <User Schedule>  dexAMETHasone  Injectable 4 milliGRAM(s) IV Push every 6 hours  ibuprofen  Tablet. 400 milliGRAM(s) Oral once  latanoprost 0.005% Ophthalmic Solution 1 Drop(s) Both EYES at bedtime  pantoprazole    Tablet 40 milliGRAM(s) Oral before breakfast    MEDICATIONS  (PRN):  acetaminophen     Tablet .. 650 milliGRAM(s) Oral every 6 hours PRN Moderate Pain (4 - 6)  melatonin 3 milliGRAM(s) Oral at bedtime PRN Insomnia      I&O's Summary    09 Sep 2022 07:01  -  10 Sep 2022 07:00  --------------------------------------------------------  IN: 0 mL / OUT: 300 mL / NET: -300 mL    10 Sep 2022 07:01  -  10 Sep 2022 12:48  --------------------------------------------------------  IN: 200 mL / OUT: 0 mL / NET: 200 mL        PHYSICAL EXAM:  Vital Signs Last 24 Hrs  T(C): 36.6 (10 Sep 2022 12:10), Max: 36.6 (10 Sep 2022 12:10)  T(F): 97.9 (10 Sep 2022 12:10), Max: 97.9 (10 Sep 2022 12:10)  HR: 70 (10 Sep 2022 12:10) (70 - 95)  BP: 143/88 (10 Sep 2022 12:10) (106/70 - 147/92)  BP(mean): 94 (09 Sep 2022 17:45) (94 - 95)  RR: 18 (10 Sep 2022 12:10) (14 - 18)  SpO2: 94% (10 Sep 2022 12:10) (93% - 98%)    Parameters below as of 10 Sep 2022 12:10  Patient On (Oxygen Delivery Method): room air    GENERAL: NAD, lying comfortably in bed  HEENT:  Atraumatic, Normocephalic, EOMI, conjunctiva and sclera clear, Left axillary LAD  CHEST/LUNG: Clear to auscultation bilaterally; No wheeze  HEART: RRR, S1 and S2 No murmurs, rubs, or gallops  ABDOMEN: Soft, Nontender, Nondistended; Bowel sounds present. no rebound or guarding   EXTREMITIES:  2+ Peripheral Pulses, No clubbing, cyanosis, or edema.   NEURO: AAOx3, non-focal, moving all extremities  SKIN: No rashes or lesions    LABS:                         13.0   8.38  )-----------( 369      ( 12 Sep 2022 10:37 )             39.6     09-13    138  |  105  |  28<H>  ----------------------------<  137<H>  5.2   |  23  |  0.52    Ca    8.9      13 Sep 2022 06:53  Phos  2.9     09-12  Mg     1.9     09-12    TPro  6.0  /  Alb  3.4  /  TBili  0.3  /  DBili  x   /  AST  35  /  ALT  18  /  AlkPhos  104  09-13

## 2022-09-13 NOTE — PROGRESS NOTE ADULT - PROBLEM SELECTOR PLAN 1
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 Primary lesion is unknown but with unilateral L plef, concern for metastatic process in chest  - Pt and family would like to pursue diagnosis  - cytology from L thoracentesis - no malignant cells  - CTC w/ IV contrast showing anterior mediastinal mass, L axillary lymph node  - Repeat MRI brain, c, t spine w/w/o IV - diffuse spinal involvement noted; no cord compression  - s/p IR biopsy of L axillary node 8/31- cytology B cell lymphoma, pathology results low grade follicular lymphoma   - Paraneoplastic panel neg  -Bone Marrow Bx on 9/7:  Suboptimal biopsy with minute marrow fragment. Adequate aspirate smears showing slightly erythroid predominant maturing trilineage hematopoiesis. Minute, CD10 negative, monotypic B cell population detected by flow cytometry analysis  -S/p IR RP lad biopsy 9/9 - confirm high grade lymphoma   -Heme/onc following   -S/p Rituximab 9/12  -PICC placed  -Plan for possible mini-CHOP this week  -Neurosurgery consult for Ommaya eval  -Rad onc consult per neurosurg   -Started on Dex 10mg x1--> Dex 4mg q6h on 9/8/22. Continue for now  -Monitor TLS labs daily

## 2022-09-14 NOTE — PROGRESS NOTE ADULT - ASSESSMENT
82F, found to have low-grade follicular lymphoma from Left axillary lymph node biopsy this admission. Consulted for ultimate Ommaya placement for c/f leptomeningeal disease seen on 8/30 MRI neuraxis. 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 Exam: AOx3, CN II-XII intact. LHG 4+/5, otherwise REINOSO 5/5. But c/o increased "hand clumsiness." Slightly decreased sensation in all toes. No Orr's/clonus    -Pending availability, possible Ommaya reservoir placement tomorrow; please pre-op and document clearance   -Admitted to Medicine, q4h neurochecks  -Has 8/30 MRI stereo, CT completed  -Plt and coags wnl  -Will discuss with attending timing for Ommaya placement this week  -Consult Rad Onc for T3-5 epidural tumor, Bilsky Grade 3 given that tumor is likely radiosensitive, no acute neurosurgical spine intervention

## 2022-09-14 NOTE — CHART NOTE - NSCHARTNOTEFT_GEN_A_CORE
81 y/o F w/ hx of arrhythmia s/p ablation and incarcerated R femoral hernia s/p small bowel resection admitted for weakness and neuro sx for Ommaya reservoir tomorrow states after going to the bathroom she felt a funny feeling in her chest. She states it was not pain and during the episode there was no shortness of breath, no dizziness, no palpitations, no N/V, no numbness or tingling, no other symptoms. States it lasted less than 15 minutes and currently is symptom free at this time. She states she is very nervous over the procedure she is having tomorrow. States never had a feeling like this prior.     Vital Signs Last 24 Hrs  T(C): 36.5 (09-14-22 @ 17:13), Max: 36.8 (09-13-22 @ 21:06)  T(F): 97.7 (09-14-22 @ 17:13), Max: 98.3 (09-13-22 @ 21:06)  HR: 68 (09-14-22 @ 17:13) (67 - 69)  BP: 148/92 (09-14-22 @ 17:13) (106/79 - 148/92)  BP(mean): --  RR: 18 (09-14-22 @ 17:13) (18 - 18)  SpO2: 96% (09-14-22 @ 17:13) (95% - 96%)    GENERAL: NAD, resting comfortably in bed    HEAD:  Atraumatic, Normocephalic  EYES: EOMI, PERRLA, conjunctiva and sclera clear  NECK: Supple, No JVD   NERVOUS SYSTEM:  Alert & Oriented X 3,moves all ext  CHEST/LUNG: CTA bilaterally; No rales, rhonchi, wheezing   HEART: S1 S2, RRR  ABDOMEN: Soft, Nontender, Nondistended; Bowel sounds present  EXTREMITIES:  2+ Peripheral Pulses, No clubbing, cyanosis, or edema  SKIN: No rashes or lesions     A/P: Chest discomfort  EKG: NSR; no ectopy  Will order trops and BMP  Attending aware  If continues will call cards c/s

## 2022-09-14 NOTE — PROGRESS NOTE ADULT - SUBJECTIVE AND OBJECTIVE BOX
SUBJECTIVE / OVERNIGHT EVENTS:  Patient was seen and examined at bedside. No significant overnight events reported. Feels well overall. Denies any acute complaints. Denies fevers, chills, CP, sob, n/v, abdominal pain, diarrhea, dysuria.       MEDICATIONS  (STANDING):  carbidopa/levodopa  25/100 1 Tablet(s) Oral <User Schedule>  dexAMETHasone  Injectable 4 milliGRAM(s) IV Push every 6 hours  ibuprofen  Tablet. 400 milliGRAM(s) Oral once  latanoprost 0.005% Ophthalmic Solution 1 Drop(s) Both EYES at bedtime  pantoprazole    Tablet 40 milliGRAM(s) Oral before breakfast    MEDICATIONS  (PRN):  acetaminophen     Tablet .. 650 milliGRAM(s) Oral every 6 hours PRN Moderate Pain (4 - 6)  melatonin 3 milliGRAM(s) Oral at bedtime PRN Insomnia      I&O's Summary    09 Sep 2022 07:01  -  10 Sep 2022 07:00  --------------------------------------------------------  IN: 0 mL / OUT: 300 mL / NET: -300 mL    10 Sep 2022 07:01  -  10 Sep 2022 12:48  --------------------------------------------------------  IN: 200 mL / OUT: 0 mL / NET: 200 mL        PHYSICAL EXAM:  Vital Signs Last 24 Hrs  T(C): 36.6 (10 Sep 2022 12:10), Max: 36.6 (10 Sep 2022 12:10)  T(F): 97.9 (10 Sep 2022 12:10), Max: 97.9 (10 Sep 2022 12:10)  HR: 70 (10 Sep 2022 12:10) (70 - 95)  BP: 143/88 (10 Sep 2022 12:10) (106/70 - 147/92)  BP(mean): 94 (09 Sep 2022 17:45) (94 - 95)  RR: 18 (10 Sep 2022 12:10) (14 - 18)  SpO2: 94% (10 Sep 2022 12:10) (93% - 98%)    Parameters below as of 10 Sep 2022 12:10  Patient On (Oxygen Delivery Method): room air    GENERAL: NAD, lying comfortably in bed  HEENT:  Atraumatic, Normocephalic, EOMI, conjunctiva and sclera clear, Left axillary LAD  CHEST/LUNG: Clear to auscultation bilaterally; No wheeze  HEART: RRR, S1 and S2 No murmurs, rubs, or gallops  ABDOMEN: Soft, Nontender, Nondistended; Bowel sounds present. no rebound or guarding   EXTREMITIES:  2+ Peripheral Pulses, No clubbing, cyanosis, or edema.   NEURO: AAOx3, non-focal, moving all extremities  SKIN: No rashes or lesions    LABS:                         12.0   8.43  )-----------( 285      ( 14 Sep 2022 06:40 )             36.7     09-14    140  |  104  |  33<H>  ----------------------------<  144<H>  4.0   |  24  |  0.59    Ca    8.6      14 Sep 2022 06:29    TPro  5.6<L>  /  Alb  3.5  /  TBili  0.2  /  DBili  x   /  AST  12  /  ALT  17  /  AlkPhos  101  09-14

## 2022-09-14 NOTE — PROGRESS NOTE ADULT - PROBLEM SELECTOR PLAN 1
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 Primary lesion is unknown but with unilateral L plef, concern for metastatic process in chest  - Pt and family would like to pursue diagnosis  - cytology from L thoracentesis - no malignant cells  - CTC w/ IV contrast showing anterior mediastinal mass, L axillary lymph node  - Repeat MRI brain, c, t spine w/w/o IV - diffuse spinal involvement noted; no cord compression  - s/p IR biopsy of L axillary node 8/31- cytology B cell lymphoma, pathology results low grade follicular lymphoma   - Paraneoplastic panel neg  -Bone Marrow Bx on 9/7:  Suboptimal biopsy with minute marrow fragment. Adequate aspirate smears showing slightly erythroid predominant maturing trilineage hematopoiesis. Minute, CD10 negative, monotypic B cell population detected by flow cytometry analysis  -S/p IR RP lad biopsy 9/9 - confirm high grade lymphoma   -Heme/onc following   -S/p Rituximab 9/12  -PICC placed  -Plan for possible mini-CHOP this week  -Rad onc consult per neurosurg   -Started on Dex 10mg x1--> Dex 4mg q6h on 9/8/22. Continue for now  -Monitor TLS labs daily  -Neurosurgery consult for Kathryn parry. VSS, labs reviewed. Currently without any acute complaints. Denies CP, SOB. RCRI 3.9% 30-day risk of death, MI, or cardiac arrest. May proceed with planned procedure without additional testing at this time.

## 2022-09-14 NOTE — CHART NOTE - NSCHARTNOTEFT_GEN_A_CORE
Nutrition Follow Up Note  Patient seen for: malnutrition follow-up     Hospital course as per chart: 83 yo female with PMH of arrhythmia s/p ablation and incarcerated R femoral hernia s/p small bowel resection who presented several months of generalized weakness and 2 weeks of lower abdominal discomfort, being treated for pseudomonal UTI, also found to have miquel. pleural effusions, "possible 2/2 acute on chronic HFrEF s/p thoracentesis," workup for new metastatic malignancy s/p IR biopsy of L axillary lymph node with low grade follicular lymphoma. S/p bone marrow biopsy and RP LAD biopsy. Chart reviewed, events noted.    Source: [x] Patient       [x] EMR        [] RN        [] Family at bedside       [] Other:    Diet Order: Diet, Regular (22)    - Is current order appropriate/adequate? [x] Yes  []  No:     - PO intake :   [x] >75%  Adequate    [] 50-75%  Fair       [] <50%  Poor  - Pt reports ongoing good appetite and PO intake, likes Mighty Shakes, has been drinking them     GI: Pt denies nausea/vomiting. Last BM .   Bowel Regimen? [] Yes   [x] No    Weights:   Daily Weight in k.3 (-), Weight in k.3 (-) - weight appears stable (initial dosing weight 50.2 kg)  Will continue to monitor and trend weights as able     Nutritionally Pertinent MEDICATIONS  (STANDING):  dexAMETHasone  Injectable  pantoprazole    Tablet  sodium chloride 0.9%.    Pertinent Labs:  @ 06:29: Na 140, BUN 33<H>, Cr 0.59, <H>, K+ 4.0, Phos --, Mg --, Alk Phos 101, ALT/SGPT 17, AST/SGOT 12, HbA1c --    Skin per nursing documentation: no pressure injuries  Edema per flowsheets: none    Estimated Needs: no change since previous assessment  Based on weight obtained at time of initial assessment 50.2 kg  Estimated Energy Needs (30-35 kcal/kg): 5389-6184 kcal/day  Estimated Protein Needs (1.2-1.4 g/kg): 60-70 g/day  Defer fluids to team    Previous Nutrition Diagnosis: Mild Malnutrition, Underweight  Nutrition Diagnosis is: [x] ongoing  [] resolved [] not applicable   Being addressed with PO diet and Mighty Shakes    New Nutrition Diagnosis: not applicable    Nutrition Care Plan:  [x] In Progress  [] Achieved  [] Not applicable    Nutrition Interventions:     Education Provided:       [x] Yes: Encouraged continued good PO intake as tolerated with supplemental Mighty Shakes. Pt with no nutrition-related questions or food preferences at this time. Pt made aware RD remains available.         Recommendations:      1) Continue Regular diet, adjust as needed  2) RD to continue to provide Mighty Shake 2x/day (200 kcal, 6 gm protein in each) to optimize intake     Monitoring and Evaluation: Monitor PO intake, weight, labs, skin, GI status, diet     RD remains available upon request and will follow up per protocol  Aminah Gipson MS, RD CDN [facility] Pager #[phone]

## 2022-09-14 NOTE — PROGRESS NOTE ADULT - SUBJECTIVE AND OBJECTIVE BOX
Patient seen and examined at bedside.    --Anticoagulation--    T(C): 36.8 (09-13-22 @ 21:06), Max: 36.8 (09-13-22 @ 21:06)  HR: 69 (09-13-22 @ 21:06) (58 - 69)  BP: 119/79 (09-13-22 @ 21:06) (119/79 - 143/89)  RR: 18 (09-13-22 @ 21:06) (18 - 18)  SpO2: 96% (09-13-22 @ 21:06) (94% - 96%)  Wt(kg): --    Exam: AOx3, CN II-XII intact. LHG 4+/5, otherwise REINOSO 5/5. But c/o increased "hand clumsiness." Slightly decreased sensation in all toes. No Orr's/clonus

## 2022-09-15 NOTE — CHART NOTE - NSCHARTNOTEFT_GEN_A_CORE
HEMATOLOGY FELLOW NOTE    Chart and labs reviewed this morning. Patient to undergo ommaya placement this AM.    We will plan to treat with mini-CHOP on 9/16/22. Consent obtained from patient and daughter on 9/14/22.    Please check AM labs on 9/16/22 in anticipation of chemo: CBC with DIFF, CMP, LDH, Uric Acid, Mg, Phos    Traci Almaraz MD  Hematology/Oncology Fellow, PGY-6  Pager: [phone]  After 5pm and on weekends please page on-call fellow

## 2022-09-15 NOTE — CONSULT NOTE ADULT - NS ATTEND AMEND GEN_ALL_CORE FT
Ms. 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 Radiation therapy to the spinal disease is likely to improve symptoms and local control of disease. Treatment logistics will need to be coordinated with chemotherapy schedule. Will follow her in-patient progress and initiate treatment at the appropriate time.

## 2022-09-15 NOTE — PROGRESS NOTE ADULT - SUBJECTIVE AND OBJECTIVE BOX
Patient seen and examined at bedside.    --Anticoagulation--    T(C): 36.6 (09-14-22 @ 21:24), Max: 36.8 (09-14-22 @ 12:21)  HR: 69 (09-14-22 @ 21:24) (67 - 69)  BP: 135/88 (09-14-22 @ 21:24) (106/79 - 148/92)  RR: 18 (09-14-22 @ 21:24) (18 - 18)  SpO2: 96% (09-14-22 @ 21:24) (95% - 96%)  Wt(kg): --    Exam: AOx3, CN II-XII intact. LHG 4+/5, otherwise REINOSO 5/5. But c/o increased "hand clumsiness." Slightly decreased sensation in all toes. No Orr's/clonus

## 2022-09-15 NOTE — PROGRESS NOTE ADULT - SUBJECTIVE AND OBJECTIVE BOX
Patient seen and examined s/p Right Ommaya placement.     AOx3, EOMI, no facial/drift, LUE 4+/5, otherwise REINOSO 5/5.    T(C): 36 (09-15-22 @ 17:22), Max: 36.6 (09-14-22 @ 21:24)  HR: 68 (09-15-22 @ 17:45) (62 - 89)  BP: 131/74 (09-15-22 @ 17:45) (122/79 - 140/90)  RR: 16 (09-15-22 @ 17:45) (14 - 18)  SpO2: 95% (09-15-22 @ 17:45) (95% - 97%)                          12.3   10.09 )-----------( 260      ( 15 Sep 2022 06:52 )             37.8     09-14    136  |  102  |  30<H>  ----------------------------<  277<H>  4.4   |  23  |  0.57    Ca    8.9      14 Sep 2022 18:58    TPro  5.6<L>  /  Alb  3.5  /  TBili  0.2  /  DBili  x   /  AST  12  /  ALT  17  /  AlkPhos  101  09-14    PT/INR - ( 14 Sep 2022 13:48 )   PT: 10.9 sec;   INR: 0.95 ratio         PTT - ( 14 Sep 2022 13:48 )  PTT:22.3 sec        CAPILLARY BLOOD GLUCOSE

## 2022-09-15 NOTE — PROGRESS NOTE ADULT - PROBLEM SELECTOR PLAN 1
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 Primary lesion is unknown but with unilateral L plef, concern for metastatic process in chest  - Pt and family would like to pursue diagnosis  - cytology from L thoracentesis - no malignant cells  - CTC w/ IV contrast showing anterior mediastinal mass, L axillary lymph node  - Repeat MRI brain, c, t spine w/w/o IV - diffuse spinal involvement noted; no cord compression  - s/p IR biopsy of L axillary node 8/31- cytology B cell lymphoma, pathology results low grade follicular lymphoma   - Paraneoplastic panel neg  -Bone Marrow Bx on 9/7:  Suboptimal biopsy with minute marrow fragment. Adequate aspirate smears showing slightly erythroid predominant maturing trilineage hematopoiesis. Minute, CD10 negative, monotypic B cell population detected by flow cytometry analysis  -S/p IR RP lad biopsy 9/9 - confirm high grade lymphoma   -Heme/onc following   -S/p Rituximab 9/12  -PICC placed  -Plan for possible mini-CHOP this week  -Rad onc consult per neurosurg   -Started on Dex 10mg x1--> Dex 4mg q6h on 9/8/22. Continue for now  -Monitor TLS labs daily  -Neurosurgery consult for Kathryn parry. VSS, labs reviewed. Currently without any acute complaints. Denies CP, SOB. RCRI 3.9% 30-day risk of death, MI, or cardiac arrest. May proceed with planned procedure without additional testing at this time. 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 Primary lesion is unknown but with unilateral L plef, concern for metastatic process in chest  - Pt and family would like to pursue diagnosis  - cytology from L thoracentesis - no malignant cells  - CTC w/ IV contrast showing anterior mediastinal mass, L axillary lymph node  - Repeat MRI brain, c, t spine w/w/o IV - diffuse spinal involvement noted; no cord compression  - s/p IR biopsy of L axillary node 8/31- cytology B cell lymphoma, pathology results low grade follicular lymphoma   - Paraneoplastic panel neg  -Bone Marrow Bx on 9/7:  Suboptimal biopsy with minute marrow fragment. Adequate aspirate smears showing slightly erythroid predominant maturing trilineage hematopoiesis. Minute, CD10 negative, monotypic B cell population detected by flow cytometry analysis  -S/p IR RP lad biopsy 9/9 - confirm high grade lymphoma   -Heme/onc following   -S/p Rituximab 9/12  -PICC placed  -Rad onc consult per neurosurg   -Started on Dex 10mg x1--> Dex 4mg q6h on 9/8/22. Continue for now  -Monitor TLS labs daily  -s/p Ommaya placement today with Neurosurgery   -Plan for mini-CHOP tomorrow   -allopurinol 300mg daily started

## 2022-09-15 NOTE — PROGRESS NOTE ADULT - ASSESSMENT
82F, found to have low-grade follicular lymphoma from Left axillary lymph node biopsy this admission. Consulted for ultimate Ommaya placement for c/f leptomeningeal disease seen on 8/30 MRI neuraxis. 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 Exam: AOx3, CN II-XII intact. LHG 4+/5, otherwise REINOSO 5/5. But c/o increased "hand clumsiness." Slightly decreased sensation in all toes. No Orr's/clonus    -OR for Ommaya reservoir placement today  -Admitted to Medicine, q4h neurochecks  -Has 8/30 MRI stereo, CT completed  -Plt and coags wnl  -Consult Rad Onc for T3-5 epidural tumor, Bilsky Grade 3 given that tumor is likely radiosensitive, no acute neurosurgical spine intervention

## 2022-09-15 NOTE — PROGRESS NOTE ADULT - ASSESSMENT
Patient seen and examined at bedside s/p Right Ommaya placement, recovering well.  -PACU Floor, cleared if 4hr postop CT appears stable  -Q4 neurochecks  -Q4 vitals  -Pain control  -Advance diet as tolerated  -PT/OT  -Can return to Medicine service if postop CT looks stable

## 2022-09-15 NOTE — CONSULT NOTE ADULT - PROVIDER SPECIALTY LIST ADULT
Rad Onc
Heme/Onc
Neurology
Neurosurgery
Pulmonology
Rehab Medicine
Neurosurgery
Intervent Radiology
Intervent Radiology

## 2022-09-15 NOTE — CONSULT NOTE ADULT - SUBJECTIVE AND OBJECTIVE BOX
HPI:  81 y/o F w/ hx of arrhythmia s/p ablation and incarcerated R femoral hernia s/p small bowel resection presents with several months of generalized weakness and 2 weeks of lower abdominal discomfort came to [facility] ED on 8/22/22 for care and during inpatient workup  was found to have "low grade lymphoma" s/p left axillary node biopsy with spine disease seen to T3-T5 levels.  No cord compression.     She says for the past several months she has been experiencing generalized weakness which has been worsening over the last 2 weeks. Prior to this she has been able to manage chores on her own like doing laundry but now she feels she is too weak and needs help with ADLs. She ambulates with help of cane but during this timeframe, she has been feeling weak and unsteady on her feet. She also reports lower abdominal discomfort. She denies fevers, chills, CP, SOB, n/v, diarrhea, dysuria. Denies any difficulties with urination or bowel movements.     Hospital course:   8/25/22 seen by Neurology for weakness and Parkinsonian symptoms- MRI spine/brain requested.   8/30/22 I.R. consulted to biopsy a large mediastinal mass  9/6 Heme onc consulted for newly diagnosed B cell lymphoma  9/12/22 NSG consulted NO leptomeningeal dz seen on brain maging but possible lepto dz seen on spine with T3-T5 tumor; they recommended rad onc consult.     Planned for Ommaya placement today.  Planned to start chemo on 9/16/22.     < from: MR Head w/wo IV Cont (08.30.22 @ 16:58) >  FINDINGS: There is no abnormal brain parenchymal or leptomeningeal   enhancement.    Slight disproportionate bilateral cerebellar volume loss appears   unchanged.    The remainder of the examination appears unchanged when compared to the   recent brain MRI study.    IMPRESSION: No abnormal brain parenchymal or leptomeningeal enhancement.      < from: CT Chest w/ IV Cont (08.27.22 @ 18:22) >  IMPRESSION:    5.5 x 1.8 cm mediastinal mass. Left axillary lymphadenopathy.    Large left pleural effusion.    Confluent paraspinal and posterior mediastinal soft tissue infiltrate   surrounding the descending thoracic aorta to approximately the level of   T11. There is bony involvement most pronounced at the T3-T5 vertebral   bodies. Spinal canal/epidural extension of soft at 3-T5 levels. Recommend   dedicated thoracic spinal MRI for evaluation of extent of spinal canal     < end of copied text >      < from: MR Thoracic Spine w/wo IV Cont (08.30.22 @ 16:59) >  IMPRESSION: Widespread osseous metastatic disease involving the entire   spine, as discussed.    Circumferential epidural tumor involvement spanning the T3-T5 levels with   epidural tumor encroachment worst at the T4 level surrounding the cord   which appears flattened. Evaluation for cord edema is limited secondary   to lack of T2 and STIR sequences through this area.    Additional epidural tumor within the sacral canal adjacent to osseous   metastatic lesions, more asymmetric replaced to the left.    Suggestion of abnormal leptomeningeal enhancement involving the cauda   equina nerve roots also suspicious for neoplastic involvement.    Redemonstration of abnormal confluent enhancing soft tissue within the   posteriormediastinum surrounding the aorta as well as confluent soft   tissue adjacent to the IVC and descending aorta within the abdominal   cavity suspicious for adenopathy and/or tumor.    Mild concave superior endplate deformities of T12 and L1 without bony   retropulsion.    PATH: 8/31/22 LEFT AXILLARY NODE BIOPSY; LOW GRADE LYMPHOMA.         Allergies    No Known Allergies    Intolerances        ROS: [  ] Fever  [  ] Chills  [  ]Chest Pain [  ] SOB  [  ]Cough [  ] N/V  [  ] Diarrhea [  ]Constipation [  ]Other ROS:  [  ] ROS otherwise negative    PAST MEDICAL & SURGICAL HISTORY:  Dyslipidemia    Hypertension    Atrial fibrillation, unspecified type  Unspecified Atrial arrhthymia, S/p ablation on Bblocker    Small bowel obstruction    No significant past surgical history    H/O cardiac radiofrequency ablation    Small bowel obstruction  abdominal sx        FAMILY HISTORY:  No pertinent family history in first degree relatives        MEDICATIONS  (STANDING):  allopurinol 300 milliGRAM(s) Oral at bedtime  carbidopa/levodopa  25/100 1 Tablet(s) Oral <User Schedule>  chlorhexidine 2% Cloths 1 Application(s) Topical <User Schedule>  dexAMETHasone  Injectable 4 milliGRAM(s) IV Push every 6 hours  ibuprofen  Tablet. 400 milliGRAM(s) Oral once  latanoprost 0.005% Ophthalmic Solution 1 Drop(s) Both EYES at bedtime  pantoprazole    Tablet 40 milliGRAM(s) Oral before breakfast  sodium chloride 0.9%. 1000 milliLiter(s) (40 mL/Hr) IV Continuous <Continuous>    MEDICATIONS  (PRN):  acetaminophen     Tablet .. 650 milliGRAM(s) Oral every 6 hours PRN Moderate Pain (4 - 6)  diphenhydrAMINE 50 milliGRAM(s) Oral once PRN Reaction  hydrocortisone sodium succinate Injectable 100 milliGRAM(s) IV Push once PRN Reaction  melatonin 3 milliGRAM(s) Oral at bedtime PRN Insomnia  meperidine     Injectable 12.5 milliGRAM(s) IV Push once PRN reaction      PHYSICAL EXAM  Vital Signs Last 24 Hrs  T(C): 36.4 (15 Sep 2022 12:33), Max: 36.6 (14 Sep 2022 21:24)  T(F): 97.5 (15 Sep 2022 12:33), Max: 97.9 (14 Sep 2022 21:24)  HR: 62 (15 Sep 2022 12:33) (62 - 69)  BP: 140/90 (15 Sep 2022 12:33) (135/88 - 148/92)  BP(mean): --  RR: 18 (15 Sep 2022 12:33) (18 - 18)  SpO2: 97% (15 Sep 2022 12:33) (96% - 97%)    Parameters below as of 15 Sep 2022 12:33  Patient On (Oxygen Delivery Method): room air        General: Well nourished, well developed, no acute distress  HEENT: NC/AT; EOMI, PERRL, sclera nonicteric; external ears normal; no rhinorrhea or epistaxis; mucous membranes moist; oropharynx clear and without erythema  CV: NR, RR; no appreciable r/m/g  Lungs: CTAB, no increased work of breathing  Abdomen: Bowel sounds present; soft, NTND  MSK: Vertebral spine non-tender to palpation  Neuro: AAOx3; cranial nerves II-XII intact; strength 5/5 in upper and lower extremities; sensation to light touch in tact bilaterally.  Psych: Full affect; mood congruent  Skin: no visible rashes on limited examination    IMAGING/LABS/PATHOLOGY: I have personally reviewed the relevant labs, pathology, and imaging as noted in the HPI.  In addition,                          12.3   10.09 )-----------( 260      ( 15 Sep 2022 06:52 )             37.8     09-14    136  |  102  |  30<H>  ----------------------------<  277<H>  4.4   |  23  |  0.57    Ca    8.9      14 Sep 2022 18:58    TPro  5.6<L>  /  Alb  3.5  /  TBili  0.2  /  DBili  x   /  AST  12  /  ALT  17  /  AlkPhos  101  09-14        ASSESSMENT/PLAN    NIKKI LUIS is a 82y woman with newly diagnosed low grade lymphoma s/p left axillary node bx on 8/31/22 came in with weakness and abdominal pain s/p recent right hernia repair.  During work up was found to have T3-T5 epidural tumor,  a mediastinal mass 5.5cm, and is now s/p Ommaya placement with plans to start chemotherapy tomorrow.   We were asked about candidacy for spine RT.     We discussed the use of palliative radiation in this setting, namely to improve quality of life through the reduction of symptoms.  We talked about the risks, benefits, acute and long term side effects, as well as expected treatment outcomes.  He/She was given the opportunity to ask questions, which were answered to his/her apparent satisfaction.  He/She provided written consent to proceed with radiation therapy. We will arrange for inpatient/outpatient treatment. HPI:  83 y/o F w/ hx of arrhythmia s/p ablation and incarcerated R femoral hernia s/p small bowel resection presents with several months of generalized weakness and 2 weeks of lower abdominal discomfort came to [facility] ED on 8/22/22 for care and during inpatient workup  was found to have "low grade lymphoma" s/p left axillary node biopsy with spine disease seen to T3-T5 levels.  No cord compression.     She says for the past several months she has been experiencing generalized weakness which has been worsening over the last 2 weeks. Prior to this she has been able to manage chores on her own like doing laundry but now she feels she is too weak and needs help with ADLs. She ambulates with help of cane but during this timeframe, she has been feeling weak and unsteady on her feet. She also reports lower abdominal discomfort. She denies fevers, chills, CP, SOB, n/v, diarrhea, dysuria. Denies any difficulties with urination or bowel movements.     Hospital course:   8/25/22 seen by Neurology for weakness and Parkinsonian symptoms- MRI spine/brain requested.   8/30/22 I.R. consulted to biopsy a large mediastinal mass  9/6 Heme onc consulted for newly diagnosed B cell lymphoma  9/12/22 NSG consulted NO leptomeningeal dz seen on brain maging but possible lepto dz seen on spine with T3-T5 tumor; they recommended rad onc consult.     Planned for Ommaya placement today.  Planned to start chemo on 9/16/22.     At the time of my visit; she was in the OR, unavailable to be seen, pending Ommaya placement.    < from: MR Head w/wo IV Cont (08.30.22 @ 16:58) >  FINDINGS: There is no abnormal brain parenchymal or leptomeningeal   enhancement.    Slight disproportionate bilateral cerebellar volume loss appears   unchanged.    The remainder of the examination appears unchanged when compared to the   recent brain MRI study.    IMPRESSION: No abnormal brain parenchymal or leptomeningeal enhancement.      < from: CT Chest w/ IV Cont (08.27.22 @ 18:22) >  IMPRESSION:    5.5 x 1.8 cm mediastinal mass. Left axillary lymphadenopathy.    Large left pleural effusion.    Confluent paraspinal and posterior mediastinal soft tissue infiltrate   surrounding the descending thoracic aorta to approximately the level of   T11. There is bony involvement most pronounced at the T3-T5 vertebral   bodies. Spinal canal/epidural extension of soft at 3-T5 levels. Recommend   dedicated thoracic spinal MRI for evaluation of extent of spinal canal     < end of copied text >      < from: MR Thoracic Spine w/wo IV Cont (08.30.22 @ 16:59) >  IMPRESSION: Widespread osseous metastatic disease involving the entire   spine, as discussed.    Circumferential epidural tumor involvement spanning the T3-T5 levels with   epidural tumor encroachment worst at the T4 level surrounding the cord   which appears flattened. Evaluation for cord edema is limited secondary   to lack of T2 and STIR sequences through this area.    Additional epidural tumor within the sacral canal adjacent to osseous   metastatic lesions, more asymmetric replaced to the left.    Suggestion of abnormal leptomeningeal enhancement involving the cauda   equina nerve roots also suspicious for neoplastic involvement.    Redemonstration of abnormal confluent enhancing soft tissue within the   posteriormediastinum surrounding the aorta as well as confluent soft   tissue adjacent to the IVC and descending aorta within the abdominal   cavity suspicious for adenopathy and/or tumor.    Mild concave superior endplate deformities of T12 and L1 without bony   retropulsion.    PATH: 8/31/22 LEFT AXILLARY NODE BIOPSY; LOW GRADE LYMPHOMA.         Allergies    No Known Allergies    Intolerances        ROS: [  ] Fever  [  ] Chills  [  ]Chest Pain [  ] SOB  [  ]Cough [  ] N/V  [  ] Diarrhea [  ]Constipation [  ]Other ROS:  [  ] ROS otherwise negative    PAST MEDICAL & SURGICAL HISTORY:  Dyslipidemia    Hypertension    Atrial fibrillation, unspecified type  Unspecified Atrial arrhthymia, S/p ablation on Bblocker    Small bowel obstruction    No significant past surgical history    H/O cardiac radiofrequency ablation    Small bowel obstruction  abdominal sx        FAMILY HISTORY:  No pertinent family history in first degree relatives        MEDICATIONS  (STANDING):  allopurinol 300 milliGRAM(s) Oral at bedtime  carbidopa/levodopa  25/100 1 Tablet(s) Oral <User Schedule>  chlorhexidine 2% Cloths 1 Application(s) Topical <User Schedule>  dexAMETHasone  Injectable 4 milliGRAM(s) IV Push every 6 hours  ibuprofen  Tablet. 400 milliGRAM(s) Oral once  latanoprost 0.005% Ophthalmic Solution 1 Drop(s) Both EYES at bedtime  pantoprazole    Tablet 40 milliGRAM(s) Oral before breakfast  sodium chloride 0.9%. 1000 milliLiter(s) (40 mL/Hr) IV Continuous <Continuous>    MEDICATIONS  (PRN):  acetaminophen     Tablet .. 650 milliGRAM(s) Oral every 6 hours PRN Moderate Pain (4 - 6)  diphenhydrAMINE 50 milliGRAM(s) Oral once PRN Reaction  hydrocortisone sodium succinate Injectable 100 milliGRAM(s) IV Push once PRN Reaction  melatonin 3 milliGRAM(s) Oral at bedtime PRN Insomnia  meperidine     Injectable 12.5 milliGRAM(s) IV Push once PRN reaction      PHYSICAL EXAM  Vital Signs Last 24 Hrs  T(C): 36.4 (15 Sep 2022 12:33), Max: 36.6 (14 Sep 2022 21:24)  T(F): 97.5 (15 Sep 2022 12:33), Max: 97.9 (14 Sep 2022 21:24)  HR: 62 (15 Sep 2022 12:33) (62 - 69)  BP: 140/90 (15 Sep 2022 12:33) (135/88 - 148/92)  BP(mean): --  RR: 18 (15 Sep 2022 12:33) (18 - 18)  SpO2: 97% (15 Sep 2022 12:33) (96% - 97%)    Parameters below as of 15 Sep 2022 12:33  Patient On (Oxygen Delivery Method): room air    P/E could not be done as she was in the OR when I arrived at bedside.        IMAGING/LABS/PATHOLOGY: I have personally reviewed the relevant labs, pathology, and imaging as noted in the HPI.  In addition,                          12.3   10.09 )-----------( 260      ( 15 Sep 2022 06:52 )             37.8     09-14    136  |  102  |  30<H>  ----------------------------<  277<H>  4.4   |  23  |  0.57    Ca    8.9      14 Sep 2022 18:58    TPro  5.6<L>  /  Alb  3.5  /  TBili  0.2  /  DBili  x   /  AST  12  /  ALT  17  /  AlkPhos  101  09-14        ASSESSMENT/PLAN    NIKKI LUIS is a 82y woman with newly diagnosed low grade lymphoma s/p left axillary node bx on 8/31/22 came in with weakness and abdominal pain s/p recent right hernia repair.  During work up was found to have T3-T5 epidural tumor,  a mediastinal mass 5.5cm, and is now s/p Ommaya placement with plans to start chemotherapy tomorrow.   We were asked about candidacy for spine RT.   Pending further assessment/review, as at the time of my visit her physical exam and symptoms could not be assessed; she was in the OR pending Ommaya placement.

## 2022-09-15 NOTE — PROGRESS NOTE ADULT - SUBJECTIVE AND OBJECTIVE BOX
SUBJECTIVE / OVERNIGHT EVENTS:  Patient was seen and examined at bedside. Yesterday evening she reported feeling a warmth/discomfort from her right chest wall while she was on the toilet. Self resolved in 10 mins. Denied CP, lightheadedness, palpitations, dizziness, SOB. EKG nonischemic, troponin neg. This morning, says she feels well overall. Is nervous about the procedure but otherwise denies fevers, chills, CP, SOB, n/v, abdominal pain, diarrhea, dysuria.     MEDICATIONS  (STANDING):  carbidopa/levodopa  25/100 1 Tablet(s) Oral <User Schedule>  dexAMETHasone  Injectable 4 milliGRAM(s) IV Push every 6 hours  ibuprofen  Tablet. 400 milliGRAM(s) Oral once  latanoprost 0.005% Ophthalmic Solution 1 Drop(s) Both EYES at bedtime  pantoprazole    Tablet 40 milliGRAM(s) Oral before breakfast    MEDICATIONS  (PRN):  acetaminophen     Tablet .. 650 milliGRAM(s) Oral every 6 hours PRN Moderate Pain (4 - 6)  melatonin 3 milliGRAM(s) Oral at bedtime PRN Insomnia      I&O's Summary    09 Sep 2022 07:01  -  10 Sep 2022 07:00  --------------------------------------------------------  IN: 0 mL / OUT: 300 mL / NET: -300 mL    10 Sep 2022 07:01  -  10 Sep 2022 12:48  --------------------------------------------------------  IN: 200 mL / OUT: 0 mL / NET: 200 mL        PHYSICAL EXAM:  Vital Signs Last 24 Hrs  T(C): 36.6 (10 Sep 2022 12:10), Max: 36.6 (10 Sep 2022 12:10)  T(F): 97.9 (10 Sep 2022 12:10), Max: 97.9 (10 Sep 2022 12:10)  HR: 70 (10 Sep 2022 12:10) (70 - 95)  BP: 143/88 (10 Sep 2022 12:10) (106/70 - 147/92)  BP(mean): 94 (09 Sep 2022 17:45) (94 - 95)  RR: 18 (10 Sep 2022 12:10) (14 - 18)  SpO2: 94% (10 Sep 2022 12:10) (93% - 98%)    Parameters below as of 10 Sep 2022 12:10  Patient On (Oxygen Delivery Method): room air    GENERAL: NAD, lying comfortably in bed  HEENT:  Atraumatic, Normocephalic, EOMI, conjunctiva and sclera clear, Left axillary LAD  CHEST/LUNG: Clear to auscultation bilaterally; No wheeze  HEART: RRR, S1 and S2 No murmurs, rubs, or gallops  ABDOMEN: Soft, Nontender, Nondistended; Bowel sounds present. no rebound or guarding   EXTREMITIES:  2+ Peripheral Pulses, No clubbing, cyanosis, or edema.   NEURO: AAOx3, non-focal, moving all extremities  SKIN: No rashes or lesions    LABS:                         12.3   10.09 )-----------( 260      ( 15 Sep 2022 06:52 )             37.8     09-14    136  |  102  |  30<H>  ----------------------------<  277<H>  4.4   |  23  |  0.57    Ca    8.9      14 Sep 2022 18:58    TPro  5.6<L>  /  Alb  3.5  /  TBili  0.2  /  DBili  x   /  AST  12  /  ALT  17  /  AlkPhos  101  09-14    PT/INR - ( 14 Sep 2022 13:48 )   PT: 10.9 sec;   INR: 0.95 ratio         PTT - ( 14 Sep 2022 13:48 )  PTT:22.3 sec

## 2022-09-15 NOTE — CONSULT NOTE ADULT - CONSULT REASON
Evaluate Rehabilitation Needs
Ommaya placement, T3-5 epidural tumor
B cell lymphoma, leptomeningeal dz, T3-T5 spine tumor
Flow cytometry and cytopath of 8/31 IR axillary LN bx discordance in results.  Requesting repeat RP LN bx for diagnosis and treatment planning
Biopsy of mediastinal mass. if mediastinal mass not amenable to biopsy, biopsy of enlarged left axillary node
generalized weakness
Lymphoma
Pleural effusion
ommaya reservoir placement

## 2022-09-15 NOTE — CONSULT NOTE ADULT - CONSULT REQUESTED DATE/TIME
08-Sep-2022 18:21
06-Sep-2022 13:52
23-Aug-2022 14:09
25-Aug-2022 15:40
26-Aug-2022 09:32
12-Sep-2022 19:32
15-Sep-2022 14:10
30-Aug-2022 16:41
08-Sep-2022 15:30

## 2022-09-15 NOTE — CONSULT NOTE ADULT - REASON FOR ADMISSION
CC: generalized weakness, abdominal discomfort

## 2022-09-16 NOTE — PROGRESS NOTE ADULT - SUBJECTIVE AND OBJECTIVE BOX
INTERVAL HPI/OVERNIGHT EVENTS:  Patient S&E at bedside. No o/n events.     VITAL SIGNS:  T(F): 97.8 (09-16-22 @ 04:57)  HR: 61 (09-16-22 @ 04:57)  BP: 150/87 (09-16-22 @ 04:57)  RR: 18 (09-16-22 @ 04:57)  SpO2: 94% (09-16-22 @ 04:57)  Wt(kg): --    PHYSICAL EXAM:    Constitutional: NAD  Eyes: EOMI, sclera non-icteric  Neck: supple  Respiratory: CTAB, no wheezes or crackles   Cardiovascular: RRR  Gastrointestinal: soft, NTND, + BS  Extremities: no cyanosis, clubbing or edema   Neurological: awake and alert      MEDICATIONS  (STANDING):  allopurinol 300 milliGRAM(s) Oral at bedtime  carbidopa/levodopa  25/100 1 Tablet(s) Oral <User Schedule>  ceFAZolin   IVPB 1000 milliGRAM(s) IV Intermittent every 8 hours  dexAMETHasone  Injectable 4 milliGRAM(s) IV Push every 6 hours  latanoprost 0.005% Ophthalmic Solution 1 Drop(s) Both EYES at bedtime  pantoprazole    Tablet 40 milliGRAM(s) Oral before breakfast    MEDICATIONS  (PRN):  acetaminophen     Tablet .. 650 milliGRAM(s) Oral every 6 hours PRN Moderate Pain (4 - 6)  diphenhydrAMINE 50 milliGRAM(s) Oral once PRN Reaction  melatonin 3 milliGRAM(s) Oral at bedtime PRN Insomnia      Allergies    No Known Allergies    Intolerances        LABS:                        12.5   13.33 )-----------( 249      ( 16 Sep 2022 07:22 )             38.8     09-16    136  |  99  |  21  ----------------------------<  131<H>  4.5   |  25  |  0.50    Ca    9.0      16 Sep 2022 07:22  Phos  3.1     09-16  Mg     2.0     09-16    TPro  5.7<L>  /  Alb  3.6  /  TBili  0.5  /  DBili  x   /  AST  13  /  ALT  21  /  AlkPhos  94  09-16    PT/INR - ( 14 Sep 2022 13:48 )   PT: 10.9 sec;   INR: 0.95 ratio         PTT - ( 14 Sep 2022 13:48 )  PTT:22.3 sec      RADIOLOGY & ADDITIONAL TESTS:  Studies reviewed.   INTERVAL HPI/OVERNIGHT EVENTS:  Patient S&E at bedside. No o/n events. s/p ommaya placement yesterday and patient is feeling well.     VITAL SIGNS:  T(F): 97.8 (09-16-22 @ 04:57)  HR: 61 (09-16-22 @ 04:57)  BP: 150/87 (09-16-22 @ 04:57)  RR: 18 (09-16-22 @ 04:57)  SpO2: 94% (09-16-22 @ 04:57)  Wt(kg): --    PHYSICAL EXAM:    Constitutional: NAD  Eyes: EOMI, sclera non-icteric  Neck: supple  Respiratory: CTAB, no wheezes or crackles   Cardiovascular: RRR  Gastrointestinal: soft, NTND, + BS  Extremities: no cyanosis, clubbing or edema   Neurological: awake and alert      MEDICATIONS  (STANDING):  allopurinol 300 milliGRAM(s) Oral at bedtime  carbidopa/levodopa  25/100 1 Tablet(s) Oral <User Schedule>  ceFAZolin   IVPB 1000 milliGRAM(s) IV Intermittent every 8 hours  dexAMETHasone  Injectable 4 milliGRAM(s) IV Push every 6 hours  latanoprost 0.005% Ophthalmic Solution 1 Drop(s) Both EYES at bedtime  pantoprazole    Tablet 40 milliGRAM(s) Oral before breakfast    MEDICATIONS  (PRN):  acetaminophen     Tablet .. 650 milliGRAM(s) Oral every 6 hours PRN Moderate Pain (4 - 6)  diphenhydrAMINE 50 milliGRAM(s) Oral once PRN Reaction  melatonin 3 milliGRAM(s) Oral at bedtime PRN Insomnia      Allergies    No Known Allergies    Intolerances        LABS:                        12.5   13.33 )-----------( 249      ( 16 Sep 2022 07:22 )             38.8     09-16    136  |  99  |  21  ----------------------------<  131<H>  4.5   |  25  |  0.50    Ca    9.0      16 Sep 2022 07:22  Phos  3.1     09-16  Mg     2.0     09-16    TPro  5.7<L>  /  Alb  3.6  /  TBili  0.5  /  DBili  x   /  AST  13  /  ALT  21  /  AlkPhos  94  09-16    PT/INR - ( 14 Sep 2022 13:48 )   PT: 10.9 sec;   INR: 0.95 ratio         PTT - ( 14 Sep 2022 13:48 )  PTT:22.3 sec      RADIOLOGY & ADDITIONAL TESTS:  Studies reviewed.

## 2022-09-16 NOTE — PROGRESS NOTE ADULT - SUBJECTIVE AND OBJECTIVE BOX
SUBJECTIVE / OVERNIGHT EVENTS:  Patient was seen and examined at bedside. Tolerated procedure well yesterday. Has a mild headache this morning but no other complaints. Denies fevers, chills, visual disturbances, weakness, CP, SOB, n/v, abdominal pain, diarrhea, dysuria.     MEDICATIONS  (STANDING):  carbidopa/levodopa  25/100 1 Tablet(s) Oral <User Schedule>  dexAMETHasone  Injectable 4 milliGRAM(s) IV Push every 6 hours  ibuprofen  Tablet. 400 milliGRAM(s) Oral once  latanoprost 0.005% Ophthalmic Solution 1 Drop(s) Both EYES at bedtime  pantoprazole    Tablet 40 milliGRAM(s) Oral before breakfast    MEDICATIONS  (PRN):  acetaminophen     Tablet .. 650 milliGRAM(s) Oral every 6 hours PRN Moderate Pain (4 - 6)  melatonin 3 milliGRAM(s) Oral at bedtime PRN Insomnia      I&O's Summary    09 Sep 2022 07:01  -  10 Sep 2022 07:00  --------------------------------------------------------  IN: 0 mL / OUT: 300 mL / NET: -300 mL    10 Sep 2022 07:01  -  10 Sep 2022 12:48  --------------------------------------------------------  IN: 200 mL / OUT: 0 mL / NET: 200 mL        PHYSICAL EXAM:  Vital Signs Last 24 Hrs  T(C): 36.6 (10 Sep 2022 12:10), Max: 36.6 (10 Sep 2022 12:10)  T(F): 97.9 (10 Sep 2022 12:10), Max: 97.9 (10 Sep 2022 12:10)  HR: 70 (10 Sep 2022 12:10) (70 - 95)  BP: 143/88 (10 Sep 2022 12:10) (106/70 - 147/92)  BP(mean): 94 (09 Sep 2022 17:45) (94 - 95)  RR: 18 (10 Sep 2022 12:10) (14 - 18)  SpO2: 94% (10 Sep 2022 12:10) (93% - 98%)    Parameters below as of 10 Sep 2022 12:10  Patient On (Oxygen Delivery Method): room air    GENERAL: NAD, lying comfortably in bed  HEENT:  S/p Ommaya placement; dressing with dried old blood, Normocephalic, EOMI, conjunctiva and sclera clear, Left axillary LAD  CHEST/LUNG: Clear to auscultation bilaterally; No wheeze  HEART: RRR, S1 and S2 No murmurs, rubs, or gallops  ABDOMEN: Soft, Nontender, Nondistended; Bowel sounds present. no rebound or guarding   EXTREMITIES:  2+ Peripheral Pulses, No clubbing, cyanosis, or edema.   NEURO: AAOx3, non-focal, moving all extremities  SKIN: No rashes or lesions      LABS:                         12.5   13.33 )-----------( 249      ( 16 Sep 2022 07:22 )             38.8     09-16    136  |  99  |  21  ----------------------------<  131<H>  4.5   |  25  |  0.50    Ca    9.0      16 Sep 2022 07:22  Phos  3.1     09-16  Mg     2.0     09-16    TPro  5.7<L>  /  Alb  3.6  /  TBili  0.5  /  DBili  x   /  AST  13  /  ALT  21  /  AlkPhos  94  09-16    PT/INR - ( 14 Sep 2022 13:48 )   PT: 10.9 sec;   INR: 0.95 ratio         PTT - ( 14 Sep 2022 13:48 )  PTT:22.3 sec   SUBJECTIVE / OVERNIGHT EVENTS:  Patient was seen and examined at bedside. Tolerated procedure well yesterday. Has a mild headache this morning but no other complaints. Denies fevers, chills, visual disturbances, CP, SOB, n/v, abdominal pain, diarrhea, dysuria.     MEDICATIONS  (STANDING):  carbidopa/levodopa  25/100 1 Tablet(s) Oral <User Schedule>  dexAMETHasone  Injectable 4 milliGRAM(s) IV Push every 6 hours  ibuprofen  Tablet. 400 milliGRAM(s) Oral once  latanoprost 0.005% Ophthalmic Solution 1 Drop(s) Both EYES at bedtime  pantoprazole    Tablet 40 milliGRAM(s) Oral before breakfast    MEDICATIONS  (PRN):  acetaminophen     Tablet .. 650 milliGRAM(s) Oral every 6 hours PRN Moderate Pain (4 - 6)  melatonin 3 milliGRAM(s) Oral at bedtime PRN Insomnia      I&O's Summary    09 Sep 2022 07:01  -  10 Sep 2022 07:00  --------------------------------------------------------  IN: 0 mL / OUT: 300 mL / NET: -300 mL    10 Sep 2022 07:01  -  10 Sep 2022 12:48  --------------------------------------------------------  IN: 200 mL / OUT: 0 mL / NET: 200 mL        PHYSICAL EXAM:  Vital Signs Last 24 Hrs  T(C): 36.6 (10 Sep 2022 12:10), Max: 36.6 (10 Sep 2022 12:10)  T(F): 97.9 (10 Sep 2022 12:10), Max: 97.9 (10 Sep 2022 12:10)  HR: 70 (10 Sep 2022 12:10) (70 - 95)  BP: 143/88 (10 Sep 2022 12:10) (106/70 - 147/92)  BP(mean): 94 (09 Sep 2022 17:45) (94 - 95)  RR: 18 (10 Sep 2022 12:10) (14 - 18)  SpO2: 94% (10 Sep 2022 12:10) (93% - 98%)    Parameters below as of 10 Sep 2022 12:10  Patient On (Oxygen Delivery Method): room air    GENERAL: NAD, lying comfortably in bed  HEENT:  S/p Ommaya placement; dressing with dried old blood, Normocephalic, EOMI, conjunctiva and sclera clear, Left axillary LAD  CHEST/LUNG: Clear to auscultation bilaterally; No wheeze  HEART: RRR, S1 and S2 No murmurs, rubs, or gallops  ABDOMEN: Soft, Nontender, Nondistended; Bowel sounds present. no rebound or guarding   EXTREMITIES:  2+ Peripheral Pulses, No clubbing, cyanosis, or edema.   NEURO: AAOx3, non-focal, moving all extremities  SKIN: No rashes or lesions      LABS:                         12.5   13.33 )-----------( 249      ( 16 Sep 2022 07:22 )             38.8     09-16    136  |  99  |  21  ----------------------------<  131<H>  4.5   |  25  |  0.50    Ca    9.0      16 Sep 2022 07:22  Phos  3.1     09-16  Mg     2.0     09-16    TPro  5.7<L>  /  Alb  3.6  /  TBili  0.5  /  DBili  x   /  AST  13  /  ALT  21  /  AlkPhos  94  09-16    PT/INR - ( 14 Sep 2022 13:48 )   PT: 10.9 sec;   INR: 0.95 ratio         PTT - ( 14 Sep 2022 13:48 )  PTT:22.3 sec

## 2022-09-16 NOTE — PROGRESS NOTE ADULT - ASSESSMENT
Parkinsonism  Myoclonus  Thoracic spinal cord masses/lesions (mets?)  Pleural effusion  Follicular lymphoma    - Etiology for symptoms was initially broad and included inflammatory, neoplastic, and even paraneoplastic. Given spinal canal findings with cervical epidural mass and impingement on the spinal cord (no edema) likely also large contributor to symptoms. Can consider unmasking of underlying Parkinson Disease in the setting of acute medical issues but less likely. Patient feels improvement on current Sinemet dose  - Continue Sinemet 25/100mg 1 tab PO TID  - MRI brain, c-spine, t-spine, and l-spine w/ and w/o given possible spinal (vertebral body) malignancies with results as above  - S/p left axillary lymph node biopsy under IR on 8/31 and resultants consistent with follicular lymphoma but need more specifics. Had bone marrow biopsy on 9/7 and awaiting results. Due to location of epidural mass not safe for LP, so had Ommaya placement for diagnostic and treatment considerations  - Appreciate heme/onc continued treatment recommendations  - PT/OT as tolerated  - Continue to address above medical and surgical issues, as you are doing  - Will continue to follow patient with you, as needed

## 2022-09-16 NOTE — CHART NOTE - NSCHARTNOTEFT_GEN_A_CORE
Briefly, Ms. 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 Underwent Ommaya placement 9/15/22. She is free of pain and no focal deficits are appreciated on neurologic examination. Started on [facility] today as an inpatient. In light of systemic management and in absence of neurologic findings, no need for RT at this time. Will follow up with telehealth in approximately 1 month. RT is available in the event of symptomatic change or progression.

## 2022-09-16 NOTE — PROGRESS NOTE ADULT - ASSESSMENT
82F, found to have low-grade follicular lymphoma from Left axillary lymph node biopsy this admission. Consulted for ultimate Ommaya placement for c/f leptomeningeal disease seen on 8/30 MRI neuraxis. 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 Exam: AOx3, CN II-XII intact. LHG 4+/5, otherwise REINOSO 5/5. But c/o increased "hand clumsiness." Slightly decreased sensation in all toes. 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

## 2022-09-16 NOTE — PROGRESS NOTE ADULT - ASSESSMENT
82F hx of arrhythmia s/p ablation and incarcerated R femoral hernia s/p small bowel resection presents with several months of generalized weakness and 2 weeks of lower abdominal discomfort. 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 Hematology consulted for management of lymphoma.    #Follicular Lymphoma  -CT TAP on admission with Moderate left pleural effusion. Amorphous soft tissue in the retroperitoneal space adjacent to the vena cava and aorta which is similar to but more pronounced than prior exam likely representing retroperitoneal lymphadenopathy. 5.5 x 1.8 cm mediastinal mass. Left axillary lymphadenopathy. Confluent paraspinal and posterior mediastinal soft tissue infiltrate   surrounding the descending thoracic aorta to approximately the level of T11. There is bony involvement most pronounced at the T3-T5 vertebral bodies. Spinal canal/epidural extension of soft at 3-T5 levels.   -MRI C/T/L Spine showing Widespread osseous metastatic disease involving the entire spine. Circumferential epidural tumor involvement spanning the T3-T5 levels with epidural tumor encroachment worst at the T4 level surrounding the cord which appears flattened. Additional epidural tumor within the sacral canal adjacent to osseous metastatic lesions, more asymmetric replaced to the left. Suggestion of abnormal leptomeningeal enhancement involving the cauda equina nerve roots also suspicious for neoplastic involvement. Redemonstration of abnormal confluent enhancing soft tissue within the posterior mediastinum surrounding the aorta as well as confluent soft tissue adjacent to the IVC and descending aorta within the abdominal cavity suspicious for adenopathy and/or tumor.   -MRI Brain showing Minimal periventricular and bifrontal subcortical white matter ischemia.  Mild global atrophy, most prominent in the cerebellum.  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    --------Immunohistochemical stains positive for CD20, CD10, CD23, BCL6, BCL2; negative for CD5, cyclin D1, MUM1, CD43. Ki67 proliferation index is 20-30%. CD21 highlights the follicular dendritic meshwork. The interspersed T cells are stained by CD3, CD5, LEF1, CD43.  - s/p BMBx on 9/7/22: Suboptimal biopsy with minute marrow fragment. Adequate aspirate smears showing slightly erythroid predominant maturing trilineage hematopoiesis. Minute, CD10 negative, monotypic B cell population detected by flow cytometry analysis  - A diagnosis of low-grade follicular lymphoma does not fit patient's clinical picture with epidural involvement. We would ordinarily request a diagnostic LP to confirm CNS involvement, however per Neuro Radiology, given the location of patient's soft tissue mass, there is no safe window for an LP. Neurosurgery to evaluated for potential Ommaya placement for diagnostic CSF sampling as well as possible intrathecal chemotherapy; deferred until need confirmed   -s/p RP lymph node biopsy with IR on 9/9/22 to confirm high grade lymphoma. Results pending  -Given patient's symptoms and burden of disease, we will proceed with treatment for an aggressive lymphoma with R-miniCHOP. Patient is s/p Rituximab Monday 9/12/22  ----mini-CHOP C1 to be given 9/16/22 (50% dose reduction)  -Started on Dex 10mg x1--> Dex 4mg q6h on 9/8/22. Continue for now  -s/p ommaya placement on 9/15/22. CSF Flow cytometry and Cytopathology pending. Patient will ultimately require IT MTX, to be given inpatient vs. outpatient pending clinical course  -HIV, hepatitis B/C negative  - Please monitor TLS labs (CMP, Mg, Phos, LDH, uric acid) daily   -Continue Allopurinol 300mg daily    We are arranging for follow-up at [facility].      Traci Almaraz MD  Hematology/Oncology Fellow, PGY-6  Pager: [phone]  After 5pm and on weekends please page on-call fellow 82F hx of arrhythmia s/p ablation and incarcerated R femoral hernia s/p small bowel resection presents with several months of generalized weakness and 2 weeks of lower abdominal discomfort. 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 Hematology consulted for management of lymphoma.    #Follicular Lymphoma  -CT TAP on admission with Moderate left pleural effusion. Amorphous soft tissue in the retroperitoneal space adjacent to the vena cava and aorta which is similar to but more pronounced than prior exam likely representing retroperitoneal lymphadenopathy. 5.5 x 1.8 cm mediastinal mass. Left axillary lymphadenopathy. Confluent paraspinal and posterior mediastinal soft tissue infiltrate   surrounding the descending thoracic aorta to approximately the level of T11. There is bony involvement most pronounced at the T3-T5 vertebral bodies. Spinal canal/epidural extension of soft at 3-T5 levels.   -MRI C/T/L Spine showing Widespread osseous metastatic disease involving the entire spine. Circumferential epidural tumor involvement spanning the T3-T5 levels with epidural tumor encroachment worst at the T4 level surrounding the cord which appears flattened. Additional epidural tumor within the sacral canal adjacent to osseous metastatic lesions, more asymmetric replaced to the left. Suggestion of abnormal leptomeningeal enhancement involving the cauda equina nerve roots also suspicious for neoplastic involvement. Redemonstration of abnormal confluent enhancing soft tissue within the posterior mediastinum surrounding the aorta as well as confluent soft tissue adjacent to the IVC and descending aorta within the abdominal cavity suspicious for adenopathy and/or tumor.   -MRI Brain showing Minimal periventricular and bifrontal subcortical white matter ischemia.  Mild global atrophy, most prominent in the cerebellum.  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    --------Immunohistochemical stains positive for CD20, CD10, CD23, BCL6, BCL2; negative for CD5, cyclin D1, MUM1, CD43. Ki67 proliferation index is 20-30%. CD21 highlights the follicular dendritic meshwork. The interspersed T cells are stained by CD3, CD5, LEF1, CD43.  - s/p BMBx on 9/7/22: Suboptimal biopsy with minute marrow fragment. Adequate aspirate smears showing slightly erythroid predominant maturing trilineage hematopoiesis. Minute, CD10 negative, monotypic B cell population detected by flow cytometry analysis  - A diagnosis of low-grade follicular lymphoma does not fit patient's clinical picture with epidural involvement. We would ordinarily request a diagnostic LP to confirm CNS involvement, however per Neuro Radiology, given the location of patient's soft tissue mass, there is no safe window for an LP. Neurosurgery to evaluated for potential Ommaya placement for diagnostic CSF sampling as well as possible intrathecal chemotherapy; deferred until need confirmed   -s/p RP lymph node biopsy with IR on 9/9/22 to confirm high grade lymphoma. Results pending  -Given patient's symptoms and burden of disease, we will proceed with treatment for an aggressive lymphoma with R-miniCHOP. Patient is s/p Rituximab Monday 9/12/22  ----mini-CHOP C1 to be given 9/16/22 (50% dose reduction)  -Started on Dex 10mg x1--> Dex 4mg q6h on 9/8/22. Please start taper as below:  ---Dex 4mg q6h on 9/16 THEN  ---Dex 4mg q8h x3 days THEN  ---Prednisone 60mg daily x2 days THEN  ---Prednisone 50mg daily x2 days THEN  ---Prednisone 40mg daily x2 days THEN  ---Prednisone 30mg daily x2 days THEN  ---Prednisone 20mg daily x2 days THEN  ---Prednisone 10mg daily x2 days THEN  ---Prednisone 5mg daily x2 days THEN  ---STOP  -s/p ommaya placement on 9/15/22. CSF Flow cytometry and Cytopathology pending. Patient will ultimately require IT MTX, to be given inpatient vs. outpatient pending clinical course  -Radiation Oncology consulted. Appreciate recommendations  -HIV, hepatitis B/C negative  - Please monitor TLS labs (CMP, Mg, Phos, LDH, uric acid) daily   -Continue Allopurinol 300mg daily    We are arranging for follow-up at [facility].      Traci Almaraz MD  Hematology/Oncology Fellow, PGY-6  Pager: [phone]  After 5pm and on weekends please page on-call fellow 82F hx of arrhythmia s/p ablation and incarcerated R femoral hernia s/p small bowel resection presents with several months of generalized weakness and 2 weeks of lower abdominal discomfort. 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 Hematology consulted for management of lymphoma.    #Follicular Lymphoma  -CT TAP on admission with Moderate left pleural effusion. Amorphous soft tissue in the retroperitoneal space adjacent to the vena cava and aorta which is similar to but more pronounced than prior exam likely representing retroperitoneal lymphadenopathy. 5.5 x 1.8 cm mediastinal mass. Left axillary lymphadenopathy. Confluent paraspinal and posterior mediastinal soft tissue infiltrate   surrounding the descending thoracic aorta to approximately the level of T11. There is bony involvement most pronounced at the T3-T5 vertebral bodies. Spinal canal/epidural extension of soft at 3-T5 levels.   -MRI C/T/L Spine showing Widespread osseous metastatic disease involving the entire spine. Circumferential epidural tumor involvement spanning the T3-T5 levels with epidural tumor encroachment worst at the T4 level surrounding the cord which appears flattened. Additional epidural tumor within the sacral canal adjacent to osseous metastatic lesions, more asymmetric replaced to the left. Suggestion of abnormal leptomeningeal enhancement involving the cauda equina nerve roots also suspicious for neoplastic involvement. Redemonstration of abnormal confluent enhancing soft tissue within the posterior mediastinum surrounding the aorta as well as confluent soft tissue adjacent to the IVC and descending aorta within the abdominal cavity suspicious for adenopathy and/or tumor.   -MRI Brain showing Minimal periventricular and bifrontal subcortical white matter ischemia.  Mild global atrophy, most prominent in the cerebellum.  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    --------Immunohistochemical stains positive for CD20, CD10, CD23, BCL6, BCL2; negative for CD5, cyclin D1, MUM1, CD43. Ki67 proliferation index is 20-30%. CD21 highlights the follicular dendritic meshwork. The interspersed T cells are stained by CD3, CD5, LEF1, CD43.  - s/p BMBx on 9/7/22: Suboptimal biopsy with minute marrow fragment. Adequate aspirate smears showing slightly erythroid predominant maturing trilineage hematopoiesis. Minute, CD10 negative, monotypic B cell population detected by flow cytometry analysis  - A diagnosis of low-grade follicular lymphoma does not fit patient's clinical picture with epidural involvement. We would ordinarily request a diagnostic LP to confirm CNS involvement, however per Neuro Radiology, given the location of patient's soft tissue mass, there is no safe window for an LP. Neurosurgery to evaluated for potential Ommaya placement for diagnostic CSF sampling as well as possible intrathecal chemotherapy; deferred until need confirmed   -s/p RP lymph node biopsy with IR on 9/9/22 to confirm high grade lymphoma. Results pending  -Given patient's symptoms and burden of disease, we will proceed with treatment for an aggressive lymphoma with R-miniCHOP. Patient is s/p Rituximab Monday 9/12/22  ----mini-CHOP C1 to be given 9/16/22 (50% dose reduction)  -Started on Dex 10mg x1--> Dex 4mg q6h on 9/8/22. Please start taper as below:  ---Dex 4mg q6h on 9/16 THEN  ---Dex 4mg q8h x3 days THEN  ---Prednisone 60mg daily x2 days THEN  ---Prednisone 50mg daily x2 days THEN  ---Prednisone 40mg daily x2 days THEN  ---Prednisone 30mg daily x2 days THEN  ---Prednisone 20mg daily x2 days THEN  ---Prednisone 10mg daily x2 days THEN  ---Prednisone 5mg daily x2 days THEN  ---STOP  -s/p ommaya placement on 9/15/22. CSF Flow cytometry and Cytopathology pending. Patient will ultimately require IT MTX, to be given inpatient vs. outpatient pending clinical course  -Radiation Oncology consulted. Appreciate recommendations  -HIV, hepatitis B/C negative  - Please monitor TLS labs (CMP, Mg, Phos, LDH, uric acid) daily following treatment with R-miniCHOP  -Continue Allopurinol 300mg daily    We are arranging for follow-up at [facility].      Traci Almaraz MD  Hematology/Oncology Fellow, PGY-6  Pager: [phone]  After 5pm and on weekends please page on-call fellow 82F hx of arrhythmia s/p ablation and incarcerated R femoral hernia s/p small bowel resection presents with several months of generalized weakness and 2 weeks of lower abdominal discomfort. 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 Hematology consulted for management of lymphoma.    #Follicular Lymphoma  -CT TAP on admission with Moderate left pleural effusion. Amorphous soft tissue in the retroperitoneal space adjacent to the vena cava and aorta which is similar to but more pronounced than prior exam likely representing retroperitoneal lymphadenopathy. 5.5 x 1.8 cm mediastinal mass. Left axillary lymphadenopathy. Confluent paraspinal and posterior mediastinal soft tissue infiltrate   surrounding the descending thoracic aorta to approximately the level of T11. There is bony involvement most pronounced at the T3-T5 vertebral bodies. Spinal canal/epidural extension of soft at 3-T5 levels.   -MRI C/T/L Spine showing Widespread osseous metastatic disease involving the entire spine. Circumferential epidural tumor involvement spanning the T3-T5 levels with epidural tumor encroachment worst at the T4 level surrounding the cord which appears flattened. Additional epidural tumor within the sacral canal adjacent to osseous metastatic lesions, more asymmetric replaced to the left. Suggestion of abnormal leptomeningeal enhancement involving the cauda equina nerve roots also suspicious for neoplastic involvement. Redemonstration of abnormal confluent enhancing soft tissue within the posterior mediastinum surrounding the aorta as well as confluent soft tissue adjacent to the IVC and descending aorta within the abdominal cavity suspicious for adenopathy and/or tumor.   -MRI Brain showing Minimal periventricular and bifrontal subcortical white matter ischemia.  Mild global atrophy, most prominent in the cerebellum.  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    --------Immunohistochemical stains positive for CD20, CD10, CD23, BCL6, BCL2; negative for CD5, cyclin D1, MUM1, CD43. Ki67 proliferation index is 20-30%. CD21 highlights the follicular dendritic meshwork. The interspersed T cells are stained by CD3, CD5, LEF1, CD43.  - s/p BMBx on 9/7/22: Suboptimal biopsy with minute marrow fragment. Adequate aspirate smears showing slightly erythroid predominant maturing trilineage hematopoiesis. Minute, CD10 negative, monotypic B cell population detected by flow cytometry analysis  - A diagnosis of low-grade follicular lymphoma does not fit patient's clinical picture with epidural involvement. We would ordinarily request a diagnostic LP to confirm CNS involvement, however per Neuro Radiology, given the location of patient's soft tissue mass, there is no safe window for an LP. Neurosurgery to evaluated for potential Ommaya placement for diagnostic CSF sampling as well as possible intrathecal chemotherapy; deferred until need confirmed   -s/p RP lymph node biopsy with IR on 9/9/22 to confirm high grade lymphoma. Results pending  -Given patient's symptoms and burden of disease, we will proceed with treatment for an aggressive lymphoma with R-miniCHOP. Patient is s/p Rituximab Monday 9/12/22  ----mini-CHOP C1 to be given 9/16/22 (50% dose reduction)  -Started on Dex 10mg x1--> Dex 4mg q6h on 9/8/22. Please start taper as below:  ---Dex 4mg q6h on 9/16 THEN  ---Dex 4mg q8h x3 days THEN  ---Prednisone 60mg daily x2 days THEN  ---Prednisone 50mg daily x2 days THEN  ---Prednisone 40mg daily x2 days THEN  ---Prednisone 30mg daily x2 days THEN  ---Prednisone 20mg daily x2 days THEN  ---Prednisone 10mg daily x2 days THEN  ---Prednisone 5mg daily x2 days THEN  ---STOP  -s/p ommaya placement on 9/15/22. CSF Flow cytometry and Cytopathology pending. Patient will ultimately require IT MTX, to be given inpatient vs. outpatient pending clinical course  -Radiation Oncology consulted. Appreciate recommendations  -HIV, hepatitis B/C negative  - Please monitor TLS labs (CMP, Mg, Phos, LDH, uric acid) daily following treatment with R-miniCHOP  -Patient to start zarxio 300mcg daily x3 days 9/17-9/19. Orders placed.   -Continue Allopurinol 300mg daily    We are arranging for follow-up at [facility].      Traci Almaraz MD  Hematology/Oncology Fellow, PGY-6  Pager: [phone]  After 5pm and on weekends please page on-call fellow

## 2022-09-16 NOTE — PROGRESS NOTE ADULT - PROBLEM SELECTOR PLAN 1
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 Primary lesion is unknown but with unilateral L plef, concern for metastatic process in chest  - Pt and family would like to pursue diagnosis  - cytology from L thoracentesis - no malignant cells  - CTC w/ IV contrast showing anterior mediastinal mass, L axillary lymph node  - Repeat MRI brain, c, t spine w/w/o IV - diffuse spinal involvement noted; no cord compression  - s/p IR biopsy of L axillary node 8/31- cytology B cell lymphoma, pathology results low grade follicular lymphoma   - Paraneoplastic panel neg  -Bone Marrow Bx on 9/7:  Suboptimal biopsy with minute marrow fragment. Adequate aspirate smears showing slightly erythroid predominant maturing trilineage hematopoiesis. Minute, CD10 negative, monotypic B cell population detected by flow cytometry analysis  -S/p IR RP lad biopsy 9/9 - confirm high grade lymphoma   -Heme/onc following   -PICC placed; S/p Rituximab 9/12  -Rad onc consult per neurosurg   -Monitor TLS labs daily  -s/p Ommaya placement 9/15. F/u CSF flow cytometry and cytopathology   -Plan mini-CHOP C1 to be given 9/16/22 (50% dose reduction)  -Allopurinol 300mg daily started

## 2022-09-16 NOTE — PROGRESS NOTE ADULT - ATTENDING COMMENTS
82-yr-old woman with widespread disease including osseous and CNS involvement seen in follow up. Axillary LN biopsy reported as low grade FL with Ki97 of 20%. The disease appears to be much aggressive. She also has had B-symptoms, PS ~3. Plan to have rebiopsy from another location and initiate treatment with R-mini-chop for the first cycle. Completed R 3 days ago. Today she will get the mini-CHOP part. Also, discussed with [facility] for palliative spine RT. Patient does not seem to have any neurological deficit. Monitor TLS labs; appropriate ppx, GSCF. Supportive.

## 2022-09-16 NOTE — PROGRESS NOTE ADULT - SUBJECTIVE AND OBJECTIVE BOX
Patient seen and examined at bedside.    --Anticoagulation--    T(C): 36.6 (09-16-22 @ 04:57), Max: 36.6 (09-16-22 @ 04:57)  HR: 61 (09-16-22 @ 04:57) (61 - 89)  BP: 150/87 (09-16-22 @ 04:57) (122/79 - 151/87)  RR: 18 (09-16-22 @ 04:57) (14 - 18)  SpO2: 94% (09-16-22 @ 04:57) (93% - 100%)  Wt(kg): --    Exam: AOx3, CN II-XII intact. LHG 4+/5, otherwise REINOSO 5/5. But c/o increased "hand clumsiness." Slightly decreased sensation in all toes. No Orr's/clonus

## 2022-09-16 NOTE — ADVANCED PRACTICE NURSE CONSULT - ASSESSMENT
Pt with day 1/1 tx, cycle 1 labs noted in sunrise, height, weight and bsa verified, okay to proceed with tx as per MD Porter.  Pt with right double lumen picc + blood return noted, no pain, redness or swelling noted at site.  Pt premedicated as noted in sunrise.  Pt administered Cyclophosphamide 375 mg/m2 = 608 mg iv over 1 hour via locked pump.  Pt administered Vincristine 1 mg iv over 5 min.  Blood return noted prior during and after administration of drug.  Pt administered Doxorubicin 25 mg/m2 = 41 mg ivp over 10 via side running iv of NS.  Blood return checked prior during and after administration of drug completed.  Pt educated on chemo regimen and signs and symptoms to report to area rn and staff, pt verbalized understanding.  Emotional support provided.  Report given to area rn. Pt with day 1/1 tx, cycle 1 labs noted in sunrise, height, weight and bsa verified, okay to proceed with tx as per MD Porter.  Pt with right double lumen picc + blood return noted, no pain, redness or swelling noted at site.  Pt premedicated as noted in sunrise.  Pt administered Cyclophosphamide 375 mg/m2 = 608 mg iv over 1 hour via locked pump.  Pt administered Vincristine 1 mg iv over 5 min.  Blood return noted prior during and after administration of drug.  Pt administered Doxorubicin 25 mg/m2 = 41 mg ivp over 10 min via side running iv of NS.  Blood return checked prior during and after administration of drug completed.  Pt educated on chemo regimen and signs and symptoms to report to area rn and staff, pt verbalized understanding.  Emotional support provided.  Report given to area rn.

## 2022-09-17 NOTE — PROGRESS NOTE ADULT - PROBLEM SELECTOR PLAN 1
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 Primary lesion is unknown but with unilateral L plef, concern for metastatic process in chest  - cytology from L thoracentesis - no malignant cells  - CTC w/ IV contrast showing anterior mediastinal mass, L axillary lymph node  - Repeat MRI brain, c, t spine w/w/o IV - diffuse spinal involvement noted; no cord compression  - s/p IR biopsy of L axillary node 8/31- cytology B cell lymphoma, pathology results low grade follicular lymphoma   - Paraneoplastic panel neg  -Bone Marrow Bx on 9/7:  Suboptimal biopsy with minute marrow fragment. Adequate aspirate smears showing slightly erythroid predominant maturing trilineage hematopoiesis. Minute, CD10 negative, monotypic B cell population detected by flow cytometry analysis  -S/p IR RP lad biopsy 9/9 - confirm high grade lymphoma   -Heme/onc following   -PICC placed; S/p Rituximab 9/12  -Rad onc consult per neurosurg   -Monitor TLS labs daily  -s/p Ommaya placement 9/15. F/u CSF flow cytometry and cytopathology   -s/p mini-CHOP C1 to be given 9/16/22 (50% dose reduction)  -Allopurinol 300mg daily started

## 2022-09-17 NOTE — PROGRESS NOTE ADULT - NSPROGADDITIONALINFOA_GEN_ALL_CORE
d/w acp    Guillermo Gan MD  [facility] Division of Hospital Medicine  Available via MS Teams  Pager: [phone]

## 2022-09-17 NOTE — PROGRESS NOTE ADULT - SUBJECTIVE AND OBJECTIVE BOX
[facility] Division of Hospital Medicine  Guillermo Gan MD  Available via MS Teams  Pager: [phone]    SUBJECTIVE / OVERNIGHT EVENTS:  -no acute events overnight. denies any chest pain/ n/v/headaches. says she has a "sensation" on her forehead, jaw overall, but no pain, numbness, tingling.     ADDITIONAL REVIEW OF SYSTEMS:    MEDICATIONS  (STANDING):  allopurinol 300 milliGRAM(s) Oral at bedtime  carbidopa/levodopa  25/100 1 Tablet(s) Oral <User Schedule>  ceFAZolin   IVPB 1000 milliGRAM(s) IV Intermittent every 8 hours  chlorhexidine 2% Cloths 1 Application(s) Topical <User Schedule>  dexAMETHasone  Injectable 4 milliGRAM(s) IV Push every 6 hours  latanoprost 0.005% Ophthalmic Solution 1 Drop(s) Both EYES at bedtime  pantoprazole    Tablet 40 milliGRAM(s) Oral before breakfast    MEDICATIONS  (PRN):  acetaminophen     Tablet .. 650 milliGRAM(s) Oral every 6 hours PRN Moderate Pain (4 - 6)  diphenhydrAMINE 50 milliGRAM(s) Oral once PRN Reaction  melatonin 3 milliGRAM(s) Oral at bedtime PRN Insomnia  ondansetron Injectable 8 milliGRAM(s) IV Push once PRN Nausea and/or Vomiting      I&O's Summary    16 Sep 2022 07:01  -  17 Sep 2022 07:00  --------------------------------------------------------  IN: 873 mL / OUT: 500 mL / NET: 373 mL    17 Sep 2022 07:01  -  17 Sep 2022 14:30  --------------------------------------------------------  IN: 0 mL / OUT: 300 mL / NET: -300 mL        PHYSICAL EXAM:  Vital Signs Last 24 Hrs  T(C): 36.9 (17 Sep 2022 12:49), Max: 36.9 (17 Sep 2022 12:49)  T(F): 98.5 (17 Sep 2022 12:49), Max: 98.5 (17 Sep 2022 12:49)  HR: 73 (17 Sep 2022 12:49) (65 - 76)  BP: 134/79 (17 Sep 2022 12:49) (106/71 - 138/85)  BP(mean): --  RR: 18 (17 Sep 2022 12:49) (16 - 18)  SpO2: 96% (17 Sep 2022 12:49) (95% - 98%)    Parameters below as of 17 Sep 2022 12:49  Patient On (Oxygen Delivery Method): room air      CGENERAL: NAD, lying comfortably in bed  HEENT:  S/p Ommaya placement; dressing with dried old blood, Normocephalic, EOMI, conjunctiva and sclera clear, Left axillary LAD  CHEST/LUNG: Clear to auscultation bilaterally; No wheeze  HEART: RRR, S1 and S2 No murmurs, rubs, or gallops  ABDOMEN: Soft, Nontender, Nondistended; Bowel sounds present. no rebound or guarding   EXTREMITIES:  2+ Peripheral Pulses, No clubbing, cyanosis, or edema.   NEURO: AAOx3, non-focal, moving all extremities  SKIN: No rashes or lesions    LABS:                        12.0   10.31 )-----------( 215      ( 17 Sep 2022 07:35 )             36.4     09-17    139  |  102  |  30<H>  ----------------------------<  169<H>  4.3   |  26  |  0.56    Ca    8.5      17 Sep 2022 07:35  Phos  2.5     09-17  Mg     2.0     09-16    TPro  5.6<L>  /  Alb  3.5  /  TBili  0.4  /  DBili  x   /  AST  13  /  ALT  17  /  AlkPhos  101  09-17              Culture - Fungal, CSF (collected 15 Sep 2022 21:50)  Source: .CSF CSF  Preliminary Report (16 Sep 2022 07:50):    Testing in progress    Culture - CSF with Gram Stain (collected 15 Sep 2022 21:50)  Source: .CSF CSF  Gram Stain (16 Sep 2022 00:55):    polymorphonuclear leukocytes seen    No organisms seen    by cytocentrifuge  Preliminary Report (17 Sep 2022 00:41):    No growth    Culture - Acid Fast - CSF (collected 15 Sep 2022 21:50)  Source: .CSF CSF      COVID-19 PCR: NotDetec (14 Sep 2022 14:03)  COVID-19 PCR: NotDetec (08 Sep 2022 17:39)  COVID-19 PCR: NotDetec (04 Sep 2022 07:33)  COVID-19 PCR: NotDetec (28 Aug 2022 06:45)  SARS-CoV-2: NotDetec (22 Aug 2022 23:35)      RADIOLOGY & ADDITIONAL TESTS:  New Results Reviewed Today:   New Imaging Personally Reviewed Today:  New Electrocardiogram Personally Reviewed Today:  Prior or Outpatient Records Reviewed Today:    COMMUNICATION:  Care Discussed with Consultants/Other Providers and Details of Discussion:  Discussions with Patient/Family:  PCP Communication:

## 2022-09-17 NOTE — PROGRESS NOTE ADULT - ASSESSMENT
81 y/o F w/ hx of arrhythmia s/p ablation and incarcerated R femoral hernia s/p small bowel resection presents with several months of generalized weakness and 2 weeks of lower abdominal discomfort, being treated for pseudomonal UTI, also found to have bilateral pleff, possibly 2/2 to acute on chronic HFrEF s/p thoracentesis, workup for new metastatic malignancy s/p IR biosy of left axillary lymph node with ?low grade follicular lymphoma. s/p bone marrow biopsy and RP LAD biopsy, started on mini-chop 9/16, tolerating it well.

## 2022-09-18 NOTE — PROGRESS NOTE ADULT - SUBJECTIVE AND OBJECTIVE BOX
[facility] Division of Hospital Medicine  Guillermo Gan MD  Available via MS Teams  Pager: [phone]    SUBJECTIVE / OVERNIGHT EVENTS:  - Says that she had a mild headache earlier but this significantly improved. Denies any chest pain, nausea, vomiting, abd pain, dysuria.     ADDITIONAL REVIEW OF SYSTEMS:    MEDICATIONS  (STANDING):  allopurinol 300 milliGRAM(s) Oral at bedtime  carbidopa/levodopa  25/100 1 Tablet(s) Oral <User Schedule>  ceFAZolin   IVPB 1000 milliGRAM(s) IV Intermittent every 8 hours  chlorhexidine 2% Cloths 1 Application(s) Topical <User Schedule>  dexAMETHasone  Injectable 4 milliGRAM(s) IV Push every 6 hours  filgrastim-sndz (ZARXIO) Injectable 300 MICROGram(s) SubCutaneous daily  latanoprost 0.005% Ophthalmic Solution 1 Drop(s) Both EYES at bedtime  pantoprazole    Tablet 40 milliGRAM(s) Oral before breakfast    MEDICATIONS  (PRN):  acetaminophen     Tablet .. 650 milliGRAM(s) Oral every 6 hours PRN Moderate Pain (4 - 6)  diphenhydrAMINE 50 milliGRAM(s) Oral once PRN Reaction  melatonin 3 milliGRAM(s) Oral at bedtime PRN Insomnia  ondansetron Injectable 8 milliGRAM(s) IV Push once PRN Nausea and/or Vomiting      I&O's Summary    17 Sep 2022 07:01  -  18 Sep 2022 07:00  --------------------------------------------------------  IN: 100 mL / OUT: 1350 mL / NET: -1250 mL    18 Sep 2022 07:01  -  18 Sep 2022 15:06  --------------------------------------------------------  IN: 630 mL / OUT: 0 mL / NET: 630 mL        PHYSICAL EXAM:  Vital Signs Last 24 Hrs  T(C): 36.9 (18 Sep 2022 11:56), Max: 36.9 (18 Sep 2022 11:56)  T(F): 98.5 (18 Sep 2022 11:56), Max: 98.5 (18 Sep 2022 11:56)  HR: 73 (18 Sep 2022 11:56) (61 - 73)  BP: 131/74 (18 Sep 2022 11:56) (121/75 - 149/94)  BP(mean): --  RR: 18 (18 Sep 2022 11:56) (18 - 18)  SpO2: 96% (18 Sep 2022 11:56) (95% - 96%)    Parameters below as of 18 Sep 2022 04:40  Patient On (Oxygen Delivery Method): room air      GENERAL: NAD, lying comfortably in bed  HEENT:  S/p Ommaya placement; dressing with dried old blood, Normocephalic, EOMI, conjunctiva and sclera clear, Left axillary LAD  CHEST/LUNG: Clear to auscultation bilaterally; No wheeze  HEART: RRR, S1 and S2 No murmurs, rubs, or gallops  ABDOMEN: Soft, Nontender, Nondistended; Bowel sounds present. no rebound or guarding   EXTREMITIES:  2+ Peripheral Pulses, No clubbing, cyanosis, or edema.   NEURO: AAOx3, non-focal, moving all extremities  SKIN: No rashes or lesions        LABS:                        12.0   10.31 )-----------( 215      ( 17 Sep 2022 07:35 )             36.4     09-18    138  |  102  |  29<H>  ----------------------------<  158<H>  4.0   |  24  |  0.45<L>    Ca    8.9      18 Sep 2022 06:52  Phos  2.1     09-18  Mg     2.0     09-18    TPro  5.4<L>  /  Alb  3.4  /  TBili  0.5  /  DBili  x   /  AST  16  /  ALT  19  /  AlkPhos  114  09-18              Culture - Fungal, CSF (collected 15 Sep 2022 21:50)  Source: .CSF CSF  Preliminary Report (16 Sep 2022 07:50):    Testing in progress    Culture - CSF with Gram Stain (collected 15 Sep 2022 21:50)  Source: .CSF CSF  Gram Stain (16 Sep 2022 00:55):    polymorphonuclear leukocytes seen    No organisms seen    by cytocentrifuge  Preliminary Report (17 Sep 2022 00:41):    No growth    Culture - Acid Fast - CSF (collected 15 Sep 2022 21:50)  Source: .CSF CSF  Preliminary Report (17 Sep 2022 15:04):    Culture is being performed.      COVID-19 PCR: NotDetec (14 Sep 2022 14:03)  COVID-19 PCR: NotDetec (08 Sep 2022 17:39)  COVID-19 PCR: NotDetec (04 Sep 2022 07:33)  COVID-19 PCR: NotDetec (28 Aug 2022 06:45)  SARS-CoV-2: NotDetec (22 Aug 2022 23:35)      RADIOLOGY & ADDITIONAL TESTS:  New Results Reviewed Today:   New Imaging Personally Reviewed Today:  New Electrocardiogram Personally Reviewed Today:  Prior or Outpatient Records Reviewed Today:    COMMUNICATION:  Care Discussed with Consultants/Other Providers and Details of Discussion:  Discussions with Patient/Family:  PCP Communication:

## 2022-09-18 NOTE — PROGRESS NOTE ADULT - PROBLEM SELECTOR PLAN 1
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 Primary lesion is unknown but with unilateral L plef, concern for metastatic process in chest  - cytology from L thoracentesis - no malignant cells  - CTC w/ IV contrast showing anterior mediastinal mass, L axillary lymph node  - Repeat MRI brain, c, t spine w/w/o IV - diffuse spinal involvement noted; no cord compression  - s/p IR biopsy of L axillary node 8/31- cytology B cell lymphoma, pathology results low grade follicular lymphoma   - Paraneoplastic panel neg  -Bone Marrow Bx on 9/7:  Suboptimal biopsy with minute marrow fragment. Adequate aspirate smears showing slightly erythroid predominant maturing trilineage hematopoiesis. Minute, CD10 negative, monotypic B cell population detected by flow cytometry analysis  -S/p IR RP lad biopsy 9/9 - confirm high grade lymphoma   -Heme/onc following   -PICC placed; S/p Rituximab 9/12  -Rad onc consult per neurosurg   -Monitor TLS labs daily - stable thus far  -s/p Ommaya placement 9/15. F/u CSF flow cytometry and cytopathology   -s/p mini-CHOP C1 to be given 9/16/22 (50% dose reduction)  -Allopurinol 300mg daily started

## 2022-09-18 NOTE — PROGRESS NOTE ADULT - NSPROGADDITIONALINFOA_GEN_ALL_CORE
d/w acp    Guillermo Gan MD  [facility] Division of Hospital Medicine  Available via MS Teams  Pager: [phone]

## 2022-09-19 NOTE — PROGRESS NOTE ADULT - SUBJECTIVE AND OBJECTIVE BOX
INTERVAL HPI/OVERNIGHT EVENTS:  Patient S&E at bedside. No o/n events. s/p mini-CHOP on 9/15 with no acute events over the weekend. Patient tolerated chemotherapy well.     VITAL SIGNS:  T(F): 97.7 (09-19-22 @ 05:04)  HR: 66 (09-19-22 @ 05:04)  BP: 137/80 (09-19-22 @ 05:04)  RR: 18 (09-19-22 @ 05:04)  SpO2: 97% (09-19-22 @ 05:04)  Wt(kg): --    PHYSICAL EXAM:    Constitutional: NAD  Eyes: EOMI, sclera non-icteric  Neck: supple  Respiratory: CTAB, no wheezes or crackles   Cardiovascular: RRR  Gastrointestinal: soft, NTND, + BS  Extremities: no cyanosis, clubbing or edema   Neurological: awake and alert      MEDICATIONS  (STANDING):  allopurinol 300 milliGRAM(s) Oral at bedtime  carbidopa/levodopa  25/100 1 Tablet(s) Oral <User Schedule>  ceFAZolin   IVPB 1000 milliGRAM(s) IV Intermittent every 8 hours  chlorhexidine 2% Cloths 1 Application(s) Topical <User Schedule>  dexAMETHasone  Injectable 4 milliGRAM(s) IV Push every 6 hours  filgrastim-sndz (ZARXIO) Injectable 300 MICROGram(s) SubCutaneous daily  latanoprost 0.005% Ophthalmic Solution 1 Drop(s) Both EYES at bedtime  pantoprazole    Tablet 40 milliGRAM(s) Oral before breakfast  senna 2 Tablet(s) Oral at bedtime  sodium phosphate IVPB 15 milliMole(s) IV Intermittent once    MEDICATIONS  (PRN):  acetaminophen     Tablet .. 650 milliGRAM(s) Oral every 6 hours PRN Moderate Pain (4 - 6)  diphenhydrAMINE 50 milliGRAM(s) Oral once PRN Reaction  melatonin 3 milliGRAM(s) Oral at bedtime PRN Insomnia  ondansetron Injectable 8 milliGRAM(s) IV Push once PRN Nausea and/or Vomiting      Allergies    No Known Allergies    Intolerances        LABS:    09-19    138  |  99  |  28<H>  ----------------------------<  129<H>  4.1   |  25  |  0.44<L>    Ca    9.1      19 Sep 2022 06:58  Phos  2.4     09-19  Mg     2.0     09-19    TPro  5.4<L>  /  Alb  3.4  /  TBili  0.7  /  DBili  x   /  AST  17  /  ALT  15  /  AlkPhos  109  09-19          RADIOLOGY & ADDITIONAL TESTS:  Studies reviewed.

## 2022-09-19 NOTE — PROGRESS NOTE ADULT - ATTENDING COMMENTS
82-yr-old woman with widespread disease including osseous and CNS involvement (radiology) seen in follow up. Axillary LN biopsy reported as low grade FL with Ki97 of 20%. The disease appears to be much aggressive. She also has had B-symptoms, PS ~3. Plan to have rebiopsy from another location and initiate treatment with R-mini-chop for the first cycle. Completed R-mini-CHOP last week successfully, tolerated well. getting GSCF. counts are stable. May plan on discharge to Rehab and [facility] follow up for subsequent treatments. Have discussed with [facility] for palliative spine RT. Patient does not seem to have any neurological deficit. Monitor TLS labs; appropriate ppx. Supportive.

## 2022-09-19 NOTE — PROGRESS NOTE ADULT - SUBJECTIVE AND OBJECTIVE BOX
SUBJECTIVE / OVERNIGHT EVENTS:  Patient was seen and examined at bedside. Feels well overall. Denies having any acute complaints this morning. Denies headache, CP, SOB, n/v, abdominal pain, diarrhea, dysuria.       MEDICATIONS  (STANDING):  allopurinol 300 milliGRAM(s) Oral at bedtime  carbidopa/levodopa  25/100 1 Tablet(s) Oral <User Schedule>  ceFAZolin   IVPB 1000 milliGRAM(s) IV Intermittent every 8 hours  chlorhexidine 2% Cloths 1 Application(s) Topical <User Schedule>  dexAMETHasone  Injectable 4 milliGRAM(s) IV Push every 6 hours  filgrastim-sndz (ZARXIO) Injectable 300 MICROGram(s) SubCutaneous daily  latanoprost 0.005% Ophthalmic Solution 1 Drop(s) Both EYES at bedtime  pantoprazole    Tablet 40 milliGRAM(s) Oral before breakfast  senna 2 Tablet(s) Oral at bedtime  sodium phosphate IVPB 15 milliMole(s) IV Intermittent once    MEDICATIONS  (PRN):  acetaminophen     Tablet .. 650 milliGRAM(s) Oral every 6 hours PRN Moderate Pain (4 - 6)  diphenhydrAMINE 50 milliGRAM(s) Oral once PRN Reaction  melatonin 3 milliGRAM(s) Oral at bedtime PRN Insomnia  ondansetron Injectable 8 milliGRAM(s) IV Push once PRN Nausea and/or Vomiting      I&O's Summary    18 Sep 2022 07:01  -  19 Sep 2022 07:00  --------------------------------------------------------  IN: 730 mL / OUT: 0 mL / NET: 730 mL        PHYSICAL EXAM:  Vital Signs Last 24 Hrs  T(C): 36.5 (19 Sep 2022 05:04), Max: 36.6 (18 Sep 2022 20:03)  T(F): 97.7 (19 Sep 2022 05:04), Max: 97.8 (18 Sep 2022 20:03)  HR: 66 (19 Sep 2022 05:04) (65 - 66)  BP: 137/80 (19 Sep 2022 05:04) (137/80 - 147/90)  BP(mean): --  RR: 18 (19 Sep 2022 05:04) (18 - 18)  SpO2: 97% (19 Sep 2022 05:04) (94% - 97%)    Parameters below as of 19 Sep 2022 05:04  Patient On (Oxygen Delivery Method): room air      GENERAL: NAD, lying comfortably in bed  HEENT:  S/p Ommaya placement; dressing with dried old blood, Normocephalic, EOMI, conjunctiva and sclera clear, Left axillary LAD  CHEST/LUNG: Clear to auscultation bilaterally; No wheeze  HEART: RRR, S1 and S2 No murmurs, rubs, or gallops  ABDOMEN: Soft, Nontender, Nondistended; Bowel sounds present. no rebound or guarding   EXTREMITIES:  2+ Peripheral Pulses, No clubbing, cyanosis, or edema.   NEURO: AAOx3, non-focal, moving all extremities  SKIN: No rashes or lesions    LABS:    09-19    138  |  99  |  28<H>  ----------------------------<  129<H>  4.1   |  25  |  0.44<L>    Ca    9.1      19 Sep 2022 06:58  Phos  2.4     09-19  Mg     2.0     09-19    TPro  5.4<L>  /  Alb  3.4  /  TBili  0.7  /  DBili  x   /  AST  17  /  ALT  15  /  AlkPhos  109  09-19              COVID-19 PCR: NotDetec (14 Sep 2022 14:03)  COVID-19 PCR: NotDetec (08 Sep 2022 17:39)  COVID-19 PCR: NotDetec (04 Sep 2022 07:33)  COVID-19 PCR: NotDetec (28 Aug 2022 06:45)  SARS-CoV-2: NotDetec (22 Aug 2022 23:35)

## 2022-09-19 NOTE — PROGRESS NOTE ADULT - ATTENDING SUPERVISION STATEMENT
Fellow
Resident
Fellow
Resident

## 2022-09-19 NOTE — PROGRESS NOTE ADULT - ASSESSMENT
82F hx of arrhythmia s/p ablation and incarcerated R femoral hernia s/p small bowel resection presents with several months of generalized weakness and 2 weeks of lower abdominal discomfort. 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 Hematology consulted for management of lymphoma.    #Follicular Lymphoma  -CT TAP on admission with Moderate left pleural effusion. Amorphous soft tissue in the retroperitoneal space adjacent to the vena cava and aorta which is similar to but more pronounced than prior exam likely representing retroperitoneal lymphadenopathy. 5.5 x 1.8 cm mediastinal mass. Left axillary lymphadenopathy. Confluent paraspinal and posterior mediastinal soft tissue infiltrate   surrounding the descending thoracic aorta to approximately the level of T11. There is bony involvement most pronounced at the T3-T5 vertebral bodies. Spinal canal/epidural extension of soft at 3-T5 levels.   -MRI C/T/L Spine showing Widespread osseous metastatic disease involving the entire spine. Circumferential epidural tumor involvement spanning the T3-T5 levels with epidural tumor encroachment worst at the T4 level surrounding the cord which appears flattened. Additional epidural tumor within the sacral canal adjacent to osseous metastatic lesions, more asymmetric replaced to the left. Suggestion of abnormal leptomeningeal enhancement involving the cauda equina nerve roots also suspicious for neoplastic involvement. Redemonstration of abnormal confluent enhancing soft tissue within the posterior mediastinum surrounding the aorta as well as confluent soft tissue adjacent to the IVC and descending aorta within the abdominal cavity suspicious for adenopathy and/or tumor.   -MRI Brain showing Minimal periventricular and bifrontal subcortical white matter ischemia.  Mild global atrophy, most prominent in the cerebellum.  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    --------Immunohistochemical stains positive for CD20, CD10, CD23, BCL6, BCL2; negative for CD5, cyclin D1, MUM1, CD43. Ki67 proliferation index is 20-30%. CD21 highlights the follicular dendritic meshwork. The interspersed T cells are stained by CD3, CD5, LEF1, CD43.  - s/p BMBx on 9/7/22: Suboptimal biopsy with minute marrow fragment. Adequate aspirate smears showing slightly erythroid predominant maturing trilineage hematopoiesis. Minute, CD10 negative, monotypic B cell population detected by flow cytometry analysis  - A diagnosis of low-grade follicular lymphoma does not fit patient's clinical picture with epidural involvement. We would ordinarily request a diagnostic LP to confirm CNS involvement, however per Neuro Radiology, given the location of patient's soft tissue mass, there is no safe window for an LP. Neurosurgery to evaluated for potential Ommaya placement for diagnostic CSF sampling as well as possible intrathecal chemotherapy; deferred until need confirmed   -s/p RP lymph node biopsy with IR on 9/9/22 to confirm high grade lymphoma. Results pending  -Given patient's symptoms and burden of disease, we will proceed with treatment for an aggressive lymphoma with R-miniCHOP. Patient is s/p Rituximab Monday 9/12/22  ----mini-CHOP C1 given 9/16/22 (50% dose reduction)  -Started on Dex 10mg x1--> Dex 4mg q6h on 9/8/22. Please continue taper as below:  ---Dex 4mg q6h on 9/16 THEN  ---Dex 4mg q8h x3 days THEN  ---Prednisone 60mg daily x2 days THEN  ---Prednisone 50mg daily x2 days THEN  ---Prednisone 40mg daily x2 days THEN  ---Prednisone 30mg daily x2 days THEN  ---Prednisone 20mg daily x2 days THEN  ---Prednisone 10mg daily x2 days THEN  ---Prednisone 5mg daily x2 days THEN  ---STOP  -s/p ommaya placement on 9/15/22. CSF Flow cytometry and Cytopathology negative for malignant cells. Patient will ultimately require IT MTX, to be given inpatient vs. outpatient pending clinical course  -Radiation Oncology consulted. Patient will follow-up outpatient for RT  -HIV, hepatitis B/C negative  - Please monitor TLS labs (CMP, Mg, Phos, LDH, uric acid) daily following treatment with R-miniCHOP  -Continue zarxio 300mcg daily x3 days 9/18-9/20.  -Continue Allopurinol 300mg daily    We are arranging for follow-up at [facility].      Traci Almaraz MD  Hematology/Oncology Fellow, PGY-6  Pager: [phone]  After 5pm and on weekends please page on-call fellow 82F hx of arrhythmia s/p ablation and incarcerated R femoral hernia s/p small bowel resection presents with several months of generalized weakness and 2 weeks of lower abdominal discomfort. 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 Hematology consulted for management of lymphoma.    #Follicular Lymphoma  -CT TAP on admission with Moderate left pleural effusion. Amorphous soft tissue in the retroperitoneal space adjacent to the vena cava and aorta which is similar to but more pronounced than prior exam likely representing retroperitoneal lymphadenopathy. 5.5 x 1.8 cm mediastinal mass. Left axillary lymphadenopathy. Confluent paraspinal and posterior mediastinal soft tissue infiltrate   surrounding the descending thoracic aorta to approximately the level of T11. There is bony involvement most pronounced at the T3-T5 vertebral bodies. Spinal canal/epidural extension of soft at 3-T5 levels.   -MRI C/T/L Spine showing Widespread osseous metastatic disease involving the entire spine. Circumferential epidural tumor involvement spanning the T3-T5 levels with epidural tumor encroachment worst at the T4 level surrounding the cord which appears flattened. Additional epidural tumor within the sacral canal adjacent to osseous metastatic lesions, more asymmetric replaced to the left. Suggestion of abnormal leptomeningeal enhancement involving the cauda equina nerve roots also suspicious for neoplastic involvement. Redemonstration of abnormal confluent enhancing soft tissue within the posterior mediastinum surrounding the aorta as well as confluent soft tissue adjacent to the IVC and descending aorta within the abdominal cavity suspicious for adenopathy and/or tumor.   -MRI Brain showing Minimal periventricular and bifrontal subcortical white matter ischemia.  Mild global atrophy, most prominent in the cerebellum.  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    --------Immunohistochemical stains positive for CD20, CD10, CD23, BCL6, BCL2; negative for CD5, cyclin D1, MUM1, CD43. Ki67 proliferation index is 20-30%. CD21 highlights the follicular dendritic meshwork. The interspersed T cells are stained by CD3, CD5, LEF1, CD43.  - s/p BMBx on 9/7/22: Suboptimal biopsy with minute marrow fragment. Adequate aspirate smears showing slightly erythroid predominant maturing trilineage hematopoiesis. Minute, CD10 negative, monotypic B cell population detected by flow cytometry analysis  - A diagnosis of low-grade follicular lymphoma does not fit patient's clinical picture with epidural involvement. We would ordinarily request a diagnostic LP to confirm CNS involvement, however per Neuro Radiology, given the location of patient's soft tissue mass, there is no safe window for an LP. Neurosurgery to evaluated for potential Ommaya placement for diagnostic CSF sampling as well as possible intrathecal chemotherapy; deferred until need confirmed   -s/p RP lymph node biopsy with IR on 9/9/22 to confirm high grade lymphoma. Results pending  -Given patient's symptoms and burden of disease, we proceeeded with treatment for an aggressive lymphoma with R-miniCHOP. Patient is s/p Rituximab Monday 9/12/22 and s/p mini-CHOP C1 given 9/16/22 (50% dose reduction)  -Started on Dex 10mg x1--> Dex 4mg q6h on 9/8/22. Please continue taper as below:  ---Dex 4mg q6h on 9/16 THEN  ---Dex 4mg q8h x3 days THEN  ---Prednisone 60mg daily x2 days THEN  ---Prednisone 50mg daily x2 days THEN  ---Prednisone 40mg daily x2 days THEN  ---Prednisone 30mg daily x2 days THEN  ---Prednisone 20mg daily x2 days THEN  ---Prednisone 10mg daily x2 days THEN  ---Prednisone 5mg daily x2 days THEN  ---STOP  -s/p ommaya placement on 9/15/22. CSF Flow cytometry and Cytopathology negative for malignant cells. Patient will ultimately require IT MTX, to be given outpatient with C2.   -Radiation Oncology consulted. Patient will follow-up outpatient for RT  -HIV, hepatitis B/C negative  - Please monitor TLS labs (CMP, Mg, Phos, LDH, uric acid) daily following treatment with R-miniCHOP  -Continue zarxio 300mcg daily x3 days 9/18-9/20.  -Continue Allopurinol 300mg daily    We are arranging for follow-up at [facility].      Traci Almaraz MD  Hematology/Oncology Fellow, PGY-6  Pager: [phone]  After 5pm and on weekends please page on-call fellow

## 2022-09-19 NOTE — PROGRESS NOTE ADULT - ASSESSMENT
83 y/o F w/ hx of arrhythmia s/p ablation and incarcerated R femoral hernia s/p small bowel resection presents with several months of generalized weakness and 2 weeks of lower abdominal discomfort, being treated for pseudomonal UTI, also found to have bilateral pleff, possibly 2/2 to acute on chronic HFrEF s/p thoracentesis, workup for new metastatic malignancy s/p IR biosy of left axillary lymph node with ?low grade follicular lymphoma. s/p bone marrow biopsy and RP LAD biopsy, started on mini-chop 9/16, tolerating it well.

## 2022-09-20 NOTE — PROGRESS NOTE ADULT - SUBJECTIVE AND OBJECTIVE BOX
SUBJECTIVE / OVERNIGHT EVENTS:  Patient was seen and examined at bedside. Feels well overall. Denies having any acute complaints this morning. Denies headache, CP, SOB, n/v, abdominal pain, diarrhea, dysuria.       MEDICATIONS  (STANDING):  allopurinol 300 milliGRAM(s) Oral at bedtime  carbidopa/levodopa  25/100 1 Tablet(s) Oral <User Schedule>  ceFAZolin   IVPB 1000 milliGRAM(s) IV Intermittent every 8 hours  chlorhexidine 2% Cloths 1 Application(s) Topical <User Schedule>  dexAMETHasone  Injectable 4 milliGRAM(s) IV Push every 6 hours  filgrastim-sndz (ZARXIO) Injectable 300 MICROGram(s) SubCutaneous daily  latanoprost 0.005% Ophthalmic Solution 1 Drop(s) Both EYES at bedtime  pantoprazole    Tablet 40 milliGRAM(s) Oral before breakfast  senna 2 Tablet(s) Oral at bedtime  sodium phosphate IVPB 15 milliMole(s) IV Intermittent once    MEDICATIONS  (PRN):  acetaminophen     Tablet .. 650 milliGRAM(s) Oral every 6 hours PRN Moderate Pain (4 - 6)  diphenhydrAMINE 50 milliGRAM(s) Oral once PRN Reaction  melatonin 3 milliGRAM(s) Oral at bedtime PRN Insomnia  ondansetron Injectable 8 milliGRAM(s) IV Push once PRN Nausea and/or Vomiting      I&O's Summary    18 Sep 2022 07:01  -  19 Sep 2022 07:00  --------------------------------------------------------  IN: 730 mL / OUT: 0 mL / NET: 730 mL        PHYSICAL EXAM:  Vital Signs Last 24 Hrs  T(C): 36.5 (19 Sep 2022 05:04), Max: 36.6 (18 Sep 2022 20:03)  T(F): 97.7 (19 Sep 2022 05:04), Max: 97.8 (18 Sep 2022 20:03)  HR: 66 (19 Sep 2022 05:04) (65 - 66)  BP: 137/80 (19 Sep 2022 05:04) (137/80 - 147/90)  BP(mean): --  RR: 18 (19 Sep 2022 05:04) (18 - 18)  SpO2: 97% (19 Sep 2022 05:04) (94% - 97%)    Parameters below as of 19 Sep 2022 05:04  Patient On (Oxygen Delivery Method): room air      GENERAL: NAD, lying comfortably in bed  HEENT:  S/p Ommaya placement; dressing with dried old blood, Normocephalic, EOMI, conjunctiva and sclera clear  CHEST/LUNG: Clear to auscultation bilaterally; No wheeze  HEART: RRR, S1 and S2 No murmurs, rubs, or gallops  ABDOMEN: Soft, Nontender, Nondistended; Bowel sounds present. no rebound or guarding   EXTREMITIES:  2+ Peripheral Pulses, No clubbing, cyanosis, or edema.   NEURO: AAOx3, non-focal, moving all extremities  SKIN: No rashes or lesions    LABS:                         12.0   68.21 )-----------( 163      ( 20 Sep 2022 07:06 )             36.2     09-20    139  |  100  |  25<H>  ----------------------------<  137<H>  4.1   |  24  |  0.40<L>    Ca    8.9      20 Sep 2022 07:11  Phos  2.5     09-20  Mg     2.0     09-20    TPro  5.4<L>  /  Alb  3.6  /  TBili  0.5  /  DBili  x   /  AST  14  /  ALT  24  /  AlkPhos  128<H>  09-20

## 2022-09-20 NOTE — PROGRESS NOTE ADULT - SUBJECTIVE AND OBJECTIVE BOX
patient complains of fatigue, decreased endurance after chemotherapy   participating with therapy     REVIEW OF SYSTEMS  Constitutional - No fever,  + fatigue  HEENT - No vertigo, No neck pain  Neurological - No headaches, No memory loss, No loss of strength, No numbness, No tremors  Skin - No rashes, No lesions   Musculoskeletal - No joint pain, No joint swelling, No muscle pain  Psychiatric - No depression, No anxiety    FUNCTIONAL PROGRESS  9/19 PT  transfers contact guard with RW  toilet transfer min assist with RW  gait contact guard with RW x 50 feet    9/19 OT  transfers contact guard with RW      VITALS  T(C): 36.8 (09-20-22 @ 12:12), Max: 36.8 (09-20-22 @ 12:12)  HR: 76 (09-20-22 @ 12:12) (69 - 77)  BP: 142/90 (09-20-22 @ 12:12) (120/81 - 146/85)  RR: 18 (09-20-22 @ 12:12) (18 - 18)  SpO2: 97% (09-20-22 @ 12:12) (95% - 97%)  Wt(kg): --    MEDICATIONS   acetaminophen     Tablet .. 650 milliGRAM(s) every 6 hours PRN  allopurinol 300 milliGRAM(s) at bedtime  carbidopa/levodopa  25/100 1 Tablet(s) <User Schedule>  ceFAZolin   IVPB 1000 milliGRAM(s) every 8 hours  chlorhexidine 2% Cloths 1 Application(s) <User Schedule>  dexAMETHasone  Injectable 4 milliGRAM(s) every 6 hours  diphenhydrAMINE 50 milliGRAM(s) once PRN  latanoprost 0.005% Ophthalmic Solution 1 Drop(s) at bedtime  melatonin 3 milliGRAM(s) at bedtime PRN  ondansetron Injectable 8 milliGRAM(s) once PRN  pantoprazole    Tablet 40 milliGRAM(s) before breakfast  senna 2 Tablet(s) at bedtime      RECENT LABS - Reviewed                        12.0   68.21 )-----------( 163      ( 20 Sep 2022 07:06 )             36.2     09-20    139  |  100  |  25<H>  ----------------------------<  137<H>  4.1   |  24  |  0.40<L>    Ca    8.9      20 Sep 2022 07:11  Phos  2.5     09-20  Mg     2.0     09-20    TPro  5.4<L>  /  Alb  3.6  /  TBili  0.5  /  DBili  x   /  AST  14  /  ALT  24  /  AlkPhos  128<H>  09-20    < from: MR Lumbar Spine w/wo IV Cont (08.30.22 @ 17:30) >    IMPRESSION: Widespread osseous metastatic disease involving the entire   spine, as discussed.    Circumferential epidural tumor involvement spanning the T3-T5 levels with   epidural tumor encroachment worst at the T4 level surrounding the cord   which appears flattened. Evaluation for cord edema is limited secondary   to lack of T2 and STIR sequences through this area.    Additional epidural tumor within the sacral canal adjacent to osseous   metastatic lesions, more asymmetric replaced to the left.    Suggestion of abnormal leptomeningeal enhancement involving the cauda   equina nerve roots also suspicious for neoplastic involvement.    Redemonstration of abnormal confluent enhancing soft tissue within the   posteriormediastinum surrounding the aorta as well as confluent soft   tissue adjacent to the IVC and descending aorta within the abdominal   cavity suspicious for adenopathy and/or tumor.    Mild concave superior endplate deformities of T12 and L1 without bony   retropulsion.      < end of copied text >    < from: MR Head w/wo IV Cont (08.30.22 @ 16:58) >    INTERPRETATION:  .    CLINICAL INFORMATION: Lesion evaluation. Generalized weakness.    TECHNIQUE: Postcontrast imaging through the brain was obtained with T1   weighted series. The other usual conventional noncontrast pulse sequences   were not acquired as a recent noncontrast brain MRI study was performed   on 8/26/2022. 6 cc's of IV Gadavist was administered for the purposes of   this examination. 1.5 cc's were discarded.    COMPARISON: Prior noncontrast brain MRI study from 8/26/2022. Prior CT   examination of the head from 8/23/2022.    FINDINGS: There is no abnormal brain parenchymal or leptomeningeal   enhancement.    Slight disproportionate bilateral cerebellar volume loss appears   unchanged.    The remainder of the examination appears unchanged when compared to the   recent brain MRI study.    IMPRESSION: No abnormal brain parenchymal or leptomeningeal enhancement.    < end of copied text >      PHYSICAL EXAM  Constitutional - NAD, Comfortable, sitting in chair, eating lunch  HEENT - Ommaya  Chest - breathing comfortably   Cardiovascular - RRR, S1S2  Abdomen - BS+, Soft, NTND  Extremities - No C/C/E, No calf tenderness   Neurologic Exam -                 Alert follows verbal instruction  Slight hypertonia and cogwheeling  Minimal tremor of the 5th digit  Motor 4+/5 bl UE and LE     Psychiatric - Mood stable, Affect WNL      Impression:  83 yo h/o arrythmia s/p ablation, right femoral hernia with SBO with functional deficits secondary to diagnosis of debility  s/p IR biopsy left axillary LN, bone marrow biopsy, low grade follicular lymphoma  MRI spine with widespread osseous metastatic disease   Ommaya placed 9/15  s/p rituximab, mini chop, steroid taper, on allopurinol, outpatient radiation oncology follow up   WBC 68.21 today, heme following   early Parkinson's, started on sinemet   Plan:  continue bedside therapy   Prec- Falls, Cardiac  DVT Prophylaxis- SCDs  Skin- Turn q2 h  Dispo- Acute Rehab- can tolerate 3h/d of therapies and requires daily physician visits for titration of medications.

## 2022-09-21 NOTE — PROVIDER CONTACT NOTE (CRITICAL VALUE NOTIFICATION) - BACKGROUND
Pt admitted for abd pain
Pt admitted for abd pain d/t UTI
Pt admitted for abd pain
Pt admitted for abd pain

## 2022-09-21 NOTE — PROVIDER CONTACT NOTE (CRITICAL VALUE NOTIFICATION) - ASSESSMENT
Pt A&Ox4, VSS. Pt denies pain, sob, discomfort. Pt is on steroid taper
Pt A&Ox4, VSS. Pt denies pain, sob, discomfort and is currently sitting in the chair.

## 2022-09-21 NOTE — PROGRESS NOTE ADULT - SUBJECTIVE AND OBJECTIVE BOX
SUBJECTIVE / OVERNIGHT EVENTS:  Patient was seen and examined at bedside. Feels well overall. Denies having any acute complaints this morning. Denies headache, CP, SOB, n/v, abdominal pain, diarrhea, dysuria.     MEDICATIONS  (STANDING):  allopurinol 300 milliGRAM(s) Oral at bedtime  carbidopa/levodopa  25/100 1 Tablet(s) Oral <User Schedule>  ceFAZolin   IVPB 1000 milliGRAM(s) IV Intermittent every 8 hours  chlorhexidine 2% Cloths 1 Application(s) Topical <User Schedule>  dexAMETHasone  Injectable 4 milliGRAM(s) IV Push every 6 hours  filgrastim-sndz (ZARXIO) Injectable 300 MICROGram(s) SubCutaneous daily  latanoprost 0.005% Ophthalmic Solution 1 Drop(s) Both EYES at bedtime  pantoprazole    Tablet 40 milliGRAM(s) Oral before breakfast  senna 2 Tablet(s) Oral at bedtime  sodium phosphate IVPB 15 milliMole(s) IV Intermittent once    MEDICATIONS  (PRN):  acetaminophen     Tablet .. 650 milliGRAM(s) Oral every 6 hours PRN Moderate Pain (4 - 6)  diphenhydrAMINE 50 milliGRAM(s) Oral once PRN Reaction  melatonin 3 milliGRAM(s) Oral at bedtime PRN Insomnia  ondansetron Injectable 8 milliGRAM(s) IV Push once PRN Nausea and/or Vomiting      I&O's Summary    18 Sep 2022 07:01  -  19 Sep 2022 07:00  --------------------------------------------------------  IN: 730 mL / OUT: 0 mL / NET: 730 mL        PHYSICAL EXAM:  Vital Signs Last 24 Hrs  T(C): 36.5 (19 Sep 2022 05:04), Max: 36.6 (18 Sep 2022 20:03)  T(F): 97.7 (19 Sep 2022 05:04), Max: 97.8 (18 Sep 2022 20:03)  HR: 66 (19 Sep 2022 05:04) (65 - 66)  BP: 137/80 (19 Sep 2022 05:04) (137/80 - 147/90)  BP(mean): --  RR: 18 (19 Sep 2022 05:04) (18 - 18)  SpO2: 97% (19 Sep 2022 05:04) (94% - 97%)    Parameters below as of 19 Sep 2022 05:04  Patient On (Oxygen Delivery Method): room air      GENERAL: NAD, lying comfortably in bed  HEENT:  S/p Ommaya placement; dressing with dried old blood, Normocephalic, EOMI, conjunctiva and sclera clear  CHEST/LUNG: Clear to auscultation bilaterally; No wheeze  HEART: RRR, S1 and S2 No murmurs, rubs, or gallops  ABDOMEN: Soft, Nontender, Nondistended; Bowel sounds present. no rebound or guarding   EXTREMITIES:  2+ Peripheral Pulses, No clubbing, cyanosis, or edema.   NEURO: AAOx3, non-focal, moving all extremities  SKIN: No rashes or lesions    LABS:                         11.5   53.25 )-----------( 148      ( 21 Sep 2022 07:12 )             34.5     09-21    137  |  98  |  31<H>  ----------------------------<  153<H>  4.1   |  27  |  0.53    Ca    9.0      21 Sep 2022 07:11  Phos  2.8     09-21  Mg     2.0     09-21    TPro  5.4<L>  /  Alb  3.6  /  TBili  0.5  /  DBili  x   /  AST  11  /  ALT  18  /  AlkPhos  147<H>  09-21

## 2022-09-21 NOTE — PROVIDER CONTACT NOTE (CRITICAL VALUE NOTIFICATION) - ACTION/TREATMENT ORDERED:
YOLANDA santacruz.
No new orders at this moment. Will continue to monitor patient.
YOLANDA Wilson aware. Ordered STAT CBC. Will continue to monitor.
kindra santacruz

## 2022-09-21 NOTE — PROVIDER CONTACT NOTE (CRITICAL VALUE NOTIFICATION) - RECOMMENDATIONS
Notify NP
Notify NP/MD. Recollect specimen to verify result. Continue to monitor.
Notify NP/MD.  Continue to monitor.
Notify NP/MD.  Continue to monitor.

## 2022-09-22 NOTE — PROGRESS NOTE ADULT - SUBJECTIVE AND OBJECTIVE BOX
SUBJECTIVE / OVERNIGHT EVENTS:  Patient was seen and examined at bedside. Feels well overall. Denies having any acute complaints this morning. Denies headache, CP, SOB, n/v, abdominal pain, diarrhea, dysuria.     MEDICATIONS  (STANDING):  allopurinol 300 milliGRAM(s) Oral at bedtime  carbidopa/levodopa  25/100 1 Tablet(s) Oral <User Schedule>  ceFAZolin   IVPB 1000 milliGRAM(s) IV Intermittent every 8 hours  chlorhexidine 2% Cloths 1 Application(s) Topical <User Schedule>  dexAMETHasone  Injectable 4 milliGRAM(s) IV Push every 6 hours  filgrastim-sndz (ZARXIO) Injectable 300 MICROGram(s) SubCutaneous daily  latanoprost 0.005% Ophthalmic Solution 1 Drop(s) Both EYES at bedtime  pantoprazole    Tablet 40 milliGRAM(s) Oral before breakfast  senna 2 Tablet(s) Oral at bedtime  sodium phosphate IVPB 15 milliMole(s) IV Intermittent once    MEDICATIONS  (PRN):  acetaminophen     Tablet .. 650 milliGRAM(s) Oral every 6 hours PRN Moderate Pain (4 - 6)  diphenhydrAMINE 50 milliGRAM(s) Oral once PRN Reaction  melatonin 3 milliGRAM(s) Oral at bedtime PRN Insomnia  ondansetron Injectable 8 milliGRAM(s) IV Push once PRN Nausea and/or Vomiting      I&O's Summary    18 Sep 2022 07:01  -  19 Sep 2022 07:00  --------------------------------------------------------  IN: 730 mL / OUT: 0 mL / NET: 730 mL        PHYSICAL EXAM:  Vital Signs Last 24 Hrs  T(C): 36.5 (19 Sep 2022 05:04), Max: 36.6 (18 Sep 2022 20:03)  T(F): 97.7 (19 Sep 2022 05:04), Max: 97.8 (18 Sep 2022 20:03)  HR: 66 (19 Sep 2022 05:04) (65 - 66)  BP: 137/80 (19 Sep 2022 05:04) (137/80 - 147/90)  BP(mean): --  RR: 18 (19 Sep 2022 05:04) (18 - 18)  SpO2: 97% (19 Sep 2022 05:04) (94% - 97%)    Parameters below as of 19 Sep 2022 05:04  Patient On (Oxygen Delivery Method): room air      GENERAL: NAD, lying comfortably in bed  HEENT:  S/p Ommaya placement; dressing with dried old blood, Normocephalic, EOMI, conjunctiva and sclera clear  CHEST/LUNG: Clear to auscultation bilaterally; No wheeze  HEART: RRR, S1 and S2 No murmurs, rubs, or gallops  ABDOMEN: Soft, Nontender, Nondistended; Bowel sounds present. no rebound or guarding   EXTREMITIES:  2+ Peripheral Pulses, No clubbing, cyanosis, or edema.   NEURO: AAOx3, non-focal, moving all extremities  SKIN: No rashes or lesions    LABS:                         11.5   38.93 )-----------( 145      ( 22 Sep 2022 07:33 )             34.7     09-22    140  |  100  |  25<H>  ----------------------------<  134<H>  4.6   |  26  |  0.48<L>    Ca    9.0      22 Sep 2022 08:05  Phos  3.0     09-22  Mg     2.0     09-22    TPro  5.6<L>  /  Alb  3.7  /  TBili  0.6  /  DBili  x   /  AST  12  /  ALT  18  /  AlkPhos  160<H>  09-22

## 2022-09-22 NOTE — PROGRESS NOTE ADULT - SUBJECTIVE AND OBJECTIVE BOX
no new complaints, some fatigue during last therapy    REVIEW OF SYSTEMS  Constitutional - No fever,  No fatigue  HEENT - No vertigo, No neck pain  Neurological - No headaches, No memory loss, No loss of strength, No numbness, No tremors  Skin - No rashes, No lesions   Musculoskeletal - No joint pain, No joint swelling, No muscle pain  Psychiatric - No depression, No anxiety    FUNCTIONAL PROGRESS  9/21 PT  transfers contact guard/supervision with RW  gait contact guard/min assist with RW x 40 feet   standing rest break    9/19 OT  bed mobility supervision   transfers contact guard    VITALS  T(C): 36.3 (09-22-22 @ 04:47), Max: 36.5 (09-21-22 @ 12:25)  HR: 63 (09-22-22 @ 04:47) (63 - 81)  BP: 138/79 (09-22-22 @ 04:47) (123/85 - 138/79)  RR: 18 (09-22-22 @ 04:47) (18 - 18)  SpO2: 98% (09-22-22 @ 04:47) (96% - 98%)  Wt(kg): --    MEDICATIONS   acetaminophen     Tablet .. 650 milliGRAM(s) every 6 hours PRN  allopurinol 300 milliGRAM(s) at bedtime  carbidopa/levodopa  25/100 1 Tablet(s) <User Schedule>  ceFAZolin   IVPB 1000 milliGRAM(s) every 8 hours  chlorhexidine 2% Cloths 1 Application(s) <User Schedule>  dexAMETHasone  Injectable 4 milliGRAM(s) every 8 hours  diphenhydrAMINE 50 milliGRAM(s) once PRN  latanoprost 0.005% Ophthalmic Solution 1 Drop(s) at bedtime  melatonin 3 milliGRAM(s) at bedtime PRN  ondansetron Injectable 8 milliGRAM(s) once PRN  pantoprazole    Tablet 40 milliGRAM(s) before breakfast  senna 2 Tablet(s) at bedtime      RECENT LABS - Reviewed                        11.5   38.93 )-----------( 145      ( 22 Sep 2022 07:33 )             34.7     09-22    140  |  100  |  25<H>  ----------------------------<  134<H>  4.6   |  26  |  0.48<L>    Ca    9.0      22 Sep 2022 08:05  Phos  3.0     09-22  Mg     2.0     09-22    TPro  5.6<L>  /  Alb  3.7  /  TBili  0.6  /  DBili  x   /  AST  12  /  ALT  18  /  AlkPhos  160<H>  09-22        PHYSICAL EXAM  Constitutional - NAD, Comfortable, sitting in chair  HEENT - Ommaya  Chest - breathing comfortably   Cardiovascular - RRR, S1S2  Abdomen - BS+, Soft, NTND  Extremities - No C/C/E, No calf tenderness   Neurologic Exam -                 Alert follows verbal instruction  Motor 4+/5 bl UE and LE     Psychiatric - Mood stable, Affect WNL      Impression:  81 yo h/o arrythmia s/p ablation, right femoral hernia with SBO with functional deficits secondary to diagnosis of debility  s/p IR biopsy left axillary LN, bone marrow biopsy, low grade follicular lymphoma  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

## 2022-09-22 NOTE — PROGRESS NOTE ADULT - PROBLEM SELECTOR PLAN 1
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 Primary lesion is unknown but with unilateral L plef, concern for metastatic process in chest  - CTC w/ IV contrast showing anterior mediastinal mass, L axillary lymph node  - Repeat MRI brain, c, t spine w/w/o IV - diffuse spinal involvement noted; no cord compression  - s/p IR biopsy of L axillary node 8/31- cytology B cell lymphoma, pathology results low grade follicular lymphoma   - Paraneoplastic panel neg  -Bone Marrow Bx on 9/7:  Suboptimal biopsy with minute marrow fragment. Adequate aspirate smears showing slightly erythroid predominant maturing trilineage hematopoiesis. Minute, CD10 negative, monotypic B cell population detected by flow cytometry analysis  -S/p IR RP lad biopsy 9/9 - confirm high grade lymphoma   -Heme/onc following   -PICC placed; S/p Rituximab 9/12  -Rad onc consult per neurosurg   -Monitor TLS labs daily - stable thus far  -s/p Ommaya placement 9/15. F/u CSF flow cytometry and cytopathology   -s/p mini-CHOP C1 to be given 9/16/22 (50% dose reduction)  -Allopurinol 300mg daily started    Steroid taper as below:  Dexamethasone 4mg q8h (D1: 9/22) x 3 days THEN prednisone 60mg x 2 days, decrease by 10mg every 2 days.

## 2022-09-23 NOTE — PROGRESS NOTE ADULT - PROBLEM SELECTOR PLAN 1
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 Primary lesion is unknown but with unilateral L plef, concern for metastatic process in chest  - CTC w/ IV contrast showing anterior mediastinal mass, L axillary lymph node  - Repeat MRI brain, c, t spine w/w/o IV - diffuse spinal involvement noted; no cord compression  - s/p IR biopsy of L axillary node 8/31- cytology B cell lymphoma, pathology results low grade follicular lymphoma   - Paraneoplastic panel neg  -Bone Marrow Bx on 9/7:  Suboptimal biopsy with minute marrow fragment. Adequate aspirate smears showing slightly erythroid predominant maturing trilineage hematopoiesis. Minute, CD10 negative, monotypic B cell population detected by flow cytometry analysis  -S/p IR RP lad biopsy 9/9 - confirm high grade lymphoma   -Heme/onc following   -PICC placed; S/p Rituximab 9/12  -Rad onc consult per neurosurg   -Monitor TLS labs daily - stable thus far  -s/p Ommaya placement 9/15. CSF flow cytometry and cytopathology negative   -s/p mini-CHOP C1 to be given 9/16/22 (50% dose reduction)  -Allopurinol 300mg daily started    Steroid taper as below:  Dexamethasone 4mg q8h (D1: 9/22) x 3 days THEN prednisone 60mg x 2 days, decrease by 10mg every 2 days.  Outpt f/u w/ oncology on 9/28 MR spine with abnormal marrow signal in T3-T6, epidural extension without evidence of compression, c/w metastatic disease. Primary lesion is unknown but with unilateral L plef, concern for metastatic process in chest  - CTC w/ IV contrast showing anterior mediastinal mass, L axillary lymph node  - Repeat MRI brain, c, t spine w/w/o IV - diffuse spinal involvement noted; no cord compression  - s/p IR biopsy of L axillary node 8/31- cytology B cell lymphoma, pathology results low grade follicular lymphoma   - Paraneoplastic panel neg  -Bone Marrow Bx on 9/7:  Suboptimal biopsy with minute marrow fragment. Adequate aspirate smears showing slightly erythroid predominant maturing trilineage hematopoiesis. Minute, CD10 negative, monotypic B cell population detected by flow cytometry analysis  -S/p IR RP lad biopsy 9/9 - confirm high grade lymphoma   -Heme/onc following   -PICC placed; S/p Rituximab 9/12  -Rad onc consulted; no RT at this time; f/u outpatient   -Monitor TLS labs daily - stable thus far  -s/p Ommaya placement 9/15. CSF flow cytometry and cytopathology negative for malignant cells. Likely IT MTX w/ C2.   -s/p mini-CHOP C1 to be given 9/16/22 (50% dose reduction)  -Allopurinol 300mg daily started    Steroid taper as below:  Dexamethasone 4mg q8h (D1: 9/22) x 3 days THEN prednisone 60mg x 2 days, decrease by 10mg every 2 days.  Outpt f/u w/ oncology on 9/28

## 2022-09-23 NOTE — PROGRESS NOTE ADULT - NUTRITIONAL ASSESSMENT
This patient has been assessed with a concern for Malnutrition and has been determined to have a diagnosis/diagnoses of Mild protein-calorie malnutrition and Underweight (BMI < 19).    This patient is being managed with:   Diet NPO-  NPO for Procedure/Test     NPO Start Date: 08-Sep-2022   NPO Start Time: 23:00  Entered: Sep  8 2022  3:53PM    Diet Regular-  Entered: Aug 23 2022 11:59AM    
This patient has been assessed with a concern for Malnutrition and has been determined to have a diagnosis/diagnoses of Mild protein-calorie malnutrition and Underweight (BMI < 19).    This patient is being managed with:   Diet Regular-  Entered: Aug 23 2022 11:59AM    
This patient has been assessed with a concern for Malnutrition and has been determined to have a diagnosis/diagnoses of Mild protein-calorie malnutrition and Underweight (BMI < 19).    This patient is being managed with:   Diet Regular-  Entered: Sep 15 2022  5:49PM    
This patient has been assessed with a concern for Malnutrition and has been determined to have a diagnosis/diagnoses of Mild protein-calorie malnutrition and Underweight (BMI < 19).
This patient has been assessed with a concern for Malnutrition and has been determined to have a diagnosis/diagnoses of Mild protein-calorie malnutrition and Underweight (BMI < 19).      
This patient has been assessed with a concern for Malnutrition and has been determined to have a diagnosis/diagnoses of Mild protein-calorie malnutrition and Underweight (BMI < 19).    This patient is being managed with:   Diet Regular-  Entered: Aug 23 2022 11:59AM    
This patient has been assessed with a concern for Malnutrition and has been determined to have a diagnosis/diagnoses of Mild protein-calorie malnutrition and Underweight (BMI < 19).    This patient is being managed with:   Diet Regular-  Entered: Sep 15 2022  5:49PM    
This patient has been assessed with a concern for Malnutrition and has been determined to have a diagnosis/diagnoses of Mild protein-calorie malnutrition and Underweight (BMI < 19).    This patient is being managed with:   Diet Regular-  Entered: Sep 15 2022  5:49PM    
This patient has been assessed with a concern for Malnutrition and has been determined to have a diagnosis/diagnoses of Mild protein-calorie malnutrition and Underweight (BMI < 19).    This patient is being managed with:   Diet Regular-  Entered: Aug 23 2022 11:59AM    
This patient has been assessed with a concern for Malnutrition and has been determined to have a diagnosis/diagnoses of Mild protein-calorie malnutrition and Underweight (BMI < 19).    This patient is being managed with:   Diet Regular-  Entered: Aug 23 2022 11:59AM    
This patient has been assessed with a concern for Malnutrition and has been determined to have a diagnosis/diagnoses of Mild protein-calorie malnutrition and Underweight (BMI < 19).    This patient is being managed with:   Diet Regular-  Entered: Sep 15 2022  5:49PM    
This patient has been assessed with a concern for Malnutrition and has been determined to have a diagnosis/diagnoses of Mild protein-calorie malnutrition and Underweight (BMI < 19).    This patient is being managed with:   Diet Regular-  Entered: Aug 23 2022 11:59AM    
This patient has been assessed with a concern for Malnutrition and has been determined to have a diagnosis/diagnoses of Mild protein-calorie malnutrition and Underweight (BMI < 19).    This patient is being managed with:   Diet Regular-  Entered: Sep 15 2022  5:49PM    
This patient has been assessed with a concern for Malnutrition and has been determined to have a diagnosis/diagnoses of Mild protein-calorie malnutrition and Underweight (BMI < 19).    This patient is being managed with:   Diet Regular-  Entered: Aug 23 2022 11:59AM    
This patient has been assessed with a concern for Malnutrition and has been determined to have a diagnosis/diagnoses of Mild protein-calorie malnutrition and Underweight (BMI < 19).    This patient is being managed with:   Diet Regular-  Entered: Aug 23 2022 11:59AM    
This patient has been assessed with a concern for Malnutrition and has been determined to have a diagnosis/diagnoses of Mild protein-calorie malnutrition and Underweight (BMI < 19).    This patient is being managed with:   Diet NPO after Midnight-     NPO Start Date: 14-Sep-2022   NPO Start Time: 23:59  Except Medications  Entered: Sep 14 2022 10:29AM    Diet Regular-  Entered: Aug 23 2022 11:59AM    
This patient has been assessed with a concern for Malnutrition and has been determined to have a diagnosis/diagnoses of Mild protein-calorie malnutrition and Underweight (BMI < 19).
This patient has been assessed with a concern for Malnutrition and has been determined to have a diagnosis/diagnoses of Mild protein-calorie malnutrition and Underweight (BMI < 19).    This patient is being managed with:   Diet Regular-  Entered: Aug 23 2022 11:59AM    
This patient has been assessed with a concern for Malnutrition and has been determined to have a diagnosis/diagnoses of Mild protein-calorie malnutrition and Underweight (BMI < 19).    This patient is being managed with:   Diet Regular-  Entered: Sep 15 2022  5:49PM    
This patient has been assessed with a concern for Malnutrition and has been determined to have a diagnosis/diagnoses of Mild protein-calorie malnutrition and Underweight (BMI < 19).    This patient is being managed with:   Diet Regular-  Entered: Aug 23 2022 11:59AM

## 2022-09-23 NOTE — CHART NOTE - NSCHARTNOTESELECT_GEN_ALL_CORE
Event Note
Heme/Onc
L wrist pain/Swelling/Event Note
Pulmnology/Event Note
Event Note
Event Note
Heme/Onc
Nutrition Services
Nutrition Services

## 2022-09-23 NOTE — PROGRESS NOTE ADULT - PROVIDER SPECIALTY LIST ADULT
Heme/Onc
Internal Medicine
Neurology
Neurosurgery
Pulmonology
Rehab Medicine
Heme/Onc
Internal Medicine
Neurology
Neurosurgery
Pulmonology
Rehab Medicine
Rehab Medicine
Heme/Onc
Heme/Onc
Hospitalist
Internal Medicine
Neurosurgery
Pulmonology
Internal Medicine
Intervent Radiology
Neurology
Pulmonology
Internal Medicine
Intervent Radiology
Internal Medicine
Hospitalist
Internal Medicine
Hospitalist
Hospitalist
Internal Medicine
Hospitalist
Hospitalist
Internal Medicine
Hospitalist
Internal Medicine
Hospitalist
Internal Medicine
Hospitalist
Hospitalist
Internal Medicine
Internal Medicine

## 2022-09-23 NOTE — DISCHARGE NOTE NURSING/CASE MANAGEMENT/SOCIAL WORK - PATIENT PORTAL LINK FT
You can access the FollowMyHealth Patient Portal offered by [facility] by registering at the following website: [email or web address]. By joining [facility]’s FollowMyHealth portal, you will also be able to view your health information using other applications (apps) compatible with our system.

## 2022-09-23 NOTE — PROGRESS NOTE ADULT - PROBLEM SELECTOR PROBLEM 6
Uterine prolapse

## 2022-09-23 NOTE — PROGRESS NOTE ADULT - PROBLEM SELECTOR PLAN 6
Known hx of uterine prolapse. CTAP significant for bladder, uterine, and rectal prolapse through the pelvic floor. Patient reports she has outpatient f/u with gyn next month for evaluation.  -obgyn rec outpt follow up with Dr. Dg Hopper . Initially was told urogynecology would be available on 9/29, however, gynecology returned call and stated to contact Dr. Hopper for outpatient f/u
Known hx of uterine prolapse. CTAP significant for bladder, uterine, and rectal prolapse through the pelvic floor. Patient reports she has outpatient f/u with gyn next month for evaluation.  -obgyn rec outpt follow up with Dr. Dg Hopper . Initially was told urogynecology would be available on 9/29, however, gynecology returned call and stated to contact Dr. Hopper for outpatient f/u    # Dispo - awaiting [facility] placement
Known hx of uterine prolapse. CTAP significant for bladder, uterine, and rectal prolapse through the pelvic floor. Patient reports she has outpatient f/u with gyn next month for evaluation.  -obgyn rec outpt follow up with Dr. Dg Hopper . Initially was told urogynecology would be available on 9/29, however, gynecology returned call and stated to contact Dr. Hopper for outpatient f/u
Known hx of uterine prolapse. CTAP significant for bladder, uterine, and rectal prolapse through the pelvic floor. Patient reports she has outpatient f/u with gyn next month for evaluation.  -obgyn rec outpt follow up with Dr. Dg Hopper . Initially was told urogynecology would be available on 9/29, however, gynecology returned call and stated to contact Dr. Hopper for outpatient f/u    # Dispo - [facility] vs acute rehab placement on hold pending bone marrow biopsy results
Known hx of uterine prolapse. CTAP significant for bladder, uterine, and rectal prolapse through the pelvic floor. Patient reports she has outpatient f/u with gyn next month for evaluation.  -obgyn rec outpt follow up with Dr. Dg Hopper . Initially was told urogynecology would be available on 9/29, however, gynecology returned call and stated to contact Dr. Hopper for outpatient f/u
Known hx of uterine prolapse. CTAP significant for bladder, uterine, and rectal prolapse through the pelvic floor. Patient reports she has outpatient f/u with gyn next month for evaluation.  -obgyn rec outpt follow up with Dr. Dg Hopper . Initially was told urogynecology would be available on 9/29, however, gynecology returned call and stated to contact Dr. Hopper for outpatient f/u    # Dispo - [facility] vs acute rehab placement
Known hx of uterine prolapse. CTAP significant for bladder, uterine, and rectal prolapse through the pelvic floor. Patient reports she has outpatient f/u with gyn next month for evaluation.  -obgyn rec outpt follow up with Dr. Dg oHpper . Initially was told urogynecology would be available on 9/29, however, gynecology returned call and stated to contact Dr. Hopper for outpatient f/u    # Dispo - [facility] vs acute rehab placement on hold pending workup for B-cell lymphoma
Known hx of uterine prolapse. CTAP significant for bladder, uterine, and rectal prolapse through the pelvic floor. Patient reports she has outpatient f/u with gyn next month for evaluation.  -obgyn rec outpt follow up with Dr. Dg Hopper . Initially was told urogynecology would be available on 9/29, however, gynecology returned call and stated to contact Dr. Hopper for outpatient f/u
Known hx of uterine prolapse. CTAP significant for bladder, uterine, and rectal prolapse through the pelvic floor. Patient reports she has outpatient f/u with gyn next month for evaluation.  -obgyn rec outpt follow up with Dr. Dg Hopper . Initially was told urogynecology would be available on 9/29, however, gynecology returned call and stated to contact Dr. Hopper for outpatient f/u    # Dispo - [facility] vs acute rehab placement
Known hx of uterine prolapse. CTAP significant for bladder, uterine, and rectal prolapse through the pelvic floor. Patient reports she has outpatient f/u with gyn next month for evaluation.  -obgyn rec outpt follow up with Dr. Dg Hopper . Initially was told urogynecology would be available on 9/29, however, gynecology returned call and stated to contact Dr. Hopper for outpatient f/u    # Dispo - awaiting [facility] placement
Known hx of uterine prolapse. CTAP significant for bladder, uterine, and rectal prolapse through the pelvic floor. Patient reports she has outpatient f/u with gyn next month for evaluation.  -obgyn rec outpt follow up with Dr. Dg Hopper . Initially was told urogynecology would be available on 9/29, however, gynecology returned call and stated to contact Dr. Hopper for outpatient f/u
Known hx of uterine prolapse. CTAP significant for bladder, uterine, and rectal prolapse through the pelvic floor. Patient reports she has outpatient f/u with gyn next month for evaluation.  -obgyn rec outpt follow up with Dr. Dg Hopper . Initially was told urogynecology would be available on 9/29, however, gynecology returned call and stated to contact Dr. Hopper for outpatient f/u    # Dispo - [facility] vs acute rehab placement
Known hx of uterine prolapse. CTAP significant for bladder, uterine, and rectal prolapse through the pelvic floor. Patient reports she has outpatient f/u with gyn next month for evaluation.  -obgyn rec outpt follow up with Dr. Dg Hopper . Initially was told urogynecology would be available on 9/29, however, gynecology returned call and stated to contact Dr. Hopper for outpatient f/u
Known hx of uterine prolapse. CTAP significant for bladder, uterine, and rectal prolapse through the pelvic floor. Patient reports she has outpatient f/u with gyn next month for evaluation.  -obgyn rec outpt follow up with Dr. Dg Hopper   - urogynocology to see patient 8/29
Known hx of uterine prolapse. CTAP significant for bladder, uterine, and rectal prolapse through the pelvic floor. Patient reports she has outpatient f/u with gyn next month for evaluation.  -obgyn rec outpt follow up with Dr. Dg Hopper . Initially was told urogynecology would be available on 9/29, however, gynecology returned call and stated to contact Dr. Hopper for outpatient f/u    # Dispo - awaiting [facility] placement
Known hx of uterine prolapse. CTAP significant for bladder, uterine, and rectal prolapse through the pelvic floor. Patient reports she has outpatient f/u with gyn next month for evaluation.  -obgyn rec outpt follow up with Dr. Dg Hopper . Initially was told urogynecology would be available on 9/29, however, gynecology returned call and stated to contact Dr. Hopper for outpatient f/u    # Dispo - [facility] vs acute rehab placement
Known hx of uterine prolapse. CTAP significant for bladder, uterine, and rectal prolapse through the pelvic floor. Patient reports she has outpatient f/u with gyn next month for evaluation.  -obgyn rec outpt follow up with Dr. Dg Hopper . Initially was told urogynecology would be available on 9/29, however, gynecology returned call and stated to contact Dr. Hopper for outpatient f/u
Known hx of uterine prolapse. CTAP significant for bladder, uterine, and rectal prolapse through the pelvic floor. Patient reports she has outpatient f/u with gyn next month for evaluation.  -obgyn rec outpt follow up with Dr. Dg Hopper . Initially was told urogynecology would be available on 9/29, however, gynecology returned call and stated to contact Dr. Hopper for outpatient f/u    # Dispo - [facility] vs acute rehab placement on hold pending bone marrow biopsy results

## 2022-09-23 NOTE — PROGRESS NOTE ADULT - PROBLEM SELECTOR PROBLEM 2
Painful wrist, left
Generalized weakness
Painful wrist, left
Pleural effusion
Painful wrist, left
Pleural effusion
Painful wrist, left
Pleural effusion
Pleural effusion
Painful wrist, left
Pleural effusion
UTI (urinary tract infection)
Painful wrist, left
UTI (urinary tract infection)
Painful wrist, left
UTI (urinary tract infection)
UTI (urinary tract infection)
Painful wrist, left

## 2022-09-23 NOTE — PROGRESS NOTE ADULT - PROBLEM SELECTOR PLAN 4
CT showing moderate left pleural effusion. Unclear etiology.  Reported some dyspnea while doing house chores and weight loss. She is on RA with satts in the 90s. Discussed with pulmonary team regarding ultrasound of the effusion for further evaluation.  -BNP elevated to 716  -F/u TTE  -Appreciate pulmonary input. On US, effusion appears to be small and simple w/ loculations, striations.  -discussed thoracentesis with daughter, she wishes to wait for TTE and speak to brother before final decisions
BNP elevated and TTE w/ diastolic dysfunction (mild)  -hold off on diuretics for now   -otherwise looks euvolemic  -bp stable  -outpt follow up
Several months of generalized weakness and fatigue. Reports difficulties with ADLs and unsteadiness with ambulation.   -PMR recs acute rehab -> [facility]  -d/w Neurology: may also  be related to early PD -> start sinemet (ordered) and pending MR brain (scheduled for  8/26 5pm)
BNP elevated and TTE w/ diastolic dysfunction (mild)  -hold off on diuretics for now   -otherwise looks euvolemic  -bp stable  -outpt follow up
BNP elevated and TTE w/ diastolic dysfunction (mild)  -hold off on diuretics for now   -otherwise looks euvolemic  -bp stable  -outpt follow up
BNP elevated and TTE w/ diastolic dysfunction (mild)  -hold off on diuretics now given patient is getting thoracentesis at this time  -otherwise looks euvolemic  -bp stable  -outpt follow up
Several months of generalized weakness and fatigue. Reports difficulties with ADLs and unsteadiness with ambulation.   -PMR recs acute rehab -> family [facility]  -d/w Neurology: may also  be related to early PD -> start sinemet (ordered). tolerating increased dose.  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
BNP elevated and TTE w/ diastolic dysfunction (mild)  -hold off on diuretics now given patient is getting thoracentesis at this time  -otherwise looks euvolemic  -bp stable  -outpt follow up
BNP elevated and TTE w/ diastolic dysfunction (mild)  -hold off on diuretics for now   -otherwise looks euvolemic  -bp stable  -outpt follow up
BNP elevated and TTE w/ diastolic dysfunction (mild)  -hold off on diuretics now given patient is getting thoracentesis at this time  -otherwise looks euvolemic  -bp stable  -outpt follow up
BNP elevated and TTE w/ diastolic dysfunction (mild)  -hold off on diuretics for now   -otherwise looks euvolemic  -bp stable  -outpt follow up
BNP elevated and TTE w/ diastolic dysfunction (mild)  -hold off on diuretics for now   -otherwise looks euvolemic  -bp stable  -outpt follow up
BNP elevated and TTE w/ diastolic dysfunction (mild)  -hold off on diuretics now given patient is getting thoracentesis at this time  -otherwise looks euvolemic  -bp stable  -outpt follow up
BNP elevated and TTE w/ diastolic dysfunction (mild)  -hold off on diuretics now given patient is getting thoracentesis at this time  -otherwise looks euvolemic  -bp stable  -outpt follow up
Several months of generalized weakness and fatigue. Reports difficulties with ADLs and unsteadiness with ambulation.   -PMR recs acute rehab -> family [facility]  -d/w Neurology: may also  be related to early PD -> start sinemet (ordered). tolerating increased dose.  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
Several months of generalized weakness and fatigue. Reports difficulties with ADLs and unsteadiness with ambulation.   -PT/OT- [facility] eventually
BNP elevated and TTE w/ diastolic dysfunction (mild)  -hold off on diuretics now given patient is getting thoracentesis at this time  -otherwise looks euvolemic  -bp stable  -outpt follow up
BNP elevated and TTE w/ diastolic dysfunction (mild)  -hold off on diuretics for now   -otherwise looks euvolemic  -bp stable  -outpt follow up
BNP elevated and TTE w/ diastolic dysfunction (mild)  -hold off on diuretics now given patient is getting thoracentesis at this time  -otherwise looks euvolemic  -bp stable  -outpt follow up
Several months of generalized weakness and fatigue. Reports difficulties with ADLs and unsteadiness with ambulation.   -PMR recs acute rehab -> family [facility]  -d/w Neurology: may also  be related to early PD -> start sinemet (ordered). tolerating increased dose.  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
BNP elevated and TTE w/ diastolic dysfunction (mild)  -hold off on diuretics for now   -otherwise looks euvolemic  -bp stable  -outpt follow up
BNP elevated and TTE w/ diastolic dysfunction (mild)  -hold off on diuretics now given patient is getting thoracentesis at this time  -otherwise looks euvolemic  -bp stable  -outpt follow up

## 2022-09-23 NOTE — PROGRESS NOTE ADULT - REASON FOR ADMISSION
CC: generalized weakness, abdominal discomfort

## 2022-09-23 NOTE — PROGRESS NOTE ADULT - PROBLEM SELECTOR PLAN 5
Several months of generalized weakness and fatigue. Reports difficulties with ADLs and unsteadiness with ambulation.   -PMR recs acute rehab -> family [facility]  -d/w Neurology: may also  be related to early PD -> start sinemet (ordered). tolerating increased dose.
Known hx of uterine prolapse. CTAP significant for bladder, uterine, and rectal prolapse through the pelvic floor. Patient reports she has outpatient f/u with gyn next month for evaluation.  -will have obgyn re-eval
Several months of generalized weakness and fatigue. Reports difficulties with ADLs and unsteadiness with ambulation.   -PMR recs acute rehab -> family [facility]  -d/w Neurology: may also  be related to early PD -> start sinemet (ordered). tolerating increased dose.  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
Known hx of uterine prolapse. CTAP significant for bladder, uterine, and rectal prolapse through the pelvic floor. Patient reports she has outpatient f/u with gyn next month for evaluation.  -obgyn rec outpt follow up with Dr. Dg Hopper . Initially was told urogynecology would be available on 9/29, however, gynecology returned call and stated to contact Dr. Hopper for outpatient f/u
Several months of generalized weakness and fatigue. Reports difficulties with ADLs and unsteadiness with ambulation.   -PMR recs acute rehab -> [facility]  -d/w Neurology: may also  be related to early PD -> start sinemet (ordered). tolerating increased dose.  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
Known hx of uterine prolapse. CTAP significant for bladder, uterine, and rectal prolapse through the pelvic floor. Patient reports she has outpatient f/u with gyn next month for evaluation.  -obgyn rec outpt follow up with Dr. Dg Hopper 
Known hx of uterine prolapse. CTAP significant for bladder, uterine, and rectal prolapse through the pelvic floor. Patient reports she has outpatient f/u with gyn next month for evaluation.  -obgyn rec outpt follow up with Dr. Dg Hopper . Initially was told urogynecology would be available on 9/29, however, gynecology returned call and stated to contact Dr. Hopper for outpatient f/u    # Dispo - awaiting [facility] placement
Several months of generalized weakness and fatigue. Reports difficulties with ADLs and unsteadiness with ambulation.   -PMR recs acute rehab -> [facility]  -d/w Neurology: may also  be related to early PD -> start sinemet (ordered). tolerating increased dose.  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
Several months of generalized weakness and fatigue. Reports difficulties with ADLs and unsteadiness with ambulation.   -PMR recs acute rehab -> [facility]  -d/w Neurology: may also  be related to early PD -> start sinemet (ordered)
Several months of generalized weakness and fatigue. Reports difficulties with ADLs and unsteadiness with ambulation.   -PMR recs acute rehab -> family [facility]  -d/w Neurology: may also  be related to early PD -> start sinemet (ordered). tolerating increased dose.  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
Several months of generalized weakness and fatigue. Reports difficulties with ADLs and unsteadiness with ambulation.   -PMR recs acute rehab -> [facility]  -d/w Neurology: may also  be related to early PD -> start sinemet (ordered). tolerating increased dose.  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
Several months of generalized weakness and fatigue. Reports difficulties with ADLs and unsteadiness with ambulation.   -PMR recs acute rehab -> [facility]  -d/w Neurology: may also  be related to early PD -> start sinemet (ordered) and pending MR brain (scheduled for  8/26 5pm)
Several months of generalized weakness and fatigue. Reports difficulties with ADLs and unsteadiness with ambulation.   -PMR recs acute rehab -> [facility]  -d/w Neurology: may also  be related to early PD -> start sinemet (ordered). tolerating increased dose.  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
Known hx of uterine prolapse. CTAP significant for bladder, uterine, and rectal prolapse through the pelvic floor. Patient reports she has outpatient f/u with gyn next month for evaluation.  -obgyn rec outpt follow up with Dr. Dg Hopper . Initially was told urogynecology would be available on 9/29, however, gynecology returned call and stated to contact Dr. Hopper for outpatient f/u
Several months of generalized weakness and fatigue. Reports difficulties with ADLs and unsteadiness with ambulation.   -PMR recs acute rehab -> [facility]  -d/w Neurology: may also  be related to early PD -> start sinemet (ordered). tolerating increased dose.  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
Several months of generalized weakness and fatigue. Reports difficulties with ADLs and unsteadiness with ambulation.   -PMR recs acute rehab -> [facility]  -d/w Neurology: may also  be related to early PD -> start sinemet (ordered). tolerating increased dose.  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
Several months of generalized weakness and fatigue. Reports difficulties with ADLs and unsteadiness with ambulation.   -PMR recs acute rehab -> [facility]  -d/w Neurology: may also  be related to early PD -> start sinemet (ordered)
Several months of generalized weakness and fatigue. Reports difficulties with ADLs and unsteadiness with ambulation.   -PMR recs acute rehab -> [facility]  -d/w Neurology: may also  be related to early PD -> start sinemet (ordered). tolerating increased dose.  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

## 2022-09-23 NOTE — PROGRESS NOTE ADULT - PROBLEM SELECTOR PROBLEM 1
UTI (urinary tract infection)
Multiple lesions of metastatic malignancy
UTI (urinary tract infection)
UTI (urinary tract infection)
Multiple lesions of metastatic malignancy

## 2022-09-23 NOTE — DISCHARGE NOTE NURSING/CASE MANAGEMENT/SOCIAL WORK - NSDPFAC_GEN_ALL_CORE
[facility] (formerly [facility]) Phone: [phone]   Left arm;

## 2022-09-23 NOTE — PROGRESS NOTE ADULT - SUBJECTIVE AND OBJECTIVE BOX
SUBJECTIVE / OVERNIGHT EVENTS:  Patient was seen and examined at bedside. Feels well overall. Reported urinary frequency yesterday. Denies having any other acute complaints this morning. Denies headache, CP, SOB, n/v, abdominal pain, diarrhea, dysuria.     MEDICATIONS  (STANDING):  allopurinol 300 milliGRAM(s) Oral at bedtime  carbidopa/levodopa  25/100 1 Tablet(s) Oral <User Schedule>  ceFAZolin   IVPB 1000 milliGRAM(s) IV Intermittent every 8 hours  chlorhexidine 2% Cloths 1 Application(s) Topical <User Schedule>  dexAMETHasone  Injectable 4 milliGRAM(s) IV Push every 6 hours  filgrastim-sndz (ZARXIO) Injectable 300 MICROGram(s) SubCutaneous daily  latanoprost 0.005% Ophthalmic Solution 1 Drop(s) Both EYES at bedtime  pantoprazole    Tablet 40 milliGRAM(s) Oral before breakfast  senna 2 Tablet(s) Oral at bedtime  sodium phosphate IVPB 15 milliMole(s) IV Intermittent once    MEDICATIONS  (PRN):  acetaminophen     Tablet .. 650 milliGRAM(s) Oral every 6 hours PRN Moderate Pain (4 - 6)  diphenhydrAMINE 50 milliGRAM(s) Oral once PRN Reaction  melatonin 3 milliGRAM(s) Oral at bedtime PRN Insomnia  ondansetron Injectable 8 milliGRAM(s) IV Push once PRN Nausea and/or Vomiting      I&O's Summary    18 Sep 2022 07:01  -  19 Sep 2022 07:00  --------------------------------------------------------  IN: 730 mL / OUT: 0 mL / NET: 730 mL        PHYSICAL EXAM:  Vital Signs Last 24 Hrs  T(C): 36.5 (19 Sep 2022 05:04), Max: 36.6 (18 Sep 2022 20:03)  T(F): 97.7 (19 Sep 2022 05:04), Max: 97.8 (18 Sep 2022 20:03)  HR: 66 (19 Sep 2022 05:04) (65 - 66)  BP: 137/80 (19 Sep 2022 05:04) (137/80 - 147/90)  BP(mean): --  RR: 18 (19 Sep 2022 05:04) (18 - 18)  SpO2: 97% (19 Sep 2022 05:04) (94% - 97%)    Parameters below as of 19 Sep 2022 05:04  Patient On (Oxygen Delivery Method): room air      GENERAL: NAD, lying comfortably in bed  HEENT:  S/p Ommaya placement; site w/ dried old blood but no erythema/drainage noted, Normocephalic, EOMI, conjunctiva and sclera clear  CHEST/LUNG: Clear to auscultation bilaterally; No wheeze  HEART: RRR, S1 and S2 No murmurs, rubs, or gallops  ABDOMEN: Soft, Nontender, Nondistended; Bowel sounds present. no rebound or guarding   EXTREMITIES:  2+ Peripheral Pulses, No clubbing, cyanosis, or edema.   NEURO: AAOx3, non-focal, moving all extremities  SKIN: No rashes or lesions    LABS:                         11.2   16.73 )-----------( 134      ( 23 Sep 2022 08:07 )             33.4         137  |  100  |  26<H>  ----------------------------<  118<H>  4.3   |  27  |  0.54    Ca    8.9      23 Sep 2022 08:07  Phos  2.8       Mg     2.0         TPro  5.3<L>  /  Alb  3.4  /  TBili  0.6  /  DBili  x   /  AST  12  /  ALT  10  /  AlkPhos  130<H>        Urinalysis Basic - ( 23 Sep 2022 16:09 )    Color: Yellow / Appearance: Clear / S.023 / pH: x  Gluc: x / Ketone: Negative  / Bili: Negative / Urobili: Negative   Blood: x / Protein: Trace / Nitrite: Negative   Leuk Esterase: Large / RBC: 7 /hpf / WBC 11 /HPF   Sq Epi: x / Non Sq Epi: 2 / Bacteria: Negative

## 2022-09-23 NOTE — PROGRESS NOTE ADULT - PROBLEM SELECTOR PROBLEM 3
Acute on chronic diastolic heart failure
Pleural effusion
Acute on chronic diastolic heart failure
Pleural effusion
Acute on chronic diastolic heart failure
Acute on chronic diastolic heart failure
Pleural effusion
Uterine prolapse
Acute on chronic diastolic heart failure
Pleural effusion

## 2022-09-23 NOTE — PROGRESS NOTE ADULT - PROBLEM SELECTOR PLAN 2
CT showing moderate left pleural effusion. Unclear etiology.  Reported some dyspnea while doing house chores and weight loss. She is on RA with satts in the 90s. Discussed with pulmonary team regarding ultrasound of the effusion for further evaluation.  -BNP elevated to 716, TTE w/ diastolic dysfunction, perhaps this is the etiology of pleff  -d/w pulmonary -> plan to do thoracentesis 8/25 to Elucidate Effusion Etiology. Family in agreement.
CT showing moderate left pleural effusion. Unclear etiology.  Reported some dyspnea while doing house chores and weight loss. She is on RA with satts in the 90s. Discussed with pulmonary team regarding ultrasound of the effusion for further evaluation.  -BNP elevated to 716, TTE w/ diastolic dysfunction, perhaps this is the etiology of pleff  -d/w pulmonary -> s/p thoracentesis 8/25 : exudative by lights criteria BUT there's a serum-ascites protein gradient of 3.3 so can still be HF as etiology of effusions
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  -s/p trial of ibuprofen 400mg TID for 2 days
Vague lower abdominal pain. Denied dysuria, difficulties with urination. UA: , +leuk esterase, +nitrate. Started on ceftriaxone for UTI by ED team. S/p treatment  - completed course of cipro for pseudomonal UTI
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  -trial of ibuprofen 400mg TID for 2 days
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  -s/p trial of ibuprofen 400mg TID for 2 days
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  -s/p trial of ibuprofen 400mg TID for 2 days  -resolved
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  -s/p trial of ibuprofen 400mg TID for 2 days
CT showing moderate left pleural effusion. Unclear etiology.  Reported some dyspnea while doing house chores and weight loss. She is on RA with satts in the 90s. Discussed with pulmonary team regarding ultrasound of the effusion for further evaluation.  -BNP elevated to 716, TTE w/ diastolic dysfunction, perhaps this is the etiology of pleff  -d/w pulmonary -> s/p thoracentesis 8/25 : exudative by lights criteria BUT there's a serum-ascites protein gradient of 3.3 so can still be HF as etiology of effusions  - cytopathology without malignant cells
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  -s/p trial of ibuprofen 400mg TID for 2 days  -resolved
Vague lower abdominal pain. Denied dysuria, difficulties with urination. UA: , +leuk esterase, +nitrate. Started on ceftriaxone for UTI by ED team.   -UCx +pseudomonas -> on zosyn  (8/24). will switch to levaquin 500 qD (qtc 411) completed
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  -trial of ibuprofen 400mg TID for 2 days
CT showing moderate left pleural effusion. Unclear etiology.  Reported some dyspnea while doing house chores and weight loss. She is on RA with satts in the 90s. Discussed with pulmonary team regarding ultrasound of the effusion for further evaluation.  -BNP elevated to 716, TTE w/ diastolic dysfunction, perhaps this is the etiology of pleff  -d/w pulmonary -> s/p thoracentesis 8/25 : exudative by lights criteria BUT there's a serum-ascites protein gradient of 3.3 so can still be HF as etiology of effusions  - cytopathology without malignant cells
Several months of generalized weakness and fatigue. Reports difficulties with ADLs and unsteadiness with ambulation.   -PT/OT eval pending
CT showing moderate left pleural effusion. Unclear etiology.  Reported some dyspnea while doing house chores and weight loss. She is on RA with satts in the 90s. Discussed with pulmonary team regarding ultrasound of the effusion for further evaluation.  -BNP elevated to 716, TTE w/ diastolic dysfunction, perhaps this is the etiology of pleff  -d/w pulmonary -> s/p thoracentesis 8/25 : exudative by lights criteria BUT there's a serum-ascites protein gradient of 3.3 so can still be HF as etiology of effusions  - cytopathology without malignant cells
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  -s/p trial of ibuprofen 400mg TID for 2 days
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  -s/p trial of ibuprofen 400mg TID for 2 days  -resolved
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  -s/p trial of ibuprofen 400mg TID for 2 days  -resolved
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  -s/p trial of ibuprofen 400mg TID for 2 days
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  -s/p trial of ibuprofen 400mg TID for 2 days
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  -s/p trial of ibuprofen 400mg TID for 2 days  -improving
Vague lower abdominal pain. Denied dysuria, difficulties with urination. UA: , +leuk esterase, +nitrate. Started on ceftriaxone for UTI by ED team. S/p treatment  - completed course of cipro for pseudomonal UTI
Vague lower abdominal pain. Denied dysuria, difficulties with urination. UA: , +leuk esterase, +nitrate. Started on ceftriaxone for UTI by ED team.   -UCx +pseudomonas -> on zosyn  (8/24). will switch to levaquin 500 qD (qtc 411) for 3 more days.
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  -s/p trial of ibuprofen 400mg TID for 2 days
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  -s/p trial of ibuprofen 400mg TID for 2 days  -resolved
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  -s/p trial of ibuprofen 400mg TID for 2 days  -resolved
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  -s/p trial of ibuprofen 400mg TID for 2 days
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  -trial of ibuprofen 400mg TID for 2 days
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  -s/p trial of ibuprofen 400mg TID for 2 days

## 2022-09-23 NOTE — PROGRESS NOTE ADULT - PROBLEM SELECTOR PLAN 3
CT showing moderate left pleural effusion. Unclear etiology.  Reported some dyspnea while doing house chores and weight loss. She is on RA with satts in the 90s. Discussed with pulmonary team regarding ultrasound of the effusion for further evaluation.  -BNP elevated to 716, TTE w/ diastolic dysfunction, perhaps this is the etiology of pleff  -d/w pulmonary -> s/p thoracentesis 8/25 : exudative by lights criteria BUT there's a serum-ascites protein gradient of 3.3 so can still be HF as etiology of effusions  - awaiting cytopathology
CT showing moderate left pleural effusion. Unclear etiology.  Reported some dyspnea while doing house chores and weight loss. She is on RA with satts in the 90s. Discussed with pulmonary team regarding ultrasound of the effusion for further evaluation.  -BNP elevated to 716, TTE w/ diastolic dysfunction, perhaps this is the etiology of pleff  -d/w pulmonary -> s/p thoracentesis 8/25 : exudative by lights criteria BUT there's a serum-ascites protein gradient of 3.3 so can still be HF as etiology of effusions  - cytopathology without malignant cells
BNP elevated and TTE w/ diastolic dysfunction (mild)  -hold off on diuretics now given patient is getting thoracentesis at this time  -otherwise looks euvolemic  -bp stable  -outpt follow up
CT showing moderate left pleural effusion. Unclear etiology.  Reported some dyspnea while doing house chores and weight loss. She is on RA with satts in the 90s. Discussed with pulmonary team regarding ultrasound of the effusion for further evaluation.  -BNP elevated to 716, TTE w/ diastolic dysfunction, perhaps this is the etiology of pleff  -d/w pulmonary -> s/p thoracentesis 8/25 : exudative by lights criteria BUT there's a serum-ascites protein gradient of 3.3 so can still be HF as etiology of effusions  - cytopathology without malignant cells
Known hx of uterine prolapse. CTAP significant for bladder, uterine, and rectal prolapse through the pelvic floor. Patient reports she has outpatient f/u with gyn next month for evaluation.  -will have obgyn re-eval
BNP elevated and TTE w/ diastolic dysfunction (mild)  -hold off on diuretics now given patient is getting thoracentesis at this time  -otherwise looks euvolemic  -bp stable  -outpt follow up
BNP elevated and TTE w/ diastolic dysfunction (mild)  -hold off on diuretics now given patient is getting thoracentesis at this time  -otherwise looks euvolemic  -bp stable  -outpt follow up
CT showing moderate left pleural effusion. Unclear etiology.  Reported some dyspnea while doing house chores and weight loss. She is on RA with satts in the 90s. Discussed with pulmonary team regarding ultrasound of the effusion for further evaluation.  -BNP elevated to 716, TTE w/ diastolic dysfunction, perhaps this is the etiology of pleff  -d/w pulmonary -> s/p thoracentesis 8/25 : exudative by lights criteria BUT there's a serum-ascites protein gradient of 3.3 so can still be HF as etiology of effusions  - cytopathology without malignant cells
BNP elevated and TTE w/ diastolic dysfunction (mild)  -hold off on diuretics now given patient is getting thoracentesis at this time  -otherwise looks euvolemic  -bp stable  -outpt follow up
CT showing moderate left pleural effusion. Unclear etiology.  Reported some dyspnea while doing house chores and weight loss. She is on RA with satts in the 90s. Discussed with pulmonary team regarding ultrasound of the effusion for further evaluation.  -BNP elevated to 716, TTE w/ diastolic dysfunction, perhaps this is the etiology of pleff  -d/w pulmonary -> s/p thoracentesis 8/25 : exudative by lights criteria BUT there's a serum-ascites protein gradient of 3.3 so can still be HF as etiology of effusions  - cytopathology without malignant cells
BNP elevated and TTE w/ diastolic dysfunction (mild)  -hold off on diuretics now given patient is getting thoracentesis at this time  -otherwise looks euvolemic  -bp stable
CT showing moderate left pleural effusion. Unclear etiology.  Reported some dyspnea while doing house chores and weight loss. She is on RA with satts in the 90s. Discussed with pulmonary team regarding ultrasound of the effusion for further evaluation.  -BNP elevated to 716, TTE w/ diastolic dysfunction, perhaps this is the etiology of pleff  -d/w pulmonary -> s/p thoracentesis 8/25 : exudative by lights criteria BUT there's a serum-ascites protein gradient of 3.3 so can still be HF as etiology of effusions  - awaiting cytopathology
CT showing moderate left pleural effusion. Unclear etiology.  Reported some dyspnea while doing house chores and weight loss. She is on RA with satts in the 90s. Discussed with pulmonary team regarding ultrasound of the effusion for further evaluation.  -BNP elevated to 716, TTE w/ diastolic dysfunction, perhaps this is the etiology of pleff  -d/w pulmonary -> s/p thoracentesis 8/25 : exudative by lights criteria BUT there's a serum-ascites protein gradient of 3.3 so can still be HF as etiology of effusions  - cytopathology without malignant cells
CT showing moderate left pleural effusion. Unclear etiology.  Reported some dyspnea while doing house chores and weight loss. She is on RA with satts in the 90s. Discussed with pulmonary team regarding ultrasound of the effusion for further evaluation.  -BNP elevated to 716, TTE w/ diastolic dysfunction, perhaps this is the etiology of pleff  -d/w pulmonary -> s/p thoracentesis 8/25 : exudative by lights criteria BUT there's a serum-ascites protein gradient of 3.3 so can still be HF as etiology of effusions  - cytopathology without malignant cells
CT showing moderate left pleural effusion. Unclear etiology.  Reported some dyspnea while doing house chores and weight loss. She is on RA with satts in the 90s. Discussed with pulmonary team regarding ultrasound of the effusion for further evaluation.  -BNP elevated to 716, TTE w/ diastolic dysfunction, perhaps this is the etiology of pleff  -d/w pulmonary -> s/p thoracentesis 8/25 : exudative by lights criteria BUT there's a serum-ascites protein gradient of 3.3 so can still be HF as etiology of effusions  - awaiting cytopathology
CT showing moderate left pleural effusion. Unclear etiology.  Reported some dyspnea while doing house chores and weight loss. She is on RA with satts in the 90s. Discussed with pulmonary team regarding ultrasound of the effusion for further evaluation.  -BNP elevated to 716, TTE w/ diastolic dysfunction, perhaps this is the etiology of pleff  -d/w pulmonary -> s/p thoracentesis 8/25 : exudative by lights criteria BUT there's a serum-ascites protein gradient of 3.3 so can still be HF as etiology of effusions  - cytopathology without malignant cells

## 2022-09-23 NOTE — PROGRESS NOTE ADULT - PROBLEM SELECTOR PROBLEM 5
Generalized weakness
Uterine prolapse
Generalized weakness
Uterine prolapse
Generalized weakness
Uterine prolapse
Generalized weakness
Uterine prolapse
Generalized weakness
Uterine prolapse
Generalized weakness

## 2022-09-23 NOTE — PROGRESS NOTE ADULT - PROBLEM SELECTOR PROBLEM 4
Acute on chronic diastolic heart failure
Generalized weakness
Generalized weakness
Acute on chronic diastolic heart failure
Generalized weakness
Generalized weakness
Pleural effusion
Acute on chronic diastolic heart failure
Generalized weakness
Acute on chronic diastolic heart failure

## 2022-09-23 NOTE — CHART NOTE - NSCHARTNOTEFT_GEN_A_CORE
HEMATOLOGY FELLOW NOTE    Patient being followed by Hematology for a diagnosis of Follicular Lymphoma s/p C1 R-miniCHOP on 9/12, 9/16.    She is pending discharge to rehab.    She is now scheduled for follow-up with Dr. Delgadillo on 9/28/22 at 2:30pm at [facility]. Please include this information in patient's discharge paperwork.    Please page or call with questions.

## 2022-09-23 NOTE — DISCHARGE NOTE NURSING/CASE MANAGEMENT/SOCIAL WORK - NSDCPEFALRISK_GEN_ALL_CORE
For information on Fall & Injury Prevention, visit: [email or web address] OR  [email or web address] OR  https://www.cdc.gov/steadi/patient.html

## 2022-09-28 PROBLEM — Z98.2: Status: RESOLVED | Noted: 2022-01-01 | Resolved: 2022-01-01

## 2022-09-28 PROBLEM — Z87.19 HISTORY OF SMALL BOWEL OBSTRUCTION: Status: RESOLVED | Noted: 2022-01-01 | Resolved: 2022-01-01

## 2022-09-28 PROBLEM — Z87.09 HISTORY OF PLEURAL EFFUSION: Status: RESOLVED | Noted: 2022-01-01 | Resolved: 2022-01-01

## 2022-09-28 PROBLEM — Z86.79 HISTORY OF HYPERTENSION: Status: RESOLVED | Noted: 2022-01-01 | Resolved: 2022-01-01

## 2022-09-28 PROBLEM — I50.32 CHRONIC DIASTOLIC CONGESTIVE HEART FAILURE: Status: RESOLVED | Noted: 2022-01-01 | Resolved: 2022-01-01

## 2022-09-28 PROBLEM — Z86.39 HISTORY OF HYPERLIPIDEMIA: Status: RESOLVED | Noted: 2022-01-01 | Resolved: 2022-01-01

## 2022-09-28 PROBLEM — Z87.42 HISTORY OF UTERINE PROLAPSE: Status: RESOLVED | Noted: 2022-01-01 | Resolved: 2022-01-01

## 2022-09-29 NOTE — CONSULT LETTER
[Dear  ___] : Dear ~MIRTA, [Courtesy Letter:] : I had the pleasure of seeing your patient, [unfilled], in my office today. [Please see my note below.] : Please see my note below. [Consult Closing:] : Thank you very much for allowing me to participate in the care of this patient.  If you have any questions, please do not hesitate to contact me. [Sincerely,] : Sincerely, [FreeTextEntry3] : Kenan Delgadillo DO, MSc\par , [facility], [place] [facility] at [facility]\par Attending Physician - Malignant Hematology\par [facility]\par [address] [address]\par Tel: [phone]\par Fax: [phone]\par

## 2022-09-29 NOTE — PHYSICAL EXAM
[Ambulatory and capable of all self care but unable to carry out any work activities] : Status 2- Ambulatory and capable of all self care but unable to carry out any work activities. Up and about more than 50% of waking hours [Thin] : thin [Normal] : affect appropriate [de-identified] : poor dentition [de-identified] : Blunted breath sounds at left lung base

## 2022-09-29 NOTE — ASSESSMENT
[FreeTextEntry1] : 83 yo female here for further management of newly diagnosed follicular lymphoma with epidural involvement. She is currently on cycle 1 day 13 of R-mini-CHOP and tolerating treatment well so far. She has an Ommaya and was planned to start IT MTX during cycle 2 -- however, this needs to be discussed further within our group as to systemic MTX +/- IT MTX. \par \par Plan:\par - Basic blood work today (lymphoma panel)\par - Plan to continue to cycle 2 R-mini-CHOP likely 10/10/22\par - She is on a short prednisone taper, going down 10 mg every 2 days, she is currently on 50 mg\par - She prefers her daughter to consent to any treatment, will need to obtain consents from [facility] or re- consent prior to cycle 2\par - She is currently residing in [facility] - PICC line dressing has not been changed since 9/13/22, please change dressings at least once a week.\par - HCM: COVID-19 vaccinated x 2\par - Follow-up in 1 week\par \par ____\par I personally have spent a total of 60 minutes of time on the date of this encounter reviewing test results, documenting findings, providing education, coordinating care and directly consulting with the patient and/or designated family member.

## 2022-09-29 NOTE — HISTORY OF PRESENT ILLNESS
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 She is now s/p C1 R-miniCHOP on 9/12, 9/16 as an inpatient.\par \par For the past several months she has been experiencing generalized weakness which has been worsening over the last 2 weeks. Prior to this she has been able to manage chores on her own like doing laundry but now she feels she is too weak and needs help with ADLs. She ambulates with help of cane but during this timeframe, she has been feeling weak and unsteady on her feet. She is currently using a walker to walk and has been able to walk 20-30 feet since being in the rehab..\par \par A diagnosis of low-grade follicular lymphoma did not fit patient's clinical picture with epidural involvement. Diagnostic LP was deferred given the location of patient's soft tissue mass, there is no safe window for an LP. Neurosurgery placed an Ommaya catheter and CSF was negative for malignant cells. She underwent an additional lymph node biopsy (left retroperitoneal) with IR on 9/9/22 to confirm high grade lymphoma, however this remains pending. She was started on cycle 1 R-miniCHOP on 9/12, 9/16 as an inpatient.\par \par After the chemotherapy she felt fatigue for a short period and a general uncomfortable feeling that is difficult for her to explain. She did not experience significant nausea or vomiting, diarrhea, constipation. She typically has a bowel movement every 2 days. She has been experiencing numbness and weakness in her arms and legs that has been unchanged since starting chemotherapy. She denies headaches, double vision, trouble swallowing, abdominal / pelvic pain. She has not had any issues with the Ommaya catheter or her right arm PICC line. 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 \par - CTH: No acute intracranial processes. \par - CT Abdomen / Pelvis: Moderate left pleural effusion. Bladder, uterine, and rectal prolapse through the pelvic floor. No bowel obstruction. Amorphous soft tissue in the retroperitoneal space adjacent to the vena cava and aorta likely representing retroperitoneal lymphadenopathy. Confluent paraspinal and posterior mediastinal soft tissue infiltrate surrounding the descending thoracic aorta to approximately the level of T11. There is bony involvement most pronounced at the T3-T5 vertebral bodies. Spinal canal/epidural extension of soft at 3-T5 levels. 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 Circumferential epidural tumor involvement spanning the T3-T5 levels with epidural tumor encroachment worst at the T4 level surrounding the cord which appears flattened. Additional epidural tumor within the sacral canal adjacent to osseous metastatic lesions, more asymmetric replaced to the left. Suggestion of abnormal leptomeningeal enhancement involving the cauda equina nerve roots also suspicious for neoplastic involvement. Redemonstration of abnormal confluent enhancing soft tissue within the posterior mediastinum surrounding the aorta as well as confluent soft tissue adjacent to the IVC and descending aorta within the abdominal cavity suspicious for adenopathy and/or tumor. \par - MRI Brain: Minimal periventricular and bifrontal subcortical white matter ischemia.  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 Cytopathology: low-grade follicular lymphoma. Immunohistochemical stains positive for CD20, CD10, CD23, BCL6, BCL2; negative for CD5, cyclin D1, MUM1, CD43. Ki67 proliferation index is 20-30%. CD21 highlights the follicular dendritic meshwork. The interspersed T cells are stained by CD3, CD5, LEF1, CD43.\par - BMBx on 9/7/22: Suboptimal biopsy with minute marrow fragment. Adequate aspirate smears showing slightly erythroid predominant maturing trilineage hematopoiesis. Minute, CD10 negative, monotypic B cell population detected by flow cytometry analysis\par \par ============================================================\par Care Providers:\par \par PMD: Has a new doctor, need to follow-up with daughter as to name\par \par ============================================================ [de-identified] : IV [de-identified] : See Above [FreeTextEntry1] : R-mini-CHOP [de-identified] : 9/28/22: Initial Visit. See above H&P.\par \par \par ____\par A comprehensive review of systems was performed including constitutional, eyes, ENT, cardiovascular, respiratory, gastrointestinal, genitourinary, musculoskeletal, integumentary, neurological, psychiatric and hematologic / lymphatic. All pertinent positives are included in the H&P under interval history above and the remaining review of systems listed are negative. \par \par \par =====================================================================\par Treatment Hx:\par \par R-mini-CHOP\par - Cycle 1 Day 1 on 9/16/22\par - Cycle 2 Day 1 planned for 10/10/22\par \par =====================================================================

## 2022-10-10 PROBLEM — Z85.79: Status: RESOLVED | Noted: 2022-01-01 | Resolved: 2022-01-01

## 2022-10-10 NOTE — PHYSICAL EXAM
[Ambulatory and capable of all self care but unable to carry out any work activities] : Status 2- Ambulatory and capable of all self care but unable to carry out any work activities. Up and about more than 50% of waking hours [Thin] : thin [Normal] : affect appropriate [de-identified] : poor dentition [de-identified] : Blunted breath sounds at left lung base [de-identified] : +RUE PICC

## 2022-10-10 NOTE — HISTORY OF PRESENT ILLNESS
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 She is now s/p C1 R-miniCHOP on 9/12, 9/16 as an inpatient.\par \par For the past several months she has been experiencing generalized weakness which has been worsening over the last 2 weeks. Prior to this she has been able to manage chores on her own like doing laundry but now she feels she is too weak and needs help with ADLs. She ambulates with help of cane but during this timeframe, she has been feeling weak and unsteady on her feet. She is currently using a walker to walk and has been able to walk 20-30 feet since being in the rehab..\par \par A diagnosis of low-grade follicular lymphoma did not fit patient's clinical picture with epidural involvement. Diagnostic LP was deferred given the location of patient's soft tissue mass, there is no safe window for an LP. Neurosurgery placed an Ommaya catheter and CSF was negative for malignant cells. She underwent an additional lymph node biopsy (left retroperitoneal) with IR on 9/9/22 to confirm high grade lymphoma, however this remains pending. She was started on cycle 1 R-miniCHOP on 9/12, 9/16 as an inpatient.\par \par After the chemotherapy she felt fatigue for a short period and a general uncomfortable feeling that is difficult for her to explain. She did not experience significant nausea or vomiting, diarrhea, constipation. She typically has a bowel movement every 2 days. She has been experiencing numbness and weakness in her arms and legs that has been unchanged since starting chemotherapy. She denies headaches, double vision, trouble swallowing, abdominal / pelvic pain. She has not had any issues with the Ommaya catheter or her right arm PICC line. 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 \par - CTH: No acute intracranial processes. \par - CT Abdomen / Pelvis: Moderate left pleural effusion. Bladder, uterine, and rectal prolapse through the pelvic floor. No bowel obstruction. Amorphous soft tissue in the retroperitoneal space adjacent to the vena cava and aorta likely representing retroperitoneal lymphadenopathy. Confluent paraspinal and posterior mediastinal soft tissue infiltrate surrounding the descending thoracic aorta to approximately the level of T11. There is bony involvement most pronounced at the T3-T5 vertebral bodies. Spinal canal/epidural extension of soft at 3-T5 levels. 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 Circumferential epidural tumor involvement spanning the T3-T5 levels with epidural tumor encroachment worst at the T4 level surrounding the cord which appears flattened. Additional epidural tumor within the sacral canal adjacent to osseous metastatic lesions, more asymmetric replaced to the left. Suggestion of abnormal leptomeningeal enhancement involving the cauda equina nerve roots also suspicious for neoplastic involvement. Redemonstration of abnormal confluent enhancing soft tissue within the posterior mediastinum surrounding the aorta as well as confluent soft tissue adjacent to the IVC and descending aorta within the abdominal cavity suspicious for adenopathy and/or tumor. \par - MRI Brain: Minimal periventricular and bifrontal subcortical white matter ischemia.  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 Cytopathology: low-grade follicular lymphoma. Immunohistochemical stains positive for CD20, CD10, CD23, BCL6, BCL2; negative for CD5, cyclin D1, MUM1, CD43. Ki67 proliferation index is 20-30%. CD21 highlights the follicular dendritic meshwork. The interspersed T cells are stained by CD3, CD5, LEF1, CD43.\par - BMBx on 9/7/22: Suboptimal biopsy with minute marrow fragment. Adequate aspirate smears showing slightly erythroid predominant maturing trilineage hematopoiesis. Minute, CD10 negative, monotypic B cell population detected by flow cytometry analysis\par \par ============================================================\par Care Providers:\par \par PMD: Has a new doctor, need to follow-up with daughter as to name\par \par ============================================================ [de-identified] : IV [de-identified] : See Above [FreeTextEntry1] : R-mini-CHOP [de-identified] : 9/28/22: Initial Visit. See above H&P.\par \par 10/10/22: Follow up. Cycle 2 day 1 of treatment. She is feeling well and tolerated the first cycle well. Patient denies bone pain, fever, chills, night sweats, back pain, headache, abdominal pain, nausea, vomiting, diarrhea, chest pain, shortness of breath, peripheral edema, or peripheral neuropathy. Good appetite, stable weight.\par \par ____\par A comprehensive review of systems was performed including constitutional, eyes, ENT, cardiovascular, respiratory, gastrointestinal, genitourinary, musculoskeletal, integumentary, neurological, psychiatric and hematologic / lymphatic. All pertinent positives are included in the H&P under interval history above and the remaining review of systems listed are negative. \par \par \par =====================================================================\par Treatment Hx:\par \par R-mini-CHOP\par - Cycle 1 Day 1 on 9/16/22\par - Cycle 2 Day 1 on 10/10/22\par - Cycle 3 Day 1 planned for 10/31/22\par \par =====================================================================

## 2022-10-10 NOTE — ASSESSMENT
[FreeTextEntry1] : 81 yo female here for further management of newly diagnosed follicular lymphoma with epidural involvement. She is currently on cycle 2 day 1 of R-mini-CHOP and tolerating treatment well so far. She has an Ommaya and was planned to start IT MTX during cycle 2 -- however, this needs to be discussed further within our group as to systemic MTX +/- IT MTX. \par \par Plan:\par - Basic blood work today (lymphoma panel)\par - Plan for cycle 3 R-mini-CHOP likely 10/31/22\par - Plan for interim PET scan after cycle 3\par - She completed her short prednisone taper on 10/6/22. To be given Prednisone daily on day 1-5 of each cycle.\par - Consents obtained from [facility] for treatment regimen\par - She is currently residing in [facility] - PICC line dressing has not been changed since 9/28/22, please change dressings weekly. Spoke with NENA Wyatt from rehab to ensure dressing changes are done. Dressing changed today in the treatment room. \par - HCM: COVID-19 vaccinated x 2\par - Follow-up in 3 weeks\par \par Case and management discussed with Dr. Delgadillo\par ____\par I personally have spent a total of 40 minutes of time on the date of this encounter reviewing test results, documenting findings, providing education, coordinating care and directly consulting with the patient and/or designated family member.

## 2022-10-23 NOTE — H&P ADULT - ASSESSMENT
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 Also found to have worsening L pleural effusion.

## 2022-10-23 NOTE — PROVIDER CONTACT NOTE (CRITICAL VALUE NOTIFICATION) - TEST AND RESULT REPORTED:
2 positive blood cultures 2 positive blood cultures  1 Gross anaerobic bottle gram negative rods 1840  2 Gross anaerobic bottle gram negative rods 1841

## 2022-10-23 NOTE — ED ADULT NURSE NOTE - NSFALLRSKPASTHIST_ED_ALL_ED
[General Appearance - Well Developed] : well developed [General Appearance - In No Acute Distress] : no acute distress [Normal Conjunctiva] : the conjunctiva exhibited no abnormalities [Normal Oral Mucosa] : normal oral mucosa [Auscultation Breath Sounds / Voice Sounds] : lungs were clear to auscultation bilaterally [Heart Rate And Rhythm] : heart rate and rhythm were normal [Heart Sounds] : normal S1 and S2 [Bowel Sounds] : normal bowel sounds no [Abnormal Walk] : normal gait [Skin Color & Pigmentation] : normal skin color and pigmentation [Skin Turgor] : normal skin turgor [Oriented To Time, Place, And Person] : oriented to person, place, and time [Affect] : the affect was normal [Mood] : the mood was normal [FreeTextEntry1] : trace edema

## 2022-10-23 NOTE — H&P ADULT - PROBLEM SELECTOR PLAN 4
Volume overloaded on exam, last TTE 8/24 with 65% EF, mild diastolci dysfunction stage I.   - will give lasix 40 mg x 1 for now given pt is septic  - if tolerates, will c/w lasix 40 mg daily

## 2022-10-23 NOTE — PATIENT PROFILE ADULT - FALL HARM RISK - HARM RISK INTERVENTIONS

## 2022-10-23 NOTE — H&P ADULT - PROBLEM SELECTOR PLAN 2
CTA chest with worsening pleural effusions, L>R, when compared with CT chest from previous admission (8/27). Concern for parapneumonic effusion vs. malignant effusion  - pulm c/s for thora  - f/u fluid studies when sent CTA chest with worsening pleural effusions, L>R, when compared with CT chest from previous admission (8/27). Concern for parapneumonic effusion vs. malignant effusion  - pulm c/s for thora, will not perform until tomorrow 10/24 given pt got therapeutic lovenox dosing 50 mg bid last 10/22   - f/u fluid studies when sent  - will diurese with lasix 40 mg x 1 for now

## 2022-10-23 NOTE — CONSULT NOTE ADULT - ASSESSMENT
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, arrhythmia s/p ablation, diastolic HF, L pleural effusion, incarcerated R femoral hernia s/p small bowel resection, uterine prolapse, recent admission for SOB/sepsis, now presenting from [facility] with worsening SOB x 1 day. She was admitted to the hospital for hypoxic respiratory failure in the setting of pneumonia and pleural effusion. Pulmonary consulted for pleural effusion.    Pleural Effusion  - Large L pleural effusion, patient has previously had thoracentesis consistent with exudative process  - Will plan for diagnostic and therapeutic thoracentesis this week  - Continue to hold Lovenox prior to procedure  - Check basic labs and coagulation factors tomorrow, 10/24, in AM  - Will check cytopathology and flow cytometry with procedure  - Continue antibiotics per primary team  - Wean O2 as tolerated    Discussed with Dr. Romelia Wagner, PGY-7  Pulmonary and Critical Care Medicine

## 2022-10-23 NOTE — H&P ADULT - PROBLEM SELECTOR PLAN 3
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 Last chemo 2 weeks ago per patient. No concern for neutropenia at this time.  - c/s heme/onc to make them aware of admission

## 2022-10-23 NOTE — CONSULT NOTE ADULT - TIME BILLING
review of laboratory data, radiology results, discussion with primary team\patient, and monitoring for potential decompensation. Interventions were performed as documented above. In addition, risks and benefits of the procedure, alternative procedures as well as further management plan also discussed at length with patient. Complex patient requiring services.

## 2022-10-23 NOTE — ED PROVIDER NOTE - PHYSICAL EXAMINATION
T(C): 36.8 (10-23-22 @ 00:08), Max: 36.8 (10-23-22 @ 00:08)  HR: 110 (10-23-22 @ 00:08) (110 - 110)  BP: 133/99 (10-23-22 @ 00:08) (133/99 - 133/99)  RR: 32 (10-23-22 @ 00:08) (32 - 32)  SpO2: 97% (10-23-22 @ 00:08) (97% - 97%)    GENERAL: AOx3, sitting upright  EYES: PERRLA and symmetric, EOMI, No conjunctival or scleral injection, non-icteric  ENMT: Oral mucosa with moist membranes. Normal dentition; no pharyngeal injection or exudates  NECK: Supple, symmetric and without tracheal deviation   RESP:  increased work of breathing; CTA b/l, no WRR  CV: RRR, +S1S2, no MRG  GI: Soft, NT, ND, no rebound, no guarding; no palpable masses; no hepatosplenomegaly   MSK: Limited RLE ROM d/t hip pain; normal ROM of UE b/l and LLE without pain  EXTREMITIES: LE non-edematous b/l  SKIN: No rashes or ulcers noted   NEURO: no focal deficits; moving all extremities; sensation intact in upper and lower extremities b/l to light touch   PSYCH: mood and affect appropriate

## 2022-10-23 NOTE — H&P ADULT - NSICDXFAMILYHX_GEN_ALL_CORE_FT
FAMILY HISTORY:  Sibling  Still living? Unknown  FH: leukemia, Age at diagnosis: Age Unknown  FH: lung cancer, Age at diagnosis: Age Unknown

## 2022-10-23 NOTE — ED PROVIDER NOTE - CLINICAL SUMMARY MEDICAL DECISION MAKING FREE TEXT BOX
83 y/o F PMH follicular lymphoma, metastases to spine, arrhythmia s/p ablation, diastolic HF, L pleural effusion, incarcerated R femoral hernia s/p small bowel resection, uterine prolapse presents w/ worsening SOB x1 day. Pt says she was sent from nursing home after her health aid noticed her breathing pattern. Pt says she otherwise feels "okay" and has no pain. Reports having a nonproductive cough for 3 days, no fevers/chills, rhinorrhea, sore throat, chest pain, palpitations, diaphoresis, dysuria, polyuria, facial swelling, tongue swelling, hives, hoarseness, difficulty talking. On exam afebrile, /99, , RR 30 SPO2 95% on 6L NC, AOx3, noticeably increased work of breathing, lungs CTA b/l no W/R/R, normal S1/S2 no M/R/G, lower extremities non-edematous. Ordered CT angio w/ IV cont, cxr, ekg, trop, RVP w/ COVID, vbg, cbc, cmp.

## 2022-10-23 NOTE — H&P ADULT - PROBLEM SELECTOR PLAN 5
DVT ppx: SCDs given plan for thora tomorrow  Diet: DASH  Code status: Full code    Spoke with son 10/23, answered all questions    Case discussed with Dr. Gomez

## 2022-10-23 NOTE — PROVIDER CONTACT NOTE (CRITICAL VALUE NOTIFICATION) - SITUATION
received critical value to 2 positive blood cultures    1 Gross anaerobic bottle gram negative rods 1840  2 Gross anaerobic bottle gram negative rods 1841

## 2022-10-23 NOTE — H&P ADULT - HISTORY OF PRESENT ILLNESS
Ms. Franklin is a 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, arrhythmia s/p ablation, diastolic HF, L pleural effusion, incarcerated R femoral hernia s/p small bowel resection, uterine prolapse, recent admission for SOB/sepsis, now presenting with worsening SOB x 1 day. Pt reports associated nonproductive cough for 3 days. Most recent admission 8/23-9/23, was being treated for pseudomonal UTI, also found to have bilateral pleural effusion, diagnosed with follicular lymphoma s/p C1 R-miniCHOP on 9/12, 9/16.     Pt arrived to ED afebrile, /99, , RR 32, SpO2 97% on 4L NC. WBC 22.87. CTA chest revealing right lung pneumonia, with increasing size of left pleural effusion with passive lower long atelectasis and new upper lobe atelectasis. Got 1 dose of vanc/zosyn.  Ms. Franklin is a 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, arrhythmia s/p ablation, diastolic HF, L pleural effusion, incarcerated R femoral hernia s/p small bowel resection, uterine prolapse, recent admission for SOB/sepsis, now presenting from [facility] with worsening SOB x 1 day. Pt reports associated nonproductive cough for 3 days. Most recent admission 8/23-9/23, was being treated for pseudomonal UTI, also found to have bilateral pleural effusion, diagnosed with follicular lymphoma s/p C1 R-miniCHOP on 9/12, 9/16. Pt was at [facility] since previous admission because she could not walk, was getting OT/PT. Last chemo session was 2 weeks ago. She reports that about 1 week ago, she began to develop chills, but remained without objective fever as they had been checking her temperature at the rehab facility. Pt reports that the cough has been progressively worsening over the last 3-4 days, and then 1 day ago, she began to experience shortness of breath and increased work of breathing. They had been giving her nasal cannula therapy at rehab but despite this SOB got progressively worse, prompting her to come to ED. Pt denying headache, dizziness, chest pain, palpitations, abdominal pain, black/bloody stools, dysuria, frequency, hematuria.     Pt arrived to ED afebrile, /99, , RR 32, SpO2 97% on 4L NC. WBC 22.87. CTA chest revealing right lung pneumonia, with increasing size of left pleural effusion with passive lower long atelectasis and new upper lobe atelectasis. Got 1 dose of vanc/zosyn. Admitted to medicine for further workup.

## 2022-10-23 NOTE — ED ADULT NURSE NOTE - OBJECTIVE STATEMENT
Patient presented to ED complaining of difficulty breathing. Patient was sent from her nursing home when her health aide noticed her with SOB. Patient also presents with cough for three days. Patient on 6L N/C sating @ 96%. Patient NSR at this time. RR 25. Patient was tachpneic when she first arrived. Labs sent,  meds given. No distress noted at this time.

## 2022-10-23 NOTE — H&P ADULT - PROBLEM SELECTOR PLAN 1
Arrived to ED tachycardic, tachypneic, and with leukocytosis. Afebrile but with subjective chills. CXR with RUL opacity concerning for pneumonia. CTA chest with right lung pneumonia. S/p one dose of vanc/zosyn in ED.   - f/u Bcx, Ucx  - c/w cefepime  - will hold off on fluids for now given pt is normotensive  - on 6L NC, will consider switching to BiPAP if pt has worsening work of breathing Arrived to ED tachycardic, tachypneic, and with leukocytosis. Afebrile but with subjective chills. CXR with RUL opacity concerning for pneumonia. CTA chest with right lung pneumonia. S/p one dose of vanc/zosyn in ED.   - f/u Bcx, Ucx  - c/w cefepime  - c/w vanc given pt has hardware in place (R cranial chemo port, PICC line)  - will hold off on fluids for now given pt is normotensive and volume overloaded  - on 6L NC, will consider switching to BiPAP if pt has worsening work of breathing

## 2022-10-23 NOTE — H&P ADULT - NSHPLABSRESULTS_GEN_ALL_CORE
LABS:                        10.9   29.81 )-----------( 135      ( 23 Oct 2022 09:03 )             33.6     10-23    142  |  106  |  18  ----------------------------<  111<H>  3.6   |  24  |  0.58    Ca    8.7      23 Oct 2022 09:03  Phos  3.4     10-23  Mg     1.6     10-23    TPro  4.7<L>  /  Alb  2.8<L>  /  TBili  0.3  /  DBili  x   /  AST  11  /  ALT  <5<L>  /  AlkPhos  207<H>  10-23

## 2022-10-23 NOTE — H&P ADULT - NSICDXPASTSURGICALHX_GEN_ALL_CORE_FT
PAST SURGICAL HISTORY:  H/O cardiac radiofrequency ablation     S/P appendectomy     S/P hernia repair     Small bowel obstruction abdominal sx

## 2022-10-23 NOTE — ED PROVIDER NOTE - OBJECTIVE STATEMENT
83 y/o F PMH follicular lymphoma, metastases to spine, arrhythmia s/p ablation, diastolic HF, L pleural effusion, incarcerated R femoral hernia s/p small bowel resection, uterine prolapse 81 y/o F PMH follicular lymphoma, metastases to spine, arrhythmia s/p ablation, diastolic HF, L pleural effusion, incarcerated R femoral hernia s/p small bowel resection, uterine prolapse presents w/ worsening SOB x1 day. No chest pain, palpitations, diaphoresis, dysuria, polyuria 83 y/o F PMH follicular lymphoma, metastases to spine, arrhythmia s/p ablation, diastolic HF, L pleural effusion, incarcerated R femoral hernia s/p small bowel resection, uterine prolapse presents w/ worsening SOB x1 day. Pt says she was sent from nursing home after her health aid noticed her breathing pattern. Pt says she otherwise feels "okay" and has no pain. Reports having a nonproductive cough for 3 days, no fevers/chills, rhinorrhea, sore throat, chest pain, palpitations, diaphoresis, dysuria, polyuria, facial swelling, tongue swelling, hives, hoarseness, difficulty talking. 81 y/o F PMH follicular lymphoma, metastases to spine, arrhythmia s/p ablation, diastolic HF, L pleural effusion, incarcerated R femoral hernia s/p small bowel resection, uterine prolapse presents w/ worsening SOB x1 day. Pt says she was sent from nursing home after her health aid noticed her breathing pattern. Pt says she otherwise feels "okay" and has no pain. Reports having a nonproductive cough for 3 days, no fevers/chills, rhinorrhea, sore throat, chest pain, palpitations, diaphoresis, dysuria, polyuria, facial swelling, tongue swelling, hives, hoarseness, difficulty talking.    HTN, Prostate CA with chronic indwelling Mujica Cathter a/w c/o urinary retention/decreased mujica output x 48hours along with Abdominal distention. Mujica changed in ER with improvement in symptoms. labs c/w CASSANDRA > BL Cr 1.2 currently 2.4 on admission. 700cc removed s/p catheter replacement in ED.  lab work significant for Leukocytosis. BP on arrival 204/105 with tachycardia HR 115bpm improved to 171/106 post-mujica replacement. Patient has tried cyberknife in the past for Prostate CA but unable to tolerate and could not have surgery,  hence with chronic mujica placement. Hx prior similar episodes 2/2 obstruction from Prostate CA.    Attendin-year-old female presents with shortness of breath.  Has had cough for about 3 days.  No nausea or vomiting.  Has been treated for metastatic cancer and has had recurrent pleural effusions on the left side.  Denies fever

## 2022-10-23 NOTE — H&P ADULT - NSICDXPASTMEDICALHX_GEN_ALL_CORE_FT
PAST MEDICAL HISTORY:  Atrial fibrillation, unspecified type Unspecified Atrial arrhthymia, S/p ablation on Bblocker    Dyslipidemia     Follicular lymphoma     Hypertension     Small bowel obstruction     Uterine prolapse

## 2022-10-23 NOTE — H&P ADULT - NSHPPHYSICALEXAM_GEN_ALL_CORE
LOS:     VITALS:   T(C): 36.8 (10-23-22 @ 06:08), Max: 36.9 (10-23-22 @ 02:20)  HR: 56 (10-23-22 @ 06:08) (56 - 110)  BP: 128/93 (10-23-22 @ 06:08) (117/90 - 133/99)  RR: 20 (10-23-22 @ 06:08) (20 - 32)  SpO2: 96% (10-23-22 @ 06:08) (96% - 98%)    GENERAL: NAD, lying in bed comfortably  HEAD:  R sided surgical scar from chemo port insertion  EYES: EOMI, PERRLA, conjunctiva and sclera clear  ENT: Moist mucous membranes  NECK: Supple, No JVD  CHEST/LUNG: Decreased breath sounds noted at Left lung base. no crackles. no accessory muscle use, currently on 6L NC  HEART: Regular rate and rhythm; No murmurs, rubs, or gallops  ABDOMEN: BSx4; Soft, nontender, nondistended  EXTREMITIES:  2+ pitting edema USP up to knee bilaterally   NERVOUS SYSTEM:  A&Ox3, no focal deficits   SKIN: No rashes or lesions

## 2022-10-23 NOTE — H&P ADULT - NSHPREVIEWOFSYSTEMS_GEN_ALL_CORE
REVIEW OF SYSTEMS      General:	  +chills as in HPI, no fever, changes in weight, night sweats  Skin/Breast:  no rash  Ophthalmologic:  no changes in vision  ENMT:	  no sore throat  Respiratory and Thorax:  +SOB and inc. WOB as in HPI, nonproductive cough as in HPI,   Cardiovascular:	  No CP, palpitations  Gastrointestinal:	  no N/V/D/C  Genitourinary:	  no dysuria, frequency, urgency, hematuria  Musculoskeletal:	  no joint pain  Neurological:	  no headaches, no dizziness, +weakness from inability to walk since previous admission  Hematology/Lymphatics:	  no bruising  Endocrine:	  no temp intolerance

## 2022-10-23 NOTE — H&P ADULT - NSHPSOCIALHISTORY_GEN_ALL_CORE
[facility]. No smoking, drugs, or alcohol. No recent travel.

## 2022-10-24 NOTE — CONSULT NOTE ADULT - ATTENDING COMMENTS
82-yr-old woman with recent diagnosis of low grade FL s/p two cycles of dose reduced R-CHOP admitted with SOB, fever. She has widespread disease including osseous and CNS involvement (radiology). Although the axillary LN biopsy reported as low grade FL with Ki97 of 20%, the disease appeared to be very aggressive and therefore was started on R-CHOP. Admission CT chest showed some improvement of disease. Her working diagnosis is pneumonia. She also has pleural effusion. Management as per primary team/ID/pulmonology. Planned for thoracenteses. Please send effusion fluid for cytopathology and flow.  Supportive.
Agree with the above mentioned assessmetn and plan. Patient seen and examined in the ER. Currently dyspneic. 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 Pulmonary consulted for a diagnostic and therapeutic thoracentesis. Patient on therapeutic ac, will hold today and plan for thora tomorrow. Will send relevant studies including flow cytometry. Pulmonary team to follow. Risks and benefits and possible interventions discussed.

## 2022-10-24 NOTE — PROGRESS NOTE ADULT - ASSESSMENT
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 She was admitted to the hospital for hypoxic respiratory failure in the setting of pneumonia and pleural effusion. Pulmonary consulted for pleural effusion.    Pleural Effusion  - Large L pleural effusion, patient has previously had thoracentesis consistent with exudative process  - s/p thoracentesis, 1600cc chylous fluid removed  - f/u pleural fluid studies  - Will check cytopathology and flow cytometry with procedure  - Continue antibiotics per primary team  - Wean O2 as tolerated

## 2022-10-24 NOTE — PROGRESS NOTE ADULT - PROBLEM SELECTOR PLAN 4
Volume overloaded on exam, last TTE 8/24 with 65% EF, mild diastolci dysfunction stage I.   - will give lasix 40 mg x 1 for now given pt is septic  - if tolerates, will c/w lasix 40 mg daily Follicular lymphoma s/p C1 R-miniCHOP on 9/12, 9/16. Last chemo 2 weeks ago per patient. No concern for neutropenia at this time.  - c/s heme/onc to make them aware of admission

## 2022-10-24 NOTE — PROGRESS NOTE ADULT - ATTENDING COMMENTS
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 Patient on 15L venturi mask, had a witnessed aspiration event drinking orange juice, desaturated to 80s, with tachypnea and use of accessory muscles. Made patient NPO.     #Acute Hypoxic respiratory failure 2/2 pneumonia and left pleural effusion   - c/w empiric antibiotics vancomycin ( patient has PICC line and ommaya reservoir + PICC line) and cefepime  - f/u BCx, f/u UCx   - BCx currently grew klebsiella, f/u repeat BCx   - s/p pulm consult, thoracentesis 10/24, will f/u fluid studies, send cyopath and flow cyto      # R/o brain hemorrhage   - admission CT head 10/23 noted "punctate density along the R frontal lobe near the site of the meenu hole may represent a small focus of hemorrhage vs. small focus of calcification"  - f/u repeat CT head   - neurosurgery eval, f/u rec  - hold anticoagulation /antiplatelet for now     #Follicular lymphoma     #Atrial fibrillation  - s/p ablation, not on rate control medications, need further clarification regarding whether or not patient on AC prior to admission   - reviewed ECG on admission, telemetry ---- A fib with RVR 100s-130s  - s/p metoprolol 12.5 mg x1 in AM -- likely need standing rate control medication   - [id number] score at least 5 (age, female gender, CHF, HTN)       D/w Dr Kelly

## 2022-10-24 NOTE — PROGRESS NOTE ADULT - PROBLEM SELECTOR PLAN 3
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 Last chemo 2 weeks ago per patient. No concern for neutropenia at this time.  - c/s heme/onc to make them aware of admission Chronic Afib s/p ablation procedure  - persistently tachy to 110-120s this admission in Afib  - metoprolol tartrate 12.5 mg x 1

## 2022-10-24 NOTE — CONSULT NOTE ADULT - ASSESSMENT
83 yo female with a hx of arrhytham s/p ablation and incarcerated right femoral hernia s/p small bowel resection and mesh admitted for cough, spesis, and right sided pneumonia. Hematology consulted for continuity of care for Follicular Lymphoma.    #Follicular Lymphoma, stage IV  - Patient follows Dr. Kenan Delgadillo at [facility] (formerly known as [facility]).  Initial pathology report showing - L axillary LN biopsy on 8/31: Flow cytometry: monotypic B-cells (31% of cells), positive for lambda, CD19, CD20, CD10, CD23; negative CD5, , 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 Cytopathology: low-grade follicular lymphoma. Immunohistochemical stains positive for CD20, CD10, CD23, BCL6, BCL2; negative for CD5, cyclin D1, MUM1, CD43. Ki67 proliferation index is 20-30%. CD21 highlights the follicular dendritic meshwork. The interspersed T cells are stained by CD3, CD5, LEF1, CD43.   - BMBx on 9/7/22: Suboptimal biopsy with minute marrow fragment. Adequate aspirate smears showing slightly erythroid predominant maturing trilineage hematopoiesis. Minute, CD10 negative, monotypic B cell population detected by flow cytometry analysis   - Treated with R-mini-CHOP (C1 - 9/16/22, C2 - 10/10/22).  - Patient is due for Cycle 3 on 10/31. Given additional medical issues, hold chemotherapy.    #Pleural Effusions  - Agree with workup and thoracentesis per primary team. Prior flow was negative for malignancy. When doing thoracentesis, please repeat flow.    Case d/w Dr. Gurrola. Will continue to follow with you.    Coni Alarcon M.D.  Hematology and Medical Oncology Fellow  Pager: [phone]  For weekends and evenings (5 pm - 8 am), please page Heme/Onc fellow on call. 81 yo female with a hx of arrhytham s/p ablation and incarcerated right femoral hernia s/p small bowel resection and mesh admitted for cough, spesis, and right sided pneumonia. Hematology consulted for continuity of care for Follicular Lymphoma.    #Follicular Lymphoma, stage IV  - Patient follows Dr. Kenan Delgadillo at [facility] (formerly known as [facility]).  Initial pathology report showing - L axillary LN biopsy on 8/31: Flow cytometry: monotypic B-cells (31% of cells), positive for lambda, CD19, CD20, CD10, CD23; negative CD5, , 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 Cytopathology: low-grade follicular lymphoma. Immunohistochemical stains positive for CD20, CD10, CD23, BCL6, BCL2; negative for CD5, cyclin D1, MUM1, CD43. Ki67 proliferation index is 20-30%. CD21 highlights the follicular dendritic meshwork. The interspersed T cells are stained by CD3, CD5, LEF1, CD43.   - BMBx on 9/7/22: Suboptimal biopsy with minute marrow fragment. Adequate aspirate smears showing slightly erythroid predominant maturing trilineage hematopoiesis. Minute, CD10 negative, monotypic B cell population detected by flow cytometry analysis   - Treated with R-mini-CHOP (C1 - 9/16/22, C2 - 10/10/22).  - Patient is due for Cycle 3 on 10/31. Given additional medical issues, hold chemotherapy.    #Pleural Effusions  - Agree with workup and thoracentesis per primary team. Prior flow was negative for malignancy. When doing thoracentesis, please repeat flow and cytopathology.    Case d/w Dr. Gurrola. Will continue to follow with you.    Coni Alarcon M.D.  Hematology and Medical Oncology Fellow  Pager: [phone]  For weekends and evenings (5 pm - 8 am), please page Heme/Onc fellow on call.

## 2022-10-24 NOTE — PROGRESS NOTE ADULT - ASSESSMENT
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 Also found to have worsening L pleural effusion.

## 2022-10-24 NOTE — PROGRESS NOTE ADULT - PROBLEM SELECTOR PLAN 1
Arrived to ED tachycardic, tachypneic, and with leukocytosis. Afebrile but with subjective chills. CXR with RUL opacity concerning for pneumonia. CTA chest with right lung pneumonia. S/p one dose of vanc/zosyn in ED.   - f/u Bcx, Ucx  - c/w cefepime  - c/w vanc given pt has hardware in place (R cranial chemo port, PICC line)  - will hold off on fluids for now given pt is normotensive and volume overloaded  - on 6L NC, will consider switching to BiPAP if pt has worsening work of breathing Arrived to ED tachycardic, tachypneic, and with leukocytosis. Afebrile but with subjective chills. CXR with RUL opacity concerning for pneumonia. CTA chest with right lung pneumonia. S/p one dose of vanc/zosyn in ED.   - Bcx with Klebsiella pneumoniae  - f/u Ucx  - c/w cefepime  - c/w vanc 750 mg q12h given pt has hardware in place (R cranial chemo port, PICC line)  - will hold off on fluids for now given pt is normotensive and volume overloaded  - on 15L venti mask, will consider switching to BiPAP if pt has worsening work of breathing

## 2022-10-24 NOTE — PROGRESS NOTE ADULT - PROBLEM SELECTOR PLAN 2
CTA chest with worsening pleural effusions, L>R, when compared with CT chest from previous admission (8/27). Concern for parapneumonic effusion vs. malignant effusion  - pulm c/s for thora, will not perform until tomorrow 10/24 given pt got therapeutic lovenox dosing 50 mg bid last 10/22   - f/u fluid studies when sent  - will diurese with lasix 40 mg x 1 for now CTA chest with worsening pleural effusions, L>R, when compared with CT chest from previous admission (8/27). Concern for parapneumonic effusion vs. malignant effusion  - thoracentesis today  - f/u fluid studies when sent  - s/p lasix 40 mg x 1 10/23  - had witnessed aspiration on orange juice with desaturation to 80s, will keep NPO for now  - Speech and swallow eval after thora

## 2022-10-24 NOTE — PROGRESS NOTE ADULT - SUBJECTIVE AND OBJECTIVE BOX
DATE OF SERVICE: 10-24-22 @ 07:34    Patient is a 82y old  Female who presents with a chief complaint of sepsis (23 Oct 2022 14:52)      SUBJECTIVE / OVERNIGHT EVENTS:  Overnight, with increased work of breathing and not sustained O2 saturation well on 6L NC, now on 15L venturi mask. Persistently tachy to 110s-120s in Afib. Given tylenol for comfort.   Pt seen and examined at bedside.    ROS negative except as above.    MEDICATIONS  (STANDING):  allopurinol 300 milliGRAM(s) Oral at bedtime  carbidopa/levodopa  25/100 1 Tablet(s) Oral three times a day  cefepime   IVPB      cefepime   IVPB 2000 milliGRAM(s) IV Intermittent every 8 hours  latanoprost 0.005% Ophthalmic Solution 1 Drop(s) Both EYES at bedtime  pantoprazole    Tablet 40 milliGRAM(s) Oral before breakfast  senna 2 Tablet(s) Oral at bedtime  vancomycin  IVPB      vancomycin  IVPB 750 milliGRAM(s) IV Intermittent every 12 hours    MEDICATIONS  (PRN):  melatonin 3 milliGRAM(s) Oral at bedtime PRN Insomnia      Vital Signs Last 24 Hrs  T(C): 36.4 (24 Oct 2022 04:49), Max: 36.9 (23 Oct 2022 17:40)  T(F): 97.6 (24 Oct 2022 04:49), Max: 98.4 (23 Oct 2022 17:40)  HR: 95 (24 Oct 2022 04:49) (72 - 122)  BP: 95/70 (24 Oct 2022 04:49) (90/63 - 124/85)  BP(mean): --  RR: 20 (24 Oct 2022 04:49) (20 - 20)  SpO2: 94% (24 Oct 2022 04:49) (92% - 98%)    Parameters below as of 24 Oct 2022 04:49  Patient On (Oxygen Delivery Method): mask, Venturi  O2 Flow (L/min): 15  O2 Concentration (%): 50  CAPILLARY BLOOD GLUCOSE        I&O's Summary    23 Oct 2022 07:01  -  24 Oct 2022 07:00  --------------------------------------------------------  IN: 120 mL / OUT: 0 mL / NET: 120 mL        PHYSICAL EXAM:  GENERAL: NAD, well-developed  HEAD:  Atraumatic, Normocephalic  EYES: EOMI, PERRLA, conjunctiva and sclera clear  NECK: Supple, No JVD  CHEST/LUNG: Clear to auscultation bilaterally; No wheeze  HEART: Regular rate and rhythm; No murmurs, rubs, or gallops  ABDOMEN: Soft, Nontender, Nondistended; Bowel sounds present  EXTREMITIES:  2+ Peripheral Pulses, No clubbing, cyanosis, or edema  PSYCH: AAOx3  NEUROLOGY: non-focal  SKIN: No rashes or lesions    LABS:                        10.9   29.81 )-----------( 135      ( 23 Oct 2022 09:03 )             33.6     10-23    142  |  106  |  18  ----------------------------<  111<H>  3.6   |  24  |  0.58    Ca    8.7      23 Oct 2022 09:03  Phos  3.4     10-23  Mg     1.6     10-23    TPro  4.7<L>  /  Alb  2.8<L>  /  TBili  0.3  /  DBili  x   /  AST  11  /  ALT  <5<L>  /  AlkPhos  207<H>  10-23    PT/INR - ( 23 Oct 2022 11:03 )   PT: 13.0 sec;   INR: 1.12 ratio         PTT - ( 23 Oct 2022 11:03 )  PTT:24.5 sec          MICRO:      RADIOLOGY & ADDITIONAL TESTS:      Consultant(s) Notes Reviewed:         DATE OF SERVICE: 10-24-22 @ 07:34    Patient is a 82y old  Female who presents with a chief complaint of sepsis (23 Oct 2022 14:52)      SUBJECTIVE / OVERNIGHT EVENTS:  Overnight, with increased work of breathing and not sustained O2 saturation well on 6L NC, now on 15L venturi mask. Persistently tachy to 110s-120s in Afib. Complaining of sacral pain, feels similar to cancer pain, given tylenol overnight.   Pt seen and examined at bedside.    ROS negative except as above.    MEDICATIONS  (STANDING):  allopurinol 300 milliGRAM(s) Oral at bedtime  carbidopa/levodopa  25/100 1 Tablet(s) Oral three times a day  cefepime   IVPB      cefepime   IVPB 2000 milliGRAM(s) IV Intermittent every 8 hours  latanoprost 0.005% Ophthalmic Solution 1 Drop(s) Both EYES at bedtime  pantoprazole    Tablet 40 milliGRAM(s) Oral before breakfast  senna 2 Tablet(s) Oral at bedtime  vancomycin  IVPB      vancomycin  IVPB 750 milliGRAM(s) IV Intermittent every 12 hours    MEDICATIONS  (PRN):  melatonin 3 milliGRAM(s) Oral at bedtime PRN Insomnia      Vital Signs Last 24 Hrs  T(C): 36.4 (24 Oct 2022 04:49), Max: 36.9 (23 Oct 2022 17:40)  T(F): 97.6 (24 Oct 2022 04:49), Max: 98.4 (23 Oct 2022 17:40)  HR: 95 (24 Oct 2022 04:49) (72 - 122)  BP: 95/70 (24 Oct 2022 04:49) (90/63 - 124/85)  BP(mean): --  RR: 20 (24 Oct 2022 04:49) (20 - 20)  SpO2: 94% (24 Oct 2022 04:49) (92% - 98%)    Parameters below as of 24 Oct 2022 04:49  Patient On (Oxygen Delivery Method): mask, Venturi  O2 Flow (L/min): 15  O2 Concentration (%): 50  CAPILLARY BLOOD GLUCOSE        I&O's Summary    23 Oct 2022 07:01  -  24 Oct 2022 07:00  --------------------------------------------------------  IN: 120 mL / OUT: 0 mL / NET: 120 mL        PHYSICAL EXAM:  GENERAL: NAD, well-developed  HEAD:  Atraumatic, Normocephalic  EYES: EOMI, PERRLA, conjunctiva and sclera clear  NECK: Supple, No JVD  CHEST/LUNG: Clear to auscultation bilaterally; No wheeze  HEART: Regular rate and rhythm; No murmurs, rubs, or gallops  ABDOMEN: Soft, Nontender, Nondistended; Bowel sounds present  EXTREMITIES:  2+ Peripheral Pulses, No clubbing, cyanosis, or edema  PSYCH: AAOx3  NEUROLOGY: non-focal  SKIN: No rashes or lesions    LABS:                        10.9   29.81 )-----------( 135      ( 23 Oct 2022 09:03 )             33.6     10-23    142  |  106  |  18  ----------------------------<  111<H>  3.6   |  24  |  0.58    Ca    8.7      23 Oct 2022 09:03  Phos  3.4     10-23  Mg     1.6     10-23    TPro  4.7<L>  /  Alb  2.8<L>  /  TBili  0.3  /  DBili  x   /  AST  11  /  ALT  <5<L>  /  AlkPhos  207<H>  10-23    PT/INR - ( 23 Oct 2022 11:03 )   PT: 13.0 sec;   INR: 1.12 ratio         PTT - ( 23 Oct 2022 11:03 )  PTT:24.5 sec          MICRO:      RADIOLOGY & ADDITIONAL TESTS:      Consultant(s) Notes Reviewed:         DATE OF SERVICE: 10-24-22 @ 07:34    Patient is a 82y old  Female who presents with a chief complaint of sepsis (23 Oct 2022 14:52)      SUBJECTIVE / OVERNIGHT EVENTS:  Overnight, with increased work of breathing and not sustained O2 saturation well on 6L NC, now on 15L venturi mask. Persistently tachy to 110s-120s in Afib. Complaining of sacral pain, feels similar to cancer pain, given tylenol overnight.   Pt seen and examined at bedside. Reports breathing is comfortable at 15L venti mask. Requesting to eat, however had witnessed aspiration on orange juice, desaturating to 80s. Made NPO. Sacral pain well controlled on tylenol and after putting pillow under back.     ROS negative except as above.    MEDICATIONS  (STANDING):  allopurinol 300 milliGRAM(s) Oral at bedtime  carbidopa/levodopa  25/100 1 Tablet(s) Oral three times a day  cefepime   IVPB      cefepime   IVPB 2000 milliGRAM(s) IV Intermittent every 8 hours  latanoprost 0.005% Ophthalmic Solution 1 Drop(s) Both EYES at bedtime  pantoprazole    Tablet 40 milliGRAM(s) Oral before breakfast  senna 2 Tablet(s) Oral at bedtime  vancomycin  IVPB      vancomycin  IVPB 750 milliGRAM(s) IV Intermittent every 12 hours    MEDICATIONS  (PRN):  melatonin 3 milliGRAM(s) Oral at bedtime PRN Insomnia      Vital Signs Last 24 Hrs  T(C): 36.4 (24 Oct 2022 04:49), Max: 36.9 (23 Oct 2022 17:40)  T(F): 97.6 (24 Oct 2022 04:49), Max: 98.4 (23 Oct 2022 17:40)  HR: 95 (24 Oct 2022 04:49) (72 - 122)  BP: 95/70 (24 Oct 2022 04:49) (90/63 - 124/85)  BP(mean): --  RR: 20 (24 Oct 2022 04:49) (20 - 20)  SpO2: 94% (24 Oct 2022 04:49) (92% - 98%)    Parameters below as of 24 Oct 2022 04:49  Patient On (Oxygen Delivery Method): mask, Venturi  O2 Flow (L/min): 15  O2 Concentration (%): 50  CAPILLARY BLOOD GLUCOSE        I&O's Summary    23 Oct 2022 07:01  -  24 Oct 2022 07:00  --------------------------------------------------------  IN: 120 mL / OUT: 0 mL / NET: 120 mL        PHYSICAL EXAM:  GENERAL: elderly, chronically-ill appearing woman, tachypneic  HEAD: R cranial chemo port, no active signs of infection   EYES: EOMI, PERRLA, conjunctiva and sclera clear  NECK: Supple, No JVD  CHEST/LUNG: reduced breath sounds on L side, no crackles, tachypneic, using accessory muscles  HEART: irregularly irregular, tachycardic  ABDOMEN: Soft, Nontender, Nondistended; Bowel sounds present  EXTREMITIES:  R arm PICC line, dressing clean/dry/intact. no signs of infection  PSYCH: AAOx3  NEUROLOGY: non-focal  SKIN: No rashes or lesions    LABS:                        10.9   29.81 )-----------( 135      ( 23 Oct 2022 09:03 )             33.6     10-23    142  |  106  |  18  ----------------------------<  111<H>  3.6   |  24  |  0.58    Ca    8.7      23 Oct 2022 09:03  Phos  3.4     10-23  Mg     1.6     10-23    TPro  4.7<L>  /  Alb  2.8<L>  /  TBili  0.3  /  DBili  x   /  AST  11  /  ALT  <5<L>  /  AlkPhos  207<H>  10-23    PT/INR - ( 23 Oct 2022 11:03 )   PT: 13.0 sec;   INR: 1.12 ratio         PTT - ( 23 Oct 2022 11:03 )  PTT:24.5 sec          MICRO:      RADIOLOGY & ADDITIONAL TESTS:      Consultant(s) Notes Reviewed:

## 2022-10-24 NOTE — PROGRESS NOTE ADULT - ATTENDING COMMENTS
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   Seen upon transfer to medical floor.  POCUS shows large simple left pleural effusion.  Remains dyspneic on minimal movement, on Ventimask 50%, SpO2 low 90s.    Discussed procedure with both patient and her daughter Larisa Franklin over the phone, consent obtained. Will perform at bedside this afternoon.

## 2022-10-24 NOTE — PROGRESS NOTE ADULT - SUBJECTIVE AND OBJECTIVE BOX
CHIEF COMPLAINT:Patient is a 82y old  Female who presents with a chief complaint of sepsis (24 Oct 2022 07:34)      Interval Events:    REVIEW OF SYSTEMS:  [x] All other systems negative except per HPI   [ ] Unable to assess ROS because ________    OBJECTIVE:  ICU Vital Signs Last 24 Hrs  T(C): 37.1 (24 Oct 2022 11:59), Max: 37.1 (24 Oct 2022 11:59)  T(F): 98.7 (24 Oct 2022 11:59), Max: 98.7 (24 Oct 2022 11:59)  HR: 71 (24 Oct 2022 11:59) (71 - 122)  BP: 117/80 (24 Oct 2022 11:59) (90/63 - 124/85)  BP(mean): --  ABP: --  ABP(mean): --  RR: 20 (24 Oct 2022 11:59) (20 - 20)  SpO2: 97% (24 Oct 2022 11:59) (92% - 98%)    O2 Parameters below as of 24 Oct 2022 11:59  Patient On (Oxygen Delivery Method): mask, Venturi  O2 Flow (L/min): 15  O2 Concentration (%): 50          10-23 @ 07:01  -  10-24 @ 07:00  --------------------------------------------------------  IN: 120 mL / OUT: 0 mL / NET: 120 mL        PHYSICAL EXAM:  General: Awake, alert, oriented X 3.   HEENT: Atraumatic, normocephalic.   Lymph Nodes: No palpable lymphadenopathy  Neck: No JVD. No carotid bruit.   Respiratory: Normal chest expansion. Decreased BS at the bases.  Cardiovascular: S1 S2 normal. No murmurs, rubs or gallops.   Abdomen: Soft, non-tender, non-distended. No organomegaly.  Extremities: Warm to touch. Peripheral pulse palpable. +Edema  Skin: No rashes or skin lesions  Neurological: Motor and sensory examination equal and normal in all four extremities.  Psychiatry: Appropriate mood and affect.    HOSPITAL MEDICATIONS:  MEDICATIONS  (STANDING):  allopurinol 300 milliGRAM(s) Oral at bedtime  carbidopa/levodopa  25/100 1 Tablet(s) Oral three times a day  cefepime   IVPB 2000 milliGRAM(s) IV Intermittent every 12 hours  chlorhexidine 2% Cloths 1 Application(s) Topical daily  latanoprost 0.005% Ophthalmic Solution 1 Drop(s) Both EYES at bedtime  pantoprazole    Tablet 40 milliGRAM(s) Oral before breakfast  senna 2 Tablet(s) Oral at bedtime  vancomycin  IVPB 750 milliGRAM(s) IV Intermittent every 12 hours    MEDICATIONS  (PRN):  melatonin 3 milliGRAM(s) Oral at bedtime PRN Insomnia      LABS:    The Labs were reviewed by me   The Radiology was reviewed by me    EKG tracing reviewed by me    10-24    142  |  106  |  22  ----------------------------<  133<H>  3.4<L>   |  25  |  0.60  10-23    142  |  106  |  18  ----------------------------<  111<H>  3.6   |  24  |  0.58  10-23    143  |  107  |  21  ----------------------------<  124<H>  3.6   |  24  |  0.57    Ca    8.5      24 Oct 2022 07:40  Ca    8.7      23 Oct 2022 09:03  Ca    8.7      23 Oct 2022 01:28  Phos  3.6     10-24  Mg     1.6     10-24    TPro  4.9<L>  /  Alb  2.5<L>  /  TBili  0.4  /  DBili  x   /  AST  9<L>  /  ALT  <5<L>  /  AlkPhos  199<H>  10-24  TPro  4.7<L>  /  Alb  2.8<L>  /  TBili  0.3  /  DBili  x   /  AST  11  /  ALT  <5<L>  /  AlkPhos  207<H>  10-23    Magnesium, Serum: 1.6 mg/dL (10-24-22 @ 07:40)  Magnesium, Serum: 1.6 mg/dL (10-23-22 @ 09:03)    Phosphorus Level, Serum: 3.6 mg/dL (10-24-22 @ 07:40)  Phosphorus Level, Serum: 3.4 mg/dL (10-23-22 @ 09:03)      PT/INR - ( 24 Oct 2022 09:13 )   PT: 12.3 sec;   INR: 1.07 ratio         PTT - ( 24 Oct 2022 09:13 )  PTT:24.8 sec                                        10.9   32.37 )-----------( 132      ( 24 Oct 2022 09:13 )             33.8                         10.9   29.81 )-----------( 135      ( 23 Oct 2022 09:03 )             33.6                         9.5    22.87 )-----------( 135      ( 23 Oct 2022 01:28 )             29.4     CAPILLARY BLOOD GLUCOSE            MICROBIOLOGY:     RADIOLOGY:  [ ] Reviewed and interpreted by me    Point of Care Ultrasound Findings:    PFT:    EKG: CHIEF COMPLAINT:Patient is a 82y old  Female who presents with a chief complaint of sepsis (24 Oct 2022 07:34)      Interval Events:  AC has been held. Pt with increased WOB in the early afternoon. Pending thoracentesis. On venti mask 50%.    REVIEW OF SYSTEMS:  [x] All other systems negative except per HPI   [ ] Unable to assess ROS because ________    OBJECTIVE:  ICU Vital Signs Last 24 Hrs  T(C): 37.1 (24 Oct 2022 11:59), Max: 37.1 (24 Oct 2022 11:59)  T(F): 98.7 (24 Oct 2022 11:59), Max: 98.7 (24 Oct 2022 11:59)  HR: 71 (24 Oct 2022 11:59) (71 - 122)  BP: 117/80 (24 Oct 2022 11:59) (90/63 - 124/85)  BP(mean): --  ABP: --  ABP(mean): --  RR: 20 (24 Oct 2022 11:59) (20 - 20)  SpO2: 97% (24 Oct 2022 11:59) (92% - 98%)    O2 Parameters below as of 24 Oct 2022 11:59  Patient On (Oxygen Delivery Method): mask, Venturi  O2 Flow (L/min): 15  O2 Concentration (%): 50          10-23 @ 07:01  -  10-24 @ 07:00  --------------------------------------------------------  IN: 120 mL / OUT: 0 mL / NET: 120 mL        PHYSICAL EXAM:  General: Awake, alert, oriented X 3.   HEENT: Atraumatic, normocephalic.   Lymph Nodes: No palpable lymphadenopathy  Neck: No JVD. No carotid bruit.   Respiratory: Normal chest expansion. Decreased BS at the bases.  Cardiovascular: S1 S2 normal. No murmurs, rubs or gallops.   Abdomen: Soft, non-tender, non-distended. No organomegaly.  Extremities: Warm to touch. Peripheral pulse palpable. +Edema  Skin: No rashes or skin lesions  Neurological: Motor and sensory examination equal and normal in all four extremities.  Psychiatry: Appropriate mood and affect.    HOSPITAL MEDICATIONS:  MEDICATIONS  (STANDING):  allopurinol 300 milliGRAM(s) Oral at bedtime  carbidopa/levodopa  25/100 1 Tablet(s) Oral three times a day  cefepime   IVPB 2000 milliGRAM(s) IV Intermittent every 12 hours  chlorhexidine 2% Cloths 1 Application(s) Topical daily  latanoprost 0.005% Ophthalmic Solution 1 Drop(s) Both EYES at bedtime  pantoprazole    Tablet 40 milliGRAM(s) Oral before breakfast  senna 2 Tablet(s) Oral at bedtime  vancomycin  IVPB 750 milliGRAM(s) IV Intermittent every 12 hours    MEDICATIONS  (PRN):  melatonin 3 milliGRAM(s) Oral at bedtime PRN Insomnia      LABS:    The Labs were reviewed by me   The Radiology was reviewed by me    EKG tracing reviewed by me    10-24    142  |  106  |  22  ----------------------------<  133<H>  3.4<L>   |  25  |  0.60  10-23    142  |  106  |  18  ----------------------------<  111<H>  3.6   |  24  |  0.58  10-23    143  |  107  |  21  ----------------------------<  124<H>  3.6   |  24  |  0.57    Ca    8.5      24 Oct 2022 07:40  Ca    8.7      23 Oct 2022 09:03  Ca    8.7      23 Oct 2022 01:28  Phos  3.6     10-24  Mg     1.6     10-24    TPro  4.9<L>  /  Alb  2.5<L>  /  TBili  0.4  /  DBili  x   /  AST  9<L>  /  ALT  <5<L>  /  AlkPhos  199<H>  10-24  TPro  4.7<L>  /  Alb  2.8<L>  /  TBili  0.3  /  DBili  x   /  AST  11  /  ALT  <5<L>  /  AlkPhos  207<H>  10-23    Magnesium, Serum: 1.6 mg/dL (10-24-22 @ 07:40)  Magnesium, Serum: 1.6 mg/dL (10-23-22 @ 09:03)    Phosphorus Level, Serum: 3.6 mg/dL (10-24-22 @ 07:40)  Phosphorus Level, Serum: 3.4 mg/dL (10-23-22 @ 09:03)      PT/INR - ( 24 Oct 2022 09:13 )   PT: 12.3 sec;   INR: 1.07 ratio         PTT - ( 24 Oct 2022 09:13 )  PTT:24.8 sec                                        10.9   32.37 )-----------( 132      ( 24 Oct 2022 09:13 )             33.8                         10.9   29.81 )-----------( 135      ( 23 Oct 2022 09:03 )             33.6                         9.5    22.87 )-----------( 135      ( 23 Oct 2022 01:28 )             29.4     CAPILLARY BLOOD GLUCOSE            MICROBIOLOGY:     RADIOLOGY:  [ ] Reviewed and interpreted by me    Point of Care Ultrasound Findings:    PFT:    EKG:

## 2022-10-25 NOTE — PROGRESS NOTE ADULT - PROBLEM SELECTOR PLAN 4
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 Last chemo 2 weeks ago per patient. No concern for neutropenia at this time.  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 Last chemo 2 weeks ago per patient. No concern for neutropenia at this time.  - f/u flow cytometry and cytopathology to assess for malignant cells  - hold chemo given active infection  - f/u heme/onc reccs

## 2022-10-25 NOTE — SWALLOW BEDSIDE ASSESSMENT ADULT - SWALLOW EVAL: DIAGNOSIS
81 yo F pmhx lymphoma with mets to spine, admitted for SOB, found to have RUL pna and pleural effusion. Increased WOB, requiring 15L Venti mask; now breathing comfortably with O2 sats 96% on 6L O2 NC. Patient presents on bedside swallow evaluation with grossly functional oropharyngeal swallow. Oral stage is functional for solids despite incomplete dentition, otherwise adequate oral management across consistencies trialed. There are no overt signs of laryngeal penetration/aspiration on repeated trials of food and liquid textures. Patient is able to take medications whole with water. Denies dysphagia.

## 2022-10-25 NOTE — PROGRESS NOTE ADULT - PROBLEM SELECTOR PLAN 1
Arrived to ED tachycardic, tachypneic, and with leukocytosis. Afebrile but with subjective chills. CXR with RUL opacity concerning for pneumonia. CTA chest with right lung pneumonia. S/p one dose of vanc/zosyn in ED.   - Bcx with Klebsiella pneumoniae  - f/u Ucx  - c/w cefepime  - c/w vanc 750 mg q12h given pt has hardware in place (R cranial chemo port, PICC line)  - will hold off on fluids for now given pt is normotensive and volume overloaded  - on 15L venti mask, will consider switching to BiPAP if pt has worsening work of breathing Arrived to ED tachycardic, tachypneic, and with leukocytosis. Afebrile but with subjective chills. CXR with RUL opacity concerning for pneumonia. CTA chest with right lung pneumonia. S/p one dose of vanc/zosyn in ED.   - Bcx with Klebsiella pneumoniae  - f/u Ucx  - c/w cefepime  - dc vanc  - on 15L venti mask, wean as tolerated

## 2022-10-25 NOTE — PROGRESS NOTE ADULT - ASSESSMENT
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 Also found to have worsening L pleural effusion.   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 Also found to have worsening L pleural effusion, s/p thoracentesis revealing exudative fluid.

## 2022-10-25 NOTE — PROVIDER CONTACT NOTE (OTHER) - RECOMMENDATIONS
come see patient? Head CT?
IV metoprolol or oral metoprolol that's due at 1800?
MD aware and notified.

## 2022-10-25 NOTE — SWALLOW BEDSIDE ASSESSMENT ADULT - SWALLOW EVAL: FUNCTIONAL LEVEL AT TIME OF EVAL
AA+Ox4; Breathing comfortably on 96%on 6L O2 NC. No increased WOB noted during extended utterances nor during po intake. Patient without complaint.

## 2022-10-25 NOTE — PROVIDER CONTACT NOTE (OTHER) - SITUATION
up to 160s
Patient complains about left eye vision is cloudy
Pt needs PICC line assessment (consult) and pain meds for MAR
Patient has increased HR of 122 on cardiac monitor.

## 2022-10-25 NOTE — PHYSICAL THERAPY INITIAL EVALUATION ADULT - PERTINENT HX OF CURRENT PROBLEM, REHAB EVAL
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   Thoracentesis performed on 10/24 with removal of approx 1600 ml chylous appearing fluid. 10/23 Chest Xray: Right upper lung hazy opacity is new from prior exam and may represent pneumonia. New left upper lung atelectasis. Unchanged moderate left pleural effusion with associated atelectasis. CTA Chest PE Protocol: No pulmonary embolism, Right lung pneumonia. 10/24 Chest Xray: Right upper lung field consolidation, Mediastinal mass, Decreased left effusion. CT Head: Right frontal Ommaya reservoir with its tip at the level of the septum pellucidum. Age-appropriate involutional change. Resolution of previous high attenuation focus questioned on the prior study likely represented a small amount of acute hemorrhage. Resolution of the bifrontal pneumocephalus.

## 2022-10-25 NOTE — SWALLOW BEDSIDE ASSESSMENT ADULT - COMMENTS
CTA chest revealing right lung pneumonia, with increasing size of left pleural effusion with passive lower long atelectasis and new upper lobe atelectasis. Got 1 dose of vanc/zosyn. Admitted to medicine for further workup.    Thoracentesis 10/24. Neuro consulted for risk assessment of AC for Afib. Repeat CTH showed stability of hyperdensities w/in lentiform nuclei (likely microcalcification).  10/24: increased WOB; O2 sats not sustained; changed from 6L to 15L venti mask. Persistently tachy to 110s-120s in Afib. Complaining of sacral pain, feels similar to cancer pain, given tylenol overnight. Reported to be breathing comfortably at 15L venti mask. Requesting to eat, however had witnessed aspiration on orange juice, desaturating to 80s. Made NPO. Sacral pain well controlled on tylenol and after putting pillow under back.   POCUS shows large simple left pleural effusion.  Remains dyspneic on minimal movement, on Ventimask 50%, SpO2 low 90s. CTA chest revealing right lung pneumonia, with increasing size of left pleural effusion with passive lower long atelectasis and new upper lobe atelectasis. Got 1 dose of vanc/zosyn. Admitted to medicine for further workup.  Neuro consulted for risk assessment of AC for Afib.   10/24: increased WOB; O2 sats not sustained; changed from 6L to 15L venti mask. Persistently tachy to 110s-120s in Afib. Complaining of sacral pain, feels similar to cancer pain, given tylenol overnight. Reported to be breathing comfortably at 15L venti mask. Requesting to eat, however had witnessed aspiration on orange juice, desaturating to 80s. Made NPO. Sacral pain well controlled on tylenol and after putting pillow under back.   POCUS shows large simple left pleural effusion.  Remains dyspneic on minimal movement, on Ventimask 50%, SpO2 low 90s.  Thoracentesis 10/24.

## 2022-10-25 NOTE — CHART NOTE - NSCHARTNOTEFT_GEN_A_CORE
Pt found to have ?small acute hemorrhage. CHADSVASC Score 4-5. No neurosurgical intervention warranted, risk/benefit discussion for AC however no hard contraindication to AC.     Spoke with daughter Larisa Franklin [phone], who is a neurologist, to discuss the risks and benefits or starting anti-coagulation. After this discussion, family agreed to start AC.

## 2022-10-25 NOTE — PROGRESS NOTE ADULT - SUBJECTIVE AND OBJECTIVE BOX
CHIEF COMPLAINT:Patient is a 82y old  Female who presents with a chief complaint of sepsis (24 Oct 2022 14:59)      Interval Events:    REVIEW OF SYSTEMS:  [x] All other systems negative except per HPI   [ ] Unable to assess ROS because ________    OBJECTIVE:  ICU Vital Signs Last 24 Hrs  T(C): 36.4 (25 Oct 2022 05:38), Max: 38.1 (24 Oct 2022 16:15)  T(F): 97.6 (25 Oct 2022 05:38), Max: 100.6 (24 Oct 2022 16:15)  HR: 100 (25 Oct 2022 05:38) (71 - 130)  BP: 96/69 (25 Oct 2022 05:38) (96/69 - 117/80)  BP(mean): --  ABP: --  ABP(mean): --  RR: 20 (25 Oct 2022 05:38) (20 - 20)  SpO2: 93% (25 Oct 2022 05:38) (89% - 97%)    O2 Parameters below as of 25 Oct 2022 05:38  Patient On (Oxygen Delivery Method): mask, Venturi  O2 Flow (L/min): 15  O2 Concentration (%): 50          10-24 @ 07:01  -  10-25 @ 07:00  --------------------------------------------------------  IN: 300 mL / OUT: 150 mL / NET: 150 mL        PHYSICAL EXAM:  General: Awake, alert, oriented X 3.   HEENT: Atraumatic, normocephalic.   Lymph Nodes: No palpable lymphadenopathy  Neck: No JVD. No carotid bruit.   Respiratory: Normal chest expansion. Decreased BS at the bases.  Cardiovascular: S1 S2 normal. No murmurs, rubs or gallops.   Abdomen: Soft, non-tender, non-distended. No organomegaly.  Extremities: Warm to touch. Peripheral pulse palpable. +Edema  Skin: No rashes or skin lesions  Neurological: Motor and sensory examination equal and normal in all four extremities.  Psychiatry: Appropriate mood and affect.    HOSPITAL MEDICATIONS:  MEDICATIONS  (STANDING):  allopurinol 300 milliGRAM(s) Oral at bedtime  carbidopa/levodopa  25/100 1 Tablet(s) Oral three times a day  cefepime   IVPB 2000 milliGRAM(s) IV Intermittent every 12 hours  chlorhexidine 2% Cloths 1 Application(s) Topical daily  influenza  Vaccine (HIGH DOSE) 0.7 milliLiter(s) IntraMuscular once  latanoprost 0.005% Ophthalmic Solution 1 Drop(s) Both EYES at bedtime  pantoprazole    Tablet 40 milliGRAM(s) Oral before breakfast  senna 2 Tablet(s) Oral at bedtime  vancomycin  IVPB 750 milliGRAM(s) IV Intermittent every 12 hours    MEDICATIONS  (PRN):  acetaminophen     Tablet .. 650 milliGRAM(s) Oral every 6 hours PRN Temp greater or equal to 38C (100.4F), Mild Pain (1 - 3), Moderate Pain (4 - 6)  melatonin 3 milliGRAM(s) Oral at bedtime PRN Insomnia      LABS:    The Labs were reviewed by me   The Radiology was reviewed by me    EKG tracing reviewed by me    10-25    142  |  107  |  22  ----------------------------<  89  3.8   |  25  |  0.57  10-24    142  |  106  |  22  ----------------------------<  133<H>  3.4<L>   |  25  |  0.60  10-23    142  |  106  |  18  ----------------------------<  111<H>  3.6   |  24  |  0.58    Ca    8.2<L>      25 Oct 2022 06:19  Ca    8.5      24 Oct 2022 07:40  Ca    8.7      23 Oct 2022 09:03  Phos  3.3     10-25  Mg     1.6     10-25    TPro  4.2<L>  /  Alb  2.3<L>  /  TBili  0.6  /  DBili  x   /  AST  9<L>  /  ALT  <5<L>  /  AlkPhos  175<H>  10-25  TPro  4.9<L>  /  Alb  2.5<L>  /  TBili  0.4  /  DBili  x   /  AST  9<L>  /  ALT  <5<L>  /  AlkPhos  199<H>  10-24  TPro  4.7<L>  /  Alb  2.8<L>  /  TBili  0.3  /  DBili  x   /  AST  11  /  ALT  <5<L>  /  AlkPhos  207<H>  10-23    Magnesium, Serum: 1.6 mg/dL (10-25-22 @ 06:19)  Magnesium, Serum: 1.6 mg/dL (10-24-22 @ 07:40)  Magnesium, Serum: 1.6 mg/dL (10-23-22 @ 09:03)    Phosphorus Level, Serum: 3.3 mg/dL (10-25-22 @ 06:19)  Phosphorus Level, Serum: 3.6 mg/dL (10-24-22 @ 07:40)  Phosphorus Level, Serum: 3.4 mg/dL (10-23-22 @ 09:03)      PT/INR - ( 24 Oct 2022 09:13 )   PT: 12.3 sec;   INR: 1.07 ratio         PTT - ( 24 Oct 2022 09:13 )  PTT:24.8 sec                                        9.6    34.88 )-----------( 134      ( 25 Oct 2022 06:19 )             29.3                         10.9   32.37 )-----------( 132      ( 24 Oct 2022 09:13 )             33.8                         10.9   29.81 )-----------( 135      ( 23 Oct 2022 09:03 )             33.6     CAPILLARY BLOOD GLUCOSE            MICROBIOLOGY:     RADIOLOGY:  [ ] Reviewed and interpreted by me    Point of Care Ultrasound Findings:    PFT:    EKG: CHIEF COMPLAINT:Patient is a 82y old  Female who presents with a chief complaint of sepsis (24 Oct 2022 14:59)      Interval Events:  Pt reports improvement in dyspnea this morning. She notes discomfort with wearing the venti mask as well. Denies pain, cough, fever, chills.    REVIEW OF SYSTEMS:  [x] All other systems negative except per HPI   [ ] Unable to assess ROS because ________    OBJECTIVE:  ICU Vital Signs Last 24 Hrs  T(C): 36.4 (25 Oct 2022 05:38), Max: 38.1 (24 Oct 2022 16:15)  T(F): 97.6 (25 Oct 2022 05:38), Max: 100.6 (24 Oct 2022 16:15)  HR: 100 (25 Oct 2022 05:38) (71 - 130)  BP: 96/69 (25 Oct 2022 05:38) (96/69 - 117/80)  BP(mean): --  ABP: --  ABP(mean): --  RR: 20 (25 Oct 2022 05:38) (20 - 20)  SpO2: 93% (25 Oct 2022 05:38) (89% - 97%)    O2 Parameters below as of 25 Oct 2022 05:38  Patient On (Oxygen Delivery Method): mask, Venturi  O2 Flow (L/min): 15  O2 Concentration (%): 50          10-24 @ 07:01  -  10-25 @ 07:00  --------------------------------------------------------  IN: 300 mL / OUT: 150 mL / NET: 150 mL        PHYSICAL EXAM:  General: Awake, alert, oriented X 3.   HEENT: Atraumatic, normocephalic.   Lymph Nodes: No palpable lymphadenopathy  Neck: No JVD. No carotid bruit.   Respiratory: Normal chest expansion. Decreased BS at the bases.  Cardiovascular: S1 S2 normal. No murmurs, rubs or gallops.   Abdomen: Soft, non-tender, non-distended. No organomegaly.  Extremities: Warm to touch. Peripheral pulse palpable. trace edema  Skin: No rashes or skin lesions  Neurological: Motor and sensory examination equal and normal in all four extremities.  Psychiatry: Appropriate mood and affect.    HOSPITAL MEDICATIONS:  MEDICATIONS  (STANDING):  allopurinol 300 milliGRAM(s) Oral at bedtime  carbidopa/levodopa  25/100 1 Tablet(s) Oral three times a day  cefepime   IVPB 2000 milliGRAM(s) IV Intermittent every 12 hours  chlorhexidine 2% Cloths 1 Application(s) Topical daily  influenza  Vaccine (HIGH DOSE) 0.7 milliLiter(s) IntraMuscular once  latanoprost 0.005% Ophthalmic Solution 1 Drop(s) Both EYES at bedtime  pantoprazole    Tablet 40 milliGRAM(s) Oral before breakfast  senna 2 Tablet(s) Oral at bedtime  vancomycin  IVPB 750 milliGRAM(s) IV Intermittent every 12 hours    MEDICATIONS  (PRN):  acetaminophen     Tablet .. 650 milliGRAM(s) Oral every 6 hours PRN Temp greater or equal to 38C (100.4F), Mild Pain (1 - 3), Moderate Pain (4 - 6)  melatonin 3 milliGRAM(s) Oral at bedtime PRN Insomnia      LABS:    The Labs were reviewed by me   The Radiology was reviewed by me    EKG tracing reviewed by me    10-25    142  |  107  |  22  ----------------------------<  89  3.8   |  25  |  0.57  10-24    142  |  106  |  22  ----------------------------<  133<H>  3.4<L>   |  25  |  0.60  10-23    142  |  106  |  18  ----------------------------<  111<H>  3.6   |  24  |  0.58    Ca    8.2<L>      25 Oct 2022 06:19  Ca    8.5      24 Oct 2022 07:40  Ca    8.7      23 Oct 2022 09:03  Phos  3.3     10-25  Mg     1.6     10-25    TPro  4.2<L>  /  Alb  2.3<L>  /  TBili  0.6  /  DBili  x   /  AST  9<L>  /  ALT  <5<L>  /  AlkPhos  175<H>  10-25  TPro  4.9<L>  /  Alb  2.5<L>  /  TBili  0.4  /  DBili  x   /  AST  9<L>  /  ALT  <5<L>  /  AlkPhos  199<H>  10-24  TPro  4.7<L>  /  Alb  2.8<L>  /  TBili  0.3  /  DBili  x   /  AST  11  /  ALT  <5<L>  /  AlkPhos  207<H>  10-23    Magnesium, Serum: 1.6 mg/dL (10-25-22 @ 06:19)  Magnesium, Serum: 1.6 mg/dL (10-24-22 @ 07:40)  Magnesium, Serum: 1.6 mg/dL (10-23-22 @ 09:03)    Phosphorus Level, Serum: 3.3 mg/dL (10-25-22 @ 06:19)  Phosphorus Level, Serum: 3.6 mg/dL (10-24-22 @ 07:40)  Phosphorus Level, Serum: 3.4 mg/dL (10-23-22 @ 09:03)      PT/INR - ( 24 Oct 2022 09:13 )   PT: 12.3 sec;   INR: 1.07 ratio         PTT - ( 24 Oct 2022 09:13 )  PTT:24.8 sec                                        9.6    34.88 )-----------( 134      ( 25 Oct 2022 06:19 )             29.3                         10.9   32.37 )-----------( 132      ( 24 Oct 2022 09:13 )             33.8                         10.9   29.81 )-----------( 135      ( 23 Oct 2022 09:03 )             33.6     CAPILLARY BLOOD GLUCOSE            MICROBIOLOGY:     RADIOLOGY:  [ ] Reviewed and interpreted by me    Point of Care Ultrasound Findings:    PFT:    EKG:

## 2022-10-25 NOTE — SWALLOW BEDSIDE ASSESSMENT ADULT - ASR SWALLOW ASPIRATION MONITOR
Monitor for s/s aspiration/laryngeal penetration. If noted:  D/C p.o. intake, provide non-oral nutrition/hydration/meds, and contact this service @ [phone]/change of breathing pattern/cough/gurgly voice/fever/pneumonia/throat clearing/upper respiratory infection

## 2022-10-25 NOTE — PROGRESS NOTE ADULT - PROBLEM SELECTOR PLAN 5
Volume overloaded on exam, last TTE 8/24 with 65% EF, mild diastolci dysfunction stage I.   - s/p lasix 40 mg x 1 on 10/23, will hold off on further diuresis for now given SBP in 90s Volume overloaded on exam, last TTE 8/24 with 65% EF, mild diastolic dysfunction stage I.   - s/p lasix 40 mg x 1 on 10/23, will hold off on further diuresis for now given initiation of metoprolol, may initiate po lasix 20 mg tomorrow 10/26 if BP tolerates

## 2022-10-25 NOTE — PROVIDER CONTACT NOTE (OTHER) - REASON
PICC and pain med
 up to 160s
Patient complains about left eye vision is cloudy
cardiac monitor HR is 122

## 2022-10-25 NOTE — PROGRESS NOTE ADULT - ASSESSMENT
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 with worsening SOB x 1 day. She was admitted to the hospital for hypoxic respiratory failure in the setting of pneumonia, pleural effusion, klebsiella bacteremia. Pulmonary consulted for pleural effusion.    Pleural Effusion  - Large L pleural effusion, patient has previously had thoracentesis consistent with exudative process  - s/p thoracentesis 10/24, 1600cc chylous fluid removed  - f/u pleural fluid studies  - Will check cytopathology and flow cytometry with procedure  - Continue antibiotics per primary team  - Wean O2 as tolerated

## 2022-10-25 NOTE — PROGRESS NOTE ADULT - PROBLEM SELECTOR PLAN 3
Chronic Afib s/p ablation procedure  - persistently tachy to 110-120s this admission in Afib  - metoprolol tartrate 12.5 mg x 1 Chronic Afib s/p ablation procedure  - persistently tachy to 110-120s this admission in Afib, improved to 90s s/p thora 10/24  - start metoprolol tartrate 12.5 mg bid, hold for HR < 60, SBP < 95  - ?small acute hemorrhage on initial CTH, repeat scan showing resolution of this focus  - neurosurgery no acute surgical intervention, AC will have risks but no hard contraindication to AC if necessary  - CHADSVASC score 5  - given age and ?brain bleed, decision to start AC will be discussed with daughter to assess risks/benefits Chronic Afib s/p ablation procedure  - persistently tachy to 110-120s this admission in Afib, improved to 90s s/p thora 10/24  - start metoprolol tartrate 12.5 mg bid, hold for HR < 60, SBP < 95  - ?small acute hemorrhage on initial CTH, repeat scan showing resolution of this focus  - neurosurgery no acute surgical intervention, AC will have risks but no hard contraindication to AC if necessary  - CHADSVASC score 5  - given age and ?brain bleed, discussed risks/benefits with daughter Larisa Franklin, who is a neurologist, and decided to start AC  - per NSGY note, will start heparin gtt, no bolus, with goal PTT 60-65, will get repeat CTH when PTT therapeutic

## 2022-10-25 NOTE — PROGRESS NOTE ADULT - PROBLEM SELECTOR PLAN 6
DVT ppx: SCDs   Diet: DASH  Code status: Full code    Spoke with son 10/24, answered all questions DVT ppx: SCDs   Diet: pending Speech and swallow eval  Code status: Full code    Spoke with son 10/24, answered all questions DVT ppx: SCDs   Diet: pending Speech and swallow eval  Code status: Full code    Spoke with daughter 10/25, answered all questions

## 2022-10-25 NOTE — PROGRESS NOTE ADULT - ATTENDING COMMENTS
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   Thoracentesis performed on 10/24 with removal of approx 1600 ml chylous appearing fluid  Exudative - Pending cultures and cytopath, fluid Triglycerides  CXR and clinically improved, less dyspneic and transitioned to NC at 6 LPM from Ventimask 50%  Bacteremic with Klebsiella - sensitive, remains on broad spectrum antibiotics  Please send sputum cultures   SLP eval today  Will cont to follow

## 2022-10-25 NOTE — PHARMACOTHERAPY INTERVENTION NOTE - COMMENTS
Performed medication reconciliation and home medication list updated in outpatient medication review. Medications verified with patient and nursing home. Please refer to specifics in home medication list (outpatient medication review).    Home Medications:  allopurinol 300 mg oral tablet: 1 tab(s) orally once a day (at bedtime)   carbidopa-levodopa 25 mg-100 mg oral tablet: 1 tab(s) orally 3 times a day (10a,3p, 8p)   latanoprost 0.005% ophthalmic solution: 1 drop(s) to each affected eye once a day (at bedtime)  melatonin 3 mg oral tablet: 1 tab(s) orally once a day (at bedtime), As needed, Insomnia  pantoprazole 40 mg oral delayed release tablet: 1 tab(s) orally once a day (before a meal)   senna leaf extract oral tablet: 2 tab(s) orally once a day (at bedtime)    Spoke to patient who denied taking any medications outpatient, spoke to the nursing facility where the patient stays ([facility]), they confirmed patient does take all the medications that are listed above.     Viri Ordonez  PharmD. Candidate 2023     Performed medication reconciliation and home medication list updated in outpatient medication review. Medications verified with patient, [facility], and previous [facility] admission.  Spoke to patient who stated she is NOT taking any medications outpatient, however spoke to the nursing facility where the patient stays ([facility]), they confirmed patient does take all the medications that are listed below; last administered date was day before admission.  Please refer to specifics in home medication list (outpatient medication review).    Home Medications:  allopurinol 300 mg oral tablet: 1 tab(s) orally once a day (at bedtime)   carbidopa-levodopa 25 mg-100 mg oral tablet: 1 tab(s) orally 3 times a day (10a,3p, 8p)   latanoprost 0.005% ophthalmic solution: 1 drop(s) to each affected eye once a day (at bedtime)  melatonin 3 mg oral tablet: 1 tab(s) orally once a day (at bedtime), As needed, Insomnia  pantoprazole 40 mg oral delayed release tablet: 1 tab(s) orally once a day (before a meal)   senna leaf extract oral tablet: 2 tab(s) orally once a day (at bedtime)    All home medications reconciled.     Viri Ordonez  PharmD. Candidate 2023    Juan MancusoD, [facility]  Clinical Pharmacy Specialist  [phone] or Teams

## 2022-10-25 NOTE — PROGRESS NOTE ADULT - SUBJECTIVE AND OBJECTIVE BOX
DATE OF SERVICE: 10-25-22 @ 07:29    Patient is a 82y old  Female who presents with a chief complaint of sepsis (24 Oct 2022 14:59)      SUBJECTIVE / OVERNIGHT EVENTS:  Yesterday, thoracentesis performed. Overnight, given flu shot. CTH performed to further assess ?bleed. Reviewed over the phone with NSGY, no obviously concerning findings, said okay to start AC if needed.   Pt seen and examined at bedside.    ROS negative except as above.    MEDICATIONS  (STANDING):  allopurinol 300 milliGRAM(s) Oral at bedtime  carbidopa/levodopa  25/100 1 Tablet(s) Oral three times a day  cefepime   IVPB 2000 milliGRAM(s) IV Intermittent every 12 hours  chlorhexidine 2% Cloths 1 Application(s) Topical daily  influenza  Vaccine (HIGH DOSE) 0.7 milliLiter(s) IntraMuscular once  latanoprost 0.005% Ophthalmic Solution 1 Drop(s) Both EYES at bedtime  pantoprazole    Tablet 40 milliGRAM(s) Oral before breakfast  senna 2 Tablet(s) Oral at bedtime  vancomycin  IVPB 750 milliGRAM(s) IV Intermittent every 12 hours    MEDICATIONS  (PRN):  acetaminophen     Tablet .. 650 milliGRAM(s) Oral every 6 hours PRN Temp greater or equal to 38C (100.4F), Mild Pain (1 - 3), Moderate Pain (4 - 6)  melatonin 3 milliGRAM(s) Oral at bedtime PRN Insomnia      Vital Signs Last 24 Hrs  T(C): 36.4 (25 Oct 2022 05:38), Max: 38.1 (24 Oct 2022 16:15)  T(F): 97.6 (25 Oct 2022 05:38), Max: 100.6 (24 Oct 2022 16:15)  HR: 100 (25 Oct 2022 05:38) (71 - 130)  BP: 96/69 (25 Oct 2022 05:38) (96/69 - 117/80)  BP(mean): --  RR: 20 (25 Oct 2022 05:38) (20 - 20)  SpO2: 93% (25 Oct 2022 05:38) (89% - 97%)    Parameters below as of 25 Oct 2022 05:38  Patient On (Oxygen Delivery Method): mask, Venturi  O2 Flow (L/min): 15  O2 Concentration (%): 50  CAPILLARY BLOOD GLUCOSE        I&O's Summary    24 Oct 2022 07:01  -  25 Oct 2022 07:00  --------------------------------------------------------  IN: 300 mL / OUT: 150 mL / NET: 150 mL        PHYSICAL EXAM:  GENERAL: NAD, well-developed  HEAD:  Atraumatic, Normocephalic  EYES: EOMI, PERRLA, conjunctiva and sclera clear  NECK: Supple, No JVD  CHEST/LUNG: Clear to auscultation bilaterally; No wheeze  HEART: Regular rate and rhythm; No murmurs, rubs, or gallops  ABDOMEN: Soft, Nontender, Nondistended; Bowel sounds present  EXTREMITIES:  2+ Peripheral Pulses, No clubbing, cyanosis, or edema  PSYCH: AAOx3  NEUROLOGY: non-focal  SKIN: No rashes or lesions    LABS:                        9.6    34.88 )-----------( 134      ( 25 Oct 2022 06:19 )             29.3     10-25    142  |  107  |  22  ----------------------------<  89  3.8   |  25  |  0.57    Ca    8.2<L>      25 Oct 2022 06:19  Phos  3.3     10-25  Mg     1.6     10-25    TPro  4.2<L>  /  Alb  2.3<L>  /  TBili  0.6  /  DBili  x   /  AST  9<L>  /  ALT  <5<L>  /  AlkPhos  175<H>  10-25    PT/INR - ( 24 Oct 2022 09:13 )   PT: 12.3 sec;   INR: 1.07 ratio         PTT - ( 24 Oct 2022 09:13 )  PTT:24.8 sec          MICRO:    Culture - Body Fluid with Gram Stain (collected 10-24-22 @ 18:32)  Source: Pleural Fl Pleural Fluid  Gram Stain (10-24-22 @ 23:02):    No polymorphonuclear leukocytes seen per low power field    No organisms seen per oil power field        RADIOLOGY & ADDITIONAL TESTS:      Consultant(s) Notes Reviewed:         DATE OF SERVICE: 10-25-22 @ 07:29    Patient is a 82y old  Female who presents with a chief complaint of sepsis (24 Oct 2022 14:59)      SUBJECTIVE / OVERNIGHT EVENTS:  Yesterday, thoracentesis performed. Overnight, with fever to 38.1. CTH performed to further assess ?bleed. Reviewed over the phone with NSGY, no obviously concerning findings, said okay to start AC if needed.   Pt seen and examined at bedside. Reports breathing improved, overall feels much better after thora. Saying she is extremely hungry as she has not eaten this admission. Is upset because she feels like overall has health has gotten worse since her last hospital admission, wants to get out of bed and moving again.     ROS negative except as above.    MEDICATIONS  (STANDING):  allopurinol 300 milliGRAM(s) Oral at bedtime  carbidopa/levodopa  25/100 1 Tablet(s) Oral three times a day  cefepime   IVPB 2000 milliGRAM(s) IV Intermittent every 12 hours  chlorhexidine 2% Cloths 1 Application(s) Topical daily  influenza  Vaccine (HIGH DOSE) 0.7 milliLiter(s) IntraMuscular once  latanoprost 0.005% Ophthalmic Solution 1 Drop(s) Both EYES at bedtime  pantoprazole    Tablet 40 milliGRAM(s) Oral before breakfast  senna 2 Tablet(s) Oral at bedtime  vancomycin  IVPB 750 milliGRAM(s) IV Intermittent every 12 hours    MEDICATIONS  (PRN):  acetaminophen     Tablet .. 650 milliGRAM(s) Oral every 6 hours PRN Temp greater or equal to 38C (100.4F), Mild Pain (1 - 3), Moderate Pain (4 - 6)  melatonin 3 milliGRAM(s) Oral at bedtime PRN Insomnia      Vital Signs Last 24 Hrs  T(C): 36.4 (25 Oct 2022 05:38), Max: 38.1 (24 Oct 2022 16:15)  T(F): 97.6 (25 Oct 2022 05:38), Max: 100.6 (24 Oct 2022 16:15)  HR: 100 (25 Oct 2022 05:38) (71 - 130)  BP: 96/69 (25 Oct 2022 05:38) (96/69 - 117/80)  BP(mean): --  RR: 20 (25 Oct 2022 05:38) (20 - 20)  SpO2: 93% (25 Oct 2022 05:38) (89% - 97%)    Parameters below as of 25 Oct 2022 05:38  Patient On (Oxygen Delivery Method): mask, Venturi  O2 Flow (L/min): 15  O2 Concentration (%): 50  CAPILLARY BLOOD GLUCOSE        I&O's Summary    24 Oct 2022 07:01  -  25 Oct 2022 07:00  --------------------------------------------------------  IN: 300 mL / OUT: 150 mL / NET: 150 mL        PHYSICAL EXAM:  GENERAL: NAD, well-developed, no tachypnea  HEAD:  Atraumatic, Normocephalic  EYES: EOMI, PERRLA, conjunctiva and sclera clear  NECK: Supple, No JVD  CHEST/LUNG: Clear to auscultation bilaterally; No wheeze. no increased work of breathing, on 15L venti mask  HEART: Regular rate and rhythm; No murmurs, rubs, or gallops  ABDOMEN: Soft, Nontender, Nondistended; Bowel sounds present  EXTREMITIES:  1+ pitting edema b/l, improved from yesterday  PSYCH: AAOx3  NEUROLOGY: non-focal  SKIN: stage 2 sacral ulcer    LABS:                        9.6    34.88 )-----------( 134      ( 25 Oct 2022 06:19 )             29.3     10-25    142  |  107  |  22  ----------------------------<  89  3.8   |  25  |  0.57    Ca    8.2<L>      25 Oct 2022 06:19  Phos  3.3     10-25  Mg     1.6     10-25    TPro  4.2<L>  /  Alb  2.3<L>  /  TBili  0.6  /  DBili  x   /  AST  9<L>  /  ALT  <5<L>  /  AlkPhos  175<H>  10-25    PT/INR - ( 24 Oct 2022 09:13 )   PT: 12.3 sec;   INR: 1.07 ratio         PTT - ( 24 Oct 2022 09:13 )  PTT:24.8 sec          MICRO:    Culture - Body Fluid with Gram Stain (collected 10-24-22 @ 18:32)  Source: Pleural Fl Pleural Fluid  Gram Stain (10-24-22 @ 23:02):    No polymorphonuclear leukocytes seen per low power field    No organisms seen per oil power field        RADIOLOGY & ADDITIONAL TESTS:      Consultant(s) Notes Reviewed:

## 2022-10-25 NOTE — PROVIDER CONTACT NOTE (OTHER) - ASSESSMENT
Patient is stable and denies chest pain, palpitations and SOB. no complaints of acute distress. Vital signs stable.
PICC line CDI    Patient cries intermittently between sleeping, d/t pain in sacrum. I applied foam.
Patient is A&Ox4. Patient's neuro is in [facility]. Patient's vitals are stable.
Patient is A&Ox4. Patient denies chest pain, SOB, lightheadedness, palpitations, and headache. Patient's BP is 97/71, Spo2 93%, RR 18 , Temp 99.5.

## 2022-10-25 NOTE — PROGRESS NOTE ADULT - PROBLEM SELECTOR PLAN 2
CTA chest with worsening pleural effusions, L>R, when compared with CT chest from previous admission (8/27). Concern for parapneumonic effusion vs. malignant effusion  - thoracentesis today  - f/u fluid studies when sent  - s/p lasix 40 mg x 1 10/23  - had witnessed aspiration on orange juice with desaturation to 80s, will keep NPO for now  - Speech and swallow eval after thora CTA chest with worsening pleural effusions, L>R, when compared with CT chest from previous admission (8/27). Concern for parapneumonic effusion vs. malignant effusion. S/p thoracentesis 10/24 with 1.6L fluid removal  - fluid protein/serum protein ratio > .5, fulfilling Light's criteria for exudative fluid  - fluid gram stain neg, likely malignant vs uncomplicated parapneumonic effusion  - s/p lasix 40 mg x 1 10/23 CTA chest with worsening pleural effusions, L>R, when compared with CT chest from previous admission (8/27). Concern for parapneumonic effusion vs. malignant effusion. S/p thoracentesis 10/24 with 1.6L fluid removal  - fluid gram stain neg, likely malignant vs uncomplicated parapneumonic effusion  - s/p lasix 40 mg x 1 10/23

## 2022-10-25 NOTE — PROVIDER CONTACT NOTE (OTHER) - ACTION/TREATMENT ORDERED:
Give oral 12.5 metoprolol now.
Provider is at bedside.
waiting to hear back from provider via TEAMs
Increase O2 to 6L NC. Will continue to monitor. Will contact provider if increased work of breathing and increased HR.

## 2022-10-25 NOTE — PROVIDER CONTACT NOTE (OTHER) - BACKGROUND
admitted for SOB over 3 days. hx A-Fib, HTN.
Patient admitted for SOB
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
patient admitted for SOB

## 2022-10-25 NOTE — PHYSICAL THERAPY INITIAL EVALUATION ADULT - ADDITIONAL COMMENTS
Patient presents to [facility] from Rehab after previous hospital stay. Prior to that, Per pt, she lives in an apt with . Has 5-6 steps to enter (+HR) and 5 flights inside (+HR). Reports being independent with functional mobility/ADLs with cane. Owns RW.

## 2022-10-25 NOTE — PROGRESS NOTE ADULT - ATTENDING COMMENTS
Chief Complaint   Patient presents with     Follow Up For     VAD Follow up with Echo Prior     Vitals were taken and medications were reconciled.     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 (last mini RCHOP 10/10) with PICC line and ommaya, Afib s/p ablation, diastolic HF, L pleural effusion, incarcerated R femoral hernia s/p small bowel resection and mesh presents with worsening SOB admitted hypoxic respiratory failure 2/2 PNA with Kleb bacteremia and large Left pleural effusion    #Acute Hypoxic respiratory failure 2/2 pneumonia and left pleural effusion   # Klebseilla bacteremia   - improved, now on 6L NC, no increased work of breathing on exam this AM  - s/p vancomycin (10/23--10/24) + cefepime   - deescalate antibiotics to cefepime only (10/23-    )   - BCx currently grew klebsiella, sensitive to cefepime;  f/u repeat BCx 10/25  - f/u sputum culture, f/u urine legionella   - s/p pulm consult,s/p thoracentesis 10/24, -1.6L, fluid analysis not suggestive of bacterial infection (pH 7.65, glucose 135), negative gram stain, f/u pleural fluid culture, f/u cyopath and flow cyto    # Intracranial hemorrhage   - admission CT head 10/23 noted "punctate density along the R frontal lobe near the site of the meenu hole may represent a small focus of hemorrhage vs. small focus of calcification"  - f/u repeat CT head 10/25:  Right frontal Ommaya reservoir with its tip at the level of the septum pellucidum. Age-appropriate involutional change. Resolution of previous high attenuation focus questioned on the prior study likely represented a small amount of acute hemorrhage. Resolution of the bifrontal pneumocephalus.  - c/w neurocheck q4h  - s/p neurosurgery evaluation: discussed starting AC for A fib, per neurosurgery team, no absolute contraindication to start AC                                                If AC is started...                                               - Recommend NO bolus with low ptt goal of ~60-65                                               - Recommend obtaining follow up CTH after patient is therapeutic and to notify neurosurgery immediately with any changes in the patients neurologic exam.    #Follicular lymphoma     #Atrial fibrillation  - discussed with patient this AM: s/p ablation yesterday ago, and she has not been on any rate control /rhythm control medication or AC for many years   - reviewed ECG on admission, telemetry ---- A fib with RVR 100s-140s  - start metoprolol tartrate 12.5 mg PO q12h --uptitrate as necessary  - [id number] score at least 5 (age, female gender, CHF, HTN) ; patient also has active malignancy, so high risk of thrombosis  - s/p risk/benefit discussion of initiating AC with patient's daughter who is a neurologist, including the acute small brain hemorrhage discovered on head CT, and the discussion with neurosurgery --- patient daughter opt for starting AC   - will start heparin gtt with low apTT goal 60-65, with no bolus, obtain f/u CT head once therapeutic per neurosurg rec ----- if patient therapeutic on heparin gtt, and remains stable, will eventually transition to apixaban 2.5mg BID (adjusted for age >80, weight < 60kg)     D/w Dr Kelly . 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 (last mini RCHOP 10/10) with PICC line and ommaya, Afib s/p ablation, diastolic HF, L pleural effusion, incarcerated R femoral hernia s/p small bowel resection and mesh presents with worsening SOB admitted hypoxic respiratory failure 2/2 PNA with Kleb bacteremia and large Left pleural effusion    #Acute Hypoxic respiratory failure 2/2 pneumonia and left pleural effusion   # Klebseilla bacteremia   - improved, now on 6L NC, no increased work of breathing on exam this AM  - s/p vancomycin (10/23--10/24) + cefepime   - deescalate antibiotics to cefepime only (10/23-    )   - BCx currently grew klebsiella, sensitive to cefepime;  f/u repeat BCx 10/25  - f/u sputum culture, f/u urine legionella   - s/p pulm consult,s/p thoracentesis 10/24, -1.6L, fluid analysis not suggestive of bacterial infection (pH 7.65, glucose 135), negative gram stain, f/u pleural fluid culture, f/u cyopath and flow cyto    # Intracranial hemorrhage   - admission CT head 10/23 noted "punctate density along the R frontal lobe near the site of the meenu hole may represent a small focus of hemorrhage vs. small focus of calcification"  - f/u repeat CT head 10/25:  Right frontal Ommaya reservoir with its tip at the level of the septum pellucidum. Age-appropriate involutional change. Resolution of previous high attenuation focus questioned on the prior study likely represented a small amount of acute hemorrhage. Resolution of the bifrontal pneumocephalus.  - c/w neurocheck q4h  - s/p neurosurgery evaluation: discussed starting AC for A fib, per neurosurgery team, no absolute contraindication to start AC                                                If AC is started...                                               - Recommend NO bolus with low ptt goal of ~60-65                                               - Recommend obtaining follow up CTH after patient is therapeutic and to notify neurosurgery immediately with any changes in the patients neurologic exam.    #Follicular lymphoma   - Patient follows Dr. Kenan Delgadillo at [facility]  - s/p R-mini-CHOP (C1 - 9/16/22, C2 - 10/10/22), due for cycle 3 on 10/31, holding off chemo for now   - hematology oncology following  - trend CBC with differential, TLS labs daily     #Atrial fibrillation  - discussed with patient this AM: s/p ablation yesterday ago, and she has not been on any rate control /rhythm control medication or AC for many years   - reviewed ECG on admission, telemetry ---- A fib with RVR 100s-140s  - start metoprolol tartrate 12.5 mg PO q12h --uptitrate as necessary  - [id number] score at least 5 (age, female gender, CHF, HTN) ; patient also has active malignancy, so high risk of thrombosis  - s/p risk/benefit discussion of initiating AC with patient's daughter who is a neurologist, including the acute small brain hemorrhage discovered on head CT, and the discussion with neurosurgery --- patient daughter opt for starting AC   - will start heparin gtt with low apTT goal 60-65, with no bolus, obtain f/u CT head once therapeutic per neurosurg rec ----- if patient therapeutic on heparin gtt, and remains stable, will eventually transition to apixaban 2.5mg BID (adjusted for age >80, weight < 60kg)     D/w Dr Kelly .

## 2022-10-26 NOTE — CONSULT NOTE ADULT - SUBJECTIVE AND OBJECTIVE BOX
CC: Sepsis    HPI:  Ms. 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 Pt reports associated nonproductive cough for 3 days. Most recent admission 8/23-9/23, was being treated for pseudomonal UTI, also found to have bilateral pleural effusion, diagnosed with follicular lymphoma s/p C1 R-miniCHOP on 9/12, 9/16. Pt was at [facility] rehab since previous admission because she could not walk, was getting OT/PT. Last chemo session was 2 weeks ago. She reports that about 1 week ago, she began to develop chills, but remained without objective fever as they had been checking her temperature at the rehab facility. Pt reports that the cough has been progressively worsening over the last 3-4 days, and then 1 day ago, she began to experience shortness of breath and increased work of breathing. They had been giving her nasal cannula therapy at rehab but despite this SOB got progressively worse, prompting her to come to ED. Pt denying headache, dizziness, chest pain, palpitations, abdominal pain, black/bloody stools, dysuria, frequency, hematuria.  (23 Oct 2022 07:40)    Patient seen and examined at bedside. Pulmonary consulted for evaluation of pleural effusion. Patient had a thoracentesis in the past on 8/25. Effusion was found to be exudative, no malignant cells on cytopathology. Patient reports feeling well currently, initially presented with cough and shortness of breath.      PAST MEDICAL & SURGICAL HISTORY:  Dyslipidemia      Hypertension      Atrial fibrillation, unspecified type  Unspecified Atrial arrhthymia, S/p ablation on Bblocker      Small bowel obstruction      Follicular lymphoma      Uterine prolapse      H/O cardiac radiofrequency ablation      Small bowel obstruction  abdominal sx      S/P appendectomy      S/P hernia repair          FAMILY HISTORY:  FH: lung cancer (Sibling)    FH: leukemia (Sibling)        SOCIAL HISTORY:  Smoking: None reported  EtOH Use: None reported  Marital Status:  Occupation:  Exposures: None reported  Recent Travel:    Allergies    No Known Allergies    Intolerances        HOME MEDICATIONS:    REVIEW OF SYSTEMS:  Constitutional: No fevers or chills.   Eyes: No itching or discharge from the eyes  ENT: No ear pain. No ear discharge. No nasal congestion.   CV: No chest pain. No palpitations. No lightheadedness or dizziness.   Resp: No dyspnea at rest. No wheezing.  GI: No nausea. No vomiting. No diarrhea.  MSK: No joint pain or pain in any extremities  Integumentary: No skin lesions. No pedal edema.  Neurological: No gross motor weakness. No sensory changes.  [x] All other systems negative  [ ] Unable to assess ROS because ________    OBJECTIVE:  ICU Vital Signs Last 24 Hrs  T(C): 36.8 (23 Oct 2022 06:08), Max: 36.9 (23 Oct 2022 02:20)  T(F): 98.2 (23 Oct 2022 06:08), Max: 98.4 (23 Oct 2022 02:20)  HR: 72 (23 Oct 2022 13:34) (56 - 110)  BP: 113/80 (23 Oct 2022 13:34) (113/80 - 133/99)  BP(mean): --  ABP: --  ABP(mean): --  RR: 20 (23 Oct 2022 13:34) (20 - 32)  SpO2: 98% (23 Oct 2022 13:34) (96% - 98%)    O2 Parameters below as of 23 Oct 2022 13:34  Patient On (Oxygen Delivery Method): nasal cannula  O2 Flow (L/min): 6            CAPILLARY BLOOD GLUCOSE          PHYSICAL EXAM:  General: Awake, alert, oriented X 3.   HEENT: Atraumatic, normocephalic.   Lymph Nodes: No palpable lymphadenopathy  Neck: No JVD. No carotid bruit.   Respiratory: Normal chest expansion. Decreased BS at the bases.  Cardiovascular: S1 S2 normal. No murmurs, rubs or gallops.   Abdomen: Soft, non-tender, non-distended. No organomegaly.  Extremities: Warm to touch. Peripheral pulse palpable. +Edema  Skin: No rashes or skin lesions  Neurological: Motor and sensory examination equal and normal in all four extremities.  Psychiatry: Appropriate mood and affect.    HOSPITAL MEDICATIONS:  MEDICATIONS  (STANDING):  allopurinol 300 milliGRAM(s) Oral at bedtime  carbidopa/levodopa  25/100 1 Tablet(s) Oral three times a day  cefepime   IVPB      cefepime   IVPB 2000 milliGRAM(s) IV Intermittent every 8 hours  latanoprost 0.005% Ophthalmic Solution 1 Drop(s) Both EYES at bedtime  pantoprazole    Tablet 40 milliGRAM(s) Oral before breakfast  senna 2 Tablet(s) Oral at bedtime    MEDICATIONS  (PRN):  melatonin 3 milliGRAM(s) Oral at bedtime PRN Insomnia      LABS:                        10.9   29.81 )-----------( 135      ( 23 Oct 2022 09:03 )             33.6     10-23    142  |  106  |  18  ----------------------------<  111<H>  3.6   |  24  |  0.58    Ca    8.7      23 Oct 2022 09:03  Phos  3.4     10-23  Mg     1.6     10-23    TPro  4.7<L>  /  Alb  2.8<L>  /  TBili  0.3  /  DBili  x   /  AST  11  /  ALT  <5<L>  /  AlkPhos  207<H>  10-23    PT/INR - ( 23 Oct 2022 11:03 )   PT: 13.0 sec;   INR: 1.12 ratio         PTT - ( 23 Oct 2022 11:03 )  PTT:24.5 sec      Venous Blood Gas:  10-23 @ 01:55  7.42/40/46/26/76.9  VBG Lactate: 2.0      MICROBIOLOGY:     RADIOLOGY:  [x] Reviewed and interpreted by me,  CT Chest 10/23: Consolidation on the R, bilateral pleural effusions L>R (my read)    Point of Care Ultrasound Findings;    PFT:    EKG:
Wound, Ostomy, Continence Consult Note:    HPI:  Ms. 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 Pt reports associated nonproductive cough for 3 days. Most recent admission 8/23-9/23, was being treated for pseudomonal UTI, also found to have bilateral pleural effusion, diagnosed with follicular lymphoma s/p C1 R-miniCHOP on 9/12, 9/16. Pt was at [facility] since previous admission because she could not walk, was getting OT/PT. Last chemo session was 2 weeks ago. She reports that about 1 week ago, she began to develop chills, but remained without objective fever as they had been checking her temperature at the rehab facility. Pt reports that the cough has been progressively worsening over the last 3-4 days, and then 1 day ago, she began to experience shortness of breath and increased work of breathing. They had been giving her nasal cannula therapy at rehab but despite this SOB got progressively worse, prompting her to come to ED. Pt denying headache, dizziness, chest pain, palpitations, abdominal pain, black/bloody stools, dysuria, frequency, hematuria.     Pt arrived to ED afebrile, /99, , RR 32, SpO2 97% on 4L NC. WBC 22.87. CTA chest revealing right lung pneumonia, with increasing size of left pleural effusion with passive lower long atelectasis and new upper lobe atelectasis. Got 1 dose of vanc/zosyn. Admitted to medicine for further workup. (23 Oct 2022 07:40)    Request for wound care consult for sacral/bilateral buttocks skin damage present on admission received from nursing. Ms. Franklin was encountered on an alternating air with low air loss surface. She complained of being uncomfortable when turned in bed. She complained of discomfort related to the sacral skin damage. She was educated on the importance of turning and repositioning in bed to prevent further skin breakdown and allow healing to occur. She had difficulty tolerating a side position but agreed to try. She has a significant uterine and bladder prolapse through the rectum which predisposes her to UTIs. Please consider a urogynecological consult for evaluation for a pessary to manage her prolapse. Message sent to Resident via Teams.    PAST MEDICAL & SURGICAL HISTORY:  Dyslipidemia  Hypertension  Atrial fibrillation, unspecified type  Unspecified Atrial arrhthymia, S/p ablation on Bblocker  Small bowel obstruction  Follicular lymphoma  Uterine prolapse  H/O cardiac radiofrequency ablation  Small bowel obstruction, abdominal sx  S/P appendectomy  S/P hernia repair    REVIEW OF SYSTEMS  General:	no fevers + chills, recent admit for sepsis  Ophthalmologic:  no vision changes  ENMT:	no sore throat or hoarseness  Respiratory and Thorax: +SOB, +cough  Cardiovascular:	Afib  Gastrointestinal:	no abd pain, h/o small bowel obstruction, hernia repair  Genitourinary:	uterine/bladder prolapse  Musculoskeletal:	 non ambulatory  Neurological:	no AMS  Psychiatric:	tearful, weary  Hematology/Lymphatics:	 follicular lymphoma  Endocrine:	no DM  Allergic/Immunologic:	recent chemp  Skin/ MSK: see HPI  All other systems negative    MEDICATIONS  (STANDING):  allopurinol 300 milliGRAM(s) Oral at bedtime  carbidopa/levodopa  25/100 1 Tablet(s) Oral three times a day  cefepime   IVPB 2000 milliGRAM(s) IV Intermittent every 12 hours  chlorhexidine 2% Cloths 1 Application(s) Topical daily  furosemide    Tablet 20 milliGRAM(s) Oral daily  heparin  Infusion 900 Unit(s)/Hr (9 mL/Hr) IV Continuous <Continuous>  influenza  Vaccine (HIGH DOSE) 0.7 milliLiter(s) IntraMuscular once  latanoprost 0.005% Ophthalmic Solution 1 Drop(s) Both EYES at bedtime  metoprolol tartrate 12.5 milliGRAM(s) Oral every 12 hours  pantoprazole    Tablet 40 milliGRAM(s) Oral before breakfast  senna 2 Tablet(s) Oral at bedtime    MEDICATIONS  (PRN):  acetaminophen     Tablet .. 650 milliGRAM(s) Oral every 6 hours PRN Temp greater or equal to 38C (100.4F), Mild Pain (1 - 3), Moderate Pain (4 - 6)  artificial tears (preservative free) Ophthalmic Solution 1 Drop(s) Left EYE two times a day PRN Dry Eyes  melatonin 3 milliGRAM(s) Oral at bedtime PRN Insomnia    Allergies    No Known Allergies    Intolerances    SOCIAL HISTORY:  single, Denies smoking, ETOH, drugs    FAMILY HISTORY:  FH: lung cancer (Sibling)    FH: leukemia (Sibling)      Vital Signs Last 24 Hrs  T(C): 36.6 (26 Oct 2022 11:20), Max: 37.8 (25 Oct 2022 23:52)  T(F): 97.9 (26 Oct 2022 11:20), Max: 100 (25 Oct 2022 23:52)  HR: 98 (26 Oct 2022 11:20) (86 - 142)  BP: 103/71 (26 Oct 2022 11:20) (91/63 - 119/81)  BP(mean): --  RR: 18 (26 Oct 2022 11:20) (18 - 18)  SpO2: 95% (26 Oct 2022 11:20) (93% - 96%)    Parameters below as of 26 Oct 2022 11:20  Patient On (Oxygen Delivery Method): nasal cannula  O2 Flow (L/min): 6    Physical Exam:  General: Alert, thin  Ophthamology: sclera clear  ENMT: moist mucous membranes, trachea midline  Respiratory: equal chest rise with respirations  Gastrointestinal: soft NT/ND  Neurology: verbal, following commands  Psych: calm, appropriate  Musculoskeletal: no contractures  Vascular: BLE edema equal  Skin:  Sacral/bilateral buttocks with deep maroon discoloration, abraded skin, in and around the gluteal cleft L 4cm X W 4cm x D 0.1cm with pink wound bed, no necrotic tissue, and scant serosanguinous drainage, scattered tiny scabs  No odor, erythema, increased warmth, tenderness, induration, fluctuance    LABS:  10-26    143  |  106  |  21  ----------------------------<  96  3.7   |  24  |  0.57    Ca    8.1<L>      26 Oct 2022 06:51  Phos  2.5     10-26  Mg     1.9     10-26    TPro  4.2<L>  /  Alb  2.3<L>  /  TBili  0.6  /  DBili  x   /  AST  9<L>  /  ALT  <5<L>  /  AlkPhos  175<H>  10-25                          9.8    32.12 )-----------( 166      ( 26 Oct 2022 06:53 )             30.8     PT/INR - ( 25 Oct 2022 22:14 )   PT: 13.0 sec;   INR: 1.12 ratio         PTT - ( 26 Oct 2022 10:13 )  PTT:56.2 sec      RADIOLOGY & ADDITIONAL STUDIES:    EXAM:  CT ABDOMEN AND PELVIS IC                          PROCEDURE DATE:  08/23/2022      INTERPRETATION:  CLINICAL INFORMATION: Abdominal discomfort. History of   incarcerated femoral hernia.    COMPARISON: 10/26/2018    CONTRAST/COMPLICATIONS:  IV Contrast: Omnipaque 350  60 cc administered   0 cc discarded  Oral Contrast: NONE  Complications: None reported at time of study completion    PROCEDURE:  CT of the Abdomen and Pelvis was performed.  Sagittal and coronal reformats were performed.    FINDINGS:  LOWER CHEST: Partially imaged moderate size left pleural effusion with   passive atelectasis. Mitral annular calcification. Heart is enlarged.    LIVER: Left hepatic cyst measuring up to 6 cm.  BILE DUCTS: Normal caliber.  GALLBLADDER: Mildly distended. Cholelithiasis. Mild gallbladder wall   thickening. No pericholecystic inflammatory changes.  SPLEEN: Within normal limits.  PANCREAS: Within normal limits.  ADRENALS: Within normal limits.  KIDNEYS/URETERS: Kidneys enhance symmetrically without hydronephrosis.   Right renal cysts and other hypoattenuating foci which are too small to   characterize    BLADDER: Prolapsed urinary bladder through the pelvic floor.  REPRODUCTIVE ORGANS: Uterine prolapse.    BOWEL: Distal small bowel anastomosis. No bowel obstruction. Mild colonic   diverticulosis without evidence of diverticulitis. Normal appendix.   Rectal prolapse.  PERITONEUM: No ascites, pneumoperitoneum, or loculated collection. No   mesenteric lymphadenopathy.  VESSELS: Atherosclerotic calcifications of the aortoiliac tree. Normal   caliber abdominal aorta.  RETROPERITONEUM/LYMPH NODES: Amorphous soft tissue in the retroperitoneal   space adjacent to the vena cava and aorta which is similar to but more   pronounced on the current exam.  ABDOMINAL WALL: Status post surgical repair of right femoral hernia.  BONES: Degenerative changes of the spine. Mild chronic compression   fractures of T12 and L1.    IMPRESSION:  Moderate left pleural effusion.    Bladder, uterine, and rectal prolapse through the pelvic floor.    No bowel obstruction.    Amorphous soft tissue in the retroperitoneal space adjacent to the vena   cava and aorta which is similar to but more pronounced than prior exam   likely representing retroperitoneal lymphadenopathy.  
HEMATOLOGY ONCOLOGY CONSULT     Patient is a 82y old  Female who presents with a chief complaint of sepsis (24 Oct 2022 12:33)      HPI:  Ms. 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 Pt reports associated nonproductive cough for 3 days. Most recent admission 8/23-9/23, was being treated for pseudomonal UTI, also found to have bilateral pleural effusion, diagnosed with follicular lymphoma s/p C1 R-miniCHOP on 9/12, 9/16. Pt was at [facility]hab since previous admission because she could not walk, was getting OT/PT. Last chemo session was 2 weeks ago. She reports that about 1 week ago, she began to develop chills, but remained without objective fever as they had been checking her temperature at the rehab facility. Pt reports that the cough has been progressively worsening over the last 3-4 days, and then 1 day ago, she began to experience shortness of breath and increased work of breathing. They had been giving her nasal cannula therapy at rehab but despite this SOB got progressively worse, prompting her to come to ED. Pt denying headache, dizziness, chest pain, palpitations, abdominal pain, black/bloody stools, dysuria, frequency, hematuria.     Pt arrived to ED afebrile, /99, , RR 32, SpO2 97% on 4L NC. WBC 22.87. CTA chest revealing right lung pneumonia, with increasing size of left pleural effusion with passive lower long atelectasis and new upper lobe atelectasis. Got 1 dose of vanc/zosyn. Admitted to medicine for further workup. (23 Oct 2022 07:40)       ROS:  Negative except for:    PAST MEDICAL & SURGICAL HISTORY:  Dyslipidemia      Hypertension      Atrial fibrillation, unspecified type  Unspecified Atrial arrhthymia, S/p ablation on Bblocker      Small bowel obstruction      Follicular lymphoma      Uterine prolapse      H/O cardiac radiofrequency ablation      Small bowel obstruction  abdominal sx      S/P appendectomy      S/P hernia repair          SOCIAL HISTORY:    FAMILY HISTORY:  FH: lung cancer (Sibling)    FH: leukemia (Sibling)        MEDICATIONS  (STANDING):  allopurinol 300 milliGRAM(s) Oral at bedtime  carbidopa/levodopa  25/100 1 Tablet(s) Oral three times a day  cefepime   IVPB 2000 milliGRAM(s) IV Intermittent every 12 hours  chlorhexidine 2% Cloths 1 Application(s) Topical daily  latanoprost 0.005% Ophthalmic Solution 1 Drop(s) Both EYES at bedtime  pantoprazole    Tablet 40 milliGRAM(s) Oral before breakfast  senna 2 Tablet(s) Oral at bedtime  vancomycin  IVPB 750 milliGRAM(s) IV Intermittent every 12 hours    MEDICATIONS  (PRN):  melatonin 3 milliGRAM(s) Oral at bedtime PRN Insomnia      Allergies    No Known Allergies    Intolerances        Vital Signs Last 24 Hrs  T(C): 37.3 (24 Oct 2022 14:55), Max: 37.3 (24 Oct 2022 14:55)  T(F): 99.2 (24 Oct 2022 14:55), Max: 99.2 (24 Oct 2022 14:55)  HR: 125 (24 Oct 2022 14:55) (71 - 125)  BP: 105/71 (24 Oct 2022 14:55) (90/63 - 124/85)  BP(mean): --  RR: 20 (24 Oct 2022 14:55) (20 - 20)  SpO2: 96% (24 Oct 2022 14:55) (92% - 97%)    Parameters below as of 24 Oct 2022 14:55  Patient On (Oxygen Delivery Method): mask, Venturi  O2 Flow (L/min): 15  O2 Concentration (%): 50    PHYSICAL EXAM  General: adult in NAD  HEENT: clear oropharynx, anicteric sclera, pink conjunctiva  Neck: supple  CV: normal S1/S2 with no murmur rubs or gallops  Lungs: positive air movement b/l ant lungs,clear to auscultation, no wheezes, no rales  Abdomen: soft non-tender non-distended, no hepatosplenomegaly  Ext: no clubbing cyanosis or edema  Skin: no rashes and no petechiae  Neuro: alert and oriented X 4, no focal deficits      10-23-22 @ 07:01  -  10-24-22 @ 07:00  --------------------------------------------------------  IN: 120 mL / OUT: 0 mL / NET: 120 mL      LABS:                          10.9   32.37 )-----------( 132      ( 24 Oct 2022 09:13 )             33.8         Mean Cell Volume : 98.0 fl  Mean Cell Hemoglobin : 31.6 pg  Mean Cell Hemoglobin Concentration : 32.2 gm/dL  Auto Neutrophil # : x  Auto Lymphocyte # : x  Auto Monocyte # : x  Auto Eosinophil # : x  Auto Basophil # : x  Auto Neutrophil % : x  Auto Lymphocyte % : x  Auto Monocyte % : x  Auto Eosinophil % : x  Auto Basophil % : x      10-24    142  |  106  |  22  ----------------------------<  133<H>  3.4<L>   |  25  |  0.60    Ca    8.5      24 Oct 2022 07:40  Phos  3.6     10-24  Mg     1.6     10-24    TPro  4.9<L>  /  Alb  2.5<L>  /  TBili  0.4  /  DBili  x   /  AST  9<L>  /  ALT  <5<L>  /  AlkPhos  199<H>  10-24      PT/INR - ( 24 Oct 2022 09:13 )   PT: 12.3 sec;   INR: 1.07 ratio         PTT - ( 24 Oct 2022 09:13 )  PTT:24.8 sec                BLOOD SMEAR INTERPRETATION:       RADIOLOGY & ADDITIONAL STUDIES:

## 2022-10-26 NOTE — PROGRESS NOTE ADULT - ASSESSMENT
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 Also found to have worsening L pleural effusion, s/p thoracentesis revealing exudative fluid.   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 Also found to have worsening L pleural effusion, s/p thoracentesis 10/24.

## 2022-10-26 NOTE — PROGRESS NOTE ADULT - PROBLEM SELECTOR PLAN 1
Arrived to ED tachycardic, tachypneic, and with leukocytosis. Afebrile but with subjective chills. CXR with RUL opacity concerning for pneumonia. CTA chest with right lung pneumonia. S/p one dose of vanc/zosyn in ED.   - Bcx with Klebsiella pneumoniae  - f/u Ucx  - c/w cefepime  - dc vanc  - on 15L venti mask, wean as tolerated Arrived to ED tachycardic, tachypneic, and with leukocytosis. Afebrile but with subjective chills. CXR with RUL opacity concerning for pneumonia. CTA chest with right lung pneumonia. S/p one dose of vanc/zosyn in ED.   - Bcx with Klebsiella pneumoniae  - f/u Ucx; pt with hx of Pseudomonas in urine in August  - gyn consulted given concern for uterine/vaginal prolapse increasing risk of UTI, no plans for inpatient intervention, has outpatient urogyn appt 11/24 for pessary evaluation  - f/u sputum cx  - sacral ulcer with no signs of infection, will hold off on wound cx   - c/w cefepime  - dc vanc  - on 6L NC, wean as tolerated  - pt with worsening leukocytosis since admission, likely due to Neupogen treatment on 10/10

## 2022-10-26 NOTE — PROGRESS NOTE ADULT - ASSESSMENT
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 She was admitted to the hospital for hypoxic respiratory failure in the setting of pneumonia, pleural effusion, klebsiella bacteremia. Pulmonary consulted for pleural effusion.    Pleural Effusion  - Large L pleural effusion, patient has previously had thoracentesis consistent with exudative process  - s/p thoracentesis 10/24, 1600cc chylous fluid removed  - f/u pending pleural fluid studies  - Continue antibiotics per primary team  - Wean O2 as tolerated

## 2022-10-26 NOTE — PROGRESS NOTE ADULT - ATTENDING COMMENTS
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 (last mini RCHOP 10/10) with PICC line and ommaya, Afib s/p ablation, diastolic HF, L pleural effusion, incarcerated R femoral hernia s/p small bowel resection and mesh presents with worsening SOB admitted hypoxic respiratory failure 2/2 PNA with Kleb bacteremia and large Left pleural effusion    #Acute Hypoxic respiratory failure 2/2 pneumonia and left pleural effusion   # Klebseilla bacteremia   - improved, now on 6L NC, no increased work of breathing on exam this AM  - s/p vancomycin (10/23--10/24) + cefepime   - deescalate antibiotics to cefepime only (10/23-    )   - BCx currently grew klebsiella, sensitive to cefepime;  f/u repeat BCx 10/25  - f/u sputum culture, f/u urine legionella   - s/p pulm consult,s/p thoracentesis 10/24, -1.6L, fluid analysis not suggestive of bacterial infection (pH 7.65, glucose 135), negative gram stain, f/u pleural fluid culture, f/u cyopath and flow cyto  - start patient on furosemide PO 20mg daily     # Intracranial hemorrhage   - admission CT head 10/23 noted "punctate density along the R frontal lobe near the site of the meenu hole may represent a small focus of hemorrhage vs. small focus of calcification"  - f/u repeat CT head 10/25:  Right frontal Ommaya reservoir with its tip at the level of the septum pellucidum. Age-appropriate involutional change. Resolution of previous high attenuation focus questioned on the prior study likely represented a small amount of acute hemorrhage. Resolution of the bifrontal pneumocephalus.  - c/w neurocheck q4h  - s/p neurosurgery evaluation: discussed starting AC for A fib, per neurosurgery team, no absolute contraindication to start AC                                                If AC is started...                                               - Recommend NO bolus with low ptt goal of ~60-65                                               - Recommend obtaining follow up CTH after patient is therapeutic and to notify neurosurgery immediately with any changes in the patients neurologic exam.    #Follicular lymphoma   - Patient follows Dr. Kenan Delgadillo at [facility] ([facility] known as [facility])  - s/p R-mini-CHOP (C1 - 9/16/22, C2 - 10/10/22), due for cycle 3 on 10/31, holding off chemo for now   - WBC 30s --- confirmed with hematology team patient received Neulasta on 10/10   - hematology oncology following  - trend CBC with differential, TLS labs daily     #Atrial fibrillation  - discussed with patient this AM: s/p ablation yesterday ago, and she has not been on any rate control /rhythm control medication or AC for many years   - reviewed ECG on admission, telemetry ---- A fib with RVR 100s-140s  - start metoprolol tartrate 12.5 mg PO q12h --uptitrate as necessary  - [id number] score at least 5 (age, female gender, CHF, HTN) ; patient also has active malignancy, so high risk of thrombosis  - s/p risk/benefit discussion of initiating AC with patient's daughter who is a neurologist, including the acute small brain hemorrhage discovered on head CT, and the discussion with neurosurgery --- patient daughter opt for starting AC   - will start heparin gtt with low apTT goal 60-65, with no bolus, obtain f/u CT head once therapeutic per neurosurg rec ----- if patient therapeutic on heparin gtt, and remains stable, will eventually transition to apixaban 2.5mg BID (adjusted for age >80, weight < 60kg)     D/w Dr Kelly

## 2022-10-26 NOTE — CONSULT NOTE ADULT - ASSESSMENT
Impression:    Sacral/bilateral Buttocks deep tissue injury present on admission  Incontinence of bowel and bladder  Incontinence Dermatitis    Recommend:  1.) topical therapy: sacral/buttock injury - cleanse with incontinence cleanser, pat dry, apply allevyn foam dressing daily  2.) Incontinence Management - incontinence cleanser, pads, pericare BID  3.) Maintain on an alternating air with low air loss surface  4.) Turn and reposition Q 2 hours  5.) Nutrition optimization  6.) Offload heels/feet with complete cair air fluidized boots; ensure that the soles of the feet are not resting on the foot board of the bed.  7.) Suggest Urogynecology consult for prolapsed bladder/uterine/rectal prolapse for evaluation for pessary    Care as per medicine. Will not actively follow but will remain available. Please recall for new issues or deterioration.  Upon [facility] [address] [place] [address] [phone]  Thank you for this consult  Discussed wound with primary team and clinical nurse  Maria Esther Scott, NP-C, CWOCN [id number]

## 2022-10-26 NOTE — PROGRESS NOTE ADULT - SUBJECTIVE AND OBJECTIVE BOX
CHIEF COMPLAINT:Patient is a 82y old  Female who presents with a chief complaint of sepsis (26 Oct 2022 07:10)      Interval Events:    REVIEW OF SYSTEMS:  [x] All other systems negative except per HPI   [ ] Unable to assess ROS because ________    OBJECTIVE:  ICU Vital Signs Last 24 Hrs  T(C): 36.6 (26 Oct 2022 11:20), Max: 37.8 (25 Oct 2022 23:52)  T(F): 97.9 (26 Oct 2022 11:20), Max: 100 (25 Oct 2022 23:52)  HR: 98 (26 Oct 2022 11:20) (86 - 142)  BP: 103/71 (26 Oct 2022 11:20) (91/63 - 119/81)  BP(mean): --  ABP: --  ABP(mean): --  RR: 18 (26 Oct 2022 11:20) (18 - 18)  SpO2: 95% (26 Oct 2022 11:20) (93% - 96%)    O2 Parameters below as of 26 Oct 2022 11:20  Patient On (Oxygen Delivery Method): nasal cannula  O2 Flow (L/min): 6            10-25 @ 07:01  -  10-26 @ 07:00  --------------------------------------------------------  IN: 120 mL / OUT: 0 mL / NET: 120 mL    10-26 @ 07:01  -  10-26 @ 12:18  --------------------------------------------------------  IN: 240 mL / OUT: 0 mL / NET: 240 mL        PHYSICAL EXAM:  General: Awake, alert, oriented X 3.   HEENT: Atraumatic, normocephalic.   Lymph Nodes: No palpable lymphadenopathy  Neck: No JVD. No carotid bruit.   Respiratory: Normal chest expansion. Decreased BS at the bases.  Cardiovascular: S1 S2 normal. No murmurs, rubs or gallops.   Abdomen: Soft, non-tender, non-distended. No organomegaly.  Extremities: Warm to touch. Peripheral pulse palpable. trace edema  Skin: No rashes or skin lesions  Neurological: Motor and sensory examination equal and normal in all four extremities.  Psychiatry: Appropriate mood and affect.    HOSPITAL MEDICATIONS:  MEDICATIONS  (STANDING):  allopurinol 300 milliGRAM(s) Oral at bedtime  carbidopa/levodopa  25/100 1 Tablet(s) Oral three times a day  cefepime   IVPB 2000 milliGRAM(s) IV Intermittent every 12 hours  chlorhexidine 2% Cloths 1 Application(s) Topical daily  furosemide    Tablet 20 milliGRAM(s) Oral daily  heparin  Infusion 900 Unit(s)/Hr (9 mL/Hr) IV Continuous <Continuous>  influenza  Vaccine (HIGH DOSE) 0.7 milliLiter(s) IntraMuscular once  latanoprost 0.005% Ophthalmic Solution 1 Drop(s) Both EYES at bedtime  metoprolol tartrate 12.5 milliGRAM(s) Oral every 12 hours  pantoprazole    Tablet 40 milliGRAM(s) Oral before breakfast  senna 2 Tablet(s) Oral at bedtime    MEDICATIONS  (PRN):  acetaminophen     Tablet .. 650 milliGRAM(s) Oral every 6 hours PRN Temp greater or equal to 38C (100.4F), Mild Pain (1 - 3), Moderate Pain (4 - 6)  artificial tears (preservative free) Ophthalmic Solution 1 Drop(s) Left EYE two times a day PRN Dry Eyes  melatonin 3 milliGRAM(s) Oral at bedtime PRN Insomnia      LABS:    The Labs were reviewed by me   The Radiology was reviewed by me    EKG tracing reviewed by me    10-26    143  |  106  |  21  ----------------------------<  96  3.7   |  24  |  0.57  10-25    142  |  107  |  22  ----------------------------<  89  3.8   |  25  |  0.57  10-24    142  |  106  |  22  ----------------------------<  133<H>  3.4<L>   |  25  |  0.60    Ca    8.1<L>      26 Oct 2022 06:51  Ca    8.2<L>      25 Oct 2022 06:19  Ca    8.5      24 Oct 2022 07:40  Phos  2.5     10-26  Mg     1.9     10-26    TPro  4.2<L>  /  Alb  2.3<L>  /  TBili  0.6  /  DBili  x   /  AST  9<L>  /  ALT  <5<L>  /  AlkPhos  175<H>  10-25  TPro  4.9<L>  /  Alb  2.5<L>  /  TBili  0.4  /  DBili  x   /  AST  9<L>  /  ALT  <5<L>  /  AlkPhos  199<H>  10-24    Magnesium, Serum: 1.9 mg/dL (10-26-22 @ 06:51)  Magnesium, Serum: 1.6 mg/dL (10-25-22 @ 06:19)  Magnesium, Serum: 1.6 mg/dL (10-24-22 @ 07:40)    Phosphorus Level, Serum: 2.5 mg/dL (10-26-22 @ 06:51)  Phosphorus Level, Serum: 3.3 mg/dL (10-25-22 @ 06:19)  Phosphorus Level, Serum: 3.6 mg/dL (10-24-22 @ 07:40)      PT/INR - ( 25 Oct 2022 22:14 )   PT: 13.0 sec;   INR: 1.12 ratio         PTT - ( 26 Oct 2022 10:13 )  PTT:56.2 sec                                        9.8    32.12 )-----------( 166      ( 26 Oct 2022 06:53 )             30.8                         9.6    34.88 )-----------( 134      ( 25 Oct 2022 06:19 )             29.3                         10.9   32.37 )-----------( 132      ( 24 Oct 2022 09:13 )             33.8     CAPILLARY BLOOD GLUCOSE            MICROBIOLOGY:     RADIOLOGY:  [ ] Reviewed and interpreted by me    Point of Care Ultrasound Findings:    PFT:    EKG: CHIEF COMPLAINT:Patient is a 82y old  Female who presents with a chief complaint of sepsis (26 Oct 2022 07:10)      Interval Events:  Improvement in dyspnea per pt report. no new concerns at this time.    REVIEW OF SYSTEMS:  [x] All other systems negative except per HPI   [ ] Unable to assess ROS because ________    OBJECTIVE:  ICU Vital Signs Last 24 Hrs  T(C): 36.6 (26 Oct 2022 11:20), Max: 37.8 (25 Oct 2022 23:52)  T(F): 97.9 (26 Oct 2022 11:20), Max: 100 (25 Oct 2022 23:52)  HR: 98 (26 Oct 2022 11:20) (86 - 142)  BP: 103/71 (26 Oct 2022 11:20) (91/63 - 119/81)  BP(mean): --  ABP: --  ABP(mean): --  RR: 18 (26 Oct 2022 11:20) (18 - 18)  SpO2: 95% (26 Oct 2022 11:20) (93% - 96%)    O2 Parameters below as of 26 Oct 2022 11:20  Patient On (Oxygen Delivery Method): nasal cannula  O2 Flow (L/min): 6            10-25 @ 07:01  -  10-26 @ 07:00  --------------------------------------------------------  IN: 120 mL / OUT: 0 mL / NET: 120 mL    10-26 @ 07:01  -  10-26 @ 12:18  --------------------------------------------------------  IN: 240 mL / OUT: 0 mL / NET: 240 mL        PHYSICAL EXAM:  General: Awake, alert, oriented X 3.   HEENT: Atraumatic, normocephalic.   Lymph Nodes: No palpable lymphadenopathy  Neck: No JVD. No carotid bruit.   Respiratory: Normal chest expansion. Decreased BS at the bases.  Cardiovascular: S1 S2 normal. No murmurs, rubs or gallops.   Abdomen: Soft, non-tender, non-distended. No organomegaly.  Extremities: Warm to touch. Peripheral pulse palpable. trace edema  Skin: No rashes or skin lesions  Neurological: Motor and sensory examination equal and normal in all four extremities.  Psychiatry: Appropriate mood and affect.    HOSPITAL MEDICATIONS:  MEDICATIONS  (STANDING):  allopurinol 300 milliGRAM(s) Oral at bedtime  carbidopa/levodopa  25/100 1 Tablet(s) Oral three times a day  cefepime   IVPB 2000 milliGRAM(s) IV Intermittent every 12 hours  chlorhexidine 2% Cloths 1 Application(s) Topical daily  furosemide    Tablet 20 milliGRAM(s) Oral daily  heparin  Infusion 900 Unit(s)/Hr (9 mL/Hr) IV Continuous <Continuous>  influenza  Vaccine (HIGH DOSE) 0.7 milliLiter(s) IntraMuscular once  latanoprost 0.005% Ophthalmic Solution 1 Drop(s) Both EYES at bedtime  metoprolol tartrate 12.5 milliGRAM(s) Oral every 12 hours  pantoprazole    Tablet 40 milliGRAM(s) Oral before breakfast  senna 2 Tablet(s) Oral at bedtime    MEDICATIONS  (PRN):  acetaminophen     Tablet .. 650 milliGRAM(s) Oral every 6 hours PRN Temp greater or equal to 38C (100.4F), Mild Pain (1 - 3), Moderate Pain (4 - 6)  artificial tears (preservative free) Ophthalmic Solution 1 Drop(s) Left EYE two times a day PRN Dry Eyes  melatonin 3 milliGRAM(s) Oral at bedtime PRN Insomnia      LABS:    The Labs were reviewed by me   The Radiology was reviewed by me    EKG tracing reviewed by me    10-26    143  |  106  |  21  ----------------------------<  96  3.7   |  24  |  0.57  10-25    142  |  107  |  22  ----------------------------<  89  3.8   |  25  |  0.57  10-24    142  |  106  |  22  ----------------------------<  133<H>  3.4<L>   |  25  |  0.60    Ca    8.1<L>      26 Oct 2022 06:51  Ca    8.2<L>      25 Oct 2022 06:19  Ca    8.5      24 Oct 2022 07:40  Phos  2.5     10-26  Mg     1.9     10-26    TPro  4.2<L>  /  Alb  2.3<L>  /  TBili  0.6  /  DBili  x   /  AST  9<L>  /  ALT  <5<L>  /  AlkPhos  175<H>  10-25  TPro  4.9<L>  /  Alb  2.5<L>  /  TBili  0.4  /  DBili  x   /  AST  9<L>  /  ALT  <5<L>  /  AlkPhos  199<H>  10-24    Magnesium, Serum: 1.9 mg/dL (10-26-22 @ 06:51)  Magnesium, Serum: 1.6 mg/dL (10-25-22 @ 06:19)  Magnesium, Serum: 1.6 mg/dL (10-24-22 @ 07:40)    Phosphorus Level, Serum: 2.5 mg/dL (10-26-22 @ 06:51)  Phosphorus Level, Serum: 3.3 mg/dL (10-25-22 @ 06:19)  Phosphorus Level, Serum: 3.6 mg/dL (10-24-22 @ 07:40)      PT/INR - ( 25 Oct 2022 22:14 )   PT: 13.0 sec;   INR: 1.12 ratio         PTT - ( 26 Oct 2022 10:13 )  PTT:56.2 sec                                        9.8    32.12 )-----------( 166      ( 26 Oct 2022 06:53 )             30.8                         9.6    34.88 )-----------( 134      ( 25 Oct 2022 06:19 )             29.3                         10.9   32.37 )-----------( 132      ( 24 Oct 2022 09:13 )             33.8     CAPILLARY BLOOD GLUCOSE            MICROBIOLOGY:     RADIOLOGY:  [ ] Reviewed and interpreted by me    Point of Care Ultrasound Findings:    PFT:    EKG:

## 2022-10-26 NOTE — PROGRESS NOTE ADULT - ATTENDING COMMENTS
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

## 2022-10-26 NOTE — PROGRESS NOTE ADULT - PROBLEM SELECTOR PLAN 4
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 Last chemo 2 weeks ago per patient. No concern for neutropenia at this time.  - f/u flow cytometry and cytopathology to assess for malignant cells  - hold chemo given active infection  - f/u heme/onc reccs Follicular lymphoma s/p C1 R-miniCHOP on 9/12, 9/16. Last chemo 2 weeks ago per patient. No concern for neutropenia at this time.  - flow cytometry neg for malignancy cells  - hold chemo given active infection  - f/u heme/onc reccs

## 2022-10-26 NOTE — PROGRESS NOTE ADULT - PROBLEM SELECTOR PLAN 5
Volume overloaded on exam, last TTE 8/24 with 65% EF, mild diastolic dysfunction stage I.   - s/p lasix 40 mg x 1 on 10/23, will hold off on further diuresis for now given initiation of metoprolol, may initiate po lasix 20 mg tomorrow 10/26 if BP tolerates Volume overloaded on exam, last TTE 8/24 with 65% EF, mild diastolic dysfunction stage I.   - s/p lasix 40 mg x 1 on 10/23,   - c/w daily lasix 20 po daily

## 2022-10-26 NOTE — PROGRESS NOTE ADULT - PROBLEM SELECTOR PLAN 2
CTA chest with worsening pleural effusions, L>R, when compared with CT chest from previous admission (8/27). Concern for parapneumonic effusion vs. malignant effusion. S/p thoracentesis 10/24 with 1.6L fluid removal  - fluid gram stain neg, likely malignant vs uncomplicated parapneumonic effusion  - s/p lasix 40 mg x 1 10/23 CTA chest with worsening pleural effusions, L>R, when compared with CT chest from previous admission (8/27). Concern for parapneumonic effusion vs. malignant effusion. S/p thoracentesis 10/24 with 1.6L fluid removal  - fluid gram stain neg, cx NGTD, appears transudative based on Light's criteria however fluid was chylous appearing  - s/p lasix 40 mg x 1 10/23  - c/w lasix 20 mg po daily  - flow cytometry with increased neutrophils, no increase in myeloid immaturity

## 2022-10-26 NOTE — PROGRESS NOTE ADULT - PROBLEM SELECTOR PLAN 6
DVT ppx: SCDs   Diet: pending Speech and swallow eval  Code status: Full code    Spoke with daughter 10/25, answered all questions DVT ppx: SCDs   Diet: regular   Code status: Full code    Spoke with daughter 10/26, answered all questions

## 2022-10-26 NOTE — PROGRESS NOTE ADULT - SUBJECTIVE AND OBJECTIVE BOX
DATE OF SERVICE: 10-26-22 @ 07:10    Patient is a 82y old  Female who presents with a chief complaint of sepsis (25 Oct 2022 07:29)      SUBJECTIVE / OVERNIGHT EVENTS:  Overnight, PTT 39.1, heparin gtt increased from 8 to 9 mL/hr.   Pt seen and examined at bedside.    ROS negative except as above.    MEDICATIONS  (STANDING):  allopurinol 300 milliGRAM(s) Oral at bedtime  carbidopa/levodopa  25/100 1 Tablet(s) Oral three times a day  cefepime   IVPB 2000 milliGRAM(s) IV Intermittent every 12 hours  chlorhexidine 2% Cloths 1 Application(s) Topical daily  heparin  Infusion 900 Unit(s)/Hr (9 mL/Hr) IV Continuous <Continuous>  influenza  Vaccine (HIGH DOSE) 0.7 milliLiter(s) IntraMuscular once  latanoprost 0.005% Ophthalmic Solution 1 Drop(s) Both EYES at bedtime  metoprolol tartrate 12.5 milliGRAM(s) Oral every 12 hours  pantoprazole    Tablet 40 milliGRAM(s) Oral before breakfast  senna 2 Tablet(s) Oral at bedtime    MEDICATIONS  (PRN):  acetaminophen     Tablet .. 650 milliGRAM(s) Oral every 6 hours PRN Temp greater or equal to 38C (100.4F), Mild Pain (1 - 3), Moderate Pain (4 - 6)  artificial tears (preservative free) Ophthalmic Solution 1 Drop(s) Left EYE two times a day PRN Dry Eyes  melatonin 3 milliGRAM(s) Oral at bedtime PRN Insomnia      Vital Signs Last 24 Hrs  T(C): 36.5 (26 Oct 2022 05:03), Max: 37.8 (25 Oct 2022 23:52)  T(F): 97.7 (26 Oct 2022 05:03), Max: 100 (25 Oct 2022 23:52)  HR: 86 (26 Oct 2022 05:03) (86 - 142)  BP: 119/81 (26 Oct 2022 05:03) (91/63 - 119/81)  BP(mean): --  RR: 18 (26 Oct 2022 05:03) (18 - 19)  SpO2: 96% (26 Oct 2022 05:03) (93% - 97%)    Parameters below as of 26 Oct 2022 05:03  Patient On (Oxygen Delivery Method): nasal cannula      CAPILLARY BLOOD GLUCOSE        I&O's Summary    25 Oct 2022 07:01  -  26 Oct 2022 07:00  --------------------------------------------------------  IN: 120 mL / OUT: 0 mL / NET: 120 mL        PHYSICAL EXAM:  GENERAL: NAD, well-developed  HEAD:  Atraumatic, Normocephalic  EYES: EOMI, PERRLA, conjunctiva and sclera clear  NECK: Supple, No JVD  CHEST/LUNG: Clear to auscultation bilaterally; No wheeze  HEART: Regular rate and rhythm; No murmurs, rubs, or gallops  ABDOMEN: Soft, Nontender, Nondistended; Bowel sounds present  EXTREMITIES:  2+ Peripheral Pulses, No clubbing, cyanosis, or edema  PSYCH: AAOx3  NEUROLOGY: non-focal  SKIN: No rashes or lesions    LABS:                        9.6    34.88 )-----------( 134      ( 25 Oct 2022 06:19 )             29.3     10-25    142  |  107  |  22  ----------------------------<  89  3.8   |  25  |  0.57    Ca    8.2<L>      25 Oct 2022 06:19  Phos  3.3     10-25  Mg     1.6     10-25    TPro  4.2<L>  /  Alb  2.3<L>  /  TBili  0.6  /  DBili  x   /  AST  9<L>  /  ALT  <5<L>  /  AlkPhos  175<H>  10-25    PT/INR - ( 25 Oct 2022 22:14 )   PT: 13.0 sec;   INR: 1.12 ratio         PTT - ( 25 Oct 2022 22:14 )  PTT:39.1 sec          MICRO:      RADIOLOGY & ADDITIONAL TESTS:      Consultant(s) Notes Reviewed:         DATE OF SERVICE: 10-26-22 @ 07:10    Patient is a 82y old  Female who presents with a chief complaint of sepsis (25 Oct 2022 07:29)      SUBJECTIVE / OVERNIGHT EVENTS:  Overnight, PTT 39.1, heparin gtt increased from 8 to 9 mL/hr.   Pt seen and examined at bedside. Found crying at bedside this morning because she is exhausted from getting constantly re-positioned for her sacral ulcer. Does not endorse pain, but is tired from the repositioning. No SOB, breathing comfortably on 6L NC.     ROS negative except as above.    MEDICATIONS  (STANDING):  allopurinol 300 milliGRAM(s) Oral at bedtime  carbidopa/levodopa  25/100 1 Tablet(s) Oral three times a day  cefepime   IVPB 2000 milliGRAM(s) IV Intermittent every 12 hours  chlorhexidine 2% Cloths 1 Application(s) Topical daily  heparin  Infusion 900 Unit(s)/Hr (9 mL/Hr) IV Continuous <Continuous>  influenza  Vaccine (HIGH DOSE) 0.7 milliLiter(s) IntraMuscular once  latanoprost 0.005% Ophthalmic Solution 1 Drop(s) Both EYES at bedtime  metoprolol tartrate 12.5 milliGRAM(s) Oral every 12 hours  pantoprazole    Tablet 40 milliGRAM(s) Oral before breakfast  senna 2 Tablet(s) Oral at bedtime    MEDICATIONS  (PRN):  acetaminophen     Tablet .. 650 milliGRAM(s) Oral every 6 hours PRN Temp greater or equal to 38C (100.4F), Mild Pain (1 - 3), Moderate Pain (4 - 6)  artificial tears (preservative free) Ophthalmic Solution 1 Drop(s) Left EYE two times a day PRN Dry Eyes  melatonin 3 milliGRAM(s) Oral at bedtime PRN Insomnia      Vital Signs Last 24 Hrs  T(C): 36.5 (26 Oct 2022 05:03), Max: 37.8 (25 Oct 2022 23:52)  T(F): 97.7 (26 Oct 2022 05:03), Max: 100 (25 Oct 2022 23:52)  HR: 86 (26 Oct 2022 05:03) (86 - 142)  BP: 119/81 (26 Oct 2022 05:03) (91/63 - 119/81)  BP(mean): --  RR: 18 (26 Oct 2022 05:03) (18 - 19)  SpO2: 96% (26 Oct 2022 05:03) (93% - 97%)    Parameters below as of 26 Oct 2022 05:03  Patient On (Oxygen Delivery Method): nasal cannula      CAPILLARY BLOOD GLUCOSE        I&O's Summary    25 Oct 2022 07:01  -  26 Oct 2022 07:00  --------------------------------------------------------  IN: 120 mL / OUT: 0 mL / NET: 120 mL        PHYSICAL EXAM:  GENERAL: chronically ill appearing  HEAD:  R Omaya port, no signs of infeciton  EYES: EOMI, PERRLA, conjunctiva and sclera clear  NECK: Supple, No JVD  CHEST/LUNG: Clear to auscultation bilaterally; No wheeze. on 6L NC, no increased work of breathing, no tachypnea  HEART: irregularly irregular  ABDOMEN: Soft, Nontender, Nondistended; Bowel sounds present  EXTREMITIES:  2+ Peripheral Pulses, No clubbing, cyanosis, or edema. R arm PICC line, no signs of infection  PSYCH: AAOx3  NEUROLOGY: non-focal  SKIN: No rashes or lesions    LABS:                        9.6    34.88 )-----------( 134      ( 25 Oct 2022 06:19 )             29.3     10-25    142  |  107  |  22  ----------------------------<  89  3.8   |  25  |  0.57    Ca    8.2<L>      25 Oct 2022 06:19  Phos  3.3     10-25  Mg     1.6     10-25    TPro  4.2<L>  /  Alb  2.3<L>  /  TBili  0.6  /  DBili  x   /  AST  9<L>  /  ALT  <5<L>  /  AlkPhos  175<H>  10-25    PT/INR - ( 25 Oct 2022 22:14 )   PT: 13.0 sec;   INR: 1.12 ratio         PTT - ( 25 Oct 2022 22:14 )  PTT:39.1 sec          MICRO:      RADIOLOGY & ADDITIONAL TESTS:      Consultant(s) Notes Reviewed:

## 2022-10-26 NOTE — PROGRESS NOTE ADULT - PROBLEM SELECTOR PLAN 3
Chronic Afib s/p ablation procedure  - persistently tachy to 110-120s this admission in Afib, improved to 90s s/p thora 10/24  - start metoprolol tartrate 12.5 mg bid, hold for HR < 60, SBP < 95  - ?small acute hemorrhage on initial CTH, repeat scan showing resolution of this focus  - neurosurgery no acute surgical intervention, AC will have risks but no hard contraindication to AC if necessary  - CHADSVASC score 5  - given age and ?brain bleed, discussed risks/benefits with daughter Larisa Franklin, who is a neurologist, and decided to start AC  - per NSGY note, will start heparin gtt, no bolus, with goal PTT 60-65, will get repeat CTH when PTT therapeutic

## 2022-10-27 NOTE — DIETITIAN INITIAL EVALUATION ADULT - REASON FOR ADMISSION
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 Also found to have worsening L pleural effusion, s/p thoracentesis 10/24."

## 2022-10-27 NOTE — DIETITIAN INITIAL EVALUATION ADULT - CONTINUE CURRENT NUTRITION CARE PLAN
- change diet to easy to chew- per pt's preference to try softer diet; monitor tolerance if pt dislikes, may go back to reg consistency and adjust specific food items as needed.   -  Monitor tolerance, PO intake, and adjust as needed./yes Patient was already triaged 10/19/2020 and agreed to get tested outside of [place].    Prior to calling patient, please advise if you require a doximity appt with you or another provider or do you decline Covid testing through [place]?

## 2022-10-27 NOTE — DIETITIAN INITIAL EVALUATION ADULT - PERTINENT LABORATORY DATA
10-27    141  |  107  |  18  ----------------------------<  109<H>  3.7   |  25  |  0.59    Ca    8.3<L>      27 Oct 2022 05:29  Phos  2.7     10-27  Mg     1.8     10-27  10-27 @ 05:29: Na 141, BUN 18, Cr 0.59, <H>, K+ 3.7, Phos 2.7, Mg 1.8, Alk Phos --, ALT/SGPT --, AST/SGOT --, HbA1c --

## 2022-10-27 NOTE — DIETITIAN INITIAL EVALUATION ADULT - ORAL NUTRITION SUPPLEMENTS
- Recommend adding Ensure Plus High Protein 3x/day (nutritionally similar to Ensure Enlive - currently unavailable from ; 350 kcal and 20 g protein in each).   - Aroldo 2xday (in each: 90 kcal and 14g essential amino acids). Note Aroldo requires ~240mL water to be dissolved in for administration.

## 2022-10-27 NOTE — PROGRESS NOTE ADULT - SUBJECTIVE AND OBJECTIVE BOX
CHIEF COMPLAINT:Patient is a 82y old  Female who presents with a chief complaint of sepsis (27 Oct 2022 07:06)      Interval Events:  Reports dyspnea is improved, still with intermittent sacral pain but also improved compared to admission.    REVIEW OF SYSTEMS:  [x] All other systems negative except per HPI   [ ] Unable to assess ROS because ________    OBJECTIVE:  ICU Vital Signs Last 24 Hrs  T(C): 36.6 (27 Oct 2022 11:58), Max: 36.8 (26 Oct 2022 17:20)  T(F): 97.9 (27 Oct 2022 11:58), Max: 98.3 (26 Oct 2022 17:20)  HR: 97 (27 Oct 2022 15:20) (70 - 105)  BP: 103/60 (27 Oct 2022 15:20) (103/60 - 120/78)  BP(mean): --  ABP: --  ABP(mean): --  RR: 18 (27 Oct 2022 11:58) (18 - 18)  SpO2: 96% (27 Oct 2022 15:20) (93% - 98%)    O2 Parameters below as of 27 Oct 2022 15:20  Patient On (Oxygen Delivery Method): nasal cannula  O2 Flow (L/min): 4            10-26 @ 07:01  -  10-27 @ 07:00  --------------------------------------------------------  IN: 480 mL / OUT: 0 mL / NET: 480 mL    10-27 @ 07:01  -  10-27 @ 16:34  --------------------------------------------------------  IN: 840 mL / OUT: 0 mL / NET: 840 mL        PHYSICAL EXAM:  General: Awake, alert, oriented X 3.   HEENT: Atraumatic, normocephalic.   Lymph Nodes: No palpable lymphadenopathy  Neck: No JVD. No carotid bruit.   Respiratory: Normal chest expansion. Decreased BS at the bases.  Cardiovascular: S1 S2 normal. No murmurs, rubs or gallops.   Abdomen: Soft, non-tender, non-distended. No organomegaly.  Extremities: Warm to touch. Peripheral pulse palpable.  Skin: No rashes or skin lesions  Neurological: Motor and sensory examination equal and normal in all four extremities.  Psychiatry: Appropriate mood and affect.    HOSPITAL MEDICATIONS:  MEDICATIONS  (STANDING):  allopurinol 300 milliGRAM(s) Oral at bedtime  carbidopa/levodopa  25/100 1 Tablet(s) Oral three times a day  cefepime   IVPB 2000 milliGRAM(s) IV Intermittent every 12 hours  chlorhexidine 2% Cloths 1 Application(s) Topical daily  furosemide    Tablet 20 milliGRAM(s) Oral daily  heparin  Infusion 950 Unit(s)/Hr (9.5 mL/Hr) IV Continuous <Continuous>  influenza  Vaccine (HIGH DOSE) 0.7 milliLiter(s) IntraMuscular once  latanoprost 0.005% Ophthalmic Solution 1 Drop(s) Both EYES at bedtime  metoprolol tartrate 12.5 milliGRAM(s) Oral every 8 hours  pantoprazole    Tablet 40 milliGRAM(s) Oral before breakfast  senna 2 Tablet(s) Oral at bedtime    MEDICATIONS  (PRN):  acetaminophen     Tablet .. 650 milliGRAM(s) Oral every 6 hours PRN Temp greater or equal to 38C (100.4F), Mild Pain (1 - 3), Moderate Pain (4 - 6)  artificial tears (preservative free) Ophthalmic Solution 1 Drop(s) Left EYE two times a day PRN Dry Eyes  melatonin 3 milliGRAM(s) Oral at bedtime PRN Insomnia      LABS:    The Labs were reviewed by me   The Radiology was reviewed by me    EKG tracing reviewed by me    10-27    141  |  107  |  18  ----------------------------<  109<H>  3.7   |  25  |  0.59  10-26    143  |  106  |  21  ----------------------------<  96  3.7   |  24  |  0.57  10-25    142  |  107  |  22  ----------------------------<  89  3.8   |  25  |  0.57    Ca    8.3<L>      27 Oct 2022 05:29  Ca    8.1<L>      26 Oct 2022 06:51  Ca    8.2<L>      25 Oct 2022 06:19  Phos  2.7     10-27  Mg     1.8     10-27    TPro  4.2<L>  /  Alb  2.3<L>  /  TBili  0.6  /  DBili  x   /  AST  9<L>  /  ALT  <5<L>  /  AlkPhos  175<H>  10-25    Magnesium, Serum: 1.8 mg/dL (10-27-22 @ 05:29)  Magnesium, Serum: 1.9 mg/dL (10-26-22 @ 06:51)  Magnesium, Serum: 1.6 mg/dL (10-25-22 @ 06:19)    Phosphorus Level, Serum: 2.7 mg/dL (10-27-22 @ 05:29)  Phosphorus Level, Serum: 2.5 mg/dL (10-26-22 @ 06:51)  Phosphorus Level, Serum: 3.3 mg/dL (10-25-22 @ 06:19)      PT/INR - ( 25 Oct 2022 22:14 )   PT: 13.0 sec;   INR: 1.12 ratio         PTT - ( 27 Oct 2022 15:48 )  PTT:54.8 sec                                        9.4    23.57 )-----------( 223      ( 27 Oct 2022 05:29 )             29.1                         9.8    32.12 )-----------( 166      ( 26 Oct 2022 06:53 )             30.8                         9.6    34.88 )-----------( 134      ( 25 Oct 2022 06:19 )             29.3     CAPILLARY BLOOD GLUCOSE            MICROBIOLOGY:     RADIOLOGY:  [ ] Reviewed and interpreted by me    Point of Care Ultrasound Findings:    PFT:    EKG:

## 2022-10-27 NOTE — DIETITIAN INITIAL EVALUATION ADULT - PERSON TAUGHT/METHOD
- Encouraged adequate PO intake for meeting nutritional needs, improving nutritional status and for preventing wt loss  - Discussed the importance of including adequate calories and proteins throughout the day; reviewed protein calorie dense food sources/ meal and snacks ideas./verbal instruction/patient instructed

## 2022-10-27 NOTE — DIETITIAN NUTRITION RISK NOTIFICATION - FINDINGS BASED ON COMPREHENSIVE NUTRITION ASSESSMENT, CONSULTATION PERFORMED ON
27-Oct-2022 Continue Regimen: Finacea gel prn Detail Level: Zone Render In Strict Bullet Format?: No

## 2022-10-27 NOTE — DIETITIAN INITIAL EVALUATION ADULT - FACTORS AFF FOOD INTAKE
pt reports feeling weak, and decreased appetite; report cannot chew on hard food but denies chewing/swallowing difficulties/other (specify)

## 2022-10-27 NOTE — DIETITIAN INITIAL EVALUATION ADULT - REASON INDICATOR FOR ASSESSMENT
Pt seen for meeting BMI <19 kg/m2 criteria   Information obtained from pt, electronic medical record

## 2022-10-27 NOTE — DIETITIAN INITIAL EVALUATION ADULT - NSFNSPHYEXAMSKINFT_GEN_A_CORE
Pt states UBW ~110 pounds; noted dosing wt 121.4 pounds , pt report attempt for wt gain PTA after a wt loss  87% IBW ( pounds)  Skin: per wound care 10/26 "Sacral/bilateral Buttocks deep tissue injury present on admission"  Performed nutrition focused physical exam with pt's consent and noted mod- severe signs of muscle/fat loss

## 2022-10-27 NOTE — DIETITIAN INITIAL EVALUATION ADULT - OTHER CALCULATIONS
Dosing wt 121.4 pounds used for calorie needs calculation; IBW -10%(BMI <19) 126 pounds for protein needs calculations. defer fluids per team

## 2022-10-27 NOTE — PROGRESS NOTE ADULT - ATTENDING COMMENTS
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 (last mini RCHOP 10/10) with PICC line and ommaya, Afib s/p ablation, diastolic HF, L pleural effusion, incarcerated R femoral hernia s/p small bowel resection and mesh presents with worsening SOB admitted hypoxic respiratory failure 2/2 PNA with Kleb bacteremia and large Left pleural effusion    #Acute Hypoxic respiratory failure 2/2 pneumonia and left pleural effusion   # Klebseilla bacteremia   - improved, now on 6L NC, no increased work of breathing on exam this AM  - s/p vancomycin (10/23--10/24) + cefepime   - deescalate antibiotics to cefepime only (10/23-    ) ; plan to convert to PO antibiotics with further clinical improvement, anticipate total 14 day antibiotics course    - BCx currently grew klebsiella, sensitive to cefepime;  f/u repeat BCx 10/25 NGTD   - f/u sputum culture, f/u urine legionella   - s/p pulm consult,s/p thoracentesis 10/24, -1.6L, fluid analysis not suggestive of bacterial infection (pH 7.65, glucose 135), negative gram stain, f/u pleural fluid culture, f/u cyopath and flow cyto  - start patient on furosemide PO 20mg daily     # Intracranial hemorrhage   - admission CT head 10/23 noted "punctate density along the R frontal lobe near the site of the meenu hole may represent a small focus of hemorrhage vs. small focus of calcification"  - f/u repeat CT head 10/25:  Right frontal Ommaya reservoir with its tip at the level of the septum pellucidum. Age-appropriate involutional change. Resolution of previous high attenuation focus questioned on the prior study likely represented a small amount of acute hemorrhage. Resolution of the bifrontal pneumocephalus.  - c/w neurocheck q4h  - s/p neurosurgery evaluation: discussed starting AC for A fib, per neurosurgery team, no absolute contraindication to start AC                                                If AC is started...                                               - Recommend NO bolus with low ptt goal of ~60-65                                               - Recommend obtaining follow up CTH after patient is therapeutic and to notify neurosurgery immediately with any changes in the patients neurologic exam.  - repeat head CT 10/27 no intracranial hemorrhage     #Follicular lymphoma   - Patient follows Dr. Kenan Delgadillo at [facility] known [facility]  - s/p R-mini-CHOP (C1 - 9/16/22, C2 - 10/10/22), due for cycle 3 on 10/31, holding off chemo for now   - WBC 30s --- confirmed with hematology team patient received Neulasta on 10/10   - hematology oncology following  - trend CBC with differential, TLS labs daily     #Atrial fibrillation  - discussed with patient this AM: s/p ablation yesterday ago, and she has not been on any rate control /rhythm control medication or AC for many years   - reviewed ECG on admission, telemetry ---- A fib w/ HR 80-120s overnight   - uptitrated metoprolol tartrate 12.5 mg PO q8h  - [id number] score at least 5 (age, female gender, CHF, HTN) ; patient also has active malignancy, so high risk of thrombosis  - s/p risk/benefit discussion of initiating AC with patient's daughter who is a neurologist, including the acute small brain hemorrhage discovered on head CT, and the discussion with neurosurgery --- patient daughter opt for starting AC   - c/w heparin gtt with low apTT goal 60-65, with no bolus, repeat CT head 10/27 no intracranial hemorrhage --- plan to transition to apixaban 2.5mg BID (adjusted for age >80, weight < 60kg) 10/27 AM if patient remains clinically stable     D/w Dr Kelly .

## 2022-10-27 NOTE — PROGRESS NOTE ADULT - PROBLEM SELECTOR PLAN 3
Chronic Afib s/p ablation procedure  - persistently tachy to 110-120s this admission in Afib, improved to 90s s/p thora 10/24  - start metoprolol tartrate 12.5 mg bid, hold for HR < 60, SBP < 95  - ?small acute hemorrhage on initial CTH, repeat scan showing resolution of this focus  - neurosurgery no acute surgical intervention, AC will have risks but no hard contraindication to AC if necessary  - CHADSVASC score 5  - given age and ?brain bleed, discussed risks/benefits with daughter Larisa Franklin, who is a neurologist, and decided to start AC  - per NSGY note, will start heparin gtt, no bolus, with goal PTT 60-65, will get repeat CTH when PTT therapeutic Chronic Afib s/p ablation procedure  - persistently tachy to 110-120s this admission in Afib, improved to 90s s/p thora 10/24  - increase metoprolol tartrate 12.5 mg to q8h, hold for HR < 60, SBP < 95  - ?small acute hemorrhage on initial CTH, repeat scan showing resolution of this focus  - neurosurgery no acute surgical intervention, AC will have risks but no hard contraindication to AC if necessary  - CHADSVASC score 5  - given age and ?brain bleed, discussed risks/benefits with daughter Larisa Franklin, who is a neurologist, and decided to start AC  - per NSGY note, will start heparin gtt, no bolus, with goal PTT 60-65  - repeat CTH unremarkable, no hemorrhage or infarct

## 2022-10-27 NOTE — DIETITIAN INITIAL EVALUATION ADULT - ORAL INTAKE PTA/DIET HISTORY
Pt endorses decreased appetite and PO intake PTA. reports wt loss then attempt for wt gain.   Reports was ?DASH diet in nursing home. Confirmed no known food allergies/ food intolerances

## 2022-10-27 NOTE — PROGRESS NOTE ADULT - ASSESSMENT
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 She was admitted to the hospital for hypoxic respiratory failure in the setting of pneumonia, pleural effusion, klebsiella bacteremia. Pulmonary consulted for pleural effusion.    Pleural Effusion  - Large L pleural effusion, patient has previously had thoracentesis consistent with exudative process  - s/p thoracentesis 10/24, 1600cc chylous fluid removed, TGs >400  - negative cytopath and flow cyto  - Continue antibiotics per primary team  - Wean O2 as tolerated

## 2022-10-27 NOTE — PROGRESS NOTE ADULT - ASSESSMENT
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 Also found to have worsening L pleural effusion, s/p thoracentesis 10/24.

## 2022-10-27 NOTE — DIETITIAN INITIAL EVALUATION ADULT - OTHER INFO
Home Medications:  allopurinol 300 mg oral tablet: 1 tab(s) orally once a day (at bedtime) (25 Oct 2022 11:11)  carbidopa-levodopa 25 mg-100 mg oral tablet: 1 tab(s) orally 3 times a day (10a,3p, 8p) (25 Oct 2022 11:11)  latanoprost 0.005% ophthalmic solution: 1 drop(s) to each affected eye once a day (at bedtime) (25 Oct 2022 11:11)  melatonin 3 mg oral tablet: 1 tab(s) orally once a day (at bedtime), As needed, Insomnia (25 Oct 2022 11:11)  pantoprazole 40 mg oral delayed release tablet: 1 tab(s) orally once a day (before a meal) (25 Oct 2022 11:11)  senna leaf extract oral tablet: 2 tab(s) orally once a day (at bedtime) (25 Oct 2022 11:11)

## 2022-10-27 NOTE — DIETITIAN INITIAL EVALUATION ADULT - FEEDING ASSISTANCE
- Provide assistance with meals as needed to promote adequate PO intake   - Encourage adequate consumption of meals/supplements to optimize protein-energy intake

## 2022-10-27 NOTE — PROGRESS NOTE ADULT - PROBLEM SELECTOR PLAN 4
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 Last chemo 2 weeks ago per patient. No concern for neutropenia at this time.  - flow cytometry neg for malignancy cells  - hold chemo given active infection  - f/u heme/onc reccs

## 2022-10-27 NOTE — DIETITIAN INITIAL EVALUATION ADULT - NSFNSGIIOFT_GEN_A_CORE
- Pt denies nausea, vomiting, diarrhea, or constipation at this time   - Last BM: this am per pt; currently ordered for bowel regimen (senna)

## 2022-10-27 NOTE — PROGRESS NOTE ADULT - PROBLEM SELECTOR PLAN 6
DVT ppx: SCDs   Diet: regular   Code status: Full code    Spoke with daughter 10/26, answered all questions DVT ppx: SCDs   Diet: regular   Code status: Full code    Spoke with daughter 10/27, answered all questions

## 2022-10-27 NOTE — PROGRESS NOTE ADULT - ATTENDING COMMENTS
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   Thoracentesis performed on 10/24 with removal of approx 1600 ml chylous appearing fluid  Chylothorax, likely related to her lymphoma- cultures NGTD, cytopath and flow cytometry negative  CXR and clinically improved, less dyspneic and on NC at 4 LPM, cont to wean   Bacteremic with Klebsiella - sensitive, remains on broad spectrum antibiotics, repeat cultures have cleared  Will cont to follow.

## 2022-10-27 NOTE — DIETITIAN INITIAL EVALUATION ADULT - SIGNS/SYMPTOMS
<75% EER >7 days, mod-sev muscle fat loss, +1 edema pt with suspected deep tissue injury per wound care

## 2022-10-27 NOTE — DIETITIAN INITIAL EVALUATION ADULT - PERTINENT MEDS FT
MEDICATIONS  (STANDING):  allopurinol 300 milliGRAM(s) Oral at bedtime  carbidopa/levodopa  25/100 1 Tablet(s) Oral three times a day  cefepime   IVPB 2000 milliGRAM(s) IV Intermittent every 12 hours  chlorhexidine 2% Cloths 1 Application(s) Topical daily  furosemide    Tablet 20 milliGRAM(s) Oral daily  heparin  Infusion 950 Unit(s)/Hr (9.5 mL/Hr) IV Continuous <Continuous>  influenza  Vaccine (HIGH DOSE) 0.7 milliLiter(s) IntraMuscular once  latanoprost 0.005% Ophthalmic Solution 1 Drop(s) Both EYES at bedtime  metoprolol tartrate 12.5 milliGRAM(s) Oral every 8 hours  pantoprazole    Tablet 40 milliGRAM(s) Oral before breakfast  senna 2 Tablet(s) Oral at bedtime    MEDICATIONS  (PRN):  acetaminophen     Tablet .. 650 milliGRAM(s) Oral every 6 hours PRN Temp greater or equal to 38C (100.4F), Mild Pain (1 - 3), Moderate Pain (4 - 6)  artificial tears (preservative free) Ophthalmic Solution 1 Drop(s) Left EYE two times a day PRN Dry Eyes  melatonin 3 milliGRAM(s) Oral at bedtime PRN Insomnia

## 2022-10-27 NOTE — PROGRESS NOTE ADULT - PROBLEM SELECTOR PLAN 2
CTA chest with worsening pleural effusions, L>R, when compared with CT chest from previous admission (8/27). Concern for parapneumonic effusion vs. malignant effusion. S/p thoracentesis 10/24 with 1.6L fluid removal  - fluid gram stain neg, cx NGTD, appears transudative based on Light's criteria however fluid was chylous appearing  - s/p lasix 40 mg x 1 10/23  - c/w lasix 20 mg po daily  - flow cytometry with increased neutrophils, no increase in myeloid immaturity CTA chest with worsening pleural effusions, L>R, when compared with CT chest from previous admission (8/27). Concern for parapneumonic effusion vs. malignant effusion. S/p thoracentesis 10/24 with 1.6L fluid removal  - fluid gram stain neg, cx NGTD, appears transudative based on Light's criteria however fluid was chylous appearing  - s/p lasix 40 mg x 1 10/23  - c/w lasix 20 mg po daily  - flow cytometry with increased neutrophils, no malignant cells

## 2022-10-27 NOTE — DIETITIAN INITIAL EVALUATION ADULT - ADD RECOMMEND
- Food preferences obtained and updated  - If not medically contraindicated, recommend MVI, vitamin C 500 mg daily + zinc 220 mg daily to promote wound healing.   - Malnutrition sticker placed, RD to follow-up as per protocol  - Nutrition care plan to remain consistent with pt goals of care  - RD remains available to review diet education and adjust diet recommendations as needed.

## 2022-10-27 NOTE — PROGRESS NOTE ADULT - PROBLEM SELECTOR PLAN 5
Volume overloaded on exam, last TTE 8/24 with 65% EF, mild diastolic dysfunction stage I.   - s/p lasix 40 mg x 1 on 10/23,   - c/w daily lasix 20 po daily

## 2022-10-27 NOTE — PROGRESS NOTE ADULT - SUBJECTIVE AND OBJECTIVE BOX
DATE OF SERVICE: 10-27-22 @ 07:07    Patient is a 82y old  Female who presents with a chief complaint of sepsis (26 Oct 2022 12:17)      SUBJECTIVE / OVERNIGHT EVENTS:  Overnight,  Pt seen and examined at bedside.    ROS negative except as above.    MEDICATIONS  (STANDING):  allopurinol 300 milliGRAM(s) Oral at bedtime  carbidopa/levodopa  25/100 1 Tablet(s) Oral three times a day  cefepime   IVPB 2000 milliGRAM(s) IV Intermittent every 12 hours  chlorhexidine 2% Cloths 1 Application(s) Topical daily  furosemide    Tablet 20 milliGRAM(s) Oral daily  heparin  Infusion 1000 Unit(s)/Hr (10 mL/Hr) IV Continuous <Continuous>  influenza  Vaccine (HIGH DOSE) 0.7 milliLiter(s) IntraMuscular once  latanoprost 0.005% Ophthalmic Solution 1 Drop(s) Both EYES at bedtime  metoprolol tartrate 12.5 milliGRAM(s) Oral every 12 hours  pantoprazole    Tablet 40 milliGRAM(s) Oral before breakfast  senna 2 Tablet(s) Oral at bedtime    MEDICATIONS  (PRN):  acetaminophen     Tablet .. 650 milliGRAM(s) Oral every 6 hours PRN Temp greater or equal to 38C (100.4F), Mild Pain (1 - 3), Moderate Pain (4 - 6)  artificial tears (preservative free) Ophthalmic Solution 1 Drop(s) Left EYE two times a day PRN Dry Eyes  melatonin 3 milliGRAM(s) Oral at bedtime PRN Insomnia      Vital Signs Last 24 Hrs  T(C): 36.4 (27 Oct 2022 05:14), Max: 36.8 (26 Oct 2022 17:20)  T(F): 97.5 (27 Oct 2022 05:14), Max: 98.3 (26 Oct 2022 17:20)  HR: 95 (27 Oct 2022 05:14) (93 - 105)  BP: 103/70 (27 Oct 2022 05:14) (103/70 - 120/78)  BP(mean): --  RR: 18 (27 Oct 2022 05:14) (18 - 18)  SpO2: 94% (27 Oct 2022 05:14) (93% - 95%)    Parameters below as of 27 Oct 2022 05:14  Patient On (Oxygen Delivery Method): nasal cannula      CAPILLARY BLOOD GLUCOSE        I&O's Summary    26 Oct 2022 07:01  -  27 Oct 2022 07:00  --------------------------------------------------------  IN: 480 mL / OUT: 0 mL / NET: 480 mL        PHYSICAL EXAM:  GENERAL: NAD, well-developed  HEAD:  Atraumatic, Normocephalic  EYES: EOMI, PERRLA, conjunctiva and sclera clear  NECK: Supple, No JVD  CHEST/LUNG: Clear to auscultation bilaterally; No wheeze  HEART: Regular rate and rhythm; No murmurs, rubs, or gallops  ABDOMEN: Soft, Nontender, Nondistended; Bowel sounds present  EXTREMITIES:  2+ Peripheral Pulses, No clubbing, cyanosis, or edema  PSYCH: AAOx3  NEUROLOGY: non-focal  SKIN: No rashes or lesions    LABS:                        9.4    23.57 )-----------( 223      ( 27 Oct 2022 05:29 )             29.1     10-27    141  |  107  |  18  ----------------------------<  109<H>  3.7   |  25  |  0.59    Ca    8.3<L>      27 Oct 2022 05:29  Phos  2.7     10-27  Mg     1.8     10-27      PT/INR - ( 25 Oct 2022 22:14 )   PT: 13.0 sec;   INR: 1.12 ratio         PTT - ( 27 Oct 2022 05:29 )  PTT:84.0 sec          MICRO:      RADIOLOGY & ADDITIONAL TESTS:      Consultant(s) Notes Reviewed:         DATE OF SERVICE: 10-27-22 @ 07:07    Patient is a 82y old  Female who presents with a chief complaint of sepsis (26 Oct 2022 12:17)      SUBJECTIVE / OVERNIGHT EVENTS:  Overnight, PTT 65.4, CTH ordered. Repeat PTT 84, rate decreased by 1.  Pt seen and examined at bedside.    ROS negative except as above.    MEDICATIONS  (STANDING):  allopurinol 300 milliGRAM(s) Oral at bedtime  carbidopa/levodopa  25/100 1 Tablet(s) Oral three times a day  cefepime   IVPB 2000 milliGRAM(s) IV Intermittent every 12 hours  chlorhexidine 2% Cloths 1 Application(s) Topical daily  furosemide    Tablet 20 milliGRAM(s) Oral daily  heparin  Infusion 1000 Unit(s)/Hr (10 mL/Hr) IV Continuous <Continuous>  influenza  Vaccine (HIGH DOSE) 0.7 milliLiter(s) IntraMuscular once  latanoprost 0.005% Ophthalmic Solution 1 Drop(s) Both EYES at bedtime  metoprolol tartrate 12.5 milliGRAM(s) Oral every 12 hours  pantoprazole    Tablet 40 milliGRAM(s) Oral before breakfast  senna 2 Tablet(s) Oral at bedtime    MEDICATIONS  (PRN):  acetaminophen     Tablet .. 650 milliGRAM(s) Oral every 6 hours PRN Temp greater or equal to 38C (100.4F), Mild Pain (1 - 3), Moderate Pain (4 - 6)  artificial tears (preservative free) Ophthalmic Solution 1 Drop(s) Left EYE two times a day PRN Dry Eyes  melatonin 3 milliGRAM(s) Oral at bedtime PRN Insomnia      Vital Signs Last 24 Hrs  T(C): 36.4 (27 Oct 2022 05:14), Max: 36.8 (26 Oct 2022 17:20)  T(F): 97.5 (27 Oct 2022 05:14), Max: 98.3 (26 Oct 2022 17:20)  HR: 95 (27 Oct 2022 05:14) (93 - 105)  BP: 103/70 (27 Oct 2022 05:14) (103/70 - 120/78)  BP(mean): --  RR: 18 (27 Oct 2022 05:14) (18 - 18)  SpO2: 94% (27 Oct 2022 05:14) (93% - 95%)    Parameters below as of 27 Oct 2022 05:14  Patient On (Oxygen Delivery Method): nasal cannula      CAPILLARY BLOOD GLUCOSE        I&O's Summary    26 Oct 2022 07:01  -  27 Oct 2022 07:00  --------------------------------------------------------  IN: 480 mL / OUT: 0 mL / NET: 480 mL        PHYSICAL EXAM:  GENERAL: NAD, well-developed  HEAD:  Atraumatic, Normocephalic  EYES: EOMI, PERRLA, conjunctiva and sclera clear  NECK: Supple, No JVD  CHEST/LUNG: Clear to auscultation bilaterally; No wheeze  HEART: Regular rate and rhythm; No murmurs, rubs, or gallops  ABDOMEN: Soft, Nontender, Nondistended; Bowel sounds present  EXTREMITIES:  2+ Peripheral Pulses, No clubbing, cyanosis, or edema  PSYCH: AAOx3  NEUROLOGY: non-focal  SKIN: No rashes or lesions    LABS:                        9.4    23.57 )-----------( 223      ( 27 Oct 2022 05:29 )             29.1     10-27    141  |  107  |  18  ----------------------------<  109<H>  3.7   |  25  |  0.59    Ca    8.3<L>      27 Oct 2022 05:29  Phos  2.7     10-27  Mg     1.8     10-27      PT/INR - ( 25 Oct 2022 22:14 )   PT: 13.0 sec;   INR: 1.12 ratio         PTT - ( 27 Oct 2022 05:29 )  PTT:84.0 sec          MICRO:      RADIOLOGY & ADDITIONAL TESTS:      Consultant(s) Notes Reviewed:         DATE OF SERVICE: 10-27-22 @ 07:07    Patient is a 82y old  Female who presents with a chief complaint of sepsis (26 Oct 2022 12:17)      SUBJECTIVE / OVERNIGHT EVENTS:  Overnight, PTT 65.4, CTH ordered. Repeat PTT 84, rate decreased by 1. Given lidocaine patch for pain.  Pt seen and examined at bedside.    ROS negative except as above.    MEDICATIONS  (STANDING):  allopurinol 300 milliGRAM(s) Oral at bedtime  carbidopa/levodopa  25/100 1 Tablet(s) Oral three times a day  cefepime   IVPB 2000 milliGRAM(s) IV Intermittent every 12 hours  chlorhexidine 2% Cloths 1 Application(s) Topical daily  furosemide    Tablet 20 milliGRAM(s) Oral daily  heparin  Infusion 1000 Unit(s)/Hr (10 mL/Hr) IV Continuous <Continuous>  influenza  Vaccine (HIGH DOSE) 0.7 milliLiter(s) IntraMuscular once  latanoprost 0.005% Ophthalmic Solution 1 Drop(s) Both EYES at bedtime  metoprolol tartrate 12.5 milliGRAM(s) Oral every 12 hours  pantoprazole    Tablet 40 milliGRAM(s) Oral before breakfast  senna 2 Tablet(s) Oral at bedtime    MEDICATIONS  (PRN):  acetaminophen     Tablet .. 650 milliGRAM(s) Oral every 6 hours PRN Temp greater or equal to 38C (100.4F), Mild Pain (1 - 3), Moderate Pain (4 - 6)  artificial tears (preservative free) Ophthalmic Solution 1 Drop(s) Left EYE two times a day PRN Dry Eyes  melatonin 3 milliGRAM(s) Oral at bedtime PRN Insomnia      Vital Signs Last 24 Hrs  T(C): 36.4 (27 Oct 2022 05:14), Max: 36.8 (26 Oct 2022 17:20)  T(F): 97.5 (27 Oct 2022 05:14), Max: 98.3 (26 Oct 2022 17:20)  HR: 95 (27 Oct 2022 05:14) (93 - 105)  BP: 103/70 (27 Oct 2022 05:14) (103/70 - 120/78)  BP(mean): --  RR: 18 (27 Oct 2022 05:14) (18 - 18)  SpO2: 94% (27 Oct 2022 05:14) (93% - 95%)    Parameters below as of 27 Oct 2022 05:14  Patient On (Oxygen Delivery Method): nasal cannula      CAPILLARY BLOOD GLUCOSE        I&O's Summary    26 Oct 2022 07:01  -  27 Oct 2022 07:00  --------------------------------------------------------  IN: 480 mL / OUT: 0 mL / NET: 480 mL        PHYSICAL EXAM:  GENERAL: NAD, well-developed  HEAD:  Atraumatic, Normocephalic  EYES: EOMI, PERRLA, conjunctiva and sclera clear  NECK: Supple, No JVD  CHEST/LUNG: Clear to auscultation bilaterally; No wheeze  HEART: Regular rate and rhythm; No murmurs, rubs, or gallops  ABDOMEN: Soft, Nontender, Nondistended; Bowel sounds present  EXTREMITIES:  2+ Peripheral Pulses, No clubbing, cyanosis, or edema  PSYCH: AAOx3  NEUROLOGY: non-focal  SKIN: No rashes or lesions    LABS:                        9.4    23.57 )-----------( 223      ( 27 Oct 2022 05:29 )             29.1     10-27    141  |  107  |  18  ----------------------------<  109<H>  3.7   |  25  |  0.59    Ca    8.3<L>      27 Oct 2022 05:29  Phos  2.7     10-27  Mg     1.8     10-27      PT/INR - ( 25 Oct 2022 22:14 )   PT: 13.0 sec;   INR: 1.12 ratio         PTT - ( 27 Oct 2022 05:29 )  PTT:84.0 sec          MICRO:      RADIOLOGY & ADDITIONAL TESTS:      Consultant(s) Notes Reviewed:         DATE OF SERVICE: 10-27-22 @ 07:07    Patient is a 82y old  Female who presents with a chief complaint of sepsis (26 Oct 2022 12:17)      SUBJECTIVE / OVERNIGHT EVENTS:  Overnight, PTT 65.4, CTH ordered. Repeat PTT 84, rate decreased by 1. Given lidocaine patch for pain. Overnight tele with Afib 90-120s  Pt seen and examined at bedside. Emotionally labile, however reports she is not in pain right now.     ROS negative except as above.    MEDICATIONS  (STANDING):  allopurinol 300 milliGRAM(s) Oral at bedtime  carbidopa/levodopa  25/100 1 Tablet(s) Oral three times a day  cefepime   IVPB 2000 milliGRAM(s) IV Intermittent every 12 hours  chlorhexidine 2% Cloths 1 Application(s) Topical daily  furosemide    Tablet 20 milliGRAM(s) Oral daily  heparin  Infusion 1000 Unit(s)/Hr (10 mL/Hr) IV Continuous <Continuous>  influenza  Vaccine (HIGH DOSE) 0.7 milliLiter(s) IntraMuscular once  latanoprost 0.005% Ophthalmic Solution 1 Drop(s) Both EYES at bedtime  metoprolol tartrate 12.5 milliGRAM(s) Oral every 12 hours  pantoprazole    Tablet 40 milliGRAM(s) Oral before breakfast  senna 2 Tablet(s) Oral at bedtime    MEDICATIONS  (PRN):  acetaminophen     Tablet .. 650 milliGRAM(s) Oral every 6 hours PRN Temp greater or equal to 38C (100.4F), Mild Pain (1 - 3), Moderate Pain (4 - 6)  artificial tears (preservative free) Ophthalmic Solution 1 Drop(s) Left EYE two times a day PRN Dry Eyes  melatonin 3 milliGRAM(s) Oral at bedtime PRN Insomnia      Vital Signs Last 24 Hrs  T(C): 36.4 (27 Oct 2022 05:14), Max: 36.8 (26 Oct 2022 17:20)  T(F): 97.5 (27 Oct 2022 05:14), Max: 98.3 (26 Oct 2022 17:20)  HR: 95 (27 Oct 2022 05:14) (93 - 105)  BP: 103/70 (27 Oct 2022 05:14) (103/70 - 120/78)  BP(mean): --  RR: 18 (27 Oct 2022 05:14) (18 - 18)  SpO2: 94% (27 Oct 2022 05:14) (93% - 95%)    Parameters below as of 27 Oct 2022 05:14  Patient On (Oxygen Delivery Method): nasal cannula      CAPILLARY BLOOD GLUCOSE        I&O's Summary    26 Oct 2022 07:01  -  27 Oct 2022 07:00  --------------------------------------------------------  IN: 480 mL / OUT: 0 mL / NET: 480 mL        PHYSICAL EXAM:  GENERAL: NAD, well-developed  HEAD:  Atraumatic, Normocephalic. R omaya, no SOI  EYES: EOMI, PERRLA, conjunctiva and sclera clear  NECK: Supple, No JVD  CHEST/LUNG: Clear to auscultation bilaterally; No wheeze. On 5L NC.   HEART: Regular rate and rhythm; No murmurs, rubs, or gallops  ABDOMEN: Soft, Nontender, Nondistended; Bowel sounds present  EXTREMITIES:  2+ Peripheral Pulses, No clubbing, cyanosis, or edema. R PICC, no SOI  PSYCH: AAOx3  NEUROLOGY: non-focal  SKIN: No rashes or lesions    LABS:                        9.4    23.57 )-----------( 223      ( 27 Oct 2022 05:29 )             29.1     10-27    141  |  107  |  18  ----------------------------<  109<H>  3.7   |  25  |  0.59    Ca    8.3<L>      27 Oct 2022 05:29  Phos  2.7     10-27  Mg     1.8     10-27      PT/INR - ( 25 Oct 2022 22:14 )   PT: 13.0 sec;   INR: 1.12 ratio         PTT - ( 27 Oct 2022 05:29 )  PTT:84.0 sec          MICRO:      RADIOLOGY & ADDITIONAL TESTS:      Consultant(s) Notes Reviewed:         DATE OF SERVICE: 10-27-22 @ 07:07    Patient is a 82y old  Female who presents with a chief complaint of sepsis (26 Oct 2022 12:17)      SUBJECTIVE / OVERNIGHT EVENTS:  Overnight, PTT 65.4, CTH ordered. Repeat PTT 84, rate decreased by 1. Given lidocaine patch for pain. Overnight tele with Afib 90-120s  Pt seen and examined at bedside. Emotionally labile, however reports she is not in pain right now.     ROS negative except as above.    MEDICATIONS  (STANDING):  allopurinol 300 milliGRAM(s) Oral at bedtime  carbidopa/levodopa  25/100 1 Tablet(s) Oral three times a day  cefepime   IVPB 2000 milliGRAM(s) IV Intermittent every 12 hours  chlorhexidine 2% Cloths 1 Application(s) Topical daily  furosemide    Tablet 20 milliGRAM(s) Oral daily  heparin  Infusion 1000 Unit(s)/Hr (10 mL/Hr) IV Continuous <Continuous>  influenza  Vaccine (HIGH DOSE) 0.7 milliLiter(s) IntraMuscular once  latanoprost 0.005% Ophthalmic Solution 1 Drop(s) Both EYES at bedtime  metoprolol tartrate 12.5 milliGRAM(s) Oral every 12 hours  pantoprazole    Tablet 40 milliGRAM(s) Oral before breakfast  senna 2 Tablet(s) Oral at bedtime    MEDICATIONS  (PRN):  acetaminophen     Tablet .. 650 milliGRAM(s) Oral every 6 hours PRN Temp greater or equal to 38C (100.4F), Mild Pain (1 - 3), Moderate Pain (4 - 6)  artificial tears (preservative free) Ophthalmic Solution 1 Drop(s) Left EYE two times a day PRN Dry Eyes  melatonin 3 milliGRAM(s) Oral at bedtime PRN Insomnia      Vital Signs Last 24 Hrs  T(C): 36.4 (27 Oct 2022 05:14), Max: 36.8 (26 Oct 2022 17:20)  T(F): 97.5 (27 Oct 2022 05:14), Max: 98.3 (26 Oct 2022 17:20)  HR: 95 (27 Oct 2022 05:14) (93 - 105)  BP: 103/70 (27 Oct 2022 05:14) (103/70 - 120/78)  BP(mean): --  RR: 18 (27 Oct 2022 05:14) (18 - 18)  SpO2: 94% (27 Oct 2022 05:14) (93% - 95%)    Parameters below as of 27 Oct 2022 05:14  Patient On (Oxygen Delivery Method): nasal cannula      CAPILLARY BLOOD GLUCOSE        I&O's Summary    26 Oct 2022 07:01  -  27 Oct 2022 07:00  --------------------------------------------------------  IN: 480 mL / OUT: 0 mL / NET: 480 mL        PHYSICAL EXAM:  GENERAL: NAD, well-developed  HEAD:  Atraumatic, Normocephalic. R omaya, no SOI  EYES: EOMI, PERRLA, conjunctiva and sclera clear  NECK: Supple, No JVD  CHEST/LUNG: Clear to auscultation bilaterally; No wheeze. On 5L NC.   HEART: Regular rate and rhythm; No murmurs, rubs, or gallops  ABDOMEN: Soft, Nontender, Nondistended; Bowel sounds present  EXTREMITIES:  2+ Peripheral Pulses, No clubbing, cyanosis, or edema. R PICC, no SOI  PSYCH: AAOx3  NEUROLOGY: non-focal  SKIN: No rashes or lesions    LABS:                        9.4    23.57 )-----------( 223      ( 27 Oct 2022 05:29 )             29.1     10-27    141  |  107  |  18  ----------------------------<  109<H>  3.7   |  25  |  0.59    Ca    8.3<L>      27 Oct 2022 05:29  Phos  2.7     10-27  Mg     1.8     10-27      PT/INR - ( 25 Oct 2022 22:14 )   PT: 13.0 sec;   INR: 1.12 ratio         PTT - ( 27 Oct 2022 05:29 )  PTT:84.0 sec          MICRO:      RADIOLOGY & ADDITIONAL TESTS:  < from: CT Head No Cont (10.27.22 @ 08:51) >    IMPRESSION:    1)  involutional changes with volume loss. No acute infarct or hemorrhage   suggested.  2)  Right frontal Ommaya reservoir with unchanged positioning.    --- End of Report ---    < end of copied text >      Consultant(s) Notes Reviewed:

## 2022-10-28 NOTE — PROGRESS NOTE ADULT - ATTENDING COMMENTS
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 May need pleurx catheter placement in the future for recurrent pleural effusion.   CXR and clinically improved, less dyspneic and with less supplemental O2 requirements, cont to wean   Bacteremic with Klebsiella - sensitive, remains on broad spectrum antibiotics, repeat cultures have cleared  Will cont to follow.

## 2022-10-28 NOTE — PROGRESS NOTE ADULT - PROBLEM SELECTOR PLAN 2
CTA chest with worsening pleural effusions, L>R, when compared with CT chest from previous admission (8/27). Concern for parapneumonic effusion vs. malignant effusion. S/p thoracentesis 10/24 with 1.6L fluid removal  - fluid gram stain neg, cx NGTD, appears transudative based on Light's criteria however fluid was chylous appearing  - s/p lasix 40 mg x 1 10/23  - c/w lasix 20 mg po daily  - flow cytometry with increased neutrophils, no malignant cells

## 2022-10-28 NOTE — PROGRESS NOTE ADULT - SUBJECTIVE AND OBJECTIVE BOX
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 Also found to have worsening L pleural effusion, s/p thoracentesis 10/24."     (27 Oct 2022 16:50)      SUBJECTIVE / OVERNIGHT EVENTS:  Overnight, PTT 59.5, heparin gtt rate unchanged.  Pt seen and examined at bedside.    ROS negative except as above.    MEDICATIONS  (STANDING):  allopurinol 300 milliGRAM(s) Oral at bedtime  ascorbic acid 500 milliGRAM(s) Oral daily  carbidopa/levodopa  25/100 1 Tablet(s) Oral three times a day  cefepime   IVPB 2000 milliGRAM(s) IV Intermittent every 12 hours  chlorhexidine 2% Cloths 1 Application(s) Topical daily  furosemide    Tablet 20 milliGRAM(s) Oral daily  heparin  Infusion 950 Unit(s)/Hr (9.5 mL/Hr) IV Continuous <Continuous>  influenza  Vaccine (HIGH DOSE) 0.7 milliLiter(s) IntraMuscular once  latanoprost 0.005% Ophthalmic Solution 1 Drop(s) Both EYES at bedtime  metoprolol tartrate 12.5 milliGRAM(s) Oral every 8 hours  multivitamin 1 Tablet(s) Oral daily  pantoprazole    Tablet 40 milliGRAM(s) Oral before breakfast  senna 2 Tablet(s) Oral at bedtime  zinc sulfate 220 milliGRAM(s) Oral daily    MEDICATIONS  (PRN):  acetaminophen     Tablet .. 650 milliGRAM(s) Oral every 6 hours PRN Temp greater or equal to 38C (100.4F), Mild Pain (1 - 3), Moderate Pain (4 - 6)  artificial tears (preservative free) Ophthalmic Solution 1 Drop(s) Left EYE two times a day PRN Dry Eyes  melatonin 3 milliGRAM(s) Oral at bedtime PRN Insomnia      Vital Signs Last 24 Hrs  T(C): 36.3 (28 Oct 2022 05:19), Max: 36.6 (27 Oct 2022 11:58)  T(F): 97.4 (28 Oct 2022 05:19), Max: 97.9 (27 Oct 2022 11:58)  HR: 82 (28 Oct 2022 05:19) (70 - 98)  BP: 108/77 (28 Oct 2022 05:19) (103/60 - 109/78)  BP(mean): --  RR: 18 (28 Oct 2022 05:19) (18 - 18)  SpO2: 97% (28 Oct 2022 05:19) (94% - 98%)    Parameters below as of 28 Oct 2022 05:19  Patient On (Oxygen Delivery Method): nasal cannula      CAPILLARY BLOOD GLUCOSE        I&O's Summary    27 Oct 2022 07:01  -  28 Oct 2022 07:00  --------------------------------------------------------  IN: 1272 mL / OUT: 0 mL / NET: 1272 mL        PHYSICAL EXAM:  GENERAL: NAD, well-developed  HEAD:  Atraumatic, Normocephalic  EYES: EOMI, PERRLA, conjunctiva and sclera clear  NECK: Supple, No JVD  CHEST/LUNG: Clear to auscultation bilaterally; No wheeze  HEART: Regular rate and rhythm; No murmurs, rubs, or gallops  ABDOMEN: Soft, Nontender, Nondistended; Bowel sounds present  EXTREMITIES:  2+ Peripheral Pulses, No clubbing, cyanosis, or edema  PSYCH: AAOx3  NEUROLOGY: non-focal  SKIN: No rashes or lesions    LABS:                        9.2    21.63 )-----------( 236      ( 28 Oct 2022 05:48 )             28.2     10-28    141  |  105  |  17  ----------------------------<  96  3.5   |  28  |  0.66    Ca    8.5      28 Oct 2022 05:48  Phos  3.0     10-28  Mg     1.7     10-28      PTT - ( 28 Oct 2022 05:48 )  PTT:64.1 sec          MICRO:      RADIOLOGY & ADDITIONAL TESTS:      Consultant(s) Notes Reviewed:         DATE OF SERVICE: 10-28-22 @ 07:17    Patient is a 82y old  Female who presents with a chief complaint of Shortness of breath  "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 Also found to have worsening L pleural effusion, s/p thoracentesis 10/24."     (27 Oct 2022 16:50)      SUBJECTIVE / OVERNIGHT EVENTS:  Overnight, PTT 59.5, heparin gtt rate unchanged. Afib to 130s. on tele  Pt seen and examined at bedside. No acute complaints.     ROS negative except as above.    MEDICATIONS  (STANDING):  allopurinol 300 milliGRAM(s) Oral at bedtime  ascorbic acid 500 milliGRAM(s) Oral daily  carbidopa/levodopa  25/100 1 Tablet(s) Oral three times a day  cefepime   IVPB 2000 milliGRAM(s) IV Intermittent every 12 hours  chlorhexidine 2% Cloths 1 Application(s) Topical daily  furosemide    Tablet 20 milliGRAM(s) Oral daily  heparin  Infusion 950 Unit(s)/Hr (9.5 mL/Hr) IV Continuous <Continuous>  influenza  Vaccine (HIGH DOSE) 0.7 milliLiter(s) IntraMuscular once  latanoprost 0.005% Ophthalmic Solution 1 Drop(s) Both EYES at bedtime  metoprolol tartrate 12.5 milliGRAM(s) Oral every 8 hours  multivitamin 1 Tablet(s) Oral daily  pantoprazole    Tablet 40 milliGRAM(s) Oral before breakfast  senna 2 Tablet(s) Oral at bedtime  zinc sulfate 220 milliGRAM(s) Oral daily    MEDICATIONS  (PRN):  acetaminophen     Tablet .. 650 milliGRAM(s) Oral every 6 hours PRN Temp greater or equal to 38C (100.4F), Mild Pain (1 - 3), Moderate Pain (4 - 6)  artificial tears (preservative free) Ophthalmic Solution 1 Drop(s) Left EYE two times a day PRN Dry Eyes  melatonin 3 milliGRAM(s) Oral at bedtime PRN Insomnia      Vital Signs Last 24 Hrs  T(C): 36.3 (28 Oct 2022 05:19), Max: 36.6 (27 Oct 2022 11:58)  T(F): 97.4 (28 Oct 2022 05:19), Max: 97.9 (27 Oct 2022 11:58)  HR: 82 (28 Oct 2022 05:19) (70 - 98)  BP: 108/77 (28 Oct 2022 05:19) (103/60 - 109/78)  BP(mean): --  RR: 18 (28 Oct 2022 05:19) (18 - 18)  SpO2: 97% (28 Oct 2022 05:19) (94% - 98%)    Parameters below as of 28 Oct 2022 05:19  Patient On (Oxygen Delivery Method): nasal cannula      CAPILLARY BLOOD GLUCOSE        I&O's Summary    27 Oct 2022 07:01  -  28 Oct 2022 07:00  --------------------------------------------------------  IN: 1272 mL / OUT: 0 mL / NET: 1272 mL        PHYSICAL EXAM:  GENERAL: NAD, well-developed  HEAD:  Atraumatic, Normocephalic. R omaya, no SOI  EYES: EOMI, PERRLA, conjunctiva and sclera clear  NECK: Supple, No JVD  CHEST/LUNG: Clear to auscultation bilaterally; No wheeze. on 4L NC  HEART: Regular rate and rhythm; No murmurs, rubs, or gallops  ABDOMEN: Soft, Nontender, Nondistended; Bowel sounds present  EXTREMITIES:  2+ Peripheral Pulses, No clubbing, cyanosis, or edema. R PICC, no SOI  PSYCH: AAOx3  NEUROLOGY: non-focal  SKIN: No rashes or lesions    LABS:                        9.2    21.63 )-----------( 236      ( 28 Oct 2022 05:48 )             28.2     10-28    141  |  105  |  17  ----------------------------<  96  3.5   |  28  |  0.66    Ca    8.5      28 Oct 2022 05:48  Phos  3.0     10-28  Mg     1.7     10-28      PTT - ( 28 Oct 2022 05:48 )  PTT:64.1 sec          MICRO:      RADIOLOGY & ADDITIONAL TESTS:      Consultant(s) Notes Reviewed:

## 2022-10-28 NOTE — PROGRESS NOTE ADULT - PROBLEM SELECTOR PLAN 3
Chronic Afib s/p ablation procedure  - persistently tachy to 110-120s this admission in Afib, improved to 90s s/p thora 10/24  - increase metoprolol tartrate 12.5 mg to q8h, hold for HR < 60, SBP < 95  - ?small acute hemorrhage on initial CTH, repeat scan showing resolution of this focus  - neurosurgery no acute surgical intervention, AC will have risks but no hard contraindication to AC if necessary  - CHADSVASC score 5  - given age and ?brain bleed, discussed risks/benefits with daughter Larisa Franklin, who is a neurologist, and decided to start AC  - per NSGY note, will start heparin gtt, no bolus, with goal PTT 60-65  - repeat CTH unremarkable, no hemorrhage or infarct Chronic Afib s/p ablation procedure  - persistently tachy to 110-120s this admission in Afib, improved to 90s s/p thora 10/24  - increase metoprolol tartrate 12.5 mg to q8h, hold for HR < 60, SBP < 95  - ?small acute hemorrhage on initial CTH, repeat scan showing resolution of this focus  - neurosurgery no acute surgical intervention, AC will have risks but no hard contraindication to AC if necessary  - CHADSVASC score 5  - given age and ?brain bleed, discussed risks/benefits with daughter Larisa Franklin, who is a neurologist, and decided to start AC  - per NSGY note, will start heparin gtt, no bolus, with goal PTT 60-65  - repeat CTH unremarkable, no hemorrhage or infarct  - after 24h, will switch to eliquis 2.5 mg bid

## 2022-10-28 NOTE — PROGRESS NOTE ADULT - ASSESSMENT
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 She was admitted to the hospital for hypoxic respiratory failure in the setting of pneumonia, pleural effusion, klebsiella bacteremia. Pulmonary consulted for pleural effusion which was drained, c/w exudative process with TGs >400, negative cytopathology and flow cytometry.    Pleural Effusion  - Large L pleural effusion, patient has previously had thoracentesis consistent with exudative process  - s/p thoracentesis 10/24, 1600cc chylous fluid removed, TGs >400  - negative cytopath and flow cyto  - Continue antibiotics per primary team  - Wean O2 as tolerated  - Will repeat POCUS monday and consider pleurx

## 2022-10-28 NOTE — PROGRESS NOTE ADULT - ATTENDING COMMENTS
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 (last mini RCHOP 10/10) with PICC line and ommaya, Afib s/p ablation, diastolic HF, L pleural effusion, incarcerated R femoral hernia s/p small bowel resection and mesh presents with worsening SOB admitted hypoxic respiratory failure 2/2 PNA with Kleb bacteremia and large Left pleural effusion    #Acute Hypoxic respiratory failure 2/2 pneumonia and left pleural effusion   # Klebsiella bacteremia   - improved, now on 2L NC, no increased work of breathing on exam this AM  - s/p vancomycin (10/23--10/24) + cefepime   - deescalate antibiotics to cefepime only (10/23-    ) ; plan to convert to PO antibiotics with further clinical improvement, anticipate total 14 day antibiotics course    - BCx currently grew klebsiella, sensitive to cefepime;  f/u repeat BCx 10/25 NGTD   - f/u sputum culture, f/u urine legionella   - s/p pulm consult,s/p thoracentesis 10/24, -1.6L, fluid analysis not suggestive of bacterial infection (pH 7.65, glucose 135), negative gram stain, f/u pleural fluid culture, f/u cyopath and flow cyto  - start patient on furosemide PO 20mg daily     # Intracranial hemorrhage   - admission CT head 10/23 noted "punctate density along the R frontal lobe near the site of the meenu hole may represent a small focus of hemorrhage vs. small focus of calcification"  - f/u repeat CT head 10/25:  Right frontal Ommaya reservoir with its tip at the level of the septum pellucidum. Age-appropriate involutional change. Resolution of previous high attenuation focus questioned on the prior study likely represented a small amount of acute hemorrhage. Resolution of the bifrontal pneumocephalus.  - c/w neurocheck q4h  - s/p neurosurgery evaluation: discussed starting AC for A fib, per neurosurgery team, no absolute contraindication to start AC                                                If AC is started...                                               - Recommend NO bolus with low ptt goal of ~60-65                                               - Recommend obtaining follow up CTH after patient is therapeutic and to notify neurosurgery immediately with any changes in the patients neurologic exam.  - repeat head CT 10/27 no intracranial hemorrhage     #Follicular lymphoma   - Patient follows Dr. Kenan Delgadillo at [facility] ([facility] known [facility] [place])  - s/p R-mini-CHOP (C1 - 9/16/22, C2 - 10/10/22), due for cycle 3 on 10/31, holding off chemo for now   - WBC 30s --- confirmed with hematology team patient received Neulasta on 10/10   - hematology oncology following  - trend CBC with differential, TLS labs daily     #Atrial fibrillation  - discussed with patient this AM: s/p ablation yesterday ago, and she has not been on any rate control /rhythm control medication or AC for many years   - reviewed ECG on admission, telemetry ---- A fib w/ HR 80-120s overnight   - uptitrated metoprolol tartrate 12.5 mg PO q8h  - [id number] score at least 5 (age, female gender, CHF, HTN) ; patient also has active malignancy, so high risk of thrombosis  - s/p risk/benefit discussion of initiating AC with patient's daughter who is a neurologist, including the acute small brain hemorrhage discovered on head CT, and the discussion with neurosurgery --- patient daughter opt for starting AC   - c/w heparin gtt with low apTT goal 60-65, with no bolus, repeat CT head 10/27 no intracranial hemorrhage --- plan to transition to apixaban 2.5mg BID (adjusted for age >80, weight < 60kg) if patient remains clinically stable   - CM working with family regarding insurance coverage for leti     D/w Dr Kelly .

## 2022-10-28 NOTE — PROGRESS NOTE ADULT - PROBLEM SELECTOR PLAN 6
DVT ppx: SCDs   Diet: regular   Code status: Full code    Spoke with daughter 10/27, answered all questions DVT ppx: SCDs   Diet: regular   Code status: Full code    Spoke with daughter 10/28, answered all questions

## 2022-10-28 NOTE — PROGRESS NOTE ADULT - SUBJECTIVE AND OBJECTIVE BOX
CHIEF COMPLAINT:Patient is a 82y old  Female who presents with a chief complaint of sepsis (28 Oct 2022 07:16)      Interval Events:    REVIEW OF SYSTEMS:  [x] All other systems negative except per HPI   [ ] Unable to assess ROS because ________    OBJECTIVE:  ICU Vital Signs Last 24 Hrs  T(C): 36.5 (28 Oct 2022 10:46), Max: 36.6 (27 Oct 2022 11:58)  T(F): 97.7 (28 Oct 2022 10:46), Max: 97.9 (27 Oct 2022 11:58)  HR: 93 (28 Oct 2022 10:46) (70 - 98)  BP: 90/57 (28 Oct 2022 10:46) (90/57 - 109/78)  BP(mean): --  ABP: --  ABP(mean): --  RR: 18 (28 Oct 2022 10:46) (18 - 18)  SpO2: 94% (28 Oct 2022 10:46) (94% - 98%)    O2 Parameters below as of 28 Oct 2022 10:46  Patient On (Oxygen Delivery Method): nasal cannula              10-27 @ 07:01  -  10-28 @ 07:00  --------------------------------------------------------  IN: 1272 mL / OUT: 0 mL / NET: 1272 mL        PHYSICAL EXAM:  General: Awake, alert, oriented X 3.   HEENT: Atraumatic, normocephalic.   Lymph Nodes: No palpable lymphadenopathy  Neck: No JVD. No carotid bruit.   Respiratory: Normal chest expansion. Decreased BS at the bases.  Cardiovascular: S1 S2 normal. No murmurs, rubs or gallops.   Abdomen: Soft, non-tender, non-distended. No organomegaly.  Extremities: Warm to touch. Peripheral pulse palpable.  Skin: No rashes or skin lesions  Neurological: Motor and sensory examination equal and normal in all four extremities.  Psychiatry: Appropriate mood and affect.    HOSPITAL MEDICATIONS:  MEDICATIONS  (STANDING):  allopurinol 300 milliGRAM(s) Oral at bedtime  ascorbic acid 500 milliGRAM(s) Oral daily  carbidopa/levodopa  25/100 1 Tablet(s) Oral three times a day  cefepime   IVPB 2000 milliGRAM(s) IV Intermittent every 12 hours  chlorhexidine 2% Cloths 1 Application(s) Topical daily  furosemide    Tablet 20 milliGRAM(s) Oral daily  heparin  Infusion 950 Unit(s)/Hr (9.5 mL/Hr) IV Continuous <Continuous>  influenza  Vaccine (HIGH DOSE) 0.7 milliLiter(s) IntraMuscular once  latanoprost 0.005% Ophthalmic Solution 1 Drop(s) Both EYES at bedtime  metoprolol tartrate 12.5 milliGRAM(s) Oral every 8 hours  multivitamin 1 Tablet(s) Oral daily  pantoprazole    Tablet 40 milliGRAM(s) Oral before breakfast  senna 2 Tablet(s) Oral at bedtime  zinc sulfate 220 milliGRAM(s) Oral daily    MEDICATIONS  (PRN):  acetaminophen     Tablet .. 650 milliGRAM(s) Oral every 6 hours PRN Temp greater or equal to 38C (100.4F), Mild Pain (1 - 3), Moderate Pain (4 - 6)  artificial tears (preservative free) Ophthalmic Solution 1 Drop(s) Left EYE two times a day PRN Dry Eyes  melatonin 3 milliGRAM(s) Oral at bedtime PRN Insomnia      LABS:    The Labs were reviewed by me   The Radiology was reviewed by me    EKG tracing reviewed by me    10-28    141  |  105  |  17  ----------------------------<  96  3.5   |  28  |  0.66  10-27    141  |  107  |  18  ----------------------------<  109<H>  3.7   |  25  |  0.59  10-26    143  |  106  |  21  ----------------------------<  96  3.7   |  24  |  0.57    Ca    8.5      28 Oct 2022 05:48  Ca    8.3<L>      27 Oct 2022 05:29  Ca    8.1<L>      26 Oct 2022 06:51  Phos  3.0     10-28  Mg     1.7     10-28      Magnesium, Serum: 1.7 mg/dL (10-28-22 @ 05:48)  Magnesium, Serum: 1.8 mg/dL (10-27-22 @ 05:29)  Magnesium, Serum: 1.9 mg/dL (10-26-22 @ 06:51)    Phosphorus Level, Serum: 3.0 mg/dL (10-28-22 @ 05:48)  Phosphorus Level, Serum: 2.7 mg/dL (10-27-22 @ 05:29)  Phosphorus Level, Serum: 2.5 mg/dL (10-26-22 @ 06:51)      PTT - ( 28 Oct 2022 05:48 )  PTT:64.1 sec                                        9.2    21.63 )-----------( 236      ( 28 Oct 2022 05:48 )             28.2                         9.4    23.57 )-----------( 223      ( 27 Oct 2022 05:29 )             29.1                         9.8    32.12 )-----------( 166      ( 26 Oct 2022 06:53 )             30.8     CAPILLARY BLOOD GLUCOSE            MICROBIOLOGY:     RADIOLOGY:  [ ] Reviewed and interpreted by me    Point of Care Ultrasound Findings:    PFT:    EKG: CHIEF COMPLAINT:Patient is a 82y old  Female who presents with a chief complaint of sepsis (28 Oct 2022 07:16)      Interval Events:  Pt denies dypsnea at rest, occasional cough, no CP, no new concerns at this time.    REVIEW OF SYSTEMS:  [x] All other systems negative except per HPI   [ ] Unable to assess ROS because ________    OBJECTIVE:  ICU Vital Signs Last 24 Hrs  T(C): 36.5 (28 Oct 2022 10:46), Max: 36.6 (27 Oct 2022 11:58)  T(F): 97.7 (28 Oct 2022 10:46), Max: 97.9 (27 Oct 2022 11:58)  HR: 93 (28 Oct 2022 10:46) (70 - 98)  BP: 90/57 (28 Oct 2022 10:46) (90/57 - 109/78)  BP(mean): --  ABP: --  ABP(mean): --  RR: 18 (28 Oct 2022 10:46) (18 - 18)  SpO2: 94% (28 Oct 2022 10:46) (94% - 98%)    O2 Parameters below as of 28 Oct 2022 10:46  Patient On (Oxygen Delivery Method): nasal cannula              10-27 @ 07:01  -  10-28 @ 07:00  --------------------------------------------------------  IN: 1272 mL / OUT: 0 mL / NET: 1272 mL        PHYSICAL EXAM:  General: Awake, alert, oriented X 3.   HEENT: Atraumatic, normocephalic.   Lymph Nodes: No palpable lymphadenopathy  Neck: No JVD. No carotid bruit.   Respiratory: Normal chest expansion. Decreased BS at the bases.  Cardiovascular: S1 S2 normal. No murmurs, rubs or gallops.   Abdomen: Soft, non-tender, non-distended. No organomegaly.  Extremities: Warm to touch. Peripheral pulse palpable.  Skin: No rashes or skin lesions  Neurological: Motor and sensory examination equal and normal in all four extremities.  Psychiatry: Appropriate mood and affect.    HOSPITAL MEDICATIONS:  MEDICATIONS  (STANDING):  allopurinol 300 milliGRAM(s) Oral at bedtime  ascorbic acid 500 milliGRAM(s) Oral daily  carbidopa/levodopa  25/100 1 Tablet(s) Oral three times a day  cefepime   IVPB 2000 milliGRAM(s) IV Intermittent every 12 hours  chlorhexidine 2% Cloths 1 Application(s) Topical daily  furosemide    Tablet 20 milliGRAM(s) Oral daily  heparin  Infusion 950 Unit(s)/Hr (9.5 mL/Hr) IV Continuous <Continuous>  influenza  Vaccine (HIGH DOSE) 0.7 milliLiter(s) IntraMuscular once  latanoprost 0.005% Ophthalmic Solution 1 Drop(s) Both EYES at bedtime  metoprolol tartrate 12.5 milliGRAM(s) Oral every 8 hours  multivitamin 1 Tablet(s) Oral daily  pantoprazole    Tablet 40 milliGRAM(s) Oral before breakfast  senna 2 Tablet(s) Oral at bedtime  zinc sulfate 220 milliGRAM(s) Oral daily    MEDICATIONS  (PRN):  acetaminophen     Tablet .. 650 milliGRAM(s) Oral every 6 hours PRN Temp greater or equal to 38C (100.4F), Mild Pain (1 - 3), Moderate Pain (4 - 6)  artificial tears (preservative free) Ophthalmic Solution 1 Drop(s) Left EYE two times a day PRN Dry Eyes  melatonin 3 milliGRAM(s) Oral at bedtime PRN Insomnia      LABS:    The Labs were reviewed by me   The Radiology was reviewed by me    EKG tracing reviewed by me    10-28    141  |  105  |  17  ----------------------------<  96  3.5   |  28  |  0.66  10-27    141  |  107  |  18  ----------------------------<  109<H>  3.7   |  25  |  0.59  10-26    143  |  106  |  21  ----------------------------<  96  3.7   |  24  |  0.57    Ca    8.5      28 Oct 2022 05:48  Ca    8.3<L>      27 Oct 2022 05:29  Ca    8.1<L>      26 Oct 2022 06:51  Phos  3.0     10-28  Mg     1.7     10-28      Magnesium, Serum: 1.7 mg/dL (10-28-22 @ 05:48)  Magnesium, Serum: 1.8 mg/dL (10-27-22 @ 05:29)  Magnesium, Serum: 1.9 mg/dL (10-26-22 @ 06:51)    Phosphorus Level, Serum: 3.0 mg/dL (10-28-22 @ 05:48)  Phosphorus Level, Serum: 2.7 mg/dL (10-27-22 @ 05:29)  Phosphorus Level, Serum: 2.5 mg/dL (10-26-22 @ 06:51)      PTT - ( 28 Oct 2022 05:48 )  PTT:64.1 sec                                        9.2    21.63 )-----------( 236      ( 28 Oct 2022 05:48 )             28.2                         9.4    23.57 )-----------( 223      ( 27 Oct 2022 05:29 )             29.1                         9.8    32.12 )-----------( 166      ( 26 Oct 2022 06:53 )             30.8     CAPILLARY BLOOD GLUCOSE            MICROBIOLOGY:     RADIOLOGY:  [ ] Reviewed and interpreted by me    Point of Care Ultrasound Findings:    PFT:    EKG:

## 2022-10-28 NOTE — PROGRESS NOTE ADULT - PROBLEM SELECTOR PLAN 1
Arrived to ED tachycardic, tachypneic, and with leukocytosis. Afebrile but with subjective chills. CXR with RUL opacity concerning for pneumonia. CTA chest with right lung pneumonia. S/p one dose of vanc/zosyn in ED.   - Bcx with Klebsiella pneumoniae, repeat cx clear  - f/u Ucx; pt with hx of Pseudomonas in urine in August  - gyn consulted given concern for uterine/vaginal prolapse increasing risk of UTI, no plans for inpatient intervention, has outpatient urogyn appt 11/24 for pessary evaluation  - sacral ulcer with no signs of infection, will hold off on wound cx   - c/w cefepime (10/24 - 11/8) to complete 2 week course since cultures clearing, may switch to levaquin to complete course  if QTc normal  - on 5L NC, wean as tolerated  - initially with worsening leukocytosis likely due to Neupogen treatment on 10/10, now downtrending Arrived to ED tachycardic, tachypneic, and with leukocytosis. Afebrile but with subjective chills. CXR with RUL opacity concerning for pneumonia. CTA chest with right lung pneumonia. S/p one dose of vanc/zosyn in ED.   - Bcx with Klebsiella pneumoniae, repeat cx clear  - f/u Ucx; pt with hx of Pseudomonas in urine in August  - gyn consulted given concern for uterine/vaginal prolapse increasing risk of UTI, no plans for inpatient intervention, has outpatient urogyn appt 11/24 for pessary evaluation  - sacral ulcer with no signs of infection, will hold off on wound cx   - c/w cefepime (10/24 - 11/7) to complete 2 week course; will switch to levaquin once weened off oxygen  - on 4L NC, wean as tolerated  - initially with worsening leukocytosis likely due to Neupogen treatment on 10/10, now downtrending

## 2022-10-29 NOTE — PROGRESS NOTE ADULT - PROBLEM SELECTOR PLAN 3
Chronic Afib s/p ablation procedure  - persistently tachy to 110-120s this admission in Afib, improved to 90s s/p thora 10/24  - increase metoprolol tartrate 12.5 mg to q8h, hold for HR < 60, SBP < 95  - ?small acute hemorrhage on initial CTH, repeat scan showing resolution of this focus  - neurosurgery no acute surgical intervention, AC will have risks but no hard contraindication to AC if necessary  - CHADSVASC score 5  - given age and ?brain bleed, discussed risks/benefits with daughter Larisa Franklin, who is a neurologist, and decided to start AC  - per NSGY note, will start heparin gtt, no bolus, with goal PTT 60-65  - repeat CTH unremarkable, no hemorrhage or infarct  - after 24h, will switch to eliquis 2.5 mg bid Chronic Afib s/p ablation procedure  - persistently tachy to 110-120s this admission in Afib, improved to 90s s/p thora 10/24  - increase metoprolol tartrate 12.5 mg to q8h, hold for HR < 60, SBP < 95  - ?small acute hemorrhage on initial CTH, repeat scan showing resolution of this focus  - neurosurgery no acute surgical intervention, AC will have risks but no hard contraindication to AC if necessary  - CHADSVASC score 5  - given age and ?brain bleed, discussed risks/benefits with daughter Larisa Franklin, who is a neurologist, and decided to start AC  - per NSGY note, will start heparin gtt, no bolus, with goal PTT 60-65  - repeat CTH unremarkable, no hemorrhage or infarct  - switch to eliquis 2.5 mg bid pending insurance update; insurance info put in chart note from 10/28

## 2022-10-29 NOTE — CHART NOTE - NSCHARTNOTEFT_GEN_A_CORE
Patient does have [employer] prescription insurance that should cover Eliquis 2.5 bid    Rx Bin #: [id number]  Issuer #: [id number]  ID #: [id number]

## 2022-10-29 NOTE — DISCHARGE NOTE PROVIDER - NSDCFUSCHEDAPPT_GEN_ALL_CORE_FT
[facility]  [facility]  Scheduled Appointment: 10/31/2022    [facility]  [facility]  Scheduled Appointment: 11/21/2022    Dg Hopper  [facility]  UROGYN [address] [place] [address]  Scheduled Appointment: 11/21/2022    [facility]  [facility]  Scheduled Appointment: 12/12/2022     [facility]  [facility]  Scheduled Appointment: 11/21/2022    Dg Hopper  [facility]  UROGYN [address] [place] [address]  Scheduled Appointment: 11/21/2022    [facility]  [facility]  Scheduled Appointment: 12/12/2022

## 2022-10-29 NOTE — DISCHARGE NOTE PROVIDER - NSDCFUADDAPPT_GEN_ALL_CORE_FT
APPTS ARE READY TO BE MADE: [X ] YES    Best Family or Patient Contact (if needed):    Additional Information about above appointments (if needed):    1: Pulmonology within 2 weeks [phone] [address] [facility] 107  2: Dr. Kenan Delgadillo at [facility] ([facility] known [facility] within 2 weeks       Other comments or requests:

## 2022-10-29 NOTE — PROGRESS NOTE ADULT - PROBLEM SELECTOR PLAN 1
Arrived to ED tachycardic, tachypneic, and with leukocytosis. Afebrile but with subjective chills. CXR with RUL opacity concerning for pneumonia. CTA chest with right lung pneumonia. S/p one dose of vanc/zosyn in ED.   - Bcx with Klebsiella pneumoniae, repeat cx clear  - f/u Ucx; pt with hx of Pseudomonas in urine in August  - gyn consulted given concern for uterine/vaginal prolapse increasing risk of UTI, no plans for inpatient intervention, has outpatient urogyn appt 11/24 for pessary evaluation  - sacral ulcer with no signs of infection, will hold off on wound cx   - c/w cefepime (10/24 - 11/7) to complete 2 week course; will switch to levaquin once weened off oxygen  - on 4L NC, wean as tolerated  - initially with worsening leukocytosis likely due to Neupogen treatment on 10/10, now downtrending Arrived to ED tachycardic, tachypneic, and with leukocytosis. Afebrile but with subjective chills. CXR with RUL opacity concerning for pneumonia. CTA chest with right lung pneumonia. S/p one dose of vanc/zosyn in ED.   - Bcx with Klebsiella pneumoniae, repeat cx clear  - gyn consulted given concern for uterine/vaginal prolapse increasing risk of UTI, no plans for inpatient intervention, has outpatient urogyn appt 11/24 for pessary evaluation  - sacral ulcer with no signs of infection, will hold off on wound cx   - s/p cefepime (10/24 - 10/29); will complete 2 week course with po levaquin  - decreasing oxygen requirements, now on 2L NC, wean as tolerated  - initially with worsening leukocytosis likely due to Neupogen treatment on 10/10, now downtrending

## 2022-10-29 NOTE — DISCHARGE NOTE PROVIDER - NSDCCPTREATMENT_GEN_ALL_CORE_FT
PRINCIPAL PROCEDURE  Procedure: CT head wo contrast  Findings and Treatment:   FINDINGS:  HEMISPHERES:  Age-appropriate involutional changes. Periventricular   hypodensities consistent with microvascular ischemic changes. No mass or   space occupying lesion.  No acute ischemic changes or hemorrhagic foci   are suggested.  VENTRICLES:  Midline and normal in size. A right frontal approach Ommaya   reservoir terminates at the level of the septum pellucidum.  POSTERIOR FOSSA:  The brain stem and cerebellum are unremarkable.  No CP   angle lesion noted.  EXTRACEREBRAL SPACES:  No subdural or epidural collections are noted.  SKULL BASE AND CALVARIUM:  Right frontal meenu hole with catheter.  SINUSES AND MASTOIDS:  Clear.  MISCELLANEOUS:  No orbital abnormality noted. Redemonstrated partially   empty sella.        SECONDARY PROCEDURE  Procedure: CT angiogram chest wo then w contrast  Findings and Treatment: FINDINGS:  LUNGS/AIRWAYS/PLEURA: Patent central airways.  Right lung consolidative   and groundglass opacities, new from prior exam. Small right pleural   effusion with associated right lower lobe partial atelectasis, increased   from prior exam. Large left pleural effusion with associated left lower   lobe partial passive atelectasis, increased from prior exam. New left   upper lobe partial atelectasis.  MEDIASTINUM AND ANAYELI: Redemonstrated confluent paraspinal and posterior   mediastinal soft tissue surrounding the descending aorta extending from   the aortic arch to approximately the level of the T11. Previously   described anterior mediastinal mass is decreased in size measuring 5.4 x   0.8 cm, previously 5.5 x 1.8 cm.  VESSELS: No pulmonary embolism. 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  HEART: Heart size is normal. Trace anterior pericardial effusion.  CHEST WALL AND LOWER NECK: Redemonstrated enlarged left axillary lymph   nodes measuring up to 1.3 x 1.9 cm, previously 3.6 x 2.5 cm.  VISUALIZED UPPER ABDOMEN: Within normal limits.  BONES: Degenerative changes. Diffuse osseous metastases again noted.

## 2022-10-29 NOTE — PROGRESS NOTE ADULT - PROBLEM SELECTOR PLAN 4
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 Last chemo 2 weeks ago per patient. No concern for neutropenia at this time.  - flow cytometry neg for malignancy cells  - hold chemo given active infection  - f/u heme/onc reccs Follicular lymphoma s/p C1 R-miniCHOP on 9/12, 9/16. Last chemo 2 weeks ago per patient. No concern for neutropenia at this time.  - flow cytometry neg for malignant cells  - hold chemo given active infection  - f/u heme/onc reccs

## 2022-10-29 NOTE — PROGRESS NOTE ADULT - SUBJECTIVE AND OBJECTIVE BOX
DATE OF SERVICE: 10-29-22 @ 07:12    Patient is a 82y old  Female who presents with a chief complaint of sepsis (28 Oct 2022 11:01)      SUBJECTIVE / OVERNIGHT EVENTS:  Overnight, c/o b/l leg and back pain, consistent with her chronic pain. Given IV tylenol, no improvement. Given toradol no improvement. Finally given IV 0.25 mg dilaudid with improvement.   Pt seen and examined at bedside.    ROS negative except as above.    MEDICATIONS  (STANDING):  allopurinol 300 milliGRAM(s) Oral at bedtime  ascorbic acid 500 milliGRAM(s) Oral daily  carbidopa/levodopa  25/100 1 Tablet(s) Oral three times a day  cefepime   IVPB 2000 milliGRAM(s) IV Intermittent every 12 hours  chlorhexidine 2% Cloths 1 Application(s) Topical daily  furosemide    Tablet 20 milliGRAM(s) Oral daily  heparin  Infusion 950 Unit(s)/Hr (9.5 mL/Hr) IV Continuous <Continuous>  influenza  Vaccine (HIGH DOSE) 0.7 milliLiter(s) IntraMuscular once  latanoprost 0.005% Ophthalmic Solution 1 Drop(s) Both EYES at bedtime  metoprolol tartrate 12.5 milliGRAM(s) Oral every 8 hours  multivitamin 1 Tablet(s) Oral daily  pantoprazole    Tablet 40 milliGRAM(s) Oral before breakfast  senna 2 Tablet(s) Oral at bedtime  zinc sulfate 220 milliGRAM(s) Oral daily    MEDICATIONS  (PRN):  acetaminophen     Tablet .. 650 milliGRAM(s) Oral every 6 hours PRN Temp greater or equal to 38C (100.4F), Mild Pain (1 - 3), Moderate Pain (4 - 6)  artificial tears (preservative free) Ophthalmic Solution 1 Drop(s) Left EYE two times a day PRN Dry Eyes  melatonin 3 milliGRAM(s) Oral at bedtime PRN Insomnia      Vital Signs Last 24 Hrs  T(C): 36.4 (29 Oct 2022 05:17), Max: 36.5 (28 Oct 2022 10:46)  T(F): 97.5 (29 Oct 2022 05:17), Max: 97.7 (28 Oct 2022 10:46)  HR: 85 (29 Oct 2022 05:17) (85 - 104)  BP: 90/65 (28 Oct 2022 21:22) (90/57 - 97/67)  BP(mean): --  RR: 18 (29 Oct 2022 05:17) (18 - 18)  SpO2: 94% (29 Oct 2022 05:17) (94% - 98%)    Parameters below as of 29 Oct 2022 05:17  Patient On (Oxygen Delivery Method): nasal cannula  O2 Flow (L/min): 2    CAPILLARY BLOOD GLUCOSE        I&O's Summary    28 Oct 2022 07:01  -  29 Oct 2022 07:00  --------------------------------------------------------  IN: 720 mL / OUT: 0 mL / NET: 720 mL        PHYSICAL EXAM:  GENERAL: NAD, well-developed  HEAD:  Atraumatic, Normocephalic  EYES: EOMI, PERRLA, conjunctiva and sclera clear  NECK: Supple, No JVD  CHEST/LUNG: Clear to auscultation bilaterally; No wheeze  HEART: Regular rate and rhythm; No murmurs, rubs, or gallops  ABDOMEN: Soft, Nontender, Nondistended; Bowel sounds present  EXTREMITIES:  2+ Peripheral Pulses, No clubbing, cyanosis, or edema  PSYCH: AAOx3  NEUROLOGY: non-focal  SKIN: No rashes or lesions    LABS:                        8.8    18.59 )-----------( 273      ( 29 Oct 2022 06:00 )             27.6     10-29    140  |  102  |  24<H>  ----------------------------<  93  3.9   |  31  |  0.65    Ca    8.5      29 Oct 2022 05:59  Phos  3.5     10-29  Mg     1.7     10-29      PTT - ( 29 Oct 2022 06:00 )  PTT:59.9 sec          MICRO:      RADIOLOGY & ADDITIONAL TESTS:      Consultant(s) Notes Reviewed:         DATE OF SERVICE: 10-29-22 @ 07:12    Patient is a 82y old  Female who presents with a chief complaint of sepsis (28 Oct 2022 11:01)      SUBJECTIVE / OVERNIGHT EVENTS:  Overnight, c/o b/l leg and back pain, consistent with her chronic pain. Given IV tylenol, no improvement. Given toradol no improvement. Finally given IV 0.25 mg dilaudid with improvement.   Pt seen and examined at bedside. No acute complaints at this time, breathing improved.    ROS negative except as above.    MEDICATIONS  (STANDING):  allopurinol 300 milliGRAM(s) Oral at bedtime  ascorbic acid 500 milliGRAM(s) Oral daily  carbidopa/levodopa  25/100 1 Tablet(s) Oral three times a day  cefepime   IVPB 2000 milliGRAM(s) IV Intermittent every 12 hours  chlorhexidine 2% Cloths 1 Application(s) Topical daily  furosemide    Tablet 20 milliGRAM(s) Oral daily  heparin  Infusion 950 Unit(s)/Hr (9.5 mL/Hr) IV Continuous <Continuous>  influenza  Vaccine (HIGH DOSE) 0.7 milliLiter(s) IntraMuscular once  latanoprost 0.005% Ophthalmic Solution 1 Drop(s) Both EYES at bedtime  metoprolol tartrate 12.5 milliGRAM(s) Oral every 8 hours  multivitamin 1 Tablet(s) Oral daily  pantoprazole    Tablet 40 milliGRAM(s) Oral before breakfast  senna 2 Tablet(s) Oral at bedtime  zinc sulfate 220 milliGRAM(s) Oral daily    MEDICATIONS  (PRN):  acetaminophen     Tablet .. 650 milliGRAM(s) Oral every 6 hours PRN Temp greater or equal to 38C (100.4F), Mild Pain (1 - 3), Moderate Pain (4 - 6)  artificial tears (preservative free) Ophthalmic Solution 1 Drop(s) Left EYE two times a day PRN Dry Eyes  melatonin 3 milliGRAM(s) Oral at bedtime PRN Insomnia      Vital Signs Last 24 Hrs  T(C): 36.4 (29 Oct 2022 05:17), Max: 36.5 (28 Oct 2022 10:46)  T(F): 97.5 (29 Oct 2022 05:17), Max: 97.7 (28 Oct 2022 10:46)  HR: 85 (29 Oct 2022 05:17) (85 - 104)  BP: 90/65 (28 Oct 2022 21:22) (90/57 - 97/67)  BP(mean): --  RR: 18 (29 Oct 2022 05:17) (18 - 18)  SpO2: 94% (29 Oct 2022 05:17) (94% - 98%)    Parameters below as of 29 Oct 2022 05:17  Patient On (Oxygen Delivery Method): nasal cannula  O2 Flow (L/min): 2    CAPILLARY BLOOD GLUCOSE        I&O's Summary    28 Oct 2022 07:01  -  29 Oct 2022 07:00  --------------------------------------------------------  IN: 720 mL / OUT: 0 mL / NET: 720 mL        PHYSICAL EXAM:  GENERAL: NAD, well-developed  HEAD:  Atraumatic, Normocephalic, R ommaya, no SOI  EYES: EOMI, PERRLA, conjunctiva and sclera clear  NECK: Supple, No JVD  CHEST/LUNG: Clear to auscultation bilaterally; No wheeze, on 2L NC  HEART: Regular rate and rhythm; No murmurs, rubs, or gallops  ABDOMEN: Soft, Nontender, Nondistended; Bowel sounds present  EXTREMITIES:  2+ Peripheral Pulses, No clubbing, cyanosis, or edema, R PICC, no SOI  PSYCH: AAOx3  NEUROLOGY: non-focal  SKIN: No rashes or lesions    LABS:                        8.8    18.59 )-----------( 273      ( 29 Oct 2022 06:00 )             27.6     10-29    140  |  102  |  24<H>  ----------------------------<  93  3.9   |  31  |  0.65    Ca    8.5      29 Oct 2022 05:59  Phos  3.5     10-29  Mg     1.7     10-29      PTT - ( 29 Oct 2022 06:00 )  PTT:59.9 sec          MICRO:      RADIOLOGY & ADDITIONAL TESTS:      Consultant(s) Notes Reviewed:

## 2022-10-29 NOTE — PROGRESS NOTE ADULT - PROBLEM SELECTOR PLAN 2
CTA chest with worsening pleural effusions, L>R, when compared with CT chest from previous admission (8/27). Concern for parapneumonic effusion vs. malignant effusion. S/p thoracentesis 10/24 with 1.6L fluid removal  - fluid gram stain neg, cx NGTD, appears transudative based on Light's criteria however fluid was chylous appearing  - s/p lasix 40 mg x 1 10/23  - c/w lasix 20 mg po daily  - flow cytometry with increased neutrophils, no malignant cells CTA chest with worsening pleural effusions, L>R, when compared with CT chest from previous admission (8/27). Concern for parapneumonic effusion vs. malignant effusion. S/p thoracentesis 10/24 with 1.6L fluid removal  - fluid gram stain neg, cx NGTD, appears transudative based on Light's criteria however fluid was chylous appearing  - s/p lasix 40 mg x 1 10/23  - c/w lasix 20 mg po daily  - flow cytometry with increased neutrophils, no malignant cells  - Pulm will repeat POCUS monday and consider pleurx

## 2022-10-29 NOTE — PROGRESS NOTE ADULT - ASSESSMENT
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 Also found to have worsening L pleural effusion, s/p thoracentesis 10/24.

## 2022-10-29 NOTE — PROGRESS NOTE ADULT - PROBLEM SELECTOR PLAN 6
DVT ppx: SCDs   Diet: regular   Code status: Full code    Spoke with daughter 10/28, answered all questions DVT ppx: SCDs   Diet: regular   Code status: Full code    Spoke with son 10/29, answered all questions

## 2022-10-29 NOTE — DISCHARGE NOTE PROVIDER - NSDCCPCAREPLAN_GEN_ALL_CORE_FT
PRINCIPAL DISCHARGE DIAGNOSIS  Diagnosis: Large pleural effusion  Assessment and Plan of Treatment: You were admitted to the hospital because you were found to have a pneumonia and a large left sided pleural effusion, which is a collection of fluid outside the lung space. This was compressing your lungs and making your breathing labored. Fluid was pulled out of your lungs in a procedure called a thoracentesis which resulted in improvement in your breathing. You were also given a course of IV antibiotics. You should continue with the course of oral antibiotics to complete a 2 week course total of antibiotics, end date is 11/7. You should also take your water pill called lasix to help remove fluid from your lungs to help with your breathing.      SECONDARY DISCHARGE DIAGNOSES  Diagnosis: Atrial fibrillation  Assessment and Plan of Treatment: You were found ot have an abnormal heart rhythm called atrial fibrillation. This type of rhythm puts you at higher risk of strokes and other embolic events. To prevent this, we decreased your heart rate using a medication called metoprolol. You should continue this medication after discharge as prescribed. You were also started on a blood thinner to decrease your risk of strokes. You should continue to take this as prescribed.     PRINCIPAL DISCHARGE DIAGNOSIS  Diagnosis: Large pleural effusion  Assessment and Plan of Treatment: You were admitted to the hospital because you were found to have a pneumonia and a large left sided pleural effusion, which is a collection of fluid outside the lung space. This was compressing your lungs and making your breathing labored. Fluid was pulled out of your lungs in a procedure called a thoracentesis which resulted in improvement in your breathing. You were also given a course of IV antibiotics. You should continue with the course of oral antibiotics to complete a 2 week course total of antibiotics, end date is 11/7. You should also take your water pill called lasix to help remove fluid from your lungs to help with your breathing.      SECONDARY DISCHARGE DIAGNOSES  Diagnosis: Atrial fibrillation  Assessment and Plan of Treatment: You were found to have an abnormal heart rhythm called atrial fibrillation. This type of rhythm puts you at higher risk of strokes and other embolic events. To prevent this, we decreased your heart rate using a medication called metoprolol. You should continue this medication after discharge as prescribed. You were also started on a blood thinner to decrease your risk of strokes. You should continue to take this as prescribed.

## 2022-10-29 NOTE — DISCHARGE NOTE PROVIDER - HOSPITAL COURSE
Ms. 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 Originally arrived septic, requiring 6L NC, given vanc (10/23-10/24) and cefepime (10/23-). Converted to PO levaquin on 10/29 to complete 14 days of abx total (end date 11/7). Pt underwent thoracentesis with pulm on 10/24 with 1.6L removal of chylous fluid. Appeared transudative by Light's criteria however with increased neutrophils on flow cytometry, no malignant cells. No growth on pleural fluid culture. Pt was also started on po lasix 20 mg to help maintain euvolemia.    On admisison, CTH revealed findings somewhat concerning for small focus of hemorrhage vs small focus of calcification. Repeat imaging showing resolution of previous high attentuation focus. Discussed with neurosurgery starting AC for Afib, per NSGY, no absolute contraindication. Started on heparin gtt with patient specific goal PTT 60-65. Gtt was running at 9.5 mL/hr to achieve therapeutic range. Pt also started on rate control with metoprolol tartrate 12.5 mg tid.     Of note, new meds:   lasix 20 mg po  DOAC (still needs to be specified pending insurance status)  metoprolol (dose to be finalized)  levaquin (end date 11/7) Ms. 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 Originally arrived septic, requiring 6L NC, given vanc (10/23-10/24) and cefepime (10/24-29). Converted to PO levaquin on 10/29 to complete 14 days of abx total (end date 11/7). Pt underwent thoracentesis with pulm on 10/24 with 1.6L removal of chylous fluid. Appeared transudative by Light's criteria however with increased neutrophils on flow cytometry, no malignant cells. No growth on pleural fluid culture. Pt was also started on po lasix 20 mg to help maintain euvolemia. Repeat thoracentesis on 11/2 with 1.4L drainage.    On admisison, CTH revealed findings somewhat concerning for small focus of hemorrhage vs small focus of calcification. Repeat imaging showing resolution of previous high attentuation focus. Discussed with neurosurgery starting AC for Afib, per NSGY, no absolute contraindication. Started on heparin gtt with patient specific goal PTT 60-65. Switched to apixaban 2.5mg BID after thoracentesis 11/2. Pt also started on rate control with metoprolol tartrate 12.5 mg tid, which was switched to metoprolol succinate 75mg daily, with rate grossly <100. She occasionally had increases to 110's, but this was likely in the setting of pain.     At time of discharge, she is stable and optimized to go to [facility]. She will follow with her doctors at [facility] and go there for further treatment.    Of note, new meds:   lasix 20 mg po qd  Apixaban 2.5mg BID  metoprolol succinate 75mg qd  levaquin (end date 11/7) Ms. 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 Originally arrived septic, requiring 6L NC, given vanc (10/23-10/24) and cefepime (10/24-29). Converted to PO levaquin on 10/29 to complete 14 days of abx total (end date 11/7). Pt underwent thoracentesis with pulm on 10/24 with 1.6L removal of chylous fluid. Appeared transudative by Light's criteria however with increased neutrophils on flow cytometry, no malignant cells. No growth on pleural fluid culture. Pt was also started on po lasix 20 mg to help maintain euvolemia. Repeat thoracentesis on 11/2 with 1.4L drainage. She was started on a low fat diet with MCT oil and this should be continued in rehab.    On admisison, CTH revealed findings somewhat concerning for small focus of hemorrhage vs small focus of calcification. Repeat imaging showing resolution of previous high attentuation focus. Discussed with neurosurgery starting AC for Afib, per NSGY, no absolute contraindication. Started on heparin gtt with patient specific goal PTT 60-65. Switched to apixaban 2.5mg BID after thoracentesis 11/2. Pt also started on rate control with metoprolol tartrate 12.5 mg tid, which was switched to metoprolol succinate 75mg daily, with rate grossly <100. She occasionally had increases to 110's, but this was likely in the setting of pain.     At time of discharge, she is stable and optimized to go to [facility]. She will follow with her doctors at [facility] and go there for further treatment.    Of note, new meds:   lasix 20 mg po qd  Apixaban 2.5mg BID  metoprolol succinate 75mg qd  levaquin (end date 11/7) Ms. 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 Originally arrived septic, requiring 6L NC, given vanc (10/23-10/24) and cefepime (10/24-29). Converted to PO levaquin on 10/29 to complete 14 days of abx total (end date 11/7). Pt underwent thoracentesis with pulm on 10/24 with 1.6L removal of chylous fluid. Appeared transudative by Light's criteria however with increased neutrophils on flow cytometry, no malignant cells. No growth on pleural fluid culture. Pt was also started on po lasix 20 mg to help maintain euvolemia. Repeat thoracentesis on 11/2 with 1.4L drainage. She was started on a low fat diet with MCT oil and this should be continued in rehab.    On admisison, CTH revealed findings somewhat concerning for small focus of hemorrhage vs small focus of calcification. Repeat imaging showing resolution of previous high attentuation focus. Discussed with neurosurgery starting AC for Afib, per NSGY, no absolute contraindication. Started on heparin gtt with patient specific goal PTT 60-65. Switched to apixaban 2.5mg BID after thoracentesis 11/2. Pt also started on rate control with metoprolol tartrate 12.5 mg tid, which was switched to metoprolol succinate 75mg daily, with rate grossly <100. She occasionally had increases to 110's, but this was likely in the setting of pain.     At time of discharge, she is stable and optimized to go to [facility]. She will follow with her doctors at [facility] and go there for further treatment. She will also follow with pulmonology for management of the chylothorax.    Of note, new meds:   lasix 20 mg po qd  Apixaban 2.5mg BID  metoprolol succinate 75mg qd  levaquin (end date 11/7)

## 2022-10-29 NOTE — DISCHARGE NOTE PROVIDER - NSDCMRMEDTOKEN_GEN_ALL_CORE_FT
allopurinol 300 mg oral tablet: 1 tab(s) orally once a day (at bedtime)  carbidopa-levodopa 25 mg-100 mg oral tablet: 1 tab(s) orally 3 times a day (10a,3p, 8p)  Eliquis 2.5 mg oral tablet: 1 tab(s) orally 2 times a day     TEST SCRIPT  latanoprost 0.005% ophthalmic solution: 1 drop(s) to each affected eye once a day (at bedtime)  melatonin 3 mg oral tablet: 1 tab(s) orally once a day (at bedtime), As needed, Insomnia  pantoprazole 40 mg oral delayed release tablet: 1 tab(s) orally once a day (before a meal)  senna leaf extract oral tablet: 2 tab(s) orally once a day (at bedtime)   allopurinol 300 mg oral tablet: 1 tab(s) orally once a day (at bedtime)  apixaban 2.5 mg oral tablet: 1 tab(s) orally every 12 hours  ascorbic acid 500 mg oral tablet: 1 tab(s) orally once a day  carbidopa-levodopa 25 mg-100 mg oral tablet: 1 tab(s) orally 3 times a day (10a,3p, 8p)  furosemide 20 mg oral tablet: 1 tab(s) orally once a day  latanoprost 0.005% ophthalmic solution: 1 drop(s) to each affected eye once a day (at bedtime)  levoFLOXacin 500 mg oral tablet: 1 tab(s) orally every 24 hours  Last day 11/7  melatonin 3 mg oral tablet: 1 tab(s) orally once a day (at bedtime), As needed, Insomnia  metoprolol succinate 25 mg oral tablet, extended release: 3 tab(s) orally once a day  Multiple Vitamins oral tablet: 1 tab(s) orally once a day  pantoprazole 40 mg oral delayed release tablet: 1 tab(s) orally once a day (before a meal)  senna leaf extract oral tablet: 2 tab(s) orally once a day (at bedtime)

## 2022-10-29 NOTE — PROGRESS NOTE ADULT - ATTENDING COMMENTS
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 Will transition to oral Levaquin today. Plan for a total of 14 days of antibiotics. Pulm to re-eval effusion on Monday with POCUS. c/w heparin for now, if Eliquis is covered with insurance will likely plan to transition to DOAC soon.

## 2022-10-29 NOTE — DISCHARGE NOTE PROVIDER - DETAILS OF MALNUTRITION DIAGNOSIS/DIAGNOSES
Additional Safety/Bands: This patient has been assessed with a concern for Malnutrition and was treated during this hospitalization for the following Nutrition diagnosis/diagnoses:     -  10/27/2022: Severe protein-calorie malnutrition   -  10/27/2022: Underweight (BMI < 19)

## 2022-10-30 NOTE — PROGRESS NOTE ADULT - PROBLEM SELECTOR PLAN 6
DVT ppx: SCDs   Diet: regular   Code status: Full code    Spoke with daughter 10/30, answered all questions

## 2022-10-30 NOTE — PROGRESS NOTE ADULT - SUBJECTIVE AND OBJECTIVE BOX
PROGRESS NOTE:   Authored by Dr. Boris Chapa MD ,    Patient is a 82y old  Female who presents with a chief complaint of sepsis (29 Oct 2022 09:21)      SUBJECTIVE / OVERNIGHT EVENTS: No events ON. Patient this AM has no complaints    ADDITIONAL REVIEW OF SYSTEMS: Denies fever, CP, SOB or other ROS     MEDICATIONS  (STANDING):  allopurinol 300 milliGRAM(s) Oral at bedtime  ascorbic acid 500 milliGRAM(s) Oral daily  carbidopa/levodopa  25/100 1 Tablet(s) Oral three times a day  chlorhexidine 2% Cloths 1 Application(s) Topical daily  furosemide    Tablet 20 milliGRAM(s) Oral daily  heparin  Infusion 950 Unit(s)/Hr (9.7 mL/Hr) IV Continuous <Continuous>  influenza  Vaccine (HIGH DOSE) 0.7 milliLiter(s) IntraMuscular once  latanoprost 0.005% Ophthalmic Solution 1 Drop(s) Both EYES at bedtime  levoFLOXacin  Tablet 500 milliGRAM(s) Oral every 24 hours  metoprolol tartrate 12.5 milliGRAM(s) Oral every 8 hours  multivitamin 1 Tablet(s) Oral daily  pantoprazole    Tablet 40 milliGRAM(s) Oral before breakfast  senna 2 Tablet(s) Oral at bedtime  zinc sulfate 220 milliGRAM(s) Oral daily    MEDICATIONS  (PRN):  acetaminophen     Tablet .. 650 milliGRAM(s) Oral every 6 hours PRN Temp greater or equal to 38C (100.4F), Mild Pain (1 - 3), Moderate Pain (4 - 6)  artificial tears (preservative free) Ophthalmic Solution 1 Drop(s) Left EYE two times a day PRN Dry Eyes  HYDROmorphone  Injectable 0.25 milliGRAM(s) IV Push every 4 hours PRN Severe Pain (7 - 10)  melatonin 3 milliGRAM(s) Oral at bedtime PRN Insomnia      CAPILLARY BLOOD GLUCOSE        I&O's Summary    29 Oct 2022 07:01  -  30 Oct 2022 07:00  --------------------------------------------------------  IN: 390 mL / OUT: 0 mL / NET: 390 mL    30 Oct 2022 07:01  -  30 Oct 2022 11:00  --------------------------------------------------------  IN: 360 mL / OUT: 0 mL / NET: 360 mL        PHYSICAL EXAM:  Vital Signs Last 24 Hrs  T(C): 36.7 (30 Oct 2022 10:48), Max: 37.5 (30 Oct 2022 04:00)  T(F): 98 (30 Oct 2022 10:48), Max: 99.5 (30 Oct 2022 04:00)  HR: 98 (30 Oct 2022 10:48) (96 - 100)  BP: 102/69 (30 Oct 2022 10:48) (94/71 - 102/69)  BP(mean): --  RR: 18 (30 Oct 2022 10:48) (18 - 19)  SpO2: 97% (30 Oct 2022 10:48) (93% - 97%)    Parameters below as of 30 Oct 2022 10:48  Patient On (Oxygen Delivery Method): nasal cannula  O2 Flow (L/min): 2      GENERAL: NAD, well-developed  HEAD:  Atraumatic, Normocephalic, R ommaya, no SOI  EYES: EOMI, PERRLA, conjunctiva and sclera clear  NECK: Supple, No JVD  CHEST/LUNG: Clear to auscultation bilaterally; No wheeze, on 2L NC  HEART: Regular rate and rhythm; No murmurs, rubs, or gallops  ABDOMEN: Soft, Nontender, Nondistended; Bowel sounds present  EXTREMITIES:  2+ Peripheral Pulses, No clubbing, cyanosis, or edema, R PICC, no SOI  PSYCH: AAOx3  NEUROLOGY: non-focal  SKIN: No rashes or lesions    LABS:                        9.2    18.43 )-----------( 316      ( 30 Oct 2022 06:33 )             28.6     10-30    137  |  100  |  21  ----------------------------<  88  3.8   |  30  |  0.59    Ca    8.5      30 Oct 2022 06:31  Phos  3.2     10-30  Mg     2.0     10-30      PTT - ( 30 Oct 2022 06:36 )  PTT:55.9 sec            RADIOLOGY & ADDITIONAL TESTS:  Results Reviewed:   Imaging Personally Reviewed:  Electrocardiogram Personally Reviewed:    COORDINATION OF CARE:  Care Discussed with Consultants/Other Providers [Y/N]:  Prior or Outpatient Records Reviewed [Y/N]:

## 2022-10-30 NOTE — PROGRESS NOTE ADULT - PROBLEM SELECTOR PLAN 4
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 Last chemo 2 weeks ago per patient. No concern for neutropenia at this time.  - flow cytometry neg for malignant cells  - hold chemo given active infection  - f/u heme/onc reccs

## 2022-10-30 NOTE — PROGRESS NOTE ADULT - ATTENDING COMMENTS
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 Transitioned to oral Levaquin 10/29. Plan for a total of 14 days of antibiotics. Pulm to re-eval effusion on Monday with POCUS. c/w heparin for now pending Pulm plan. Transition to Eliquis should no further procedures be necessary

## 2022-10-30 NOTE — PROGRESS NOTE ADULT - PROBLEM SELECTOR PLAN 2
CTA chest with worsening pleural effusions, L>R, when compared with CT chest from previous admission (8/27). Concern for parapneumonic effusion vs. malignant effusion. S/p thoracentesis 10/24 with 1.6L fluid removal  - fluid gram stain neg, cx NGTD, appears transudative based on Light's criteria however fluid was chylous appearing  - s/p lasix 40 mg x 1 10/23  - c/w lasix 20 mg po daily  - flow cytometry with increased neutrophils, no malignant cells  - Pulm will repeat POCUS monday and consider pleurx

## 2022-10-30 NOTE — PROGRESS NOTE ADULT - PROBLEM SELECTOR PLAN 3
Chronic Afib s/p ablation procedure  - persistently tachy to 110-120s this admission in Afib, improved to 90s s/p thora 10/24  - increase metoprolol tartrate 12.5 mg to q8h, hold for HR < 60, SBP < 95  - ?small acute hemorrhage on initial CTH, repeat scan showing resolution of this focus  - neurosurgery no acute surgical intervention, AC will have risks but no hard contraindication to AC if necessary  - CHADSVASC score 5  - given age and ?brain bleed, discussed risks/benefits with daughter Larisa Franklin, who is a neurologist, and decided to start AC  - per NSGY note, will start heparin gtt, no bolus, with goal PTT 60-65  - repeat CTH unremarkable, no hemorrhage or infarct  - switch to eliquis 2.5 mg bid, if no further procedures planned by Pulm tomorrow

## 2022-10-30 NOTE — PROGRESS NOTE ADULT - PROBLEM SELECTOR PLAN 1
Arrived to ED tachycardic, tachypneic, and with leukocytosis. Afebrile but with subjective chills. CXR with RUL opacity concerning for pneumonia. CTA chest with right lung pneumonia. S/p one dose of vanc/zosyn in ED.   - Bcx with Klebsiella pneumoniae, repeat cx clear  - gyn consulted given concern for uterine/vaginal prolapse increasing risk of UTI, no plans for inpatient intervention, has outpatient urogyn appt 11/24 for pessary evaluation  - sacral ulcer with no signs of infection, will hold off on wound cx   - s/p cefepime (10/24 - 10/29); will complete 2 week course with po levaquin  - decreasing oxygen requirements, now on 2L NC, wean as tolerated  - initially with worsening leukocytosis likely due to Neupogen treatment on 10/10, now downtrending

## 2022-10-31 NOTE — PROGRESS NOTE ADULT - ASSESSMENT
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 She was admitted to the hospital for hypoxic respiratory failure in the setting of pneumonia, pleural effusion, klebsiella bacteremia. Pulmonary consulted for pleural effusion which was drained, c/w exudative process with TGs >400, negative cytopathology and flow cytometry.    Pleural Effusion  - large L pleural effusion, patient has previously had thoracentesis consistent with exudative process  - s/p thoracentesis 10/24, 1600cc chylous fluid removed, TGs >400, negative cytopath and flow cyto  - continue antibiotics per primary team  - wean O2 as tolerated, goal SpO2 92% or higher  - incentive spirometry, OOB to chair/ambulation as able  - will repeat POCUS to assess pleural effusion

## 2022-10-31 NOTE — PROGRESS NOTE ADULT - CONVERSATION DETAILS
Spoke to Daughter Larisa about goals of care should patient go into cardiac arrest or require intubation. Daughter states that mother would like to be full code and have everything done.

## 2022-10-31 NOTE — PROGRESS NOTE ADULT - PROBLEM SELECTOR PLAN 4
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 Last chemo 2 weeks ago per patient. No concern for neutropenia at this time.  - flow cytometry neg for malignant cells  - hold chemo given active infection  - f/u heme/onc reccs - Follows with Dr. Kenan Delgadillo at [facility]  - Follicular lymphoma s/p C1 R-miniCHOP on 9/16, 10/10. Cycle 3 due 10/31, will hold as per heme onc.  - Flow cytometry of pleural fluid neg for malignant cells  - Hold chemo given active infection  - f/u heme/onc reccs.

## 2022-10-31 NOTE — PROGRESS NOTE ADULT - SUBJECTIVE AND OBJECTIVE BOX
PROGRESS NOTE:   Authored by: Onofre Sutton M.D.   Internal Medicine PGY-1  Please Contact Via Teams    Patient is a 82y old  Female who presents with a chief complaint of sepsis (30 Oct 2022 10:56)      SUBJECTIVE / OVERNIGHT EVENTS:  No acute events overnight.     ADDITIONAL REVIEW OF SYSTEMS:  Patient denies fevers, chills, chest pain, shortness of breath, nausea, abdominal pain, diarrhea, constipation, dysuria, leg swelling, headache, light headedness.    MEDICATIONS  (STANDING):  allopurinol 300 milliGRAM(s) Oral at bedtime  ascorbic acid 500 milliGRAM(s) Oral daily  carbidopa/levodopa  25/100 1 Tablet(s) Oral three times a day  chlorhexidine 2% Cloths 1 Application(s) Topical daily  furosemide    Tablet 20 milliGRAM(s) Oral daily  heparin  Infusion 950 Unit(s)/Hr (9.7 mL/Hr) IV Continuous <Continuous>  influenza  Vaccine (HIGH DOSE) 0.7 milliLiter(s) IntraMuscular once  latanoprost 0.005% Ophthalmic Solution 1 Drop(s) Both EYES at bedtime  levoFLOXacin  Tablet 500 milliGRAM(s) Oral every 24 hours  metoprolol tartrate 12.5 milliGRAM(s) Oral every 8 hours  multivitamin 1 Tablet(s) Oral daily  pantoprazole    Tablet 40 milliGRAM(s) Oral before breakfast  senna 2 Tablet(s) Oral at bedtime  zinc sulfate 220 milliGRAM(s) Oral daily    MEDICATIONS  (PRN):  acetaminophen     Tablet .. 650 milliGRAM(s) Oral every 6 hours PRN Temp greater or equal to 38C (100.4F), Mild Pain (1 - 3), Moderate Pain (4 - 6)  artificial tears (preservative free) Ophthalmic Solution 1 Drop(s) Left EYE two times a day PRN Dry Eyes  HYDROmorphone  Injectable 0.25 milliGRAM(s) IV Push every 4 hours PRN Severe Pain (7 - 10)  melatonin 3 milliGRAM(s) Oral at bedtime PRN Insomnia      CAPILLARY BLOOD GLUCOSE        I&O's Summary    29 Oct 2022 07:01  -  30 Oct 2022 07:00  --------------------------------------------------------  IN: 730 mL / OUT: 0 mL / NET: 730 mL    30 Oct 2022 07:01  -  31 Oct 2022 06:46  --------------------------------------------------------  IN: 1116.2 mL / OUT: 0 mL / NET: 1116.2 mL        PHYSICAL EXAM:  Vital Signs Last 24 Hrs  T(C): 36.7 (31 Oct 2022 04:02), Max: 36.8 (30 Oct 2022 20:48)  T(F): 98 (31 Oct 2022 04:02), Max: 98.2 (30 Oct 2022 20:48)  HR: 92 (31 Oct 2022 04:02) (92 - 98)  BP: 110/72 (31 Oct 2022 04:02) (102/69 - 111/77)  BP(mean): --  RR: 18 (31 Oct 2022 04:02) (18 - 18)  SpO2: 96% (31 Oct 2022 04:02) (94% - 97%)    Parameters below as of 31 Oct 2022 04:02  Patient On (Oxygen Delivery Method): nasal cannula  O2 Flow (L/min): 2      CONSTITUTIONAL: NAD, well-developed  RESPIRATORY: Normal respiratory effort; lungs are clear to auscultation bilaterally  CARDIOVASCULAR: Regular rate and rhythm, normal S1 and S2, no murmur/rub/gallop; No lower extremity edema; Peripheral pulses are 2+ bilaterally  ABDOMEN: Nontender to palpation, normoactive bowel sounds, no rebound/guarding; No hepatosplenomegaly  MUSCLOSKELETAL: no clubbing or cyanosis of digits; no joint swelling or tenderness to palpation  PSYCH: A+O to person, place, and time; affect appropriate    LABS:                        9.2    18.43 )-----------( 316      ( 30 Oct 2022 06:33 )             28.6     10-30    137  |  100  |  21  ----------------------------<  88  3.8   |  30  |  0.59    Ca    8.5      30 Oct 2022 06:31  Phos  3.2     10-30  Mg     2.0     10-30      PT/INR - ( 31 Oct 2022 01:14 )   PT: 11.8 sec;   INR: 1.03 ratio         PTT - ( 31 Oct 2022 01:14 )  PTT:62.3 sec            Tele Reviewed:    RADIOLOGY & ADDITIONAL TESTS:  Results Reviewed:   Imaging Personally Reviewed:  Electrocardiogram Personally Reviewed:     PROGRESS NOTE:   Authored by: Onofre Sutton M.D.   Internal Medicine PGY-1  Please Contact Via Teams    Patient is a 82y old  Female who presents with a chief complaint of sepsis (30 Oct 2022 10:56)      SUBJECTIVE / OVERNIGHT EVENTS:  No acute events overnight. Pt feeling well this morning on 2L NC. No acute complaints    MEDICATIONS  (STANDING):  allopurinol 300 milliGRAM(s) Oral at bedtime  ascorbic acid 500 milliGRAM(s) Oral daily  carbidopa/levodopa  25/100 1 Tablet(s) Oral three times a day  chlorhexidine 2% Cloths 1 Application(s) Topical daily  furosemide    Tablet 20 milliGRAM(s) Oral daily  heparin  Infusion 950 Unit(s)/Hr (9.7 mL/Hr) IV Continuous <Continuous>  influenza  Vaccine (HIGH DOSE) 0.7 milliLiter(s) IntraMuscular once  latanoprost 0.005% Ophthalmic Solution 1 Drop(s) Both EYES at bedtime  levoFLOXacin  Tablet 500 milliGRAM(s) Oral every 24 hours  metoprolol tartrate 12.5 milliGRAM(s) Oral every 8 hours  multivitamin 1 Tablet(s) Oral daily  pantoprazole    Tablet 40 milliGRAM(s) Oral before breakfast  senna 2 Tablet(s) Oral at bedtime  zinc sulfate 220 milliGRAM(s) Oral daily    MEDICATIONS  (PRN):  acetaminophen     Tablet .. 650 milliGRAM(s) Oral every 6 hours PRN Temp greater or equal to 38C (100.4F), Mild Pain (1 - 3), Moderate Pain (4 - 6)  artificial tears (preservative free) Ophthalmic Solution 1 Drop(s) Left EYE two times a day PRN Dry Eyes  HYDROmorphone  Injectable 0.25 milliGRAM(s) IV Push every 4 hours PRN Severe Pain (7 - 10)  melatonin 3 milliGRAM(s) Oral at bedtime PRN Insomnia      CAPILLARY BLOOD GLUCOSE        I&O's Summary    29 Oct 2022 07:01  -  30 Oct 2022 07:00  --------------------------------------------------------  IN: 730 mL / OUT: 0 mL / NET: 730 mL    30 Oct 2022 07:01  -  31 Oct 2022 06:46  --------------------------------------------------------  IN: 1116.2 mL / OUT: 0 mL / NET: 1116.2 mL        PHYSICAL EXAM:  Vital Signs Last 24 Hrs  T(C): 36.7 (31 Oct 2022 04:02), Max: 36.8 (30 Oct 2022 20:48)  T(F): 98 (31 Oct 2022 04:02), Max: 98.2 (30 Oct 2022 20:48)  HR: 92 (31 Oct 2022 04:02) (92 - 98)  BP: 110/72 (31 Oct 2022 04:02) (102/69 - 111/77)  BP(mean): --  RR: 18 (31 Oct 2022 04:02) (18 - 18)  SpO2: 96% (31 Oct 2022 04:02) (94% - 97%)    Parameters below as of 31 Oct 2022 04:02  Patient On (Oxygen Delivery Method): nasal cannula  O2 Flow (L/min): 2      CONSTITUTIONAL: NAD, thin, cachectic, temporal wasting  RESPIRATORY: Decreased breath sounds, crackles at left lower lung base.  CARDIOVASCULAR: Afib, no murmurs. No LE edema  ABDOMEN: Nontender to palpation, normoactive bowel sounds  PSYCH: A+O to person, place, and time; affect appropriate    LABS:                        9.2    18.43 )-----------( 316      ( 30 Oct 2022 06:33 )             28.6     10-30    137  |  100  |  21  ----------------------------<  88  3.8   |  30  |  0.59    Ca    8.5      30 Oct 2022 06:31  Phos  3.2     10-30  Mg     2.0     10-30      PT/INR - ( 31 Oct 2022 01:14 )   PT: 11.8 sec;   INR: 1.03 ratio         PTT - ( 31 Oct 2022 01:14 )  PTT:62.3 sec            Tele Reviewed: afib 90's-120,s but majority of day spent at rate <100BPM

## 2022-10-31 NOTE — PROGRESS NOTE ADULT - PROBLEM SELECTOR PLAN 1
Arrived to ED tachycardic, tachypneic, and with leukocytosis. Afebrile but with subjective chills. CXR with RUL opacity concerning for pneumonia. CTA chest with right lung pneumonia. S/p one dose of vanc/zosyn in ED.   - Bcx with Klebsiella pneumoniae, repeat cx clear  - gyn consulted given concern for uterine/vaginal prolapse increasing risk of UTI, no plans for inpatient intervention, has outpatient urogyn appt 11/24 for pessary evaluation  - sacral ulcer with no signs of infection, will hold off on wound cx   - s/p cefepime (10/24 - 10/29); will complete 2 week course with po levaquin  - decreasing oxygen requirements, now on 2L NC, wean as tolerated  - initially with worsening leukocytosis likely due to Neupogen treatment on 10/10, now downtrending Arrived to ED tachycardic, tachypneic, and with leukocytosis. Afebrile but with subjective chills. CXR with RUL opacity concerning for pneumonia. CTA chest with right lung pneumonia.  - Bcx with Klebsiella pneumoniae, repeat cx clear  - Gyn consulted given concern for uterine/vaginal prolapse increasing risk of UTI, no plans for inpatient intervention, has outpatient urogyn appt 11/24 for pessary evaluation  - Sacral ulcer with no signs of infection, will hold off on wound cx   - s/p cefepime (10/24 - 10/29); will complete 2 week course with po levaquin. To finish 11/7  - Decreasing oxygen requirements, now on 2L NC, wean as tolerated  - initially with worsening leukocytosis likely due to Neupogen treatment on 10/10, now downtrending.

## 2022-10-31 NOTE — PROGRESS NOTE ADULT - ASSESSMENT
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 Also found to have worsening L pleural effusion, s/p thoracentesis 10/24. 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

## 2022-10-31 NOTE — PROGRESS NOTE ADULT - ATTENDING COMMENTS
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 Transitioned to oral Levaquin 10/29. Plan for a total of 14 days of antibiotics. Pulm to re-eval effusion on Monday with POCUS. c/w heparin for now pending Pulm plan. Transition to Eliquis should no further procedures be necessary .      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 Transitioned to oral Levaquin 10/29    #Acute Hypoxic respiratory failure 2/2 pneumonia and left pleural effusion   #Klebsiella bacteremia   - blood cx 10/25: ngtd, pleural effusion 10/24: ngtd  - continue levaquin x2 weeks   - f/u pulm team regarding re-evaluation of effusion   - continue lasix    #Intracranial hemorrhage   - heparin gtt with low aptt goal    #Afib  - switch to eliquis 2.5mg bid if no further pulm procedure planned   - uptitrate metoprolol dose for goal average HR 80-90s, <110    discharge planning PT rec Christopher

## 2022-10-31 NOTE — PROGRESS NOTE ADULT - SUBJECTIVE AND OBJECTIVE BOX
Interval Events:  - denies shortness of breath, chest pain, chest tightness  - remains on 2L NC    REVIEW OF SYSTEMS:  Negative except as documented above.      OBJECTIVE:  ICU Vital Signs Last 24 Hrs  T(C): 36.7 (31 Oct 2022 04:02), Max: 36.8 (30 Oct 2022 20:48)  T(F): 98 (31 Oct 2022 04:02), Max: 98.2 (30 Oct 2022 20:48)  HR: 92 (31 Oct 2022 04:02) (92 - 98)  BP: 110/72 (31 Oct 2022 04:02) (102/69 - 111/77)  BP(mean): --  ABP: --  ABP(mean): --  RR: 18 (31 Oct 2022 04:02) (18 - 18)  SpO2: 96% (31 Oct 2022 04:02) (94% - 97%)    O2 Parameters below as of 31 Oct 2022 04:02  Patient On (Oxygen Delivery Method): nasal cannula  O2 Flow (L/min): 2            10-30 @ 07:01  -  10-31 @ 07:00  --------------------------------------------------------  IN: 1472.6 mL / OUT: 0 mL / NET: 1472.6 mL              PHYSICAL EXAM:  General: NAD  HEENT: PERRL, EOMI, sclera anicteric  Neck: supple, no JVD  Cardiovascular: RRR, no murmurs, rubs, or gallops  Respiratory: decreased breath sounds at bases  Abdomen: soft, nontender, nondistended, normoactive bowel sounds  Extremities: warm and well perfused, no edema, no clubbing  Skin: no rashes  Neurological: awake and alert    HOSPITAL MEDICATIONS:  MEDICATIONS  (STANDING):  allopurinol 300 milliGRAM(s) Oral at bedtime  ascorbic acid 500 milliGRAM(s) Oral daily  carbidopa/levodopa  25/100 1 Tablet(s) Oral three times a day  chlorhexidine 2% Cloths 1 Application(s) Topical daily  furosemide    Tablet 20 milliGRAM(s) Oral daily  heparin  Infusion 950 Unit(s)/Hr (9.7 mL/Hr) IV Continuous <Continuous>  influenza  Vaccine (HIGH DOSE) 0.7 milliLiter(s) IntraMuscular once  latanoprost 0.005% Ophthalmic Solution 1 Drop(s) Both EYES at bedtime  levoFLOXacin  Tablet 500 milliGRAM(s) Oral every 24 hours  metoprolol tartrate 12.5 milliGRAM(s) Oral every 8 hours  multivitamin 1 Tablet(s) Oral daily  pantoprazole    Tablet 40 milliGRAM(s) Oral before breakfast  senna 2 Tablet(s) Oral at bedtime  zinc sulfate 220 milliGRAM(s) Oral daily    MEDICATIONS  (PRN):  acetaminophen     Tablet .. 650 milliGRAM(s) Oral every 6 hours PRN Temp greater or equal to 38C (100.4F), Mild Pain (1 - 3), Moderate Pain (4 - 6)  artificial tears (preservative free) Ophthalmic Solution 1 Drop(s) Left EYE two times a day PRN Dry Eyes  HYDROmorphone  Injectable 0.25 milliGRAM(s) IV Push every 4 hours PRN Severe Pain (7 - 10)  melatonin 3 milliGRAM(s) Oral at bedtime PRN Insomnia      LABS:                        9.2    16.78 )-----------( 299      ( 31 Oct 2022 08:18 )             29.7     Hgb Trend: 9.2<--, 9.2<--, 8.8<--, 9.2<--, 9.4<--  10-30    137  |  100  |  21  ----------------------------<  88  3.8   |  30  |  0.59    Ca    8.5      30 Oct 2022 06:31  Phos  3.2     10-30  Mg     2.0     10-30      Creatinine Trend: 0.59<--, 0.65<--, 0.66<--, 0.59<--, 0.57<--, 0.57<--  PT/INR - ( 31 Oct 2022 01:14 )   PT: 11.8 sec;   INR: 1.03 ratio         PTT - ( 31 Oct 2022 08:18 )  PTT:61.3 sec          RADIOLOGY:  [x] Reviewed and interpreted by me  CT chest b/l pleural effusion L > R

## 2022-10-31 NOTE — PROGRESS NOTE ADULT - ATTENDING COMMENTS
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   Thoracentesis performed on 10/24 with removal of approx 1600 ml chylous appearing fluid  She has pneumonia and Klebsiella UTI with bacteremia.  Cultures have cleared.  On POCUS effusion on the left is large- do not have comparison post thoracentesis to gauge amount of reaccumulation.  Please obtain CXR tomorrow.  Concern for placement of Pleurx is potential for large volume drainage, calorie depletion from loss of lipid.  Prior to this current  thoracentesis, she was last drained 2 months ago.  Will discuss with interventional pulmonology and reassess tomorrow.    Agree with plan as outlined above.

## 2022-10-31 NOTE — PROGRESS NOTE ADULT - PROBLEM SELECTOR PLAN 6
DVT ppx: SCDs   Diet: regular   Code status: Full code    Spoke with daughter 10/30, answered all questions DVT ppx: SCDs   Diet: regular   Code status: Full code

## 2022-10-31 NOTE — PROGRESS NOTE ADULT - PROBLEM SELECTOR PLAN 2
CTA chest with worsening pleural effusions, L>R, when compared with CT chest from previous admission (8/27). Concern for parapneumonic effusion vs. malignant effusion. S/p thoracentesis 10/24 with 1.6L fluid removal  - fluid gram stain neg, cx NGTD, appears transudative based on Light's criteria however fluid was chylous appearing  - s/p lasix 40 mg x 1 10/23  - c/w lasix 20 mg po daily  - flow cytometry with increased neutrophils, no malignant cells  - Pulm will repeat POCUS monday and consider pleurx - Small right and large left on CT 10/23. S/p thoracentesis 10/24 with 1.6L fluid removal with improvement in clinical status  - Fluid gram stain neg, cx NGTD, appears transudative based on Light's criteria however fluid was chylous appearing and TG>400  - c/w lasix 20 mg po daily  - flow cytometry with increased neutrophils, no malignant cells  - Pulm to discuss with interventional pulm for further recs. Chylothorax likely secondary to lymphoma. AM CXR 11/1

## 2022-10-31 NOTE — PROGRESS NOTE ADULT - PROBLEM SELECTOR PLAN 3
Chronic Afib s/p ablation procedure  - persistently tachy to 110-120s this admission in Afib, improved to 90s s/p thora 10/24  - increase metoprolol tartrate 12.5 mg to q8h, hold for HR < 60, SBP < 95  - ?small acute hemorrhage on initial CTH, repeat scan showing resolution of this focus  - neurosurgery no acute surgical intervention, AC will have risks but no hard contraindication to AC if necessary  - CHADSVASC score 5  - given age and ?brain bleed, discussed risks/benefits with daughter Larisa Franklin, who is a neurologist, and decided to start AC  - per NSGY note, will start heparin gtt, no bolus, with goal PTT 60-65  - repeat CTH unremarkable, no hemorrhage or infarct  - switch to eliquis 2.5 mg bid, if no further procedures planned by Pulm tomorrow Chronic Afib s/p ablation procedure  - Rates mostly <100  - Continue metoprolol tartrate 12.5 mg to q8h, hold for HR < 60, SBP < 95  - ?small acute hemorrhage on initial CTH, repeat scan showing resolution of this focus 10/27  - Neurosurgery no acute surgical intervention, AC will have risks but no hard contraindication to AC if necessary  - CHADSVASC score 5  - Given age and ?brain bleed, discussed risks/benefits with daughter Larisa Franklin, who is a neurologist, and decided to start AC  - Per NSGY note, will start heparin gtt, no bolus, with goal PTT 60-65  - Will need to switch to apixaban after temporizing measure for chylothorax

## 2022-10-31 NOTE — CHART NOTE - NSCHARTNOTEFT_GEN_A_CORE
: Christine Perdue    INDICATION: pleural effusion    PROCEDURE:  [ ] LIMITED ECHO  [x] LIMITED CHEST  [ ] LIMITED RETROPERITONEAL  [ ] LIMITED ABDOMINAL  [ ] LIMITED DVT  [ ] NEEDLE GUIDANCE VASCULAR  [ ] NEEDLE GUIDANCE THORACENTESIS  [ ] NEEDLE GUIDANCE PARACENTESIS  [ ] NEEDLE GUIDANCE PERICARDIOCENTESIS  [ ] OTHER    FINDINGS:  large left side pleural effusion    INTERPRETATION:  large left side pleural effusion accessible for drainage     Images uploaded on [email or web address] Path : Christine Perdue    INDICATION: pleural effusion    PROCEDURE:  [ ] LIMITED ECHO  [x] LIMITED CHEST  [ ] LIMITED RETROPERITONEAL  [ ] LIMITED ABDOMINAL  [ ] LIMITED DVT  [ ] NEEDLE GUIDANCE VASCULAR  [ ] NEEDLE GUIDANCE THORACENTESIS  [ ] NEEDLE GUIDANCE PARACENTESIS  [ ] NEEDLE GUIDANCE PERICARDIOCENTESIS  [ ] OTHER    FINDINGS:  large left side pleural effusion    INTERPRETATION:  large left side pleural effusion accessible for drainage     Images uploaded on [email or web address]    Pulmonary Attending  I personally reviewed images and agree with findings and interpretation as outlined above.

## 2022-10-31 NOTE — PROGRESS NOTE ADULT - PROBLEM SELECTOR PLAN 5
Volume overloaded on exam, last TTE 8/24 with 65% EF, mild diastolic dysfunction stage I.   - s/p lasix 40 mg x 1 on 10/23,   - c/w daily lasix 20 po daily Last TTE 8/24 with 65% EF, mild diastolic dysfunction stage I.   - c/w daily lasix 20 po daily.

## 2022-11-01 NOTE — PROGRESS NOTE ADULT - SUBJECTIVE AND OBJECTIVE BOX
PROGRESS NOTE:   Authored by: Onofre Sutton M.D.   Internal Medicine PGY-1  Please Contact Via Teams    Patient is a 82y old  Female who presents with a chief complaint of sepsis (31 Oct 2022 07:23)      SUBJECTIVE / OVERNIGHT EVENTS:  No acute events overnight.     ADDITIONAL REVIEW OF SYSTEMS:  Patient denies fevers, chills, chest pain, shortness of breath, nausea, abdominal pain, diarrhea, constipation, dysuria, leg swelling, headache, light headedness.    MEDICATIONS  (STANDING):  allopurinol 300 milliGRAM(s) Oral at bedtime  ascorbic acid 500 milliGRAM(s) Oral daily  carbidopa/levodopa  25/100 1 Tablet(s) Oral three times a day  chlorhexidine 2% Cloths 1 Application(s) Topical daily  furosemide    Tablet 20 milliGRAM(s) Oral daily  heparin  Infusion 950 Unit(s)/Hr (9.7 mL/Hr) IV Continuous <Continuous>  influenza  Vaccine (HIGH DOSE) 0.7 milliLiter(s) IntraMuscular once  latanoprost 0.005% Ophthalmic Solution 1 Drop(s) Both EYES at bedtime  levoFLOXacin  Tablet 500 milliGRAM(s) Oral every 24 hours  metoprolol succinate ER 75 milliGRAM(s) Oral daily  multivitamin 1 Tablet(s) Oral daily  pantoprazole    Tablet 40 milliGRAM(s) Oral before breakfast  senna 2 Tablet(s) Oral at bedtime  zinc sulfate 220 milliGRAM(s) Oral daily    MEDICATIONS  (PRN):  acetaminophen     Tablet .. 650 milliGRAM(s) Oral every 6 hours PRN Temp greater or equal to 38C (100.4F), Mild Pain (1 - 3), Moderate Pain (4 - 6)  artificial tears (preservative free) Ophthalmic Solution 1 Drop(s) Left EYE two times a day PRN Dry Eyes  HYDROmorphone  Injectable 0.25 milliGRAM(s) IV Push every 4 hours PRN Severe Pain (7 - 10)  melatonin 3 milliGRAM(s) Oral at bedtime PRN Insomnia      CAPILLARY BLOOD GLUCOSE        I&O's Summary    30 Oct 2022 07:01  -  31 Oct 2022 07:00  --------------------------------------------------------  IN: 1472.6 mL / OUT: 0 mL / NET: 1472.6 mL    31 Oct 2022 07:01  -  01 Nov 2022 06:46  --------------------------------------------------------  IN: 240 mL / OUT: 0 mL / NET: 240 mL        PHYSICAL EXAM:  Vital Signs Last 24 Hrs  T(C): 36.6 (01 Nov 2022 04:12), Max: 36.9 (31 Oct 2022 20:54)  T(F): 97.8 (01 Nov 2022 04:12), Max: 98.5 (31 Oct 2022 20:54)  HR: 89 (01 Nov 2022 04:12) (89 - 97)  BP: 107/76 (01 Nov 2022 04:12) (96/67 - 109/79)  BP(mean): --  RR: 18 (01 Nov 2022 04:12) (18 - 20)  SpO2: 96% (01 Nov 2022 04:12) (95% - 98%)    Parameters below as of 01 Nov 2022 04:12  Patient On (Oxygen Delivery Method): nasal cannula  O2 Flow (L/min): 2      CONSTITUTIONAL: NAD, well-developed  RESPIRATORY: Normal respiratory effort; lungs are clear to auscultation bilaterally  CARDIOVASCULAR: Regular rate and rhythm, normal S1 and S2, no murmur/rub/gallop; No lower extremity edema; Peripheral pulses are 2+ bilaterally  ABDOMEN: Nontender to palpation, normoactive bowel sounds, no rebound/guarding; No hepatosplenomegaly  MUSCLOSKELETAL: no clubbing or cyanosis of digits; no joint swelling or tenderness to palpation  PSYCH: A+O to person, place, and time; affect appropriate    LABS:                        9.2    16.78 )-----------( 299      ( 31 Oct 2022 08:18 )             29.7     10-31    139  |  99  |  19  ----------------------------<  96  4.0   |  28  |  0.60    Ca    8.7      31 Oct 2022 08:19  Phos  3.6     10-31  Mg     2.1     10-31      PT/INR - ( 31 Oct 2022 01:14 )   PT: 11.8 sec;   INR: 1.03 ratio         PTT - ( 31 Oct 2022 08:18 )  PTT:61.3 sec            Tele Reviewed:    RADIOLOGY & ADDITIONAL TESTS:  Results Reviewed:   Imaging Personally Reviewed:  Electrocardiogram Personally Reviewed:     PROGRESS NOTE:   Authored by: Onofre Sutton M.D.   Internal Medicine PGY-1  Please Contact Via Teams    Patient is a 82y old  Female who presents with a chief complaint of sepsis (31 Oct 2022 07:23)      SUBJECTIVE / OVERNIGHT EVENTS:  No acute events overnight. Is feeling well this morning. Pleasant, eating breakfast. Does not want an indwelling chest tube and would prefer a thoracentesis.    MEDICATIONS  (STANDING):  allopurinol 300 milliGRAM(s) Oral at bedtime  ascorbic acid 500 milliGRAM(s) Oral daily  carbidopa/levodopa  25/100 1 Tablet(s) Oral three times a day  chlorhexidine 2% Cloths 1 Application(s) Topical daily  furosemide    Tablet 20 milliGRAM(s) Oral daily  heparin  Infusion 950 Unit(s)/Hr (9.7 mL/Hr) IV Continuous <Continuous>  influenza  Vaccine (HIGH DOSE) 0.7 milliLiter(s) IntraMuscular once  latanoprost 0.005% Ophthalmic Solution 1 Drop(s) Both EYES at bedtime  levoFLOXacin  Tablet 500 milliGRAM(s) Oral every 24 hours  metoprolol succinate ER 75 milliGRAM(s) Oral daily  multivitamin 1 Tablet(s) Oral daily  pantoprazole    Tablet 40 milliGRAM(s) Oral before breakfast  senna 2 Tablet(s) Oral at bedtime  zinc sulfate 220 milliGRAM(s) Oral daily    MEDICATIONS  (PRN):  acetaminophen     Tablet .. 650 milliGRAM(s) Oral every 6 hours PRN Temp greater or equal to 38C (100.4F), Mild Pain (1 - 3), Moderate Pain (4 - 6)  artificial tears (preservative free) Ophthalmic Solution 1 Drop(s) Left EYE two times a day PRN Dry Eyes  HYDROmorphone  Injectable 0.25 milliGRAM(s) IV Push every 4 hours PRN Severe Pain (7 - 10)  melatonin 3 milliGRAM(s) Oral at bedtime PRN Insomnia      CAPILLARY BLOOD GLUCOSE        I&O's Summary    30 Oct 2022 07:01  -  31 Oct 2022 07:00  --------------------------------------------------------  IN: 1472.6 mL / OUT: 0 mL / NET: 1472.6 mL    31 Oct 2022 07:01  -  01 Nov 2022 06:46  --------------------------------------------------------  IN: 240 mL / OUT: 0 mL / NET: 240 mL        PHYSICAL EXAM:  Vital Signs Last 24 Hrs  T(C): 36.6 (01 Nov 2022 04:12), Max: 36.9 (31 Oct 2022 20:54)  T(F): 97.8 (01 Nov 2022 04:12), Max: 98.5 (31 Oct 2022 20:54)  HR: 89 (01 Nov 2022 04:12) (89 - 97)  BP: 107/76 (01 Nov 2022 04:12) (96/67 - 109/79)  BP(mean): --  RR: 18 (01 Nov 2022 04:12) (18 - 20)  SpO2: 96% (01 Nov 2022 04:12) (95% - 98%)    Parameters below as of 01 Nov 2022 04:12  Patient On (Oxygen Delivery Method): nasal cannula  O2 Flow (L/min): 2      CONSTITUTIONAL: NAD, thin, cachectic, temporal wasting  RESPIRATORY: Decreased breath sounds, crackles at left lower lung base.  CARDIOVASCULAR: Afib, no murmurs. No LE edema  ABDOMEN: Nontender to palpation, normoactive bowel sounds  PSYCH: A+O to person, place, and time; affect appropriate    LABS:                        9.2    16.78 )-----------( 299      ( 31 Oct 2022 08:18 )             29.7     10-31    139  |  99  |  19  ----------------------------<  96  4.0   |  28  |  0.60    Ca    8.7      31 Oct 2022 08:19  Phos  3.6     10-31  Mg     2.1     10-31      PT/INR - ( 31 Oct 2022 01:14 )   PT: 11.8 sec;   INR: 1.03 ratio         PTT - ( 31 Oct 2022 08:18 )  PTT:61.3 sec            No new micro    CXR with worsening L pleural effusion

## 2022-11-01 NOTE — PROGRESS NOTE ADULT - PROBLEM SELECTOR PLAN 3
Chronic Afib s/p ablation procedure  - Rates mostly <100  - Continue metoprolol tartrate 12.5 mg to q8h, hold for HR < 60, SBP < 95  - ?small acute hemorrhage on initial CTH, repeat scan showing resolution of this focus 10/27  - Neurosurgery no acute surgical intervention, AC will have risks but no hard contraindication to AC if necessary  - CHADSVASC score 5  - Given age and ?brain bleed, discussed risks/benefits with daughter Larisa Franklin, who is a neurologist, and decided to start AC  - Per NSGY note, will start heparin gtt, no bolus, with goal PTT 60-65  - Will need to switch to apixaban after temporizing measure for chylothorax Chronic Afib s/p ablation procedure  - Rates mostly <100  - Continue metoprolol tartrate 12.5 mg to q8h, hold for HR < 60, SBP < 95  - ?small acute hemorrhage on initial CTH, repeat scan showing resolution of this focus 10/27  - Neurosurgery no acute surgical intervention, AC will have risks but no hard contraindication to AC if necessary  - CHADSVASC score 5  - Given age and ?brain bleed, discussed risks/benefits with daughter Larisa Franklin, who is a neurologist, and decided to start AC  - Per NSGY note, will start heparin gtt, no bolus, with goal PTT 60-65  - Switch to apixaban 2.5 after thoracentesis tomorrow

## 2022-11-01 NOTE — PROGRESS NOTE ADULT - ASSESSMENT
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

## 2022-11-01 NOTE — CHART NOTE - NSCHARTNOTEFT_GEN_A_CORE
Nutrition Follow Up Note  Patient seen for: nutrition consult for low fat diet  Chart reviewed, events noted.    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 Also found to have worsening L pleural effusion, s/p thoracentesis 10/24 showing chylothorax."    Source: [x] Patient       [x] EMR        [x] RN        [] Family at bedside       [] Other:    -If unable to interview patient: [] Trach/Vent/BiPAP  [] Disoriented/confused/inappropriate to interview    Diet Order:   Diet, Regular:   Easy to Chew (EASYTOCHEW)  Aroldo(7 Gm Arginine/7 Gm Glut/1.2 Gm HMB     Qty per Day:  2  Supplement Feeding Modality:  Oral  Ensure Plus High Protein Cans or Servings Per Day:  3       Frequency:  Daily (10-27-22)    - Is current order appropriate/adequate? [] Yes  []  No:   recommend to change current diet order; see recommendations below     - PO intake :   [] >75%  Adequate    [] 50-75%  Fair       [] <50%  Poor  pt with malnutrition ~50% PO intake of meals at this time per pt report     - Nutrition-related concerns:  - malnutrition; pt reports improving PO intake ~50% meals; with similar appetite   - per MD note," s/p thoracentesis 10/24 showing chylothorax." ; will recommend STRICTLY LOW FAT diet (</=10 g/fat/day)  - RD updated diet orders on CBORD, please discuss with RD any changes/specific ordering of food items for pt to meet strict fat and diet consistency restrictions.   - 10/26 wound care: "Sacral/bilateral Buttocks deep tissue injury present on admission"    GI:  Last BM 10/31 as per flowsheets  Bowel Regimen? [x] Yes- senna   [] No  Pt denies nausea, vomiting, diarrhea, or constipation.     Weights:   Daily Weight in k.9 (-01), Weight in k.1 (10-30), Weight in k (10-29), Weight in k.8 (10-28)  Drug Dosing Weight  Height (cm): 172.7 (23 Oct 2022 00:08)  Weight (kg): 55.1 (23 Oct 2022 17:03)  BMI (kg/m2): 18.5 (23 Oct 2022 17:03)  BSA (m2): 1.65 (23 Oct 2022 17:03)    Nutritionally Pertinent MEDICATIONS  (STANDING):  allopurinol  ascorbic acid  furosemide    Tablet  levoFLOXacin  Tablet  metoprolol succinate ER  multivitamin  pantoprazole    Tablet  senna  zinc sulfate    Pertinent Labs:  @ 07:20: Na 142, BUN 19, Cr 0.58, BG 92, K+ 4.0, Phos 3.9, Mg 1.9, Alk Phos 159<H>, ALT/SGPT 7<L>, AST/SGOT 26, HbA1c --    Skin per nursing documentation: 10/26 wound care: "Sacral/bilateral Buttocks deep tissue injury present on admission"  Edema: No noted edema as per flow sheets.     Estimated Needs:   [x] no change since previous assessment  [] recalculated:     Previous Nutrition Diagnosis: acute on chronic severe protein calorie malnutrition; Increased protein-energy needs   Nutrition Diagnosis is: [x] ongoing  [] resolved [] not applicable     New Nutrition Diagnosis: [] Not applicable    Nutrition Care Plan:  [x] In Progress- addressed with diet order, PO encouragement and nutritional supplements   [] Achieved  [] Not applicable    Nutrition Interventions:     Education Provided:       [] Yes:  [] No: low fat diet education provided to pt. Encouraged adequate PO intake for meeting nutritional needs, improving nutritional status and for preventing wt loss        Recommendations:      - Recommend low fat diet (</=10 g/fat/day); recommend adding 1-2 tbsp MCT oil with each meal (provider to RN in chart)  - Recommend Ensure Clear 3x daily (540 caitlin and 24 gm protein).   - Aroldo 1xday (in each: 90 kcal and 14g essential amino acids). Note Aroldo requires ~240mL water to be dissolved in for administration.   - Continue Multivitamin, vit C zinc daily to promote wound healing   - Nutrition care plan to remain consistent with pt goals of care  - Please discuss with RD any ordering of food items for pt/ changes in diet order per restricted low fat diet.  - RD remains available to review diet education and adjust diet recommendations as needed.     Monitoring and Evaluation:   Continue to monitor nutritional intake, tolerance to diet prescription, weights, labs, skin integrity    RD remains available upon request and will follow up per protocol  Enma Lynne RD CDN #[id number] Nutrition Follow Up Note  Patient seen for: nutrition consult for low fat diet  Chart reviewed, events noted.    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 Also found to have worsening L pleural effusion, s/p thoracentesis 10/24 showing chylothorax."    Source: [x] Patient       [x] EMR        [x] RN        [] Family at bedside       [] Other:    -If unable to interview patient: [] Trach/Vent/BiPAP  [] Disoriented/confused/inappropriate to interview    Diet Order:   Diet, Regular:   Easy to Chew (EASYTOCHEW)  Aroldo(7 Gm Arginine/7 Gm Glut/1.2 Gm HMB     Qty per Day:  2  Supplement Feeding Modality:  Oral  Ensure Plus High Protein Cans or Servings Per Day:  3       Frequency:  Daily (10-27-22)    - Is current order appropriate/adequate? [] Yes  []  No:   recommend to change current diet order; see recommendations below     - PO intake :   [] >75%  Adequate    [] 50-75%  Fair       [] <50%  Poor  pt with malnutrition ~50% PO intake of meals at this time per pt report     - Nutrition-related concerns:  - malnutrition; pt reports improving PO intake ~50% meals; with similar appetite   - per MD note," s/p thoracentesis 10/24 showing chylothorax." ; will recommend STRICTLY LOW FAT diet   - RD updated diet orders on CBORD, please discuss with RD any changes/specific ordering of food items for pt to meet strict fat and diet consistency restrictions.   - 10/26 wound care: "Sacral/bilateral Buttocks deep tissue injury present on admission"    GI:  Last BM 10/31 as per flowsheets  Bowel Regimen? [x] Yes- senna   [] No  Pt denies nausea, vomiting, diarrhea, or constipation.     Weights:   Daily Weight in k.9 (-01), Weight in k.1 (10-30), Weight in k (10-29), Weight in k.8 (10-28)  Drug Dosing Weight  Height (cm): 172.7 (23 Oct 2022 00:08)  Weight (kg): 55.1 (23 Oct 2022 17:03)  BMI (kg/m2): 18.5 (23 Oct 2022 17:03)  BSA (m2): 1.65 (23 Oct 2022 17:03)    Nutritionally Pertinent MEDICATIONS  (STANDING):  allopurinol  ascorbic acid  furosemide    Tablet  levoFLOXacin  Tablet  metoprolol succinate ER  multivitamin  pantoprazole    Tablet  senna  zinc sulfate    Pertinent Labs:  @ 07:20: Na 142, BUN 19, Cr 0.58, BG 92, K+ 4.0, Phos 3.9, Mg 1.9, Alk Phos 159<H>, ALT/SGPT 7<L>, AST/SGOT 26, HbA1c --    Skin per nursing documentation: 10/26 wound care: "Sacral/bilateral Buttocks deep tissue injury present on admission"  Edema: No noted edema as per flow sheets.     Estimated Needs:   [x] no change since previous assessment  [] recalculated:     Previous Nutrition Diagnosis: acute on chronic severe protein calorie malnutrition; Increased protein-energy needs   Nutrition Diagnosis is: [x] ongoing  [] resolved [] not applicable     New Nutrition Diagnosis: [] Not applicable    Nutrition Care Plan:  [x] In Progress- addressed with diet order, PO encouragement and nutritional supplements   [] Achieved  [] Not applicable    Nutrition Interventions:     Education Provided:       [] Yes:  [] No: low fat diet education provided to pt. Encouraged adequate PO intake for meeting nutritional needs, improving nutritional status and for preventing wt loss        Recommendations:      - Recommend low fat diet; recommend adding 1-2 tbsp MCT oil with each meal (provider to RN in chart)  - Recommend Ensure Clear 3x daily (540 caitlin and 24 gm protein).   - Aroldo 1xday (in each: 90 kcal and 14g essential amino acids). Note Aroldo requires ~240mL water to be dissolved in for administration.   - Continue Multivitamin, vit C zinc daily to promote wound healing   - Nutrition care plan to remain consistent with pt goals of care  - Please discuss with RD any ordering of food items for pt/ changes in diet order per restricted low fat diet.  - RD remains available to review diet education and adjust diet recommendations as needed.     Monitoring and Evaluation:   Continue to monitor nutritional intake, tolerance to diet prescription, weights, labs, skin integrity    RD remains available upon request and will follow up per protocol  Enma Lynne RD CDN #[id number]

## 2022-11-01 NOTE — PROGRESS NOTE ADULT - SUBJECTIVE AND OBJECTIVE BOX
CHIEF COMPLAINT:    Interval Events:      REVIEW OF SYSTEMS:  Negative except as documented above.      OBJECTIVE:  ICU Vital Signs Last 24 Hrs  T(C): 36.6 (01 Nov 2022 04:12), Max: 36.9 (31 Oct 2022 20:54)  T(F): 97.8 (01 Nov 2022 04:12), Max: 98.5 (31 Oct 2022 20:54)  HR: 89 (01 Nov 2022 04:12) (89 - 97)  BP: 107/76 (01 Nov 2022 04:12) (96/67 - 109/79)  BP(mean): --  ABP: --  ABP(mean): --  RR: 18 (01 Nov 2022 04:12) (18 - 20)  SpO2: 96% (01 Nov 2022 04:12) (95% - 98%)    O2 Parameters below as of 01 Nov 2022 04:12  Patient On (Oxygen Delivery Method): nasal cannula  O2 Flow (L/min): 2            10-31 @ 07:01  -  11-01 @ 07:00  --------------------------------------------------------  IN: 240 mL / OUT: 0 mL / NET: 240 mL      CAPILLARY BLOOD GLUCOSE          PHYSICAL EXAM:  General: NAD  HEENT: PERRL, EOMI, sclera anicteric, moist mucus membranes  Neck: supple, no JVD  Cardiovascular: RRR, no murmurs, rubs, or gallops  Respiratory: CTAB, no wheezes, crackles, or rhonci  Abdomen: soft, nontender, nondistended, normoactive bowel sounds  Extremities: warm and well perfused, no edema, no clubbing  Skin: no rashes  Neurological: Aox3, no focal deficits    HOSPITAL MEDICATIONS:  MEDICATIONS  (STANDING):  allopurinol 300 milliGRAM(s) Oral at bedtime  ascorbic acid 500 milliGRAM(s) Oral daily  carbidopa/levodopa  25/100 1 Tablet(s) Oral three times a day  chlorhexidine 2% Cloths 1 Application(s) Topical daily  furosemide    Tablet 20 milliGRAM(s) Oral daily  heparin  Infusion 950 Unit(s)/Hr (9.7 mL/Hr) IV Continuous <Continuous>  influenza  Vaccine (HIGH DOSE) 0.7 milliLiter(s) IntraMuscular once  latanoprost 0.005% Ophthalmic Solution 1 Drop(s) Both EYES at bedtime  levoFLOXacin  Tablet 500 milliGRAM(s) Oral every 24 hours  metoprolol succinate ER 75 milliGRAM(s) Oral daily  multivitamin 1 Tablet(s) Oral daily  pantoprazole    Tablet 40 milliGRAM(s) Oral before breakfast  senna 2 Tablet(s) Oral at bedtime  zinc sulfate 220 milliGRAM(s) Oral daily    MEDICATIONS  (PRN):  acetaminophen     Tablet .. 650 milliGRAM(s) Oral every 6 hours PRN Temp greater or equal to 38C (100.4F), Mild Pain (1 - 3), Moderate Pain (4 - 6)  artificial tears (preservative free) Ophthalmic Solution 1 Drop(s) Left EYE two times a day PRN Dry Eyes  HYDROmorphone  Injectable 0.25 milliGRAM(s) IV Push every 4 hours PRN Severe Pain (7 - 10)  melatonin 3 milliGRAM(s) Oral at bedtime PRN Insomnia      LABS:                        9.2    16.78 )-----------( 299      ( 31 Oct 2022 08:18 )             29.7     Hgb Trend: 9.2<--, 9.2<--, 8.8<--, 9.2<--, 9.4<--  10-31    139  |  99  |  19  ----------------------------<  96  4.0   |  28  |  0.60    Ca    8.7      31 Oct 2022 08:19  Phos  3.6     10-31  Mg     2.1     10-31      Creatinine Trend: 0.60<--, 0.59<--, 0.65<--, 0.66<--, 0.59<--, 0.57<--  PT/INR - ( 31 Oct 2022 01:14 )   PT: 11.8 sec;   INR: 1.03 ratio         PTT - ( 31 Oct 2022 08:18 )  PTT:61.3 sec          MICROBIOLOGY:       RADIOLOGY:  [x] Reviewed and interpreted by me   Interval Events:  - worked with PT, dyspneic with exertion  - no shortness of breath at rest, no chest pain, no chest tightness, no cough    REVIEW OF SYSTEMS:  Negative except as documented above.      OBJECTIVE:  ICU Vital Signs Last 24 Hrs  T(C): 36.6 (01 Nov 2022 04:12), Max: 36.9 (31 Oct 2022 20:54)  T(F): 97.8 (01 Nov 2022 04:12), Max: 98.5 (31 Oct 2022 20:54)  HR: 89 (01 Nov 2022 04:12) (89 - 97)  BP: 107/76 (01 Nov 2022 04:12) (96/67 - 109/79)  BP(mean): --  ABP: --  ABP(mean): --  RR: 18 (01 Nov 2022 04:12) (18 - 20)  SpO2: 96% (01 Nov 2022 04:12) (95% - 98%)    O2 Parameters below as of 01 Nov 2022 04:12  Patient On (Oxygen Delivery Method): nasal cannula  O2 Flow (L/min): 2            10-31 @ 07:01  -  11-01 @ 07:00  --------------------------------------------------------  IN: 240 mL / OUT: 0 mL / NET: 240 mL      CAPILLARY BLOOD GLUCOSE          PHYSICAL EXAM:  General: NAD  HEENT: PERRL, EOMI, sclera anicteric  Neck: supple, no JVD  Cardiovascular: RRR, no murmurs, rubs, or gallops  Respiratory: decreased L side breath sounds  Abdomen: soft, nontender, nondistended  Extremities: warm and well perfused, no edema, no clubbing  Skin: no rashes  Neurological: Aox3, no focal deficits    HOSPITAL MEDICATIONS:  MEDICATIONS  (STANDING):  allopurinol 300 milliGRAM(s) Oral at bedtime  ascorbic acid 500 milliGRAM(s) Oral daily  carbidopa/levodopa  25/100 1 Tablet(s) Oral three times a day  chlorhexidine 2% Cloths 1 Application(s) Topical daily  furosemide    Tablet 20 milliGRAM(s) Oral daily  heparin  Infusion 950 Unit(s)/Hr (9.7 mL/Hr) IV Continuous <Continuous>  influenza  Vaccine (HIGH DOSE) 0.7 milliLiter(s) IntraMuscular once  latanoprost 0.005% Ophthalmic Solution 1 Drop(s) Both EYES at bedtime  levoFLOXacin  Tablet 500 milliGRAM(s) Oral every 24 hours  metoprolol succinate ER 75 milliGRAM(s) Oral daily  multivitamin 1 Tablet(s) Oral daily  pantoprazole    Tablet 40 milliGRAM(s) Oral before breakfast  senna 2 Tablet(s) Oral at bedtime  zinc sulfate 220 milliGRAM(s) Oral daily    MEDICATIONS  (PRN):  acetaminophen     Tablet .. 650 milliGRAM(s) Oral every 6 hours PRN Temp greater or equal to 38C (100.4F), Mild Pain (1 - 3), Moderate Pain (4 - 6)  artificial tears (preservative free) Ophthalmic Solution 1 Drop(s) Left EYE two times a day PRN Dry Eyes  HYDROmorphone  Injectable 0.25 milliGRAM(s) IV Push every 4 hours PRN Severe Pain (7 - 10)  melatonin 3 milliGRAM(s) Oral at bedtime PRN Insomnia      LABS:                        9.2    16.78 )-----------( 299      ( 31 Oct 2022 08:18 )             29.7     Hgb Trend: 9.2<--, 9.2<--, 8.8<--, 9.2<--, 9.4<--  10-31    139  |  99  |  19  ----------------------------<  96  4.0   |  28  |  0.60    Ca    8.7      31 Oct 2022 08:19  Phos  3.6     10-31  Mg     2.1     10-31      Creatinine Trend: 0.60<--, 0.59<--, 0.65<--, 0.66<--, 0.59<--, 0.57<--  PT/INR - ( 31 Oct 2022 01:14 )   PT: 11.8 sec;   INR: 1.03 ratio         PTT - ( 31 Oct 2022 08:18 )  PTT:61.3 sec          MICROBIOLOGY:       RADIOLOGY:  [x] Reviewed and interpreted by me

## 2022-11-01 NOTE — PROGRESS NOTE ADULT - ATTENDING COMMENTS
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 Transitioned to oral Levaquin 10/29    #Acute Hypoxic respiratory failure 2/2 pneumonia and left pleural effusion   #Klebsiella bacteremia   - blood cx 10/25: ngtd, pleural effusion 10/24: ngtd  - continue levaquin x2 weeks   - pocus 10/31 with large L effusion. f/u pulm team regarding plan for thoracentesis tomorrow.   - continue lasix    #Intracranial hemorrhage   - heparin gtt with low aptt goal > hold heparin tomorrow am for thora     #Afib  - switch to eliquis 2.5mg bid after thoracentesis   - switched to metoprolol succinate 75. tele - HR within goal     discharge planning PT rec KATHRYN

## 2022-11-01 NOTE — PROGRESS NOTE ADULT - ASSESSMENT
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 She was admitted to the hospital for hypoxic respiratory failure in the setting of pneumonia, pleural effusion, klebsiella bacteremia. Pulmonary consulted for pleural effusion which was drained, c/w exudative process with TGs >400, negative cytopathology and flow cytometry.    Chylothorax  - s/p thoracentesis 10/24, 1600cc chylous fluid removed, TGs >400, negative cytopath and flow cyto  - continue antibiotics per primary team  - wean O2 as tolerated, goal SpO2 92% or higher  - incentive spirometry, OOB to chair/ambulation as able  - recommend nutrition consult for low fat diet to slow reaccumulation of chylothorax  - regarding long term management, discussed options including periodic thoracentesis vs pleurx catheter with patient. She is very hesitant to have any indwelling catheter and strongly prefers thoracentesis as needed. Will discuss with daughter and oncology.   - plan for thoracentesis tomorrow AM (11/2/22), please hold heparin at 4AM

## 2022-11-01 NOTE — PROGRESS NOTE ADULT - ATTENDING COMMENTS
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   Thoracentesis performed on 10/24 with removal of approx 1600 ml chylous appearing fluid  She has pneumonia and Klebsiella UTI with bacteremia.  Cultures have cleared.  On POCUS effusion on the left is large- do not have comparison post thoracentesis to gauge amount of reaccumulation.  Please obtain CXR tomorrow.  Concern for placement of Pleurx is potential for large volume drainage, calorie depletion from loss of lipid.  Prior to this current  thoracentesis, she was last drained 2 months ago.    Will perform large volume thoracentesis tomorrow after discussion with patient's daughter.  Please consult nutrition regarding limiting dietary fat, especially long chain AA.  Agree with plan as outlined above.

## 2022-11-01 NOTE — PROGRESS NOTE ADULT - PROBLEM SELECTOR PLAN 4
- Follows with Dr. Kenan Delgadillo at [facility]  - Follicular lymphoma s/p C1 R-miniCHOP on 9/16, 10/10. Cycle 3 due 10/31, will hold as per heme onc.  - Flow cytometry of pleural fluid neg for malignant cells  - Hold chemo given active infection  - f/u heme/onc reccs.

## 2022-11-01 NOTE — PROGRESS NOTE ADULT - PROBLEM SELECTOR PLAN 1
Arrived to ED tachycardic, tachypneic, and with leukocytosis. Afebrile but with subjective chills. CXR with RUL opacity concerning for pneumonia. CTA chest with right lung pneumonia.  - Bcx with Klebsiella pneumoniae, repeat cx clear  - Gyn consulted given concern for uterine/vaginal prolapse increasing risk of UTI, no plans for inpatient intervention, has outpatient urogyn appt 11/24 for pessary evaluation  - Sacral ulcer with no signs of infection, will hold off on wound cx   - s/p cefepime (10/24 - 10/29); will complete 2 week course with po levaquin. To finish 11/7  - Decreasing oxygen requirements, now on 2L NC, wean as tolerated  - initially with worsening leukocytosis likely due to Neupogen treatment on 10/10, now downtrending.

## 2022-11-01 NOTE — PROGRESS NOTE ADULT - PROBLEM SELECTOR PLAN 2
- Small right and large left on CT 10/23. S/p thoracentesis 10/24 with 1.6L fluid removal with improvement in clinical status  - Fluid gram stain neg, cx NGTD, appears transudative based on Light's criteria however fluid was chylous appearing and TG>400  - c/w lasix 20 mg po daily  - flow cytometry with increased neutrophils, no malignant cells  - Pulm to discuss with interventional pulm for further recs. Chylothorax likely secondary to lymphoma. AM CXR 11/1 - Small right and large left on CT 10/23. S/p thoracentesis 10/24 with 1.6L fluid removal with improvement in clinical status  - Fluid gram stain neg, cx NGTD, appears transudative based on Light's criteria however fluid was chylous appearing and TG>400  - c/w lasix 20 mg po daily  - flow cytometry with increased neutrophils, no malignant cells  - Large volume thoracentesis tomorrow. Will hold heparin 4am  - Low fat diet with MCT oil

## 2022-11-02 NOTE — PROGRESS NOTE ADULT - PROBLEM SELECTOR PLAN 3
Chronic Afib s/p ablation procedure  - Rates mostly <100  - Continue metoprolol tartrate 12.5 mg to q8h, hold for HR < 60, SBP < 95  - ?small acute hemorrhage on initial CTH, repeat scan showing resolution of this focus 10/27  - Neurosurgery no acute surgical intervention, AC will have risks but no hard contraindication to AC if necessary  - CHADSVASC score 5  - Given age and ?brain bleed, discussed risks/benefits with daughter Larisa Franklin, who is a neurologist, and decided to start AC  - Per NSGY note, will start heparin gtt, no bolus, with goal PTT 60-65  - Switch to apixaban 2.5 after thoracentesis tomorrow Chronic Afib s/p ablation procedure  - Rates mostly <100  - Continue metoprolol tartrate 12.5 mg to q8h, hold for HR < 60, SBP < 95  - ?small acute hemorrhage on initial CTH, repeat scan showing resolution of this focus 10/27  - Neurosurgery no acute surgical intervention, AC will have risks but no hard contraindication to AC if necessary  - CHADSVASC score 5  - Given age and ?brain bleed, discussed risks/benefits with daughter Larisa Franklin, who is a neurologist, and decided to start AC  - Per NSGY note, will start heparin gtt, no bolus, with goal PTT 60-65  - Switch to apixaban 2.5 after thoracentesis Chronic Afib s/p ablation procedure  - Rates mostly <100  - Continue metoprolol tartrate 12.5 mg to q8h, hold for HR < 60, SBP < 95  - ?small acute hemorrhage on initial CTH, repeat scan showing resolution of this focus 10/27  - Neurosurgery no acute surgical intervention, AC will have risks but no hard contraindication to AC if necessary  - CHADSVASC score 5  - Given age and ?brain bleed, discussed risks/benefits with daughter Larisa Franklin, who is a neurologist, and decided to start AC  - Per NSGY note, will start heparin gtt, no bolus, with goal PTT 60-65  - Switch to apixaban 2.5 tonight after thoracentesis

## 2022-11-02 NOTE — PROGRESS NOTE ADULT - SUBJECTIVE AND OBJECTIVE BOX
PROGRESS NOTE:   Authored by: Onofre Sutton M.D.   Internal Medicine PGY-1  Please Contact Via Teams    Patient is a 82y old  Female who presents with a chief complaint of sepsis (01 Nov 2022 07:27)      SUBJECTIVE / OVERNIGHT EVENTS:  No acute events overnight.     ADDITIONAL REVIEW OF SYSTEMS:  Patient denies fevers, chills, chest pain, shortness of breath, nausea, abdominal pain, diarrhea, constipation, dysuria, leg swelling, headache, light headedness.    MEDICATIONS  (STANDING):  allopurinol 300 milliGRAM(s) Oral at bedtime  ascorbic acid 500 milliGRAM(s) Oral daily  carbidopa/levodopa  25/100 1 Tablet(s) Oral three times a day  chlorhexidine 2% Cloths 1 Application(s) Topical daily  furosemide    Tablet 20 milliGRAM(s) Oral daily  influenza  Vaccine (HIGH DOSE) 0.7 milliLiter(s) IntraMuscular once  latanoprost 0.005% Ophthalmic Solution 1 Drop(s) Both EYES at bedtime  levoFLOXacin  Tablet 500 milliGRAM(s) Oral every 24 hours  metoprolol succinate ER 75 milliGRAM(s) Oral daily  multivitamin 1 Tablet(s) Oral daily  pantoprazole    Tablet 40 milliGRAM(s) Oral before breakfast  senna 2 Tablet(s) Oral at bedtime  zinc sulfate 220 milliGRAM(s) Oral daily    MEDICATIONS  (PRN):  acetaminophen     Tablet .. 650 milliGRAM(s) Oral every 6 hours PRN Temp greater or equal to 38C (100.4F), Mild Pain (1 - 3), Moderate Pain (4 - 6)  artificial tears (preservative free) Ophthalmic Solution 1 Drop(s) Left EYE two times a day PRN Dry Eyes  HYDROmorphone  Injectable 0.25 milliGRAM(s) IV Push every 4 hours PRN Severe Pain (7 - 10)  melatonin 3 milliGRAM(s) Oral at bedtime PRN Insomnia      CAPILLARY BLOOD GLUCOSE        I&O's Summary    01 Nov 2022 07:01  -  02 Nov 2022 07:00  --------------------------------------------------------  IN: 360 mL / OUT: 0 mL / NET: 360 mL        PHYSICAL EXAM:  Vital Signs Last 24 Hrs  T(C): 36.4 (02 Nov 2022 04:14), Max: 36.9 (01 Nov 2022 20:25)  T(F): 97.6 (02 Nov 2022 04:14), Max: 98.5 (01 Nov 2022 20:25)  HR: 87 (02 Nov 2022 04:14) (86 - 90)  BP: 97/68 (02 Nov 2022 04:14) (97/65 - 103/69)  BP(mean): --  RR: 18 (02 Nov 2022 04:14) (18 - 18)  SpO2: 96% (02 Nov 2022 04:14) (93% - 96%)    Parameters below as of 02 Nov 2022 04:14  Patient On (Oxygen Delivery Method): nasal cannula  O2 Flow (L/min): 2      CONSTITUTIONAL: NAD, well-developed  RESPIRATORY: Normal respiratory effort; lungs are clear to auscultation bilaterally  CARDIOVASCULAR: Regular rate and rhythm, normal S1 and S2, no murmur/rub/gallop; No lower extremity edema; Peripheral pulses are 2+ bilaterally  ABDOMEN: Nontender to palpation, normoactive bowel sounds, no rebound/guarding; No hepatosplenomegaly  MUSCLOSKELETAL: no clubbing or cyanosis of digits; no joint swelling or tenderness to palpation  PSYCH: A+O to person, place, and time; affect appropriate    LABS:                        9.6    14.64 )-----------( 367      ( 02 Nov 2022 06:13 )             29.8     11-02    141  |  101  |  19  ----------------------------<  80  4.1   |  31  |  0.67    Ca    9.0      02 Nov 2022 06:13  Phos  4.1     11-02  Mg     2.0     11-02    TPro  4.8<L>  /  Alb  2.6<L>  /  TBili  0.3  /  DBili  x   /  AST  20  /  ALT  <5<L>  /  AlkPhos  143<H>  11-02    PT/INR - ( 02 Nov 2022 06:13 )   PT: 10.9 sec;   INR: 0.94 ratio         PTT - ( 02 Nov 2022 06:13 )  PTT:29.1 sec            Tele Reviewed:    RADIOLOGY & ADDITIONAL TESTS:  Results Reviewed:   Imaging Personally Reviewed:  Electrocardiogram Personally Reviewed:     PROGRESS NOTE:   Authored by: Onofre Sutton M.D.   Internal Medicine PGY-1  Please Contact Via Teams    Patient is a 82y old  Female who presents with a chief complaint of sepsis (01 Nov 2022 07:27)      SUBJECTIVE / OVERNIGHT EVENTS:  No acute events overnight. Pt seen eating breakfast this morning. Comfortable. Ready for thoracentesis. Still on 2L NC    MEDICATIONS  (STANDING):  allopurinol 300 milliGRAM(s) Oral at bedtime  ascorbic acid 500 milliGRAM(s) Oral daily  carbidopa/levodopa  25/100 1 Tablet(s) Oral three times a day  chlorhexidine 2% Cloths 1 Application(s) Topical daily  furosemide    Tablet 20 milliGRAM(s) Oral daily  influenza  Vaccine (HIGH DOSE) 0.7 milliLiter(s) IntraMuscular once  latanoprost 0.005% Ophthalmic Solution 1 Drop(s) Both EYES at bedtime  levoFLOXacin  Tablet 500 milliGRAM(s) Oral every 24 hours  metoprolol succinate ER 75 milliGRAM(s) Oral daily  multivitamin 1 Tablet(s) Oral daily  pantoprazole    Tablet 40 milliGRAM(s) Oral before breakfast  senna 2 Tablet(s) Oral at bedtime  zinc sulfate 220 milliGRAM(s) Oral daily    MEDICATIONS  (PRN):  acetaminophen     Tablet .. 650 milliGRAM(s) Oral every 6 hours PRN Temp greater or equal to 38C (100.4F), Mild Pain (1 - 3), Moderate Pain (4 - 6)  artificial tears (preservative free) Ophthalmic Solution 1 Drop(s) Left EYE two times a day PRN Dry Eyes  HYDROmorphone  Injectable 0.25 milliGRAM(s) IV Push every 4 hours PRN Severe Pain (7 - 10)  melatonin 3 milliGRAM(s) Oral at bedtime PRN Insomnia      CAPILLARY BLOOD GLUCOSE        I&O's Summary    01 Nov 2022 07:01  -  02 Nov 2022 07:00  --------------------------------------------------------  IN: 360 mL / OUT: 0 mL / NET: 360 mL        PHYSICAL EXAM:  Vital Signs Last 24 Hrs  T(C): 36.4 (02 Nov 2022 04:14), Max: 36.9 (01 Nov 2022 20:25)  T(F): 97.6 (02 Nov 2022 04:14), Max: 98.5 (01 Nov 2022 20:25)  HR: 87 (02 Nov 2022 04:14) (86 - 90)  BP: 97/68 (02 Nov 2022 04:14) (97/65 - 103/69)  BP(mean): --  RR: 18 (02 Nov 2022 04:14) (18 - 18)  SpO2: 96% (02 Nov 2022 04:14) (93% - 96%)    Parameters below as of 02 Nov 2022 04:14  Patient On (Oxygen Delivery Method): nasal cannula  O2 Flow (L/min): 2      CONSTITUTIONAL: NAD, thin, cachectic, temporal wasting  RESPIRATORY: Decreased breath sounds, crackles at left lower lung base.  CARDIOVASCULAR: Afib, no murmurs. No LE edema  ABDOMEN: Nontender to palpation, normoactive bowel sounds  PSYCH: A+O to person, place, and time; affect appropriate    LABS:                        9.6    14.64 )-----------( 367      ( 02 Nov 2022 06:13 )             29.8     11-02    141  |  101  |  19  ----------------------------<  80  4.1   |  31  |  0.67    Ca    9.0      02 Nov 2022 06:13  Phos  4.1     11-02  Mg     2.0     11-02    TPro  4.8<L>  /  Alb  2.6<L>  /  TBili  0.3  /  DBili  x   /  AST  20  /  ALT  <5<L>  /  AlkPhos  143<H>  11-02    PT/INR - ( 02 Nov 2022 06:13 )   PT: 10.9 sec;   INR: 0.94 ratio         PTT - ( 02 Nov 2022 06:13 )  PTT:29.1 sec            Tele Reviewed: Afib mostly 80's-90's. Occasionally in 120's      No new micro or imaging

## 2022-11-02 NOTE — PROGRESS NOTE ADULT - ASSESSMENT
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 She was admitted to the hospital for hypoxic respiratory failure in the setting of pneumonia, pleural effusion, klebsiella bacteremia. Pulmonary consulted for pleural effusion which was drained, c/w exudative process with TGs >400, negative cytopathology and flow cytometry.    Chylothorax  - s/p thoracentesis 10/24, 1600cc chylous fluid removed, TGs >400, negative cytopath and flow cyto  - continue antibiotics per primary team  - wean O2 as tolerated, goal SpO2 92% or higher  - incentive spirometry, OOB to chair/ambulation as able  - recommend low fat diet  - regarding long term management, discussed options including periodic thoracentesis vs pleurx catheter with patient. She is very hesitant to have any indwelling catheter and strongly prefers thoracentesis as needed. Discussed with daughter who agrees. Will need to follow up with pulmonology for continued monitoring for reaccumulation  - s/p thoracentesis  (1400 cc fluid drained), f/u pleural fluid studies    Prior to discharge:  Please email [email or web address] to set up an appointment. Include patient's name, , MRN, and contact information in the email.    Pulmonary/Sleep Clinic  [address] [place] [address]  [phone]

## 2022-11-02 NOTE — PROGRESS NOTE ADULT - ATTENDING COMMENTS
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 Transitioned to oral Levaquin 10/29    #Acute Hypoxic respiratory failure 2/2 pneumonia and left pleural effusion   #Klebsiella bacteremia   - blood cx 10/25: ngtd, pleural effusion 10/24: ngtd  - continue levaquin x2 weeks   - pocus 10/31 with large L effusion. plan for thoracentesis today by pulm team  - continue lasix    #Intracranial hemorrhage   - heparin on hold. resume eliquis after procedure     #Afib  - switch to eliquis 2.5mg bid after thoracentesis   - switched to metoprolol succinate 75. tele - HR within goal     discharge planning PT rec KATHRYN.

## 2022-11-02 NOTE — PROGRESS NOTE ADULT - PROBLEM SELECTOR PLAN 2
- Small right and large left on CT 10/23. S/p thoracentesis 10/24 with 1.6L fluid removal with improvement in clinical status  - Fluid gram stain neg, cx NGTD, appears transudative based on Light's criteria however fluid was chylous appearing and TG>400  - c/w lasix 20 mg po daily  - flow cytometry with increased neutrophils, no malignant cells  - Large volume thoracentesis tomorrow. Will hold heparin 4am  - Low fat diet with MCT oil - Small right and large left on CT 10/23. S/p thoracentesis 10/24 with 1.6L fluid removal with improvement in clinical status  - Fluid gram stain neg, cx NGTD, appears transudative based on Light's criteria however fluid was chylous appearing and TG>400  - c/w lasix 20 mg po daily  - flow cytometry with increased neutrophils, no malignant cells  - For large volume thoracentesis today  - Low fat diet with MCT oil - Small right and large left on CT 10/23. S/p thoracentesis 10/24 with 1.6L fluid removal with improvement in clinical status  - Fluid gram stain neg, cx NGTD, appears transudative based on Light's criteria however fluid was chylous appearing and TG>400  - c/w lasix 20 mg po daily  - flow cytometry with increased neutrophils, no malignant cells  - For large volume thoracentesis today. Get CXR after  - Low fat diet with MCT oil

## 2022-11-02 NOTE — PROGRESS NOTE ADULT - ASSESSMENT
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 Pending repeat thora

## 2022-11-02 NOTE — PROGRESS NOTE ADULT - PROBLEM SELECTOR PLAN 4
- Follows with Dr. Kenan Delgadillo at [facility]  - Follicular lymphoma s/p C1 R-miniCHOP on 9/16, 10/10. Cycle 3 due 10/31, will hold as per heme onc.  - Flow cytometry of pleural fluid neg for malignant cells  - Hold chemo given active infection  - f/u heme/onc reccs. - Follows with Dr. Kenan Delgadillo at [facility]  - Follicular lymphoma s/p C1 R-miniCHOP on 9/16, 10/10. Cycle 3 due 10/31, will hold as per heme onc.  - Flow cytometry of pleural fluid neg for malignant cells  - Hold chemo given active infection  - f/u heme/onc recs.  - Pt will go to [facility] from rehab for treatment.

## 2022-11-02 NOTE — PROGRESS NOTE ADULT - ATTENDING COMMENTS
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   Thoracentesis performed on 10/24 with removal of approx 1600 ml chylous appearing fluid  She has pneumonia and Klebsiella UTI with bacteremia.  Cultures have cleared.  On POCUS effusion on the left is large- do not have comparison post thoracentesis to gauge amount of reaccumulation.  Nutrition has placed patient on low fat diet.  Will perform large volume thoracentesis today.  Discussed with patient and daughter.  Agree with plan as outlined above.

## 2022-11-02 NOTE — PROGRESS NOTE ADULT - SUBJECTIVE AND OBJECTIVE BOX
Interval Events:  - remains on 3L NC  - reports discomfort from being in bed  - no shortness of breath, chest pain, cough, hemoptysis    REVIEW OF SYSTEMS:  Negative except as documented above.      OBJECTIVE:  ICU Vital Signs Last 24 Hrs  T(C): 36.6 (02 Nov 2022 11:03), Max: 36.9 (01 Nov 2022 20:25)  T(F): 97.8 (02 Nov 2022 11:03), Max: 98.5 (01 Nov 2022 20:25)  HR: 91 (02 Nov 2022 11:03) (87 - 91)  BP: 92/62 (02 Nov 2022 11:03) (92/62 - 103/69)  BP(mean): --  ABP: --  ABP(mean): --  RR: 18 (02 Nov 2022 11:03) (18 - 18)  SpO2: 96% (02 Nov 2022 11:03) (95% - 96%)    O2 Parameters below as of 02 Nov 2022 11:03  Patient On (Oxygen Delivery Method): nasal cannula  O2 Flow (L/min): 2            11-01 @ 07:01  -  11-02 @ 07:00  --------------------------------------------------------  IN: 360 mL / OUT: 0 mL / NET: 360 mL    11-02 @ 07:01  -  11-02 @ 15:38  --------------------------------------------------------  IN: 720 mL / OUT: 0 mL / NET: 720 mL      CAPILLARY BLOOD GLUCOSE          PHYSICAL EXAM:  General: NAD  HEENT: PERRL, EOMI, sclera anicteric, moist mucus membranes  Neck: supple, no JVD  Cardiovascular: RRR, no murmurs, rubs, or gallops  Respiratory: decreased L side breath sounds  Abdomen: soft, nontender, nondistended, normoactive bowel sounds  Extremities: warm and well perfused, no edema, no clubbing  Skin: no rashes  Neurological: Aox3, no focal deficits    HOSPITAL MEDICATIONS:  MEDICATIONS  (STANDING):  allopurinol 300 milliGRAM(s) Oral at bedtime  apixaban 2.5 milliGRAM(s) Oral every 12 hours  ascorbic acid 500 milliGRAM(s) Oral daily  carbidopa/levodopa  25/100 1 Tablet(s) Oral three times a day  chlorhexidine 2% Cloths 1 Application(s) Topical daily  furosemide    Tablet 20 milliGRAM(s) Oral daily  influenza  Vaccine (HIGH DOSE) 0.7 milliLiter(s) IntraMuscular once  latanoprost 0.005% Ophthalmic Solution 1 Drop(s) Both EYES at bedtime  levoFLOXacin  Tablet 500 milliGRAM(s) Oral every 24 hours  metoprolol succinate ER 75 milliGRAM(s) Oral daily  multivitamin 1 Tablet(s) Oral daily  pantoprazole    Tablet 40 milliGRAM(s) Oral before breakfast  senna 2 Tablet(s) Oral at bedtime  zinc sulfate 220 milliGRAM(s) Oral daily    MEDICATIONS  (PRN):  acetaminophen     Tablet .. 650 milliGRAM(s) Oral every 6 hours PRN Temp greater or equal to 38C (100.4F), Mild Pain (1 - 3), Moderate Pain (4 - 6)  artificial tears (preservative free) Ophthalmic Solution 1 Drop(s) Left EYE two times a day PRN Dry Eyes  HYDROmorphone  Injectable 0.25 milliGRAM(s) IV Push every 4 hours PRN Severe Pain (7 - 10)  melatonin 3 milliGRAM(s) Oral at bedtime PRN Insomnia      LABS:                        9.6    14.64 )-----------( 367      ( 02 Nov 2022 06:13 )             29.8     Hgb Trend: 9.6<--, 9.8<--, 9.2<--, 9.2<--, 8.8<--  11-02    141  |  101  |  19  ----------------------------<  80  4.1   |  31  |  0.67    Ca    9.0      02 Nov 2022 06:13  Phos  4.1     11-02  Mg     2.0     11-02    TPro  4.8<L>  /  Alb  2.6<L>  /  TBili  0.3  /  DBili  x   /  AST  20  /  ALT  <5<L>  /  AlkPhos  143<H>  11-02    Creatinine Trend: 0.67<--, 0.58<--, 0.60<--, 0.59<--, 0.65<--, 0.66<--  PT/INR - ( 02 Nov 2022 06:13 )   PT: 10.9 sec;   INR: 0.94 ratio         PTT - ( 02 Nov 2022 06:13 )  PTT:29.1 sec          MICROBIOLOGY:       RADIOLOGY:  [x] Reviewed and interpreted by me

## 2022-11-03 PROBLEM — C82.90 FOLLICULAR LYMPHOMA, UNSPECIFIED, UNSPECIFIED SITE: Chronic | Status: ACTIVE | Noted: 2022-01-01

## 2022-11-03 PROBLEM — N81.4 UTEROVAGINAL PROLAPSE, UNSPECIFIED: Chronic | Status: ACTIVE | Noted: 2022-01-01

## 2022-11-03 NOTE — PROGRESS NOTE ADULT - PROBLEM SELECTOR PROBLEM 1
Sepsis due to pneumonia

## 2022-11-03 NOTE — PROGRESS NOTE ADULT - SUBJECTIVE AND OBJECTIVE BOX
PROGRESS NOTE:   Authored by: Onofre Sutton M.D.   Internal Medicine PGY-1  Please Contact Via Teams    Patient is a 82y old  Female who presents with a chief complaint of sepsis (02 Nov 2022 07:51)      SUBJECTIVE / OVERNIGHT EVENTS:  No acute events overnight.     ADDITIONAL REVIEW OF SYSTEMS:  Patient denies fevers, chills, chest pain, shortness of breath, nausea, abdominal pain, diarrhea, constipation, dysuria, leg swelling, headache, light headedness.    MEDICATIONS  (STANDING):  allopurinol 300 milliGRAM(s) Oral at bedtime  apixaban 2.5 milliGRAM(s) Oral every 12 hours  ascorbic acid 500 milliGRAM(s) Oral daily  carbidopa/levodopa  25/100 1 Tablet(s) Oral three times a day  chlorhexidine 2% Cloths 1 Application(s) Topical daily  furosemide    Tablet 20 milliGRAM(s) Oral daily  influenza  Vaccine (HIGH DOSE) 0.7 milliLiter(s) IntraMuscular once  latanoprost 0.005% Ophthalmic Solution 1 Drop(s) Both EYES at bedtime  levoFLOXacin  Tablet 500 milliGRAM(s) Oral every 24 hours  metoprolol succinate ER 75 milliGRAM(s) Oral daily  multivitamin 1 Tablet(s) Oral daily  pantoprazole    Tablet 40 milliGRAM(s) Oral before breakfast  senna 2 Tablet(s) Oral at bedtime  zinc sulfate 220 milliGRAM(s) Oral daily    MEDICATIONS  (PRN):  acetaminophen     Tablet .. 650 milliGRAM(s) Oral every 6 hours PRN Temp greater or equal to 38C (100.4F), Mild Pain (1 - 3), Moderate Pain (4 - 6)  artificial tears (preservative free) Ophthalmic Solution 1 Drop(s) Left EYE two times a day PRN Dry Eyes  bisacodyl Suppository 10 milliGRAM(s) Rectal daily PRN Constipation  HYDROmorphone  Injectable 0.25 milliGRAM(s) IV Push every 4 hours PRN Severe Pain (7 - 10)  melatonin 3 milliGRAM(s) Oral at bedtime PRN Insomnia      CAPILLARY BLOOD GLUCOSE        I&O's Summary    01 Nov 2022 07:01  -  02 Nov 2022 07:00  --------------------------------------------------------  IN: 360 mL / OUT: 0 mL / NET: 360 mL    02 Nov 2022 07:01  -  03 Nov 2022 06:59  --------------------------------------------------------  IN: 1080 mL / OUT: 0 mL / NET: 1080 mL        PHYSICAL EXAM:  Vital Signs Last 24 Hrs  T(C): 36.5 (03 Nov 2022 04:08), Max: 36.8 (02 Nov 2022 20:41)  T(F): 97.7 (03 Nov 2022 04:08), Max: 98.3 (02 Nov 2022 20:41)  HR: 97 (03 Nov 2022 04:08) (91 - 100)  BP: 101/68 (03 Nov 2022 04:08) (92/62 - 112/72)  BP(mean): --  RR: 18 (03 Nov 2022 04:08) (18 - 20)  SpO2: 99% (03 Nov 2022 04:08) (96% - 99%)    Parameters below as of 03 Nov 2022 04:08  Patient On (Oxygen Delivery Method): nasal cannula  O2 Flow (L/min): 2      CONSTITUTIONAL: NAD, well-developed  RESPIRATORY: Normal respiratory effort; lungs are clear to auscultation bilaterally  CARDIOVASCULAR: Regular rate and rhythm, normal S1 and S2, no murmur/rub/gallop; No lower extremity edema; Peripheral pulses are 2+ bilaterally  ABDOMEN: Nontender to palpation, normoactive bowel sounds, no rebound/guarding; No hepatosplenomegaly  MUSCLOSKELETAL: no clubbing or cyanosis of digits; no joint swelling or tenderness to palpation  PSYCH: A+O to person, place, and time; affect appropriate    LABS:                        9.6    14.64 )-----------( 367      ( 02 Nov 2022 06:13 )             29.8     11-02    141  |  101  |  19  ----------------------------<  80  4.1   |  31  |  0.67    Ca    9.0      02 Nov 2022 06:13  Phos  4.1     11-02  Mg     2.0     11-02    TPro  4.8<L>  /  Alb  2.6<L>  /  TBili  0.3  /  DBili  x   /  AST  20  /  ALT  <5<L>  /  AlkPhos  143<H>  11-02    PT/INR - ( 02 Nov 2022 06:13 )   PT: 10.9 sec;   INR: 0.94 ratio         PTT - ( 02 Nov 2022 06:13 )  PTT:29.1 sec          Culture - Body Fluid with Gram Stain (collected 02 Nov 2022 15:21)  Source: Pleural Fl Pleural Fluid  Gram Stain (03 Nov 2022 01:27):    No polymorphonuclear cells seen    No organisms seen    by cytocentrifuge        Tele Reviewed:    RADIOLOGY & ADDITIONAL TESTS:  Results Reviewed:   Imaging Personally Reviewed:  Electrocardiogram Personally Reviewed:     PROGRESS NOTE:   Authored by: Onofre Sutton M.D.   Internal Medicine PGY-1  Please Contact Via Teams    Patient is a 82y old  Female who presents with a chief complaint of sepsis (02 Nov 2022 07:51)      SUBJECTIVE / OVERNIGHT EVENTS:  No acute events overnight. This morning says her breathing is better. She is uncomfortable due to her positioning. Has a BM last night after getting the suppository and is feeling better.    MEDICATIONS  (STANDING):  allopurinol 300 milliGRAM(s) Oral at bedtime  apixaban 2.5 milliGRAM(s) Oral every 12 hours  ascorbic acid 500 milliGRAM(s) Oral daily  carbidopa/levodopa  25/100 1 Tablet(s) Oral three times a day  chlorhexidine 2% Cloths 1 Application(s) Topical daily  furosemide    Tablet 20 milliGRAM(s) Oral daily  influenza  Vaccine (HIGH DOSE) 0.7 milliLiter(s) IntraMuscular once  latanoprost 0.005% Ophthalmic Solution 1 Drop(s) Both EYES at bedtime  levoFLOXacin  Tablet 500 milliGRAM(s) Oral every 24 hours  metoprolol succinate ER 75 milliGRAM(s) Oral daily  multivitamin 1 Tablet(s) Oral daily  pantoprazole    Tablet 40 milliGRAM(s) Oral before breakfast  senna 2 Tablet(s) Oral at bedtime  zinc sulfate 220 milliGRAM(s) Oral daily    MEDICATIONS  (PRN):  acetaminophen     Tablet .. 650 milliGRAM(s) Oral every 6 hours PRN Temp greater or equal to 38C (100.4F), Mild Pain (1 - 3), Moderate Pain (4 - 6)  artificial tears (preservative free) Ophthalmic Solution 1 Drop(s) Left EYE two times a day PRN Dry Eyes  bisacodyl Suppository 10 milliGRAM(s) Rectal daily PRN Constipation  HYDROmorphone  Injectable 0.25 milliGRAM(s) IV Push every 4 hours PRN Severe Pain (7 - 10)  melatonin 3 milliGRAM(s) Oral at bedtime PRN Insomnia      CAPILLARY BLOOD GLUCOSE        I&O's Summary    01 Nov 2022 07:01  -  02 Nov 2022 07:00  --------------------------------------------------------  IN: 360 mL / OUT: 0 mL / NET: 360 mL    02 Nov 2022 07:01  -  03 Nov 2022 06:59  --------------------------------------------------------  IN: 1080 mL / OUT: 0 mL / NET: 1080 mL        PHYSICAL EXAM:  Vital Signs Last 24 Hrs  T(C): 36.5 (03 Nov 2022 04:08), Max: 36.8 (02 Nov 2022 20:41)  T(F): 97.7 (03 Nov 2022 04:08), Max: 98.3 (02 Nov 2022 20:41)  HR: 97 (03 Nov 2022 04:08) (91 - 100)  BP: 101/68 (03 Nov 2022 04:08) (92/62 - 112/72)  BP(mean): --  RR: 18 (03 Nov 2022 04:08) (18 - 20)  SpO2: 99% (03 Nov 2022 04:08) (96% - 99%)    Parameters below as of 03 Nov 2022 04:08  Patient On (Oxygen Delivery Method): nasal cannula  O2 Flow (L/min): 2    CONSTITUTIONAL: NAD, thin, cachectic, temporal wasting  RESPIRATORY: Decreased breath sounds b/l, crackles at left lower lung base significantly improved.  CARDIOVASCULAR: Afib, no murmurs. No LE edema  ABDOMEN: Nontender to palpation, normoactive bowel sounds  PSYCH: A+O to person, place, and time; affect appropriate    LABS:                        9.6    14.64 )-----------( 367      ( 02 Nov 2022 06:13 )             29.8     11-02    141  |  101  |  19  ----------------------------<  80  4.1   |  31  |  0.67    Ca    9.0      02 Nov 2022 06:13  Phos  4.1     11-02  Mg     2.0     11-02    TPro  4.8<L>  /  Alb  2.6<L>  /  TBili  0.3  /  DBili  x   /  AST  20  /  ALT  <5<L>  /  AlkPhos  143<H>  11-02    PT/INR - ( 02 Nov 2022 06:13 )   PT: 10.9 sec;   INR: 0.94 ratio         PTT - ( 02 Nov 2022 06:13 )  PTT:29.1 sec          Culture - Body Fluid with Gram Stain (collected 02 Nov 2022 15:21)  Source: Pleural Fl Pleural Fluid  Gram Stain (03 Nov 2022 01:27):    No polymorphonuclear cells seen    No organisms seen    by cytocentrifuge        Tele Reviewed: Afib mostly in 80's, occasionally into 110's    < from: Xray Chest 1 View- PORTABLE-Urgent (Xray Chest 1 View- PORTABLE-Urgent .) (11.02.22 @ 15:27) >  1. Findings within the right hemithorax are unchanged from the last prior   exam, with details as noted above, and includes right-sided central line   without pneumothorax, areas of fibrosis and scarring along with right   middle lobe atelectasis and small right pleural effusion..  2. Markedly improved aeration in the left lung with resolved discoid   atelectasis and left pleural effusion. Persistent scarring/fibrosis in   the left apex remains stable.    < end of copied text >

## 2022-11-03 NOTE — PROGRESS NOTE ADULT - PROVIDER SPECIALTY LIST ADULT
Internal Medicine
Pulmonology
Internal Medicine

## 2022-11-03 NOTE — PROGRESS NOTE ADULT - PROBLEM SELECTOR PLAN 2
- Small right and large left on CT 10/23. S/p thoracentesis 10/24 with 1.6L fluid removal with improvement in clinical status  - Fluid gram stain neg, cx NGTD, appears transudative based on Light's criteria however fluid was chylous appearing and TG>400  - c/w lasix 20 mg po daily  - flow cytometry with increased neutrophils, no malignant cells  - For large volume thoracentesis today. Get CXR after  - Low fat diet with MCT oil - Small right and large left on CT 10/23. S/p thoracentesis 10/24 with 1.6L fluid removal with improvement in clinical status  - Repeat thoracentesis 11/3 with 1.4L removed.  - Fluid gram stain neg, cx NGTD, appears transudative based on Light's criteria however fluid was chylous appearing and TG>400  - c/w lasix 20 mg po daily  - flow cytometry with increased neutrophils, no malignant cells  - Low fat diet with MCT oil

## 2022-11-03 NOTE — PROGRESS NOTE ADULT - PROBLEM SELECTOR PROBLEM 2
Large pleural effusion

## 2022-11-03 NOTE — PROGRESS NOTE ADULT - ATTENDING COMMENTS
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 Transitioned to oral Levaquin 10/29    #Acute Hypoxic respiratory failure 2/2 pneumonia and left pleural effusion   #Klebsiella bacteremia   - blood cx 10/25: ngtd, pleural effusion 10/24: ngtd  - continue levaquin x2 weeks   - pocus 10/31 with large L effusion. s/p L thoracentesis 11/2, removed 1.4L, , with improvement on xray. f/u cytopath   - continue lasix  - dietician consulted - on LFD, MCT    #Intracranial hemorrhage   - heparin on hold. resume eliquis after procedure     #Afib  - switch to eliquis 2.5mg bid after thoracentesis   - switched to metoprolol succinate 75. tele - HR within goal     discharge planning 40mins. to [facility].

## 2022-11-03 NOTE — PROGRESS NOTE ADULT - ATTENDING COMMENTS
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 also with pneumonia and Klebsiella UTI with bacteremia.    Thoracentesis performed on 10/24 with removal of approx 1600 ml chylous appearing fluid.  We repeated thoracentesis yesterday and drained the effusion in its entirety.  Results noted.  Agree with detailed plan as outlined above.

## 2022-11-03 NOTE — DISCHARGE NOTE NURSING/CASE MANAGEMENT/SOCIAL WORK - NSDCPEFALRISK_GEN_ALL_CORE
For information on Fall & Injury Prevention, visit: [email or web address] OR  [email or web address] OR  https://www.cdc.gov/steadi/patient.html

## 2022-11-03 NOTE — PROGRESS NOTE ADULT - ASSESSMENT
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 Pending repeat thora

## 2022-11-03 NOTE — PROGRESS NOTE ADULT - PROBLEM SELECTOR PLAN 3
Chronic Afib s/p ablation procedure  - Rates mostly <100  - Continue metoprolol tartrate 12.5 mg to q8h, hold for HR < 60, SBP < 95  - ?small acute hemorrhage on initial CTH, repeat scan showing resolution of this focus 10/27  - Neurosurgery no acute surgical intervention, AC will have risks but no hard contraindication to AC if necessary  - CHADSVASC score 5  - Given age and ?brain bleed, discussed risks/benefits with daughter Larisa Franklin, who is a neurologist, and decided to start AC  - Per NSGY note, will start heparin gtt, no bolus, with goal PTT 60-65  - Switch to apixaban 2.5 tonight after thoracentesis Chronic Afib s/p ablation procedure  - Rates mostly <100  - Continue metoprolol tartrate 12.5 mg to q8h, hold for HR < 60, SBP < 95  - ?small acute hemorrhage on initial CTH, repeat scan showing resolution of this focus 10/27  - Neurosurgery no acute surgical intervention, AC will have risks but no hard contraindication to AC if necessary  - CHADSVASC score 5  - Given age and ?brain bleed, discussed risks/benefits with daughter Larisa Franklin, who is a neurologist, and decided to start AC  - Continue 2.5mg apixaban Chronic Afib s/p ablation procedure  - Rates mostly <100. The occasional increases are likely due to pain. Should be re-evaluated outpatient.  - Continue metoprolol tartrate 12.5 mg to q8h, hold for HR < 60, SBP < 95  - ?small acute hemorrhage on initial CTH, repeat scan showing resolution of this focus 10/27  - Neurosurgery no acute surgical intervention, AC will have risks but no hard contraindication to AC if necessary  - CHADSVASC score 5  - Given age and ?brain bleed, discussed risks/benefits with daughter Larisa Franklin, who is a neurologist, and decided to start AC  - Continue 2.5mg apixaban

## 2022-11-03 NOTE — DISCHARGE NOTE NURSING/CASE MANAGEMENT/SOCIAL WORK - NSDCFUADDAPPT_GEN_ALL_CORE_FT
APPTS ARE READY TO BE MADE: [X ] YES    Best Family or Patient Contact (if needed):    Additional Information about above appointments (if needed):    1: Pulmonology within 2 weeks [phone] [address] [facility] [address]  2: Dr. Kenan Delgadillo at [facility] ([facility] within 2 weeks       Other comments or requests:

## 2022-11-03 NOTE — PROGRESS NOTE ADULT - PROBLEM SELECTOR PLAN 4
- Follows with Dr. Kenan Delgadillo at [facility]  - Follicular lymphoma s/p C1 R-miniCHOP on 9/16, 10/10. Cycle 3 due 10/31, will hold as per heme onc.  - Flow cytometry of pleural fluid neg for malignant cells  - Hold chemo given active infection  - f/u heme/onc recs.  - Pt will go to [facility] from rehab for treatment.

## 2022-11-03 NOTE — PROGRESS NOTE ADULT - NUTRITIONAL ASSESSMENT
This patient has been assessed with a concern for Malnutrition and has been determined to have a diagnosis/diagnoses of Severe protein-calorie malnutrition and Underweight (BMI < 19).    This patient is being managed with:   Diet Regular-  Low Fat (LOWFAT)  Easy to Chew (EASYTOCHEW)  Aroldo(7 Gm Arginine/7 Gm Glut/1.2 Gm HMB     Qty per Day:  1  Supplement Feeding Modality:  Oral  Ensure Clear Cans or Servings Per Day:  3       Frequency:  Daily  Ensure Plus High Protein Cans or Servings Per Day:  3       Frequency:  Daily  Entered: Nov 1 2022  4:18PM    
This patient has been assessed with a concern for Malnutrition and has been determined to have a diagnosis/diagnoses of Severe protein-calorie malnutrition and Underweight (BMI < 19).    This patient is being managed with:   Diet Regular-  Easy to Chew (EASYTOCHEW)  Aroldo(7 Gm Arginine/7 Gm Glut/1.2 Gm HMB     Qty per Day:  2  Supplement Feeding Modality:  Oral  Ensure Plus High Protein Cans or Servings Per Day:  3       Frequency:  Daily  Entered: Oct 27 2022  5:09PM    Diet Regular-  Entered: Oct 25 2022  3:33PM    The following pending diet order is being considered for treatment of Severe protein-calorie malnutrition and Underweight (BMI < 19):null
This patient has been assessed with a concern for Malnutrition and has been determined to have a diagnosis/diagnoses of Severe protein-calorie malnutrition and Underweight (BMI < 19).    This patient is being managed with:   Diet Easy to Chew-  Low Fat (LOWFAT)  Aroldo(7 Gm Arginine/7 Gm Glut/1.2 Gm HMB     Qty per Day:  1  Supplement Feeding Modality:  Oral  Ensure Clear Cans or Servings Per Day:  3       Frequency:  Daily  Entered: Nov 2 2022  9:48AM    
This patient has been assessed with a concern for Malnutrition and has been determined to have a diagnosis/diagnoses of Severe protein-calorie malnutrition and Underweight (BMI < 19).    This patient is being managed with:   Diet Regular-  Easy to Chew (EASYTOCHEW)  Aroldo(7 Gm Arginine/7 Gm Glut/1.2 Gm HMB     Qty per Day:  2  Supplement Feeding Modality:  Oral  Ensure Plus High Protein Cans or Servings Per Day:  3       Frequency:  Daily  Entered: Oct 27 2022  5:09PM    Diet Regular-  Entered: Oct 25 2022  3:33PM    The following pending diet order is being considered for treatment of Severe protein-calorie malnutrition and Underweight (BMI < 19):null

## 2022-11-03 NOTE — PROGRESS NOTE ADULT - TIME BILLING
Personal review of data, imaging and discussion with medical team.

## 2022-11-03 NOTE — PROGRESS NOTE ADULT - SUBJECTIVE AND OBJECTIVE BOX
Interval Events:  - s/p thoracentesis yesterday, 1.4L drained  - post thora CXR with clearance of L effusion  - this AM, reports she is generally uncomfortable in bed but that she has no sob, chest pain, cough, chest tightness      REVIEW OF SYSTEMS:  Negative except as documented above.      OBJECTIVE:  ICU Vital Signs Last 24 Hrs  T(C): 36.4 (03 Nov 2022 08:23), Max: 36.8 (02 Nov 2022 20:41)  T(F): 97.5 (03 Nov 2022 08:23), Max: 98.3 (02 Nov 2022 20:41)  HR: 92 (03 Nov 2022 08:23) (91 - 100)  BP: 99/70 (03 Nov 2022 08:23) (92/62 - 112/72)  BP(mean): --  ABP: --  ABP(mean): --  RR: 18 (03 Nov 2022 08:23) (18 - 20)  SpO2: 97% (03 Nov 2022 08:23) (96% - 99%)    O2 Parameters below as of 03 Nov 2022 08:23  Patient On (Oxygen Delivery Method): nasal cannula  O2 Flow (L/min): 2            11-02 @ 07:01  -  11-03 @ 07:00  --------------------------------------------------------  IN: 1080 mL / OUT: 0 mL / NET: 1080 mL      CAPILLARY BLOOD GLUCOSE          PHYSICAL EXAM:  General: NAD  HEENT: PERRL, EOMI, sclera anicteric, moist mucus membranes  Neck: supple  Cardiovascular: RRR, no murmurs, rubs, or gallops  Respiratory: CTAB  Abdomen: soft, nontender, nondistended  Extremities: warm and well perfused, no edema, no clubbing  Skin: no rashes  Neurological: Aox3, no focal deficits    HOSPITAL MEDICATIONS:  MEDICATIONS  (STANDING):  allopurinol 300 milliGRAM(s) Oral at bedtime  apixaban 2.5 milliGRAM(s) Oral every 12 hours  ascorbic acid 500 milliGRAM(s) Oral daily  carbidopa/levodopa  25/100 1 Tablet(s) Oral three times a day  chlorhexidine 2% Cloths 1 Application(s) Topical daily  furosemide    Tablet 20 milliGRAM(s) Oral daily  influenza  Vaccine (HIGH DOSE) 0.7 milliLiter(s) IntraMuscular once  latanoprost 0.005% Ophthalmic Solution 1 Drop(s) Both EYES at bedtime  levoFLOXacin  Tablet 500 milliGRAM(s) Oral every 24 hours  metoprolol succinate ER 75 milliGRAM(s) Oral daily  multivitamin 1 Tablet(s) Oral daily  pantoprazole    Tablet 40 milliGRAM(s) Oral before breakfast  senna 2 Tablet(s) Oral at bedtime  zinc sulfate 220 milliGRAM(s) Oral daily    MEDICATIONS  (PRN):  acetaminophen     Tablet .. 650 milliGRAM(s) Oral every 6 hours PRN Temp greater or equal to 38C (100.4F), Mild Pain (1 - 3), Moderate Pain (4 - 6)  artificial tears (preservative free) Ophthalmic Solution 1 Drop(s) Left EYE two times a day PRN Dry Eyes  bisacodyl Suppository 10 milliGRAM(s) Rectal daily PRN Constipation  HYDROmorphone  Injectable 0.25 milliGRAM(s) IV Push every 4 hours PRN Severe Pain (7 - 10)  melatonin 3 milliGRAM(s) Oral at bedtime PRN Insomnia      LABS:                        9.6    13.88 )-----------( 346      ( 03 Nov 2022 07:37 )             29.4     Hgb Trend: 9.6<--, 9.6<--, 9.8<--, 9.2<--, 9.2<--  11-03    140  |  101  |  20  ----------------------------<  84  4.0   |  31  |  0.59    Ca    8.7      03 Nov 2022 07:37  Phos  3.5     11-03  Mg     2.0     11-03    TPro  4.9<L>  /  Alb  2.7<L>  /  TBili  0.2  /  DBili  x   /  AST  19  /  ALT  <5<L>  /  AlkPhos  144<H>  11-03    Creatinine Trend: 0.59<--, 0.67<--, 0.58<--, 0.60<--, 0.59<--, 0.65<--  PT/INR - ( 02 Nov 2022 06:13 )   PT: 10.9 sec;   INR: 0.94 ratio         PTT - ( 02 Nov 2022 06:13 )  PTT:29.1 sec          MICROBIOLOGY:     Culture - Body Fluid with Gram Stain (collected 02 Nov 2022 15:21)  Source: Pleural Fl Pleural Fluid  Gram Stain (03 Nov 2022 01:27):    No polymorphonuclear cells seen    No organisms seen    by cytocentrifuge        RADIOLOGY:  [x] Reviewed and interpreted by me  11/2 post thoracentesis: no ptx, clearance of L effusion with diaphragm visible

## 2022-11-03 NOTE — PROGRESS NOTE ADULT - ATTENDING SUPERVISION STATEMENT
Fellow
Resident

## 2022-11-03 NOTE — DISCHARGE NOTE NURSING/CASE MANAGEMENT/SOCIAL WORK - PATIENT PORTAL LINK FT
You can access the FollowMyHealth Patient Portal offered by [facility] by registering at the following website: [email or web address]. By joining [facility]’s FollowMyHealth portal, you will also be able to view your health information using other applications (apps) compatible with our system.

## 2022-11-03 NOTE — PROGRESS NOTE ADULT - ASSESSMENT
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 She was admitted to the hospital for hypoxic respiratory failure in the setting of pneumonia, pleural effusion, klebsiella bacteremia. Pulmonary consulted for pleural effusion which was drained 10/24, c/w exudative process with TGs >400, negative cytopathology and flow cytometry. S/p repeat thoracentesis .    Chylothorax  - s/p L thoracentesis 10/24: 1600cc chylous fluid removed, TGs >400, negative cytopath and flow cyto  - s/p L thoracentesis : 1400cc cloudy fluid removed, , f/u cytopath and flow cyto  - continue antibiotics per primary team  - wean O2 as tolerated, goal SpO2 92% or higher  - incentive spirometry, OOB to chair/ambulation as able  - recommend low fat diet and MCT  - regarding long term management, discussed options including periodic thoracentesis vs pleurx catheter with patient. She is very hesitant to have any indwelling catheter and strongly prefers thoracentesis as needed. Discussed with daughter who agrees. Will need to follow up with pulmonology for continued monitoring for reaccumulation.  CXR confirms complete drainage of L pleural effusion.    Prior to discharge:  Please email [email or web address] to set up an appointment. Include patient's name, , MRN, and contact information in the email.    Pulmonary/Sleep Clinic  [address] [place] [address]  [phone]        To be discussed with attending.

## 2022-11-10 NOTE — HISTORY OF PRESENT ILLNESS
[TextBox_4] : 82-year-old woman here for initial office evaluation, posthospitalization at [facility] from October 2033 November 3, and still living at subacute rehab at [facility].\par \par She is an 82-year-old woman with a history of follicular lymphoma with spinal mets.  She was admitted to [facility] with shortness of breath, respiratory failure, and sepsis.  Multifocal pneumonia and Klebsiella bacteremia.  She was also noted to have a large left pleural effusion, thoracentesis demonstrated a chylous effusion.  Culture was negative.  Patient was treated with antibiotics.  In total she underwent thoracentesis twice with removal of more than a liter of fluid each time, the second time close to discharge and tapped essentially dry.  Patient had undergone discussion with the pulmonary team regarding a Pleurx catheter, patient and her family prefer to just do as needed thoracentesis.\par \par Other past medical history includes atrial fibrillation for which she is on Eliquis, as well as metoprolol, and Lasix.\par \[facility] Has a PICC line in place for infusion of her chemotherapy.\par \par Overall, patient's feels she is doing well at rehab.  She is slowly getting her strength back.  She is currently using oxygen 2 L/min round-the-clock.  She is progressing with her physical therapy.  She reports she took a few steps with a walker, otherwise does exercises such as sitting and standing up and other nonweightbearing exercises as well.  She denies any respiratory limitations, shortness of breath or dyspnea with exertion.\par She is trying to maintain the recommended low-fat diet, though she admits it is difficult and it is not as easy as it was in the hospital.

## 2022-11-10 NOTE — PHYSICAL EXAM
[No Acute Distress] : no acute distress [Normal Oropharynx] : normal oropharynx [Normal Appearance] : normal appearance [No Neck Mass] : no neck mass [Normal S1, S2] : normal s1, s2 [No Murmurs] : no murmurs [No Resp Distress] : no resp distress [Clear to Auscultation Bilaterally] : clear to auscultation bilaterally [No Abnormalities] : no abnormalities [Benign] : benign [No Clubbing] : no clubbing [No Cyanosis] : no cyanosis [No Edema] : no edema [FROM] : FROM [Normal Color/ Pigmentation] : normal color/ pigmentation [No Focal Deficits] : no focal deficits [Oriented x3] : oriented x3 [Normal Affect] : normal affect [TextBox_2] : Patient appears thin and chronically ill but not in acute distress [TextBox_54] : Irregular [TextBox_68] : Clear, decreased breath sounds on the left [TextBox_99] : Wheelchair

## 2022-11-10 NOTE — DISCUSSION/SUMMARY
[FreeTextEntry1] : Patient's oxygen saturation is 98% at rest on 2 L/min.  On room air at rest her oxygen saturation remains 94-96% at rest.  She did not try to walk during this visit.\par \par Lung ultrasound reveals a small left pleural effusion.  No significant effusion on the right, B-lines at the bases and a somewhat irregular pleura.  Images saved to Q path.\par \par 82-year-old woman recently admitted to [place] with a chylous left effusion related to her known diagnosis of lymphoma, as well as treated with prolonged antibiotics for pneumonia and Klebsiella bacteremia.  She underwent thoracentesis twice for drainage of the chylothorax, and was placed on her low-fat diet.  After discussion with pulmonary, it was decided she would not like to have an indwelling catheter such as a Pleurx and would rather have thoracentesis periodically if needed.\par \par Currently, she denies any pulmonary symptoms.  Oxygen saturation at rest is stable on room air.  Currently there is no indication for a thoracentesis.  \par Patient can remain on room air at rest as long as her oxygen saturations remained above 90%.  Continue to use supplemental oxygen with exertion and with sleep.\par Patient should return for evaluation if she has increasing shortness of breath, work of breathing, or O2 requirements

## 2022-11-14 NOTE — PHYSICAL EXAM
[Ambulatory and capable of all self care but unable to carry out any work activities] : Status 2- Ambulatory and capable of all self care but unable to carry out any work activities. Up and about more than 50% of waking hours [Thin] : thin [Normal] : affect appropriate [de-identified] : poor dentition [de-identified] : Blunted breath sounds at left lung base, left sided expiratory wheeze [de-identified] : +RUE PICC

## 2022-11-14 NOTE — CONSULT LETTER
[Dear  ___] : Dear ~MIRTA, [Courtesy Letter:] : I had the pleasure of seeing your patient, [unfilled], in my office today. [Please see my note below.] : Please see my note below. [Consult Closing:] : Thank you very much for allowing me to participate in the care of this patient.  If you have any questions, please do not hesitate to contact me. [Sincerely,] : Sincerely, [FreeTextEntry3] : Kenan Delgadillo DO, MSc\par , [facility]\par [facility]\par [facility]\par [address] [address]\par Tel: [phone]\par Fax: [phone]\par

## 2022-11-14 NOTE — ASSESSMENT
[FreeTextEntry1] : 83 yo female here for further management of follicular lymphoma with epidural involvement (Dx 9/2022). She is currently on cycle 2 day 1 of R-mini-CHOP and tolerating treatment well so far. She has an Ommaya and was planned to start IT MTX during cycle 2, however due to poor penetration, would recommend systemic MTX over IT MTX. If tolerating treatments well, may intercalate MTX between R-mini-CHOP dosing.\par \par Plan:\par - Basic blood work today, while at rehab on 11/18/22, please check CMP, LDH, Beta-2 microglobulin level.\par - Plan for cycle 3 R-mini-CHOP week of 11/21/22\par - Plan for interim PET scan after cycle 3\par - She is currently residing in [facility], getting PICC line care at rehab\par - Update Daughter Larisa [phone] regarding Tx plan each visit\par - HCM: COVID-19 vaccinated x 2\par - Follow-up 8-10 days after each treatment\par \par ____\par I personally have spent a total of 45 minutes of time on the date of this encounter reviewing test results, documenting findings, providing education, coordinating care and directly consulting with the patient and/or designated family member.

## 2022-11-14 NOTE — HISTORY OF PRESENT ILLNESS
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 She is now s/p C1 R-miniCHOP on 9/12, 9/16 as an inpatient.\par \par For the past several months she has been experiencing generalized weakness which has been worsening over the last 2 weeks. Prior to this she has been able to manage chores on her own like doing laundry but now she feels she is too weak and needs help with ADLs. She ambulates with help of cane but during this timeframe, she has been feeling weak and unsteady on her feet. She is currently using a walker to walk and has been able to walk 20-30 feet since being in the rehab..\par \par A diagnosis of low-grade follicular lymphoma did not fit patient's clinical picture with epidural involvement. Diagnostic LP was deferred given the location of patient's soft tissue mass, there is no safe window for an LP. Neurosurgery placed an Ommaya catheter and CSF was negative for malignant cells. She underwent an additional lymph node biopsy (left retroperitoneal) with IR on 9/9/22 to confirm high grade lymphoma, however this remains pending. She was started on cycle 1 R-miniCHOP on 9/12, 9/16 as an inpatient.\par \par After the chemotherapy she felt fatigue for a short period and a general uncomfortable feeling that is difficult for her to explain. She did not experience significant nausea or vomiting, diarrhea, constipation. She typically has a bowel movement every 2 days. She has been experiencing numbness and weakness in her arms and legs that has been unchanged since starting chemotherapy. She denies headaches, double vision, trouble swallowing, abdominal / pelvic pain. She has not had any issues with the Ommaya catheter or her right arm PICC line. 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 \par - CTH: No acute intracranial processes. \par - CT Abdomen / Pelvis: Moderate left pleural effusion. Bladder, uterine, and rectal prolapse through the pelvic floor. No bowel obstruction. Amorphous soft tissue in the retroperitoneal space adjacent to the vena cava and aorta likely representing retroperitoneal lymphadenopathy. Confluent paraspinal and posterior mediastinal soft tissue infiltrate surrounding the descending thoracic aorta to approximately the level of T11. There is bony involvement most pronounced at the T3-T5 vertebral bodies. Spinal canal/epidural extension of soft at 3-T5 levels. 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 Circumferential epidural tumor involvement spanning the T3-T5 levels with epidural tumor encroachment worst at the T4 level surrounding the cord which appears flattened. Additional epidural tumor within the sacral canal adjacent to osseous metastatic lesions, more asymmetric replaced to the left. Suggestion of abnormal leptomeningeal enhancement involving the cauda equina nerve roots also suspicious for neoplastic involvement. Redemonstration of abnormal confluent enhancing soft tissue within the posterior mediastinum surrounding the aorta as well as confluent soft tissue adjacent to the IVC and descending aorta within the abdominal cavity suspicious for adenopathy and/or tumor. \par - MRI Brain: Minimal periventricular and bifrontal subcortical white matter ischemia.  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 Cytopathology: low-grade follicular lymphoma. Immunohistochemical stains positive for CD20, CD10, CD23, BCL6, BCL2; negative for CD5, cyclin D1, MUM1, CD43. Ki67 proliferation index is 20-30%. CD21 highlights the follicular dendritic meshwork. The interspersed T cells are stained by CD3, CD5, LEF1, CD43.\par - BMBx on 9/7/22: Suboptimal biopsy with minute marrow fragment. Adequate aspirate smears showing slightly erythroid predominant maturing trilineage hematopoiesis. Minute, CD10 negative, monotypic B cell population detected by flow cytometry analysis\par \par ============================================================\par Care Providers:\par \par PMD: Has a new doctor, need to follow-up with daughter as to name\par \par ============================================================ [de-identified] : IV [de-identified] : See Above [de-identified] : 9/28/22: Initial Visit. See above H&P.\par \par 10/10/22: Follow up. Cycle 2 day 1 of treatment. She is feeling well and tolerated the first cycle well. Patient denies bone pain, fever, chills, night sweats, back pain, headache, abdominal pain, nausea, vomiting, diarrhea, chest pain, shortness of breath, peripheral edema, or peripheral neuropathy. Good appetite, stable weight.\par \par 11/14/22: Follow-up. Cycle 2 Day 36. While she was at [facility] she started feeling shortness of breath, developed a pneumonia which required admission to [facility] (admitted 10/23/22). She was treated with antibiotics and also underwent 2 separate thoracentesis for pleural effusion on her left. During the day she does not require oxygen, but uses sometimes at night. She is currently at [facility] rehab. She complains today of right pelvic and leg pain with hip flexion. However she denies pain with ambulation. She feels that she is getting stronger, now getting a better appetite. Since our first visit, she reports overall being set back in terms of her physical fitness due to the pneumonia.\par \par ____\par A comprehensive review of systems was performed including constitutional, eyes, ENT, cardiovascular, respiratory, gastrointestinal, genitourinary, musculoskeletal, integumentary, neurological, psychiatric and hematologic / lymphatic. All pertinent positives are included in the H&P under interval history above and the remaining review of systems listed are negative. \par \par \par =====================================================================\par Treatment Hx:\par \par R-mini-CHOP\par - Cycle 1 Day 1 on 9/16/22\par - Cycle 2 Day 1 on 10/10/22\par - Cycle 3 Day 1 planned for 11/21/22 (delayed 3 weeks due to inpatient admission for pneumonia)\par \par ===================================================================== [FreeTextEntry1] : R-mini-CHOP planned with systemic MTX for epidural involvement

## 2022-11-21 PROBLEM — Z80.9 FAMILY HISTORY OF MALIGNANT NEOPLASM: Status: ACTIVE | Noted: 2022-01-01

## 2022-11-21 PROBLEM — Z78.9 NON-SMOKER: Status: ACTIVE | Noted: 2022-01-01

## 2022-11-21 PROBLEM — Z85.830: Status: RESOLVED | Noted: 2022-01-01 | Resolved: 2022-01-01

## 2022-11-22 NOTE — PHYSICAL EXAM
[Chaperone Present] : A chaperone was present in the examining room during all aspects of the physical examination [Normal Appearance] : general appearance was normal [Rectocele] : a rectocele [Cystocele] : a cystocele [Uterine Prolapse] : uterine prolapse [Aa ____] : Aa [unfilled] [Ba ____] : Ba [unfilled] [C ____] : C [unfilled] [GH ____] : GH [unfilled] [PB ____] : PB [unfilled] [TVL ____] : TVL  [unfilled] [Ap ____] : Ap [unfilled] [Bp ____] : Bp [unfilled] [D ____] : D [unfilled] [Post Void Residual ____ml] : post void residual was [unfilled] ml [Exam Deferred] : was deferred [No Acute Distress] : in no acute distress [Normal Mood/Affect] : mood and affect are normal [No Edema] : ~T edema was not present [Warm and Dry] : was warm and dry to touch [Normal] : normal external genitalia [Atrophy] : atrophy [Anxiety] : patient is not anxious [Tenderness] : ~T no ~M abdominal tenderness observed [Distended] : not distended [FreeTextEntry3] : + hypermobility

## 2022-11-22 NOTE — COUNSELING
[FreeTextEntry1] : Discussed treatment options for the prolapse with the patient and her daughter on the phone.  Opted for pessary fitting which we will do today

## 2022-11-22 NOTE — HISTORY OF PRESENT ILLNESS
[Vaginal Wall Prolapse] : no [Urinary Frequency] : no [FreeTextEntry5] : daily  [de-identified] : sometimes  [de-identified] : wears a diapper [de-identified] : not sexully active [FreeTextEntry1] : on chemo for lymphoma and mets to spine - pt got chem this Am\par I spoke with her daughter Dr Franklin as well\par

## 2022-11-22 NOTE — PROCEDURE
[Good Fit] : fits well [Pessary Inserted] : inserted [None] : no bleeding [Refit] : refit is not needed [Erosion] : no evidence of erosion [Erythema] : no erythema [Discharge] : no vaginal discharge [Infection] : no evidence of infection [FreeTextEntry1] : A catheterized urine was performed to rule out urinary tract infection and/or retention

## 2022-11-22 NOTE — DISCUSSION/SUMMARY
[FreeTextEntry1] : We discussed the possibility of vaginal discharge developing, the pessary falling out, urinary incontinence developing/worsening, and the possibility of vaginal bleeding. I've asked her to call the office if any of the above happens.\par IUGA patient information on pelvic organ prolapse and pessary was given to her.  I have asked her to follow-up in the office in approximately 2 weeks.  All questions were answered.

## 2022-12-08 PROBLEM — G96.198 EPIDURAL MASS: Status: ACTIVE | Noted: 2022-01-01

## 2022-12-09 PROBLEM — R33.9 INCOMPLETE EMPTYING OF BLADDER: Status: ACTIVE | Noted: 2022-01-01

## 2022-12-09 NOTE — DISCUSSION/SUMMARY
[FreeTextEntry1] : Incomplete uterovaginal prolapse:\par -Refitted with Gellhorn LS # 3 1/2 and Donut # 6.\par -Precaution and instructions were reviewed.\par PVR in office 50mL.\par \par Constipation:\par -Prevention discussed.  PT to take Miralax and stool softener.\par -Fiber diet\par \par Follow up in 2 weeks or sooner for pelvic prolapse.  IF pt have any problems/concern to call office.  PT and daughter aware and agrees.

## 2022-12-09 NOTE — PHYSICAL EXAM
[No Acute Distress] : in no acute distress [Normal Memory] : ~T memory was ~L unimpaired [Normal Mood/Affect] : mood and affect are normal [Soft, Nontender] : the abdomen was soft and nontender [Warm and Dry] : was warm and dry to touch [Vulvar Atrophy] : vulvar atrophy [Labia Majora Erythema] : erythema [Atrophy] : atrophy [Rectocele] : a rectocele [Cystocele] : a cystocele [Uterine Prolapse] : uterine prolapse [No Bleeding] : there was no active vaginal bleeding [de-identified] : in wheelchair, able to stand and sit with some assist. [de-identified] : prolapse protruding approximately 3-4cm. [de-identified] : visible outside of vagina.  Mild erythema of the OS possible from chafing as pt wears adult diapers daily.  No bleeding noted.

## 2022-12-09 NOTE — PROCEDURE
[Good Fit] : fits well [Refit] : refit is not needed [Erythema] : erythema noted [Discharge] : no vaginal discharge [Infection] : no evidence of infection [Pessary Inserted] : inserted [H2O] : H2O [None] : no bleeding [Incomplete Emptying Of Bladder] : incomplete emptying of bladder [Residual Volume ___] : the final residual volume was [unfilled] cc [Bowel Management] : Bowel Management: patient verbalizes understanding of daily dietary fiber intake [Bladder Training] : Bladder Training: Patient given information with verbal understanding [FreeTextEntry1] : Refitted with Tandem Femi SINGLETON # 3 1/2 and Petty # 6. [de-identified] : Femi LS # 3 1/4 - fell out after 2 days [FreeTextEntry4] : take stool softener and Miralax. [FreeTextEntry8] : Reinforced daily pericare.

## 2022-12-09 NOTE — HISTORY OF PRESENT ILLNESS
[FreeTextEntry1] : Lucita, 83y/o presents to the office for follow up on incomplete uterovaginal prolapse.  Pt was fitted with Gellhorn LS # 3 1/4 at initial visit.  Pessary fell out after 2 days.  Pt also had constipation and bear down.  Pt is urinating without issues.  She has urinary incontinence and wears adult diaper and briefs.\par Pt was just discharged from rehab and going home with daughter Larisa (she is a neurologist in [place]).\par

## 2022-12-19 PROBLEM — C82.90 FOLLICULAR LYMPHOMA: Status: ACTIVE | Noted: 2022-01-01

## 2022-12-23 PROBLEM — N81.11 MIDLINE CYSTOCELE: Status: ACTIVE | Noted: 2022-01-01

## 2022-12-23 PROBLEM — Z51.11 ENCOUNTER FOR CHEMOTHERAPY MANAGEMENT: Status: ACTIVE | Noted: 2022-01-01

## 2022-12-23 PROBLEM — N81.6 RECTOCELE, FEMALE: Status: ACTIVE | Noted: 2022-01-01

## 2022-12-23 PROBLEM — N81.3 COMPLETE UTEROVAGINAL PROLAPSE: Status: ACTIVE | Noted: 2022-01-01

## 2022-12-23 NOTE — PHYSICAL EXAM
[No Acute Distress] : in no acute distress [Normal Memory] : ~T memory was ~L unimpaired [Normal Mood/Affect] : mood and affect are normal [Soft, Nontender] : the abdomen was soft and nontender [Warm and Dry] : was warm and dry to touch [Vulvar Atrophy] : vulvar atrophy [Labia Majora Erythema] : erythema [Atrophy] : atrophy [Rectocele] : a rectocele [Cystocele] : a cystocele [Uterine Prolapse] : uterine prolapse [No Bleeding] : there was no active vaginal bleeding [de-identified] : in wheelchair, able to stand and sit with some assist. [de-identified] : prolapse protruding approximately 3-4cm. [de-identified] : visible outside of vagina.  Mild erythema of the OS possible from chafing as pt wears adult diapers daily.  No bleeding noted.

## 2022-12-23 NOTE — HISTORY OF PRESENT ILLNESS
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 She is now s/p C1 R-miniCHOP on 9/12, 9/16 as an inpatient.\par \par For the past several months she has been experiencing generalized weakness which has been worsening over the last 2 weeks. Prior to this she has been able to manage chores on her own like doing laundry but now she feels she is too weak and needs help with ADLs. She ambulates with help of cane but during this timeframe, she has been feeling weak and unsteady on her feet. She is currently using a walker to walk and has been able to walk 20-30 feet since being in the rehab..\par \par A diagnosis of low-grade follicular lymphoma did not fit patient's clinical picture with epidural involvement. Diagnostic LP was deferred given the location of patient's soft tissue mass, there is no safe window for an LP. Neurosurgery placed an Ommaya catheter and CSF was negative for malignant cells. She underwent an additional lymph node biopsy (left retroperitoneal) with IR on 9/9/22 to confirm high grade lymphoma, however this remains pending. She was started on cycle 1 R-miniCHOP on 9/12, 9/16 as an inpatient.\par \par After the chemotherapy she felt fatigue for a short period and a general uncomfortable feeling that is difficult for her to explain. She did not experience significant nausea or vomiting, diarrhea, constipation. She typically has a bowel movement every 2 days. She has been experiencing numbness and weakness in her arms and legs that has been unchanged since starting chemotherapy. She denies headaches, double vision, trouble swallowing, abdominal / pelvic pain. She has not had any issues with the Ommaya catheter or her right arm PICC line. 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 \par - CTH: No acute intracranial processes. \par - CT Abdomen / Pelvis: Moderate left pleural effusion. Bladder, uterine, and rectal prolapse through the pelvic floor. No bowel obstruction. Amorphous soft tissue in the retroperitoneal space adjacent to the vena cava and aorta likely representing retroperitoneal lymphadenopathy. Confluent paraspinal and posterior mediastinal soft tissue infiltrate surrounding the descending thoracic aorta to approximately the level of T11. There is bony involvement most pronounced at the T3-T5 vertebral bodies. Spinal canal/epidural extension of soft at 3-T5 levels. 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 Circumferential epidural tumor involvement spanning the T3-T5 levels with epidural tumor encroachment worst at the T4 level surrounding the cord which appears flattened. Additional epidural tumor within the sacral canal adjacent to osseous metastatic lesions, more asymmetric replaced to the left. Suggestion of abnormal leptomeningeal enhancement involving the cauda equina nerve roots also suspicious for neoplastic involvement. Redemonstration of abnormal confluent enhancing soft tissue within the posterior mediastinum surrounding the aorta as well as confluent soft tissue adjacent to the IVC and descending aorta within the abdominal cavity suspicious for adenopathy and/or tumor. \par - MRI Brain: Minimal periventricular and bifrontal subcortical white matter ischemia.  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 Cytopathology: low-grade follicular lymphoma. Immunohistochemical stains positive for CD20, CD10, CD23, BCL6, BCL2; negative for CD5, cyclin D1, MUM1, CD43. Ki67 proliferation index is 20-30%. CD21 highlights the follicular dendritic meshwork. The interspersed T cells are stained by CD3, CD5, LEF1, CD43.\par - BMBx on 9/7/22: Suboptimal biopsy with minute marrow fragment. Adequate aspirate smears showing slightly erythroid predominant maturing trilineage hematopoiesis. Minute, CD10 negative, monotypic B cell population detected by flow cytometry analysis\par \par ============================================================\par Care Providers:\par \par PMD: Has a new doctor, need to follow-up with daughter as to name\par \par ============================================================ [de-identified] : IV [de-identified] : See Above [de-identified] : 9/28/22: Initial Visit. See above H&P.\par \par 10/10/22: Follow up. Cycle 2 day 1 of treatment. She is feeling well and tolerated the first cycle well. Patient denies bone pain, fever, chills, night sweats, back pain, headache, abdominal pain, nausea, vomiting, diarrhea, chest pain, shortness of breath, peripheral edema, or peripheral neuropathy. Good appetite, stable weight.\par \par 11/14/22: Follow-up. Cycle 2 Day 36. While she was at [facility] she started feeling shortness of breath, developed a pneumonia which required admission to [facility] (admitted 10/23/22). She was treated with antibiotics and also underwent 2 separate thoracentesis for pleural effusion on her left. During the day she does not require oxygen, but uses sometimes at night. She is currently at [facility] rehab. She complains today of right pelvic and leg pain with hip flexion. However she denies pain with ambulation. She feels that she is getting stronger, now getting a better appetite. Since our first visit, she reports overall being set back in terms of her physical fitness due to the pneumonia.\par \par 12/19/22: Follow-up. About 3 weeks ago she had COVID-19 associated with rhinorrhea and occasional cough, since she has had a persistent dry cough. She also complains of significant fatigue and poor appetite (although her appetite is improving). She as lost more weight in the past few weeks. She also complains of being unable to find a comfortable position when lying down and sitting which is relieved with standing. She is also having constipation with abdominal pain. She has a prolapsed uterus, her pessary recently came out. She is now living back at home with her daughter.\par \par ____\par A comprehensive review of systems was performed including constitutional, eyes, ENT, cardiovascular, respiratory, gastrointestinal, genitourinary, musculoskeletal, integumentary, neurological, psychiatric and hematologic / lymphatic. All pertinent positives are included in the H&P under interval history above and the remaining review of systems listed are negative. \par \par \par =====================================================================\par Treatment Hx:\par \par R-mini-CHOP\par - Cycle 1 Day 1 on 9/16/22\par - Cycle 2 Day 1 on 10/10/22\par - Cycle 3 Day 1 on 11/21/22 (delayed 3 weeks due to inpatient admission for pneumonia)\par - Cycle 4 Day 1 on 12/23/22 (missed 1 treatment appt due to COVID19 infection which led to delay)\par \par ===================================================================== [FreeTextEntry1] : R-mini-CHOP with possible systemic MTX at end of RCHOP for epidural involvement

## 2022-12-23 NOTE — RESULTS/DATA
[FreeTextEntry1] : MRI Cervical, Thoracic, Lumbar Spine W/WO contrast 8/30/22:\par \par Osseous metastatic disease is seen with abnormal marrow signal  and enhancement. Metastatic disease is suspected within the right C4-C5 facets. No pathologic fractures involving the cervical spine are seen.\par \par Enhancing metastatic lesions are once again seen within the T3, T4, T5 vertebral bodies. Circumferential epidural tumor is also seen at these levels which surround the thoracic cord with resultant cord flattening, worst at the T4 level. Evaluation for cord edema is limited as the conventional T2 and STIR sequences are excluded from the study. Enhancing tumor is also noted along the paravertebral margins more asymmetric towards the left side surrounding the costovertebral junction and neural foramina. Enhancing tumor surrounds the posterior elements. Enhancing tumor extends towards the posterior mediastinum and surrounds the descending aorta. This is better evaluated on the prior CT examination of the chest.\par \par Additional abnormal enhancement is seen within the T8, T11, and T12 vertebral bodies and posterior elements.\par \par Mild superior concave endplate deformities of the T12 and L1 vertebral bodies are seen.\par \par No other overt pathologic fractures involving the thoracic spine are seen.\par \par Additional enhancing metastatic lesions are noted within the L1, L2, L3, L4 vertebral bodies without associated pathologic fracture. Metastatic lesion within the posterior elements of T12 is seen. Additional metastatic lesions within the posterior elements of L4 and L5 are noted. Enhancing metastatic lesions are also noted within the sacrum and sacral canal with epidural extension, more asymmetric towards the left side.\par \par Confluent enhancing abnormal soft tissue is seen within the retroperitoneum adjacent to the IVC and aorta, better visualized on the prior CT exam.\par \par A left-sided pleural effusion is seen.\par \par IMPRESSION: Widespread osseous metastatic disease involving the entire spine, as discussed.\par \par Circumferential epidural tumor involvement spanning the T3-T5 levels with epidural tumor encroachment worst at the T4 level surrounding the cord\par which appears flattened. Evaluation for cord edema is limited secondary to lack of T2 and STIR sequences through this area.\par \par Additional epidural tumor within the sacral canal adjacent to osseous metastatic lesions, more asymmetric replaced to the left.\par \par Suggestion of abnormal leptomeningeal enhancement involving the cauda equina nerve roots also suspicious for neoplastic involvement.\par \par Redemonstration of abnormal confluent enhancing soft tissue within the posterior mediastinum surrounding the aorta as well as confluent soft tissue adjacent to the IVC and descending aorta within the abdominal cavity suspicious for adenopathy and/or tumor.\par \par Mild concave superior endplate deformities of T12 and L1 without bony retropulsion.\par

## 2022-12-23 NOTE — PHYSICAL EXAM
[Ambulatory and capable of all self care but unable to carry out any work activities] : Status 2- Ambulatory and capable of all self care but unable to carry out any work activities. Up and about more than 50% of waking hours [Thin] : thin [Normal] : affect appropriate [de-identified] : poor dentition [de-identified] : Blunted breath sounds at left lung base, left sided expiratory wheeze [de-identified] : +RUE PICC

## 2022-12-23 NOTE — DISCUSSION/SUMMARY
[FreeTextEntry1] : Incomplete uterovaginal prolapse:\par -Refitted with Cube # 7.\par -Precaution and instructions were reviewed.\par PVR in office 30mL.\par \par Constipation:\par -Prevention discussed.  PT to take Miralax and stool softener.\par -Fiber diet\par \par Follow up in 2 weeks or sooner for pelvic prolapse.  IF pt have any problems/concern to call office.  PT and daughter aware and agrees.

## 2022-12-23 NOTE — PROCEDURE
[Good Fit] : fits well [Erythema] : erythema noted [Pessary Inserted] : inserted [H2O] : H2O [None] : no bleeding [Incomplete Emptying Of Bladder] : incomplete emptying of bladder [Residual Volume ___] : the final residual volume was [unfilled] cc [Bowel Management] : Bowel Management: patient verbalizes understanding of daily dietary fiber intake [Bladder Training] : Bladder Training: Patient given information with verbal understanding [Refit] : refit is not needed [Discharge] : no vaginal discharge [Infection] : no evidence of infection [FreeTextEntry1] : Refitted with Cube # 7 [de-identified] : Tandem Gellhorn LS # 3 1/2 and Donut # 6. Gellhorn LS # 3 1/4  [FreeTextEntry4] : take stool softener and Miralax. [FreeTextEntry8] : Reinforced daily pericare.

## 2022-12-23 NOTE — HISTORY OF PRESENT ILLNESS
[FreeTextEntry1] : Lucita, 83y/o presents to the office for follow up on incomplete uterovaginal prolapse.  Pt was fitted with Gellhorn LS # 3 1/2 and donut at last visit.   Pessary fell out after a few days.  Pt also had constipation and bear own.  Pt is urinating without issues.  She has urinary incontinence and wears adult diaper and briefs.\par Accompanied by daughter in law Larisa (she is a neurologist in [place]).  PT has chemotherapy today as well.\par \par Tried Gellhorn LS # 3 1/4, tandem Gellhorn LS # 3 1/2 and Donut # 6.

## 2022-12-23 NOTE — CONSULT LETTER
[Dear  ___] : Dear ~MIRTA, [Courtesy Letter:] : I had the pleasure of seeing your patient, [unfilled], in my office today. [Please see my note below.] : Please see my note below. [Consult Closing:] : Thank you very much for allowing me to participate in the care of this patient.  If you have any questions, please do not hesitate to contact me. [Sincerely,] : Sincerely, [FreeTextEntry3] : Kenan Delgadillo DO, MSc\par , [facility]\par [facility] [place]\par [facility]\par [address] [address]\par Tel: [phone]\par Fax: [phone]\par

## 2022-12-23 NOTE — ASSESSMENT
[FreeTextEntry1] : 83 yo female here for further management of follicular lymphoma with epidural involvement (Dx 9/2022). CSF Flow studies on 9/15/22 were negative for malignant cells. \par \par She had an Ommaya placed and was planned to start IT MTX during cycle 2, however due to poor penetration and issues related to frequent visits and her rehab stay, we delayed IT therapy and recommended systemic MTX instead. Following 6 cycles of R-mini-CHOP, will reassess for the need of systemic MTX. Palliative RT may also be considered if there is residual disease at the end of treatment. She is currently on R-mini-CHOP and tolerating well without significant adverse effects. Today is Cycle 3 Day 29 (Cycle 4 delayed).\par \par Reviewed post-cycle 3 interim PET-CT results today. There continues to be a mild superior endplate compression deformity of the T11 vertebral body (FDG-avid: SUV 4.9; image 110 without prior PET-CT for comparison), based on CT findings, it is not significantly changed since 8/23/2022. Other areas of previously noted mass involvement / adenopathy and spinal / epidural involvement are not FDG-avid, possibly indicating a response to therapy when compared to the MRI spine performed 8/30/22.  Unfortunately this assessment is limited by the lack of a baseline PET-CT. Other areas of adenopathy have relatively low FDG-avidity and there are no areas concerning for progression at this time. Pulmonary findings may be related to recent infectious complications. She continues to have nasal congestion, which was also seen on the PET-CT.\par \par Plan:\par - CMP, LDH, Beta-2 microglobulin level (Pre-V)\par - Plan for Cycle 4 R-mini-CHOP on 12/23/22\par - She is taking Apixaban, unclear the reason for this, if no clear indication, such as recent thrombosis or AFib, would not continue. Patient is not sure why she is on this, I suspect AFib runs while hospitalized, but this is not clear from reviewed plans, will need to look more into this\par - She is currently residing in [facility], getting PICC line care at rehab\par - Updated Daughter Larisa [phone] (through patients son who is here today) regarding Tx plan each visit\par - HCM: COVID-19 vaccinated\par - Follow-up 8-10 days after each treatment\par \par ____\par I personally have spent a total of 40 minutes of time on the date of this encounter reviewing test results, documenting findings, providing education, coordinating care and directly consulting with the patient and/or designated family member.

## 2023-01-01 ENCOUNTER — APPOINTMENT (OUTPATIENT)
Dept: INFUSION THERAPY | Facility: HOSPITAL | Age: 83
End: 2023-01-01

## 2023-01-01 ENCOUNTER — APPOINTMENT (OUTPATIENT)
Dept: UROGYNECOLOGY | Facility: CLINIC | Age: 83
End: 2023-01-01

## 2023-01-01 ENCOUNTER — APPOINTMENT (OUTPATIENT)
Dept: HEMATOLOGY ONCOLOGY | Facility: CLINIC | Age: 83
End: 2023-01-01

## 2023-01-01 ENCOUNTER — TRANSCRIPTION ENCOUNTER (OUTPATIENT)
Age: 83
End: 2023-01-01

## 2023-02-03 NOTE — DISCHARGE NOTE NURSING/CASE MANAGEMENT/SOCIAL WORK - NSDCVIVACCINE_GEN_ALL_CORE_FT
S/w pharmacy and they rc'd both meds but insurance not covering. They will reach out to pt to inform her not covered and see how she wants to proceed. No Vaccines Administered.

## 2023-02-14 ENCOUNTER — APPOINTMENT (OUTPATIENT)
Dept: INTERNAL MEDICINE | Facility: CLINIC | Age: 83
End: 2023-02-14

## 2023-04-10 NOTE — PATIENT PROFILE ADULT - FLU SEASON?
Addended by: DEIRDRE ROJAS on: 4/10/2023 12:34 PM     Modules accepted: Level of Service    
Addended by: YSABEL MUKHERJEE on: 3/30/2023 06:47 PM     Modules accepted: Orders    
No

## 2023-05-10 ENCOUNTER — TRANSCRIPTION ENCOUNTER (OUTPATIENT)
Age: 83
End: 2023-05-10

## 2023-05-31 NOTE — PROCEDURE NOTE - NSPROCDETAILS_GEN_ALL_CORE
location identified, draped/prepped, sterile technique used, needle inserted/introduced/Seldinger technique/catheter inserted over needle/connection to syringe/ultrasound assessment of effusion (localization)
location identified, draped/prepped, sterile technique used, needle inserted/introduced/Seldinger technique/catheter inserted over needle/connection to syringe/ultrasound assessment of effusion (localization)
No

## 2023-10-18 NOTE — ED ADULT NURSE NOTE - TEMPLATE LIST FOR HEAD TO TOE ASSESSMENT
General Niacinamide Counseling: I recommended taking niacin or niacinamide, also know as vitamin B3, twice daily. Recent evidence suggests that taking vitamin B3 (500 mg twice daily) can reduce the risk of actinic keratoses and non-melanoma skin cancers. Side effects of vitamin B3 include flushing and headache.

## 2024-05-17 NOTE — PROGRESS NOTE ADULT - ASSESSMENT
[phone] contact # jesenia Schwarz, need to be seen by DOUGLAS PATEL today for inner ear pain & unable to hear but can only be seen today as boat going out.    Thanks,   83 y/o F w/ hx of arrhythmia s/p ablation and incarcerated R femoral hernia s/p small bowel resection presents with several months of generalized weakness and 2 weeks of lower abdominal discomfort, being treated for pseudomonal UTI, also found to have bilateral pleff, possibly 2/2 to acute on chronic HFrEF s/p thoracentesis, workup for new metastatic malignancy s/p IR biosy of left axillary lymph node with ?low grade follicular lymphoma

## 2024-05-21 NOTE — PATIENT PROFILE ADULT - PRO INTERPRETER NEED 2
Patient is 78 y/o female admitted for Patient is 78 y/o female admitted for diverticulitis. History of DM II, HLD, glaucoma and HTN English

## 2024-06-03 NOTE — PROGRESS NOTE ADULT - PROBLEM SELECTOR PLAN 5
DVT ppx: SCDs given plan for thora tomorrow  Diet: DASH  Code status: Full code    Spoke with son 10/23, answered all questions    Case discussed with Dr. Gomez Volume overloaded on exam, last TTE 8/24 with 65% EF, mild diastolci dysfunction stage I.   - s/p lasix 40 mg x 1 on 10/23, will hold off on further diuresis for now given SBP in 90s Ambulatory

## 2025-03-17 NOTE — ED PROVIDER NOTE - NSICDXPASTSURGICALHX_GEN_ALL_CORE_FT
Subjective   History of Present Illness  19-year-old female with no known past medical history presents to the emergency room for headache, nonproductive cough, and sore throat for the past 2 days that is worsening.  Patient states that her child is also sick, however states that she is unsure of what she may have.  Denies any fevers, chills, or bodyaches.  Denies any recent travel.  Denies any other complaints or concerns at this time.    History provided by:  Patient   used: No        Review of Systems   Constitutional: Negative.  Negative for fever.   HENT:  Positive for sore throat.    Respiratory:  Positive for cough.    Cardiovascular: Negative.  Negative for chest pain.   Gastrointestinal:  Positive for nausea. Negative for abdominal pain.   Endocrine: Negative.    Genitourinary: Negative.  Negative for dysuria.   Skin: Negative.    Neurological:  Positive for headaches.   Psychiatric/Behavioral: Negative.     All other systems reviewed and are negative.      No past medical history on file.    Allergies   Allergen Reactions    Amoxicillin Rash       No past surgical history on file.    No family history on file.    Social History     Socioeconomic History    Marital status: Single           Objective   Physical Exam  Vitals and nursing note reviewed.   Constitutional:       General: She is not in acute distress.     Appearance: She is well-developed. She is not diaphoretic.   HENT:      Head: Normocephalic and atraumatic.      Right Ear: External ear normal.      Left Ear: External ear normal.      Nose: Nose normal.   Eyes:      Conjunctiva/sclera: Conjunctivae normal.      Pupils: Pupils are equal, round, and reactive to light.   Neck:      Vascular: No JVD.      Trachea: No tracheal deviation.   Cardiovascular:      Rate and Rhythm: Normal rate and regular rhythm.      Heart sounds: Normal heart sounds. No murmur heard.  Pulmonary:      Effort: Pulmonary effort is normal. No  respiratory distress.      Breath sounds: Normal breath sounds. No wheezing.   Abdominal:      General: Bowel sounds are normal.      Palpations: Abdomen is soft.      Tenderness: There is no abdominal tenderness.   Musculoskeletal:         General: No deformity. Normal range of motion.      Cervical back: Normal range of motion and neck supple.   Skin:     General: Skin is warm and dry.      Coloration: Skin is not pale.      Findings: No erythema or rash.   Neurological:      Mental Status: She is alert and oriented to person, place, and time.      Cranial Nerves: No cranial nerve deficit.   Psychiatric:         Behavior: Behavior normal.         Thought Content: Thought content normal.         Procedures           ED Course  ED Course as of 03/17/25 1536   Mon Mar 17, 2025   1312 XR Chest 1 View [TK]      ED Course User Index  [TK] Bernadette Mclaughlin, LUIS                                             Results for orders placed or performed during the hospital encounter of 03/17/25   COVID-19 and FLU A/B PCR, 1 HR TAT - Swab, Nasopharynx    Collection Time: 03/17/25 12:16 PM    Specimen: Nasopharynx; Swab   Result Value Ref Range    COVID19 Not Detected Not Detected - Ref. Range    Influenza A PCR Not Detected Not Detected    Influenza B PCR Not Detected Not Detected   Rapid Strep A Screen - Swab, Throat    Collection Time: 03/17/25 12:16 PM    Specimen: Throat; Swab   Result Value Ref Range    Strep A Ag Negative Negative       XR Chest 1 View   Final Result     Unremarkable exam. No acute cardiopulmonary findings identified.       This report was finalized on 3/17/2025 1:09 PM by Dr. Osvaldo Thomas MD.                        Medical Decision Making  19-year-old female with no known past medical history presents to the emergency room for headache, nonproductive cough, and sore throat for the past 2 days that is worsening.  Patient states that her child is also sick, however states that she is unsure of what she may  have.  Denies any fevers, chills, or bodyaches.  Denies any recent travel.  Denies any other complaints or concerns at this time.      Problems Addressed:  Viral illness: complicated acute illness or injury    Amount and/or Complexity of Data Reviewed  Radiology: ordered. Decision-making details documented in ED Course.        Final diagnoses:   Viral illness       ED Disposition  ED Disposition       ED Disposition   Discharge    Condition   Stable    Comment   --               Luisa Silva MD  [address] [place] [id number] [phone]    In 2 days           Medication List      No changes were made to your prescriptions during this visit.            Bernadette Mclaughlin PA-C  03/17/25 1536     PAST SURGICAL HISTORY:  H/O cardiac radiofrequency ablation     Small bowel obstruction abdominal sx

## 2025-05-19 NOTE — ED PROVIDER NOTE - CROS ED ROS STATEMENT
ATTN: Stelara will be excluded from coverage on this patient's plan beginning 7/1/25     We regularly review our prescription drug list (PDL) and accompanying pharmacy management programs to help give [facility] members access to affordable medications that safely and effectively treat their conditions.<b> As a result of this review, we are excluding coverage of Stelara and moving to a biosimilar as our preferred covered alternative.</b> This becomes effective on: <CODE_CHANGE_EFFECTIVE_DATE> 7/1/2025     You have a patient who is affected by this update. We've notified the patient of this change with suggested alternatives, and included additional guidance due to this important change. We have also recommended that the patient follow up with their doctor.     Summary of Change: Exclusion     NOTES  Effective date of coverage change: 7/1/2025   Patient Name: Gerry Bradley   Patient YOB: 1982   Coverage Change: Exclusion   Affected medication(s): STELARA   Preferred Drug: WEZLANA      all other ROS negative except as per HPI

## (undated) DEVICE — DRAPE 1/2 SHEET 40X57"

## (undated) DEVICE — DRSG MASTISOL

## (undated) DEVICE — INTRO SHEATH INTEGRA PD 61CM

## (undated) DEVICE — PREP DURAPREP 26CC

## (undated) DEVICE — ELCTR SUBDERMAL NDL 27G X 1/2" WITH TWISTED PAIR

## (undated) DEVICE — D HELP - CLEARVIEW CLEARIFY SYSTEM

## (undated) DEVICE — DRAPE LIGHT HANDLE COVER (BLUE)

## (undated) DEVICE — MEDTRONIC AXIEM NAVIGATION POINTER

## (undated) DEVICE — Device

## (undated) DEVICE — SOL IRR POUR NS 0.9% 500ML

## (undated) DEVICE — GLV 7.5 PROTEXIS (BLUE)

## (undated) DEVICE — DRAPE TOWEL BLUE 17" X 24"

## (undated) DEVICE — SYR LUER LOK 10CC

## (undated) DEVICE — VISITEC 4X4

## (undated) DEVICE — DRSG STERISTRIPS 0.5 X 4"

## (undated) DEVICE — ELCTR 4-DISC 20MM 49" (RED, BLUE, GREEN, BLACK)

## (undated) DEVICE — ELCTR PEDICLE SCREW PROBE 3MM BALL 1.8MM X 100MM

## (undated) DEVICE — GLV 7.5 PROTEXIS (WHITE)

## (undated) DEVICE — DRSG XEROFORM 1 X 8"

## (undated) DEVICE — DRSG TELFA 3 X 8

## (undated) DEVICE — MEDTRONIC TRACKER BUNDLE NI

## (undated) DEVICE — DRAPE MAYO STAND 30"

## (undated) DEVICE — ELCTR BOVIE TIP BLADE INSULATED 2.75" EDGE

## (undated) DEVICE — GLV 8.5 PROTEXIS (WHITE)

## (undated) DEVICE — CODMAN PERFORATOR 14MM (BLUE)

## (undated) DEVICE — GLV 6.5 PROTEXIS (WHITE)

## (undated) DEVICE — DRAPE 3/4 SHEET W REINFORCEMENT 56X77"

## (undated) DEVICE — TUBING INSUFFLATION LAP FILTER 10FT

## (undated) DEVICE — DRSG CURITY GAUZE SPONGE 4 X 4" 12-PLY

## (undated) DEVICE — SUT BOOT STANDARD (ASSORTED) 5 PAIR

## (undated) DEVICE — GLV 7 PROTEXIS (WHITE)

## (undated) DEVICE — SUT CHROMIC 3-0 30" V-20

## (undated) DEVICE — ELCTR SUBDERMAL NDL CLASSIC 1.5M X 59" (6 COLOR)

## (undated) DEVICE — SUT VICRYL 2-0 18" CP-2 UNDYED (POP-OFF)

## (undated) DEVICE — SUT SOFSILK 2-0 18" TIES

## (undated) DEVICE — SUT BIOSYN 4-0 18" P-12

## (undated) DEVICE — PACK VP SHUNT

## (undated) DEVICE — SPECIMEN CONTAINER 100ML

## (undated) DEVICE — SUT VICRYL 3-0 18" X-1 (POP-OFF)

## (undated) DEVICE — MARKING PEN W RULER

## (undated) DEVICE — ELCTR BIPOLAR PROBE

## (undated) DEVICE — SYR LUER LOK 20CC

## (undated) DEVICE — GLV 8 PROTEXIS (WHITE)

## (undated) DEVICE — MEDTRONIC AXIEM PATIENT TRACKER NON-INVASIVE

## (undated) DEVICE — SOL IRR POUR H2O 250ML

## (undated) DEVICE — STAPLER SKIN VISI-STAT 35 WIDE

## (undated) DEVICE — MEDICATION LABELS W MARKER

## (undated) DEVICE — VENODYNE/SCD SLEEVE CALF LARGE

## (undated) DEVICE — DRSG TAPE HYPAFIX 4"

## (undated) DEVICE — DRAPE CAMERA VIDEO 7"X96"

## (undated) DEVICE — POSITIONER FOAM EGG CRATE ULNAR 2PCS (PINK)

## (undated) DEVICE — LAP PAD 18 X 18"

## (undated) DEVICE — ELCTR SUBDERMAL CORKSCREW NDL 1.2MM

## (undated) DEVICE — PREP CHLORAPREP HI-LITE ORANGE 26ML

## (undated) DEVICE — DRSG TELFA .5 X 3

## (undated) DEVICE — SOLIDIFIER ISOLYZER 2000 CC

## (undated) DEVICE — WARMING BLANKET LOWER ADULT

## (undated) DEVICE — MEDTRONIC AXIEM STYLET 23CM

## (undated) DEVICE — ELCTR MONOPOLAR STIMULATOR PROBE FLUSH-TIP

## (undated) DEVICE — GOWN TRIMAX LG

## (undated) DEVICE — MIDAS REX LEGEND LUBRICANT DIFFUSER CARTRIDGE